# Patient Record
Sex: FEMALE | Race: WHITE | NOT HISPANIC OR LATINO | Employment: OTHER | URBAN - METROPOLITAN AREA
[De-identification: names, ages, dates, MRNs, and addresses within clinical notes are randomized per-mention and may not be internally consistent; named-entity substitution may affect disease eponyms.]

---

## 2017-10-12 ENCOUNTER — CONVERSION ENCOUNTER (OUTPATIENT)
Dept: RADIOLOGY | Facility: IMAGING CENTER | Age: 42
End: 2017-10-12

## 2017-10-27 RX ORDER — ACETAMINOPHEN,DIPHENHYDRAMINE HCL 500; 25 MG/1; MG/1
1 TABLET, FILM COATED ORAL
COMMUNITY
End: 2017-11-03 | Stop reason: HOSPADM

## 2017-10-27 RX ORDER — ACETAMINOPHEN 500 MG
500 TABLET ORAL EVERY 6 HOURS PRN
COMMUNITY
End: 2017-11-03 | Stop reason: HOSPADM

## 2017-10-27 RX ORDER — ALBUTEROL SULFATE 90 UG/1
2 AEROSOL, METERED RESPIRATORY (INHALATION) EVERY 4 HOURS PRN
COMMUNITY
End: 2018-08-06 | Stop reason: SDUPTHER

## 2017-10-27 RX ORDER — ZOLPIDEM TARTRATE 10 MG/1
10 TABLET ORAL
COMMUNITY
End: 2019-01-22 | Stop reason: SDUPTHER

## 2017-10-27 RX ORDER — NAPROXEN 500 MG/1
500 TABLET ORAL 2 TIMES DAILY WITH MEALS
COMMUNITY
End: 2018-07-05 | Stop reason: HOSPADM

## 2017-10-27 RX ORDER — MONTELUKAST SODIUM 10 MG/1
10 TABLET ORAL EVERY EVENING
COMMUNITY
End: 2018-07-17 | Stop reason: SDUPTHER

## 2017-10-27 RX ORDER — SUMATRIPTAN 50 MG/1
100 TABLET, FILM COATED ORAL ONCE AS NEEDED
COMMUNITY
End: 2019-08-08 | Stop reason: SDUPTHER

## 2017-10-27 NOTE — PRE-PROCEDURE INSTRUCTIONS
Pre-Surgery Instructions:   Medication Instructions    acetaminophen (TYLENOL) 500 mg tablet Instructed patient per Anesthesia Guidelines   albuterol (PROVENTIL HFA,VENTOLIN HFA) 90 mcg/act inhaler Instructed patient per Anesthesia Guidelines   conjugated estrogens (PREMARIN) 0 625 mg tablet Instructed patient per Anesthesia Guidelines   diphenhydrAMINE-acetaminophen (TYLENOL PM)  MG TABS Instructed patient per Anesthesia Guidelines   fluticasone-salmeterol (ADVAIR) 500-50 mcg/dose Instructed patient per Anesthesia Guidelines   HYDROcodone-acetaminophen (VICODIN) 7 5-500 MG per tablet Instructed patient per Anesthesia Guidelines   montelukast (SINGULAIR) 10 mg tablet Instructed patient per Anesthesia Guidelines   naproxen (NAPROSYN) 500 mg tablet Instructed patient per Anesthesia Guidelines   SUMAtriptan (IMITREX) 50 mg tablet Instructed patient per Anesthesia Guidelines   zolpidem (AMBIEN) 10 mg tablet Instructed patient per Anesthesia Guidelines  Pt instructed to take advair in am of surgery and to bring her albuterol inhaler   Instructed to stop NSAIDS and all supplements 7 days prior to surgery

## 2017-10-31 ENCOUNTER — ANESTHESIA EVENT (OUTPATIENT)
Dept: PERIOP | Facility: HOSPITAL | Age: 42
End: 2017-10-31
Payer: COMMERCIAL

## 2017-11-01 ENCOUNTER — TRANSCRIBE ORDERS (OUTPATIENT)
Dept: ADMINISTRATIVE | Facility: HOSPITAL | Age: 42
End: 2017-11-01

## 2017-11-01 ENCOUNTER — HOSPITAL ENCOUNTER (OUTPATIENT)
Dept: CT IMAGING | Facility: HOSPITAL | Age: 42
Discharge: HOME | End: 2017-11-01
Payer: COMMERCIAL

## 2017-11-01 DIAGNOSIS — Z01.818 PRE-OP EVALUATION: ICD-10-CM

## 2017-11-01 DIAGNOSIS — M79.673 PAIN OF FOOT, UNSPECIFIED LATERALITY: ICD-10-CM

## 2017-11-01 DIAGNOSIS — Z01.818 PRE-OP EVALUATION: Primary | ICD-10-CM

## 2017-11-01 PROCEDURE — 73700 CT LOWER EXTREMITY W/O DYE: CPT

## 2017-11-03 ENCOUNTER — HOSPITAL ENCOUNTER (OUTPATIENT)
Facility: HOSPITAL | Age: 42
Setting detail: OUTPATIENT SURGERY
Discharge: HOME | End: 2017-11-03
Attending: PODIATRIST | Admitting: PODIATRIST
Payer: COMMERCIAL

## 2017-11-03 ENCOUNTER — APPOINTMENT (OUTPATIENT)
Dept: RADIOLOGY | Facility: HOSPITAL | Age: 42
End: 2017-11-03
Payer: COMMERCIAL

## 2017-11-03 ENCOUNTER — ANESTHESIA (OUTPATIENT)
Dept: PERIOP | Facility: HOSPITAL | Age: 42
End: 2017-11-03
Payer: COMMERCIAL

## 2017-11-03 VITALS
SYSTOLIC BLOOD PRESSURE: 105 MMHG | DIASTOLIC BLOOD PRESSURE: 73 MMHG | BODY MASS INDEX: 28.7 KG/M2 | HEART RATE: 86 BPM | TEMPERATURE: 98.5 F | OXYGEN SATURATION: 97 % | HEIGHT: 61 IN | RESPIRATION RATE: 16 BRPM | WEIGHT: 152 LBS

## 2017-11-03 DIAGNOSIS — M20.12 ACQUIRED HALLUX VALGUS OF LEFT FOOT: ICD-10-CM

## 2017-11-03 DIAGNOSIS — G89.18 POST-OP PAIN: Primary | ICD-10-CM

## 2017-11-03 PROCEDURE — C1769 GUIDE WIRE: HCPCS | Performed by: PODIATRIST

## 2017-11-03 PROCEDURE — 73630 X-RAY EXAM OF FOOT: CPT

## 2017-11-03 PROCEDURE — C1713 ANCHOR/SCREW BN/BN,TIS/BN: HCPCS | Performed by: PODIATRIST

## 2017-11-03 DEVICE — IMPLANTABLE DEVICE
Type: IMPLANTABLE DEVICE | Site: FOOT | Status: NON-FUNCTIONAL
Removed: 2018-07-05

## 2017-11-03 DEVICE — KIT REPAIR LIGAMENT AUG INTERNALBRACE: Type: IMPLANTABLE DEVICE | Site: FOOT | Status: FUNCTIONAL

## 2017-11-03 DEVICE — ANCHOR SUT MINI 2.4 X 8.5MM DISP: Type: IMPLANTABLE DEVICE | Site: FOOT | Status: FUNCTIONAL

## 2017-11-03 RX ORDER — HYDROCODONE BITARTRATE AND ACETAMINOPHEN 5; 325 MG/1; MG/1
1-2 TABLET ORAL EVERY 6 HOURS PRN
Status: DISCONTINUED | OUTPATIENT
Start: 2017-11-03 | End: 2017-11-03 | Stop reason: HOSPADM

## 2017-11-03 RX ORDER — FENTANYL CITRATE 50 UG/ML
INJECTION, SOLUTION INTRAMUSCULAR; INTRAVENOUS AS NEEDED
Status: DISCONTINUED | OUTPATIENT
Start: 2017-11-03 | End: 2017-11-03 | Stop reason: SURG

## 2017-11-03 RX ORDER — CLINDAMYCIN PHOSPHATE 600 MG/50ML
600 INJECTION INTRAVENOUS ONCE
Status: COMPLETED | OUTPATIENT
Start: 2017-11-03 | End: 2017-11-03

## 2017-11-03 RX ORDER — ROPIVACAINE HYDROCHLORIDE 5 MG/ML
INJECTION, SOLUTION EPIDURAL; INFILTRATION; PERINEURAL AS NEEDED
Status: DISCONTINUED | OUTPATIENT
Start: 2017-11-03 | End: 2017-11-03 | Stop reason: SURG

## 2017-11-03 RX ORDER — DEXAMETHASONE SODIUM PHOSPHATE 4 MG/ML
INJECTION, SOLUTION INTRA-ARTICULAR; INTRALESIONAL; INTRAMUSCULAR; INTRAVENOUS; SOFT TISSUE AS NEEDED
Status: DISCONTINUED | OUTPATIENT
Start: 2017-11-03 | End: 2017-11-03 | Stop reason: SURG

## 2017-11-03 RX ORDER — SODIUM CHLORIDE 9 MG/ML
125 INJECTION, SOLUTION INTRAVENOUS CONTINUOUS
Status: DISCONTINUED | OUTPATIENT
Start: 2017-11-03 | End: 2017-11-03 | Stop reason: HOSPADM

## 2017-11-03 RX ORDER — MIDAZOLAM HYDROCHLORIDE 1 MG/ML
INJECTION INTRAMUSCULAR; INTRAVENOUS AS NEEDED
Status: DISCONTINUED | OUTPATIENT
Start: 2017-11-03 | End: 2017-11-03 | Stop reason: SURG

## 2017-11-03 RX ORDER — ONDANSETRON 2 MG/ML
INJECTION INTRAMUSCULAR; INTRAVENOUS AS NEEDED
Status: DISCONTINUED | OUTPATIENT
Start: 2017-11-03 | End: 2017-11-03 | Stop reason: SURG

## 2017-11-03 RX ORDER — ONDANSETRON 2 MG/ML
4 INJECTION INTRAMUSCULAR; INTRAVENOUS ONCE AS NEEDED
Status: DISCONTINUED | OUTPATIENT
Start: 2017-11-03 | End: 2017-11-03 | Stop reason: HOSPADM

## 2017-11-03 RX ORDER — HYDROCODONE BITARTRATE AND ACETAMINOPHEN 7.5; 325 MG/1; MG/1
1 TABLET ORAL EVERY 6 HOURS PRN
Qty: 30 TABLET | Refills: 0 | Status: SHIPPED | OUTPATIENT
Start: 2017-11-03 | End: 2017-11-13

## 2017-11-03 RX ORDER — ASPIRIN 81 MG/1
81 TABLET ORAL 2 TIMES DAILY
Qty: 30 TABLET | Refills: 0 | Status: SHIPPED | OUTPATIENT
Start: 2017-11-03 | End: 2018-06-29

## 2017-11-03 RX ORDER — FENTANYL CITRATE/PF 50 MCG/ML
25 SYRINGE (ML) INJECTION
Status: COMPLETED | OUTPATIENT
Start: 2017-11-03 | End: 2017-11-03

## 2017-11-03 RX ORDER — MAGNESIUM HYDROXIDE 1200 MG/15ML
LIQUID ORAL AS NEEDED
Status: DISCONTINUED | OUTPATIENT
Start: 2017-11-03 | End: 2017-11-03 | Stop reason: HOSPADM

## 2017-11-03 RX ORDER — GLYCOPYRROLATE 0.2 MG/ML
INJECTION INTRAMUSCULAR; INTRAVENOUS AS NEEDED
Status: DISCONTINUED | OUTPATIENT
Start: 2017-11-03 | End: 2017-11-03 | Stop reason: SURG

## 2017-11-03 RX ORDER — PROPOFOL 10 MG/ML
INJECTION, EMULSION INTRAVENOUS AS NEEDED
Status: DISCONTINUED | OUTPATIENT
Start: 2017-11-03 | End: 2017-11-03 | Stop reason: SURG

## 2017-11-03 RX ADMIN — ONDANSETRON HYDROCHLORIDE 4 MG: 2 INJECTION, SOLUTION INTRAVENOUS at 10:41

## 2017-11-03 RX ADMIN — FENTANYL CITRATE 50 MCG: 50 INJECTION, SOLUTION INTRAMUSCULAR; INTRAVENOUS at 08:10

## 2017-11-03 RX ADMIN — MIDAZOLAM HYDROCHLORIDE 4 MG: 1 INJECTION, SOLUTION INTRAMUSCULAR; INTRAVENOUS at 07:24

## 2017-11-03 RX ADMIN — PROPOFOL 200 MG: 10 INJECTION, EMULSION INTRAVENOUS at 07:40

## 2017-11-03 RX ADMIN — FENTANYL CITRATE 25 MCG: 50 INJECTION, SOLUTION INTRAMUSCULAR; INTRAVENOUS at 11:34

## 2017-11-03 RX ADMIN — SODIUM CHLORIDE: 0.9 INJECTION, SOLUTION INTRAVENOUS at 09:48

## 2017-11-03 RX ADMIN — CLINDAMYCIN PHOSPHATE 900 MG: 12 INJECTION, SOLUTION INTRAMUSCULAR; INTRAVENOUS at 07:37

## 2017-11-03 RX ADMIN — FENTANYL CITRATE 100 MCG: 50 INJECTION, SOLUTION INTRAMUSCULAR; INTRAVENOUS at 07:24

## 2017-11-03 RX ADMIN — SODIUM CHLORIDE 125 ML/HR: 0.9 INJECTION, SOLUTION INTRAVENOUS at 06:54

## 2017-11-03 RX ADMIN — DEXAMETHASONE SODIUM PHOSPHATE 4 MG: 4 INJECTION, SOLUTION INTRAMUSCULAR; INTRAVENOUS at 10:41

## 2017-11-03 RX ADMIN — GLYCOPYRROLATE 0.2 MG: 0.2 INJECTION, SOLUTION INTRAMUSCULAR; INTRAVENOUS at 08:29

## 2017-11-03 RX ADMIN — HYDROCODONE BITARTRATE AND ACETAMINOPHEN 2 TABLET: 5; 325 TABLET ORAL at 13:33

## 2017-11-03 RX ADMIN — MIDAZOLAM HYDROCHLORIDE 2 MG: 1 INJECTION, SOLUTION INTRAMUSCULAR; INTRAVENOUS at 07:35

## 2017-11-03 RX ADMIN — FENTANYL CITRATE 25 MCG: 50 INJECTION, SOLUTION INTRAMUSCULAR; INTRAVENOUS at 12:04

## 2017-11-03 RX ADMIN — ROPIVACAINE HYDROCHLORIDE 30 ML: 5 INJECTION, SOLUTION EPIDURAL; INFILTRATION; PERINEURAL at 07:29

## 2017-11-03 RX ADMIN — LIDOCAINE HYDROCHLORIDE 60 MG: 20 INJECTION, SOLUTION INTRAVENOUS at 07:40

## 2017-11-03 RX ADMIN — FENTANYL CITRATE 25 MCG: 50 INJECTION, SOLUTION INTRAMUSCULAR; INTRAVENOUS at 11:42

## 2017-11-03 RX ADMIN — FENTANYL CITRATE 50 MCG: 50 INJECTION, SOLUTION INTRAMUSCULAR; INTRAVENOUS at 08:21

## 2017-11-03 RX ADMIN — FENTANYL CITRATE 25 MCG: 50 INJECTION, SOLUTION INTRAMUSCULAR; INTRAVENOUS at 11:52

## 2017-11-03 NOTE — OP NOTE
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]     * Congenital pes cavus [Q66 7]    Procedure(s) (LRB):  OSTEOTOMY CALCANEUS (Left)  FUSION FIRST MTPJ: BROSTRUM; LATERAL ANKLE STABILIZATION (Left)    Specimen(s):  * No specimens in log *    Materials: Arthrex 6 7 PT screw  Arthrex 4 5 partially threated screw  Arthrex 3 0 screws (x5)  Arthrex MTP plate short    Estimated Blood Loss:   20 mL    Drains: none       Anesthesia Type:   General w/ Popliteal Block    Operative Indications:  Acquired hallux valgus of left foot [M20 12]  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, 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]  Congenital pes cavus [Q66 7]    Operative Findings:  C/w diagnosis  Bone of adequate quality  Complications:   None    Procedure and Technique:  Under mild sedation, the patient was brought into the operating room and placed on the operating room table in the supine position  A pneumatic thigh tourniquet was then placed around the patient's left thigh with ample webril padding  A time out was performed to confirm the correct patient, procedure and site with all parties in agreement  Preoperatively, patient received a popliteal block the left lower extremity  After general anesthesia, The foot and lower leg were then scrubbed, prepped and draped in the usual aseptic manner   An esmarch bandage was utilized to exsangunate the patients foot and the pneumatic ankle tourniquet was then inflated  The esmarch bandage was removed and the foot was placed on the operating room table  Attention was then directed to the left heel where upon the lateral wall of the calcaneus, an incision in an oblique manner was effected that was full-thickness through the skin  Blunt to sharp dissection was performed to the level of the periosteum of the lateral wall of the calcaneus  Bleeders were cauterized as necessary and a neurovascular and tendinous structures were retracted as necessary  The site of the osteotomy was confirmed via c-arm  Utilizing a sagittal saw, a wedge cut was made until the medial cortex was reached  At this time, the wedge of bone was removed and the osteotomy was closed down in order to swing the calcaneus out of varus deformity  Positioning was confirmed via c-arm  Fixation was placed in the form of one 6 7 screw and one 4 5 Partially threaded screws from the posterior inferior calcaneus through the osteotomy  Adequate compression was noted clinically and radiographically  The incisions were irrigated with normal sterile saline and closed in a layered fashion with 3-0 Vicryl and 3-0 nylon      Attention was directed to the left lateral ankle where a curvilinear skin incision was made along the ATFL and midline of the fibula  Any exposed bleeding vessels were cauterized  Retraction was used to protect vital structures  Blunt dissection was continued down through the subcutaneous layers using a hemostat and scissors  Dissection was continued until the retinaculum was identified      An ankle arthrotomy was effected laterally and the capsule and periosteum was sharply reflected exposing the distal fibula and lateral talus  The inferior and anterior portions of the fibula were roughened using a rongeur and rasp in order to provide better attachment for the Brostrom    The ATFL ligament was observed and noted to be attenuated      The decision to augment the instability was made via Arthrex internal brace  Per  protocol the internal brace was anchored to the non-articular aspect of the lateral talus at the ATFL insertion  Two suture tacks were placed into the distal fibula, all were challenged and noted stable  The brostrom was performed and then the fibertape from the lateral talus was then passed from below the capsule outward and with proper tensioning secured via biotenodesis screw to distal fibula   This was then challenged and noted to be stable  The site was then he irrigated with normal sterile saline  The subcutaneous tissues were reapproximated using 3-0 Vicryl  The skin margins were reapproximated using 3-0 nylon in a horizontal mattress fashion  Attention was then directed to dorsomedial aspect of the Left foot where an approximately 6cm dorsolinear incision was made  This incision was deepened to subcutaneous tissue with a sterile 15 blade  All vital neurovascular structures were identified and then retracted, all bleeders were identified and cauterized  The EHL was then identified and retracted laterally  A capsular incision was then made and all capsular and periosteal structures were reflected off of the 1st metatarsal head and the base of the proximal phalanx, all soft tissue attachments were reflected off the 1st metatarsal head and base of the proximal phalanx  Upon visualization of the MTPJ, it was noted that there was prominent osteophyte formation circumferentially around the joint  A cup and cone reamer was then utilized to remove all cartilaginous surfaces from the head of the first metatarsal and the base of the proximal phalanx down to bleeding subchondral bone in the standard fashion  Adequate reduction and position of the joint was visualized and assured using C-arm   Using standard AO technique a lag screw was placed from distal medial aspect on the proximal phalanx to proximal lateral on the first metatarsal head to achieve compression  The length was confirmed on C-arm  A short MTP plate by arthrex was placed on the dorsal aspect of the 1st MTPJ  This was secured utilized 5 screws in standard AO technique  Adequate reduction of the joint was confirmed radiologically and clinically  The wound was cleansed with copious amounts of sterile saline  The EHL tendon was noted to be partially torn at this time and this was reapproximated using 3-0 Ethibond  Capsular closure was then obtained utilizing 4-0 Vicryl  Subcutaneous tissues were then reapproximated utilizing 4-0 Vicryl  Skin closure was then obtained utilizing 4-0 nylon placed in a horizontal mattress type of fashion  The foot was then cleansed and dried  All incisions were then dressed with Xeroform and dry sterile dressing  The thigh tourniquet was then deflated and a prompt hyperemic response was noted to all digits of the left foot  A posterior splint was then reapplied with an adequate Guzman compression dressing underlying    The patient tolerated the procedure and anesthesia well was then transferred to PACU vital signs stable        Patient Disposition:  PACU     SIGNATURE: Fabian Macias  DATE: November 3, 2017  TIME: 11:35 AM

## 2017-11-03 NOTE — ANESTHESIA POSTPROCEDURE EVALUATION
Post-Op Assessment Note      CV Status:  Stable    Mental Status:  Alert and awake    Hydration Status:  Euvolemic    PONV Controlled:  Controlled    Airway Patency:  Patent    Post Op Vitals Reviewed: Yes          Staff: Anesthesiologist           /73 (11/03/17 1200)    Temp 98 5 °F (36 9 °C) (11/03/17 1200)    Pulse 76 (11/03/17 1200)   Resp 20 (11/03/17 1200)    SpO2 95 % (11/03/17 1200)

## 2017-11-03 NOTE — ANESTHESIA PROCEDURE NOTES
Peripheral Block    Patient location during procedure: holding area  Start time: 11/3/2017 7:24 AM  End time: 11/3/2017 7:29 AM  Reason for block: at surgeon's request and post-op pain management  Staffing  Anesthesiologist: China John  Performed: anesthesiologist   Preanesthetic Checklist  Completed: patient identified, site marked, surgical consent, pre-op evaluation, timeout performed, IV checked, risks and benefits discussed and monitors and equipment checked  Peripheral Block  Patient position: prone  Prep: ChloraPrep  Patient monitoring: heart rate, continuous pulse ox and frequent blood pressure checks  Block type: popliteal  Laterality: left  Injection technique: single-shot  Procedures: ultrasound guided and nerve stimulator  ultrasound permanent image saved    Local infiltration: ropivacaine  Infiltration strength: 0 5 %  Dose: 30 mL  Needle  Needle type: short-bevel   Needle gauge: 22 G  Needle length: 10 cm  Needle localization: nerve stimulator and ultrasound guidance  Assessment  Injection assessment: incremental injection, local visualized surrounding nerve on ultrasound, negative aspiration for heme and no paresthesia on injection  patient tolerated the procedure well with no immediate complications

## 2017-11-03 NOTE — PROGRESS NOTES
Pt returned from xray crying c/o xray technician grabbing and raising her leg in the air which was really painful for her    Tavia Wahl Rn clinical coordinator made aware and informed Malia Roque

## 2017-11-03 NOTE — DISCHARGE INSTRUCTIONS
Post-op surgery Instructions    Pain / Swelling   There is expected to be some discomfort, swelling and bruising of the foot  You might see some blood on the bandage  This is not a cause for alarm  However, if there is active or persistent bleeding (blood running out of the bandage while at rest) - call the office at once (or) go to a [facility] and ask them to page the podiatry residents   Apply an ice bag to the top of your ankle for 30 minutes for each waking hour, for the first 72 hours  This should be discontinued when sleeping  This will also work through your cast if you have one  Ice must not leak and wet the dressings  Also, using the ice inappropriately can cause permanent nerve damage   Your foot should be elevated as much as possible for the first 72 hours  The foot should be above heart level  If your foot is below heart level, throbbing and pain will increase  When sleeping, elevation can be accomplished by putting a small hard suitcase between the box spring and mattress at the foot of the bed  Walking and standing will increase pain, throbbing and bleeding   Persistent pain despite elevation and your pain meds can many times be relieved by removing the tight brown compression layer (called the ACE wrap) that is over the white gauze dressing  If you are elevating and taking your pain meds and pain is still severe, remove this brown stretchy layer but leave the gauze intact  Wait 30 minutes  If the pain subsides, reapply the ACE so its not so tight  If pain doesnt get better, call your doctor  Dressings / Casts   Do not remove your surgical dressings - they will be changed at your doctor appointment  Do not allow surgical dressings to get wet  Sponge baths should be used until the sutures are removed  Do not try to keep the foot dry using a garbage bag and tape - this rarely works  If you get your dressings or cast wet - call your doctor immediately     If your cast or dressings feel tight - elevate your foot for 30 minutes  If this doesnt help and you feel tingling or see toe discoloration - call your doctor or go to a [facility] ER and ask them to page the podiatry residents   Do not put things in your cast such as powder, coat hangers to scratch, etc  This can cause skin damage and infections  Infection   If you have a fever at or above 100 degrees, chills, sweats, or see red streaks rising above the dressing or smell odor / see pus (creamy white drainage), call your doctor immediately or go to a [facility] ER and ask them to page the podiatry residents  Constipation   If you have severe constipation after surgery, this can be due to the pain medication  Notify your doctor and special medication will be prescribed to deal with this  Blood Clots   If you had surgery and are in a cast, have an external fixation device, or are non-weightbearing using crutches, a knee scooter, a wheelchair, a walker, or an iWalk device - you need to be on a blood thinner  Your doctor will prescribe one of the regimens below  If you run out of the blood thinner checked off below before you are walking normally on your foot and out of your cast - notify your doctor immediately so you can get a refill  Not doing so can lead to blood clots and serious complications including death     Aspirin 81 mg two times daily  Please call doctor if fall while taking blood thinner    Numbness   It is normal for your foot to be numb until about dinner time  If youve had a popliteal block procedure, you might be numb until the following day  When you start to feel pins and needles in the foot - this means the block is wearing off  That is the appropriate time to take your pain medication  Pain Medication   Do not supplement your pain medication with over the counter drugs, old leftover pain medications, or extra Tylenol   You must discuss any additional medications with your doctor prior to taking them for pain  Driving   No driving is allowed without discussion with the doctor  Ambulation   Nonweightbearing to surgical foot   If given a flat, stiff shoe / darco wedge shoe, Do not walk at all without it   Use a device (cane, walker, crutches) to take some weight off of the foot when walking   If instructed not to put weight on the surgical foot, use the following:  o Crutches     o Putting weight on the foot will lead to complications

## 2017-11-03 NOTE — ANESTHESIA PREPROCEDURE EVALUATION
Review of Systems/Medical History  Patient summary reviewed        Cardiovascular   Pulmonary  Smoker , Asthma: , ,        GI/Hepatic    GERD well controlled,        Single kidney,        Endo/Other  Arthritis     GYN       Hematology   Musculoskeletal  Back pain ,   Comment: Hx THR      Neurology      Comment: Glaucoma   Psychology   Anxiety, Depression ,            Physical Exam    Airway    Mallampati score: III  TM Distance: >3 FB  Neck ROM: full     Dental   Comment: Bonded right upper front tooth,     Cardiovascular  Rhythm: regular, Rate: normal, Cardiovascular exam normal    Pulmonary  Pulmonary exam normal Breath sounds clear to auscultation,     Other Findings        Anesthesia Plan  ASA Score- 2       Anesthesia Type- general and regional with ASA Monitors  Additional Monitors:   Airway Plan:           Induction- intravenous  Informed Consent- Anesthetic plan and risks discussed with patient

## 2017-11-11 ENCOUNTER — HOSPITAL ENCOUNTER (EMERGENCY)
Facility: HOSPITAL | Age: 42
Discharge: HOME | End: 2017-11-11
Attending: EMERGENCY MEDICINE
Payer: COMMERCIAL

## 2017-11-11 ENCOUNTER — APPOINTMENT (EMERGENCY)
Dept: RADIOLOGY | Facility: HOSPITAL | Age: 42
End: 2017-11-11
Payer: COMMERCIAL

## 2017-11-11 VITALS
TEMPERATURE: 98.1 F | RESPIRATION RATE: 21 BRPM | OXYGEN SATURATION: 98 % | DIASTOLIC BLOOD PRESSURE: 58 MMHG | SYSTOLIC BLOOD PRESSURE: 115 MMHG | HEART RATE: 73 BPM | WEIGHT: 150 LBS

## 2017-11-11 DIAGNOSIS — G89.18 ACUTE POST-OPERATIVE PAIN: ICD-10-CM

## 2017-11-11 DIAGNOSIS — M79.672 FOOT PAIN, LEFT: Primary | ICD-10-CM

## 2017-11-11 PROCEDURE — 73630 X-RAY EXAM OF FOOT: CPT

## 2017-11-11 PROCEDURE — 99283 EMERGENCY DEPT VISIT LOW MDM: CPT

## 2017-11-11 RX ORDER — OXYCODONE HYDROCHLORIDE 5 MG/1
5-15 TABLET ORAL EVERY 6 HOURS PRN
Qty: 30 TABLET | Refills: 0 | Status: SHIPPED | OUTPATIENT
Start: 2017-11-11 | End: 2018-07-05 | Stop reason: HOSPADM

## 2017-11-11 RX ORDER — OXYCODONE HYDROCHLORIDE 5 MG/1
5 TABLET ORAL ONCE
Status: COMPLETED | OUTPATIENT
Start: 2017-11-11 | End: 2017-11-11

## 2017-11-11 RX ORDER — ACETAMINOPHEN 325 MG/1
325 TABLET ORAL ONCE
Status: COMPLETED | OUTPATIENT
Start: 2017-11-11 | End: 2017-11-11

## 2017-11-11 RX ADMIN — ACETAMINOPHEN 325 MG: 325 TABLET ORAL at 23:11

## 2017-11-11 RX ADMIN — OXYCODONE HYDROCHLORIDE 5 MG: 5 TABLET ORAL at 23:11

## 2017-11-12 NOTE — ED ATTENDING ATTESTATION
Rocky Zuniga DO, saw and evaluated the patient  I have discussed the patient with the resident/non-physician practitioner and agree with the resident's/non-physician practitioner's findings, Plan of Care, and MDM as documented in the resident's/non-physician practitioner's note, except where noted  All available labs and Radiology studies were reviewed  At this point I agree with the current assessment done in the Emergency Department  I have conducted an independent evaluation of this patient a history and physical is as follows:  48-year-old female presents for evaluation of severe left foot pain that occurred several hours prior to arrival   Patient states that she got her foot caught on a piece of furniture and believes that she strain part of her foot in her postoperative cast   The patient recently had podiatric surgery and contacted Dr Muriel Zafar advise the patient come to the emergency department for further evaluation  The patient took 2 Norco tablets without relief prior to arrival    The patient was neurovascularly intact on my examination  I discussed the case with the podiatry resident who saw and evaluated the patient in the emergency department  Reviewed the x-ray together and did not note any new fracture  The foot was redressed by Podiatry and the pain management protocol was changed consultation with Dr Muriel Zafar to begin the patient on oxycodone and Tylenol  The patient was improved prior to discharge from the emergency department  The patient will follow up with Podiatry as an outpatient        Critical Care Time  CritCare Time

## 2017-11-12 NOTE — DISCHARGE INSTRUCTIONS
Take oxycodone 5mg 1-3 tablets every 4-6 hours and tylenol extra strength 1 tablet (500mg) every 4-6 hours as needed for severe pain

## 2017-11-12 NOTE — ED PROVIDER NOTES
History  Chief Complaint   Patient presents with    Leg Pain     Patient presents complaining of left sided leg pain, reports "it feels like my leg is in a vice "  Presents in cast, reports had procedure 1 week prior to fuse great toe, reconstruct "heel bone", and repair ligaments  Patient of "Dr Bhavani Chambers", reports "he's calling the resident"  Sensation intact in toes, able to move toes very minimally  Pink skin  25-year-old female presents to the ER with left foot pain for 1 day  Patient is 1 week post reconstructive surgery of left heel and reports that foot slipped in cast last night placing foot in a flexed position primarily on 5th toe for six hours until cast was rewrapped by   Since incident patient has been in a severe pain uncontrolled by current pain regiment prescribed post surgery  Denies shortness of breath, chest pain, claudication, decrease sensation or tingling in foot  History provided by:  Patient   used: No    Leg Pain   Location:  Foot  Time since incident:  1 day  Foot location:  L foot  Pain details:     Quality:  Sharp and aching    Radiates to:  Does not radiate    Severity:  Severe    Onset quality:  Sudden    Duration:  1 day    Timing:  Constant  Chronicity:  New (Pain in addition to generalized discomfort from heel reconstruction )  Relieved by:  Nothing  Worsened by: Activity  Ineffective treatments: Pain Norco    Associated symptoms: no back pain, no fever, no swelling and no tingling    Risk factors: concern for non-accidental trauma        None       Past Medical History:   Diagnosis Date    Asthma     Glaucoma     Renal disorder     Reports single kidney       Past Surgical History:   Procedure Laterality Date    LEG SURGERY         History reviewed  No pertinent family history  I have reviewed and agree with the history as documented      Social History   Substance Use Topics    Smoking status: Current Every Day Smoker tablet 5 mg (5 mg Oral Given 11/11/17 2311)   acetaminophen (TYLENOL) tablet 325 mg (325 mg Oral Given 11/11/17 2311)       Diagnostic Studies  Results Reviewed     None                 XR foot 3+ views LEFT   ED Interpretation by Morgan Sanford (11/11 2311)   No fracture or osteomyelitis to 5th toe or metatarsal                   Procedures  Procedures       Phone Contacts  ED Phone Contact    ED Course  ED Course as of Nov 11 2357   Sat Nov 11, 2017   0458 Plan of care discussed with podiatry  Goal to achieve pain control with oxycodone and Tylenol prior to discharge and continue pain regimen outpatient  MDM  Number of Diagnoses or Management Options  Acute post-operative pain: new and requires workup  Foot pain, left: new and requires workup  Diagnosis management comments: Pain reassessment of left prior to discharge 5/10  The patient (and any family present) verbalized understanding of the discharge instructions and warnings that would necessitate return to the Emergency Department  Gave verbal in addition to written discharge instructions  Specifically highlighted areas of special concern regarding the written and verbal discharge instructions and return precautions  All questions were answered prior to discharge  Amount and/or Complexity of Data Reviewed  Tests in the radiology section of CPT®: ordered and reviewed  Discuss the patient with other providers: yes    Risk of Complications, Morbidity, and/or Mortality  Presenting problems: high  Diagnostic procedures: high  Management options: moderate  General comments: Acute post operative pain likely due to accidental misposition of foot in posterior splint for 6 hours    Foot pain, left- 5th toe and metatarsal   - left foot x-ray: no fractures or osteomyelitis noted to Left 5th toe and metatarsal   - pain management provided  - consulted podiatry and assessed in ED  f/u scheduled       Dr Lee Ann Castaneda agrees with treatment plan and medications prescribed for patient  Patient Progress  Patient progress: stable    CritCare Time    Disposition  Final diagnoses: Foot pain, left   Acute post-operative pain     Time reflects when diagnosis was documented in both MDM as applicable and the Disposition within this note     Time User Action Codes Description Comment    11/11/2017  9:42 PM Carlo Escobar Add [V44 715] Foot pain, left     11/11/2017 11:41 PM Ranjit Wellso Add [G89 18] Acute post-operative pain       ED Disposition     ED Disposition Condition Comment    Discharge  Miguel Garner discharge to home/self care  Condition at discharge: Stable        Follow-up Information     Follow up With Specialties Details Why Contact Info    Monster Arenas DPM Podiatry Go to For further evaluation, As needed [address]  [facility] [place] [address] [address] [facility]  [phone]          Discharge Medication List as of 11/11/2017 11:43 PM      START taking these medications    Details   oxyCODONE (ROXICODONE) 5 mg immediate release tablet Take 1-3 tablets by mouth every 6 (six) hours as needed for moderate pain Max Daily Amount: 60 mg, Starting Sat 11/11/2017, Print           No discharge procedures on file      ED Provider  Electronically Signed by           Elon Merlin, [address]  11/11/17 5990

## 2017-11-12 NOTE — CONSULTS
Consult - Podiatry   Vikavarun Garner 36 y o  female MRN: [id number]  Unit/Bed#: ED 30 Encounter: [id number]    Assessment/Plan     Assessment:  1  Left foot pain secondary to putting pressure for prolonged period of time   2  S/p Left foot calcaneal osteotomy, lateral ankle stabilization and 1st MTPJ fusion on 11/4/17 by Dr Inez Cisneros:  - patient is afebrile with VSS, appears to be non toxic  - pain on palpation of the left 5th toe and 5th metatarsal however no bruising noted to the skin and no abnormality noted on the xray  Pain is likely soft tissue origin from having pressure to the area for prolonged period of time   - Xray images reviewed and no abnormalities were noted by me; official reading pending   - surgical site assessed which appears to be stable without any acute signs of infection   - dressings re-applied with xeroform, gauze, webril, posterior splint and ACE wrap   - will switch pain regimen to oxycodone 5mg tablet every 4-6 hours and tylenol extra strength 1 tablet (500mg) every 4-6 hours as needed for severe pain (off note, patient has taken percocet in the past with minimal reaction of hives however denies any respiratory or BP problems with it)  - discussed this plan with Dr Vinicio Nichole and Dr Myriam Zafar     History of Present Illness     HPI:  Ned Arechiga is a 36 y o  female who presents with left foot pain since 2AM last night secondary to having her left foot caught up in her bed metal part  She states her foot was stuck in varus position with pressure on the lateral aspect of the left foot for approximately 6 hours  She states there was no one home to help her to get the foot out of that position  She complains of 14/10 pain to the left lateral foot and 5th toe   Also, she showed me bruising on her right thigh which she states happened secondary to X-ray technician trying to get images of her foot post operatively by manipulating her foot as she was not able to move her leg secondary to popliteal block  However she does states that she tends to easily bruise  Denies any nausea, vomiting, fever, chills, shortness of breath or chest pain  She states she took 1 norco pill at 5pm then another one at 6pm which did not help her with pain  Her  states he unwrapped the top layers of the dressing with no improvement in pain  Inpatient consult to Podiatry  Consult performed by: Lara Dai ordered by: Lisa Toussaint        Review of Systems   Constitutional: Negative  HENT: Negative  Eyes: Negative  Respiratory: Negative  Cardiovascular: LLE swelling   Gastrointestinal: Negative  Musculoskeletal: Left foot pain   Skin:surgical closure   Neurological: Numbness and burning from time to time   Psych: negative  Historical Information   Past Medical History:   Diagnosis Date    Asthma     Glaucoma     Renal disorder     Reports single kidney     Past Surgical History:   Procedure Laterality Date    LEG SURGERY       Social History   History   Alcohol Use No     History   Drug Use No     History   Smoking Status    Current Every Day Smoker    Packs/day: 0 25   Smokeless Tobacco    Never Used     Family History: History reviewed  No pertinent family history      Meds/Allergies     (Not in a hospital admission)  Allergies   Allergen Reactions    Morphine And Related     Penicillins        Objective   First Vitals:   Blood Pressure: 119/56 (11/11/17 2058)  Pulse: 77 (11/11/17 2058)  Temperature: 98 1 °F (36 7 °C) (11/11/17 2058)  Temp Source: Oral (11/11/17 6234)  Respirations: 20 (11/11/17 0446)  Weight - Scale: 68 kg (150 lb) (11/11/17 2058)  SpO2: 98 % (11/11/17 2058)    Current Vitals:   Blood Pressure: 119/56 (11/11/17 2058)  Pulse: 77 (11/11/17 2058)  Temperature: 98 1 °F (36 7 °C) (11/11/17 2058)  Temp Source: Oral (11/11/17 5413)  Respirations: 20 (11/11/17 8773)  Weight - Scale: 68 kg (150 lb) (11/11/17 2058)  SpO2: 98 % (11/11/17 2058)        /56 Pulse 77   Temp 98 1 °F (36 7 °C) (Oral)   Resp 20   Wt 68 kg (150 lb)   SpO2 98%      General Appearance:    Alert, cooperative, no distress   Head:    Normocephalic, without obvious abnormality, atraumatic   Eyes:    PERRL, conjunctiva/corneas clear, EOM's intact        Nose:   Moist mucous membranes   Neck:   Supple, symmetrical, trachea midline   Back:     Symmetric   Lungs:     Respirations unlabored   Heart:    Regular rate and rhythm, S1 and S2 normal, no murmur, rub   or gallop   Abdomen:     Soft, non-tender   Extremities:   Motor function diminished to the digits on the left foot likely secondary to edema  Pain noted on palpation of the left 5th toe and along 5th metatarsal  No pain on squeeze of the calf  Pulses:   DP/PT 2/4 b/l  Cap refill WNL  Moderate edema to the LLE secondary to surgery  Skin:   Surgical sites are well coapted with sutures intact  No signs of necrosis or dehiscence or infection  Neurologic:   Gross sensation is intact  Left lateral heel                                                  Left foot and leg             Lab Results:   No visits with results within 1 Day(s) from this visit  Latest known visit with results is:   No results found for any previous visit  Invalid input(s): LABAEARO            Imaging: I have personally reviewed pertinent films in PACS  EKG, Pathology, and Other Studies: I have personally reviewed pertinent reports        Code Status: No Order  Advance Directive and Living Will:      Power of :    POLST:

## 2018-01-03 ENCOUNTER — APPOINTMENT (OUTPATIENT)
Dept: PHYSICAL THERAPY | Facility: REHABILITATION | Age: 43
End: 2018-01-03
Payer: COMMERCIAL

## 2018-01-03 PROCEDURE — G8979 MOBILITY GOAL STATUS: HCPCS

## 2018-01-03 PROCEDURE — 97162 PT EVAL MOD COMPLEX 30 MIN: CPT

## 2018-01-03 PROCEDURE — G8978 MOBILITY CURRENT STATUS: HCPCS

## 2018-01-05 ENCOUNTER — APPOINTMENT (OUTPATIENT)
Dept: PHYSICAL THERAPY | Facility: REHABILITATION | Age: 43
End: 2018-01-05
Payer: COMMERCIAL

## 2018-01-08 ENCOUNTER — APPOINTMENT (OUTPATIENT)
Dept: PHYSICAL THERAPY | Facility: REHABILITATION | Age: 43
End: 2018-01-08
Payer: COMMERCIAL

## 2018-01-10 ENCOUNTER — APPOINTMENT (OUTPATIENT)
Dept: PHYSICAL THERAPY | Facility: REHABILITATION | Age: 43
End: 2018-01-10
Payer: COMMERCIAL

## 2018-04-05 ENCOUNTER — TRANSCRIBE ORDERS (OUTPATIENT)
Dept: ADMINISTRATIVE | Facility: HOSPITAL | Age: 43
End: 2018-04-05

## 2018-04-05 DIAGNOSIS — M79.672 LEFT FOOT PAIN: Primary | ICD-10-CM

## 2018-05-10 ENCOUNTER — TRANSCRIBE ORDERS (OUTPATIENT)
Dept: RADIOLOGY | Facility: HOSPITAL | Age: 43
End: 2018-05-10

## 2018-05-10 ENCOUNTER — HOSPITAL ENCOUNTER (OUTPATIENT)
Dept: RADIOLOGY | Facility: HOSPITAL | Age: 43
Discharge: HOME | End: 2018-05-10
Attending: PODIATRIST

## 2018-05-10 DIAGNOSIS — M79.672 LEFT FOOT PAIN: ICD-10-CM

## 2018-05-11 ENCOUNTER — HOSPITAL ENCOUNTER (OUTPATIENT)
Dept: RADIOLOGY | Facility: HOSPITAL | Age: 43
Discharge: HOME | End: 2018-05-11
Attending: PODIATRIST
Payer: COMMERCIAL

## 2018-05-11 DIAGNOSIS — M79.672 LEFT FOOT PAIN: ICD-10-CM

## 2018-05-11 PROCEDURE — 20605 DRAIN/INJ JOINT/BURSA W/O US: CPT

## 2018-05-11 PROCEDURE — 77012 CT SCAN FOR NEEDLE BIOPSY: CPT

## 2018-05-11 RX ORDER — BUPIVACAINE HYDROCHLORIDE 2.5 MG/ML
2 INJECTION, SOLUTION EPIDURAL; INFILTRATION; INTRACAUDAL ONCE
Status: COMPLETED | OUTPATIENT
Start: 2018-05-11 | End: 2018-05-11

## 2018-05-11 RX ORDER — LIDOCAINE HYDROCHLORIDE 10 MG/ML
10 INJECTION, SOLUTION INFILTRATION; PERINEURAL ONCE
Status: COMPLETED | OUTPATIENT
Start: 2018-05-11 | End: 2018-05-11

## 2018-05-11 RX ORDER — METHYLPREDNISOLONE ACETATE 80 MG/ML
80 INJECTION, SUSPENSION INTRA-ARTICULAR; INTRALESIONAL; INTRAMUSCULAR; SOFT TISSUE ONCE
Status: COMPLETED | OUTPATIENT
Start: 2018-05-11 | End: 2018-05-11

## 2018-05-11 RX ADMIN — LIDOCAINE HYDROCHLORIDE 10 ML: 10 INJECTION, SOLUTION INFILTRATION; PERINEURAL at 13:48

## 2018-05-11 RX ADMIN — METHYLPREDNISOLONE ACETATE 80 MG: 80 INJECTION, SUSPENSION INTRA-ARTICULAR; INTRALESIONAL; INTRAMUSCULAR; SOFT TISSUE at 13:48

## 2018-05-11 RX ADMIN — BUPIVACAINE HYDROCHLORIDE 2 ML: 2.5 INJECTION, SOLUTION EPIDURAL; INFILTRATION; INTRACAUDAL at 13:47

## 2018-06-21 LAB
ANION GAP SERPL CALCULATED.3IONS-SCNC: 6 MMOL/L (ref 5–14)
BUN SERPL-MCNC: 17 MG/DL (ref 5–25)
CALCIUM SERPL-MCNC: 10.2 MG/DL (ref 8.4–10.2)
CHLORIDE SERPL-SCNC: 105 MEQ/L (ref 97–108)
CO2 SERPL-SCNC: 30 MMOL/L (ref 22–30)
CREATINE, SERUM (HISTORICAL): 0.85 MG/DL (ref 0.6–1.2)
DEPRECATED RDW RBC AUTO: 13.6 %
EGFR (HISTORICAL): >60 ML/MIN/1.73 M2
GLUCOSE SERPL-MCNC: 93 MG/DL (ref 70–99)
HCT VFR BLD AUTO: 40.7 % (ref 36–46)
HGB BLD-MCNC: 13.8 G/DL (ref 12–16)
MCH RBC QN AUTO: 31.3 PG (ref 26–34)
MCHC RBC AUTO-ENTMCNC: 33.8 % (ref 31–36)
MCV RBC AUTO: 93 FL (ref 80–100)
PLATELET # BLD AUTO: 219 K/MCL (ref 150–450)
POTASSIUM SERPL-SCNC: 5.7 MEQ/L (ref 3.6–5)
PT - I.N. RATIO (HISTORICAL): 0.9 RATIO (INR) (ref 0.89–1.1)
PT, PATIENT (HISTORICAL): 9.6 SEC (ref 9.5–11.6)
RBC # BLD AUTO: 4.39 M/MCL (ref 4–5.2)
SODIUM SERPL-SCNC: 141 MEQ/L (ref 137–147)
WBC # BLD AUTO: 6.6 K/MCL (ref 4.5–11)

## 2018-06-26 ENCOUNTER — TELEPHONE (OUTPATIENT)
Dept: FAMILY MEDICINE CLINIC | Facility: CLINIC | Age: 43
End: 2018-06-26

## 2018-06-26 DIAGNOSIS — R22.42 LUMP OF SKIN OF LEFT LOWER EXTREMITY: Primary | ICD-10-CM

## 2018-06-26 RX ORDER — BUDESONIDE AND FORMOTEROL FUMARATE DIHYDRATE 160; 4.5 UG/1; UG/1
2 AEROSOL RESPIRATORY (INHALATION) 2 TIMES DAILY
COMMUNITY
End: 2018-08-27

## 2018-06-26 RX ORDER — DIAZEPAM 5 MG/1
5 TABLET ORAL DAILY
COMMUNITY
End: 2018-06-29

## 2018-06-26 RX ORDER — SERTRALINE HYDROCHLORIDE 25 MG/1
25 TABLET, FILM COATED ORAL DAILY
COMMUNITY
End: 2018-08-22 | Stop reason: SDUPTHER

## 2018-06-26 RX ORDER — OXYCODONE HYDROCHLORIDE AND ACETAMINOPHEN 5; 325 MG/1; MG/1
1 TABLET ORAL EVERY 6 HOURS PRN
COMMUNITY
End: 2018-07-05 | Stop reason: HOSPADM

## 2018-06-26 RX ORDER — IBUPROFEN 600 MG/1
TABLET ORAL 3 TIMES DAILY
Status: ON HOLD | COMMUNITY
End: 2018-07-02 | Stop reason: SDUPTHER

## 2018-06-26 RX ORDER — ALBUTEROL SULFATE 2.5 MG/3ML
2.5 SOLUTION RESPIRATORY (INHALATION) EVERY 6 HOURS PRN
COMMUNITY
End: 2019-01-22 | Stop reason: SDUPTHER

## 2018-06-26 NOTE — TELEPHONE ENCOUNTER
U/S was put in and pt is aware Mri was denied and to get the U/S done  Pt understands and will go either Thursday or Friday   Her Surgery for her foot is Monday

## 2018-06-29 ENCOUNTER — ANESTHESIA EVENT (OUTPATIENT)
Dept: PERIOP | Facility: HOSPITAL | Age: 43
End: 2018-06-29
Payer: COMMERCIAL

## 2018-06-29 RX ORDER — GABAPENTIN 100 MG/1
300 CAPSULE ORAL 3 TIMES DAILY
COMMUNITY
End: 2018-11-22 | Stop reason: SDUPTHER

## 2018-06-29 NOTE — PRE-PROCEDURE INSTRUCTIONS
Pre-op Showering Instructions for Surgery Patients    Before your operation, you play an important role in decreasing your risk for infection by washing with special antiseptic soap  This is an effective way to reduce bacteria on the skin which may help to prevent infections at the surgical site  Please read the following directions in advance  1  In the week before your operation, purchase a 4 ounce bottle of antiseptic soap containing chlorhexidine gluconate (CHG)  4%  Some brand names include: Aplicare®, Endure, and Hibiclens®  The cost is usually less than $5 00   For your convenience, the [employer] carries the soap   It may also be available at your doctors office or pre-admission testing center, and at most retail pharmacies   If you are allergic or sensitive to soaps containing CHG, please let your doctor know so another antiseptic can be suggested   CHG antiseptic soap is for external use only  2  The day before your operation, follow these instructions carefully to get ready   Please clean linens (sheets) on your bed; you should sleep on clean sheets after your evening shower   Get clean towels and washcloth ready - you need enough for 2 showers   Set aside clean underwear, pajamas, and clothing to wear after the showers     Reminders:   DO NOT use any other soap or body rinse on your skin during or after the antiseptic showers   DO NOT use lotion, powder, deodorant, or perfume/aftershave of any kind on your skin after your antiseptic shower   DO NOT shave any body parts in the 24 hours/day before your operation   DO NOT get the antiseptic soap in your eyes, ears, nose, mouth, or vaginal area    3  You will need to shower the night before AND the morning of your surgery  Shower 1:   The first evening before the operation, take the first shower   First, shampoo your hair with regular shampoo and rinse it completely before you use the antiseptic soap   After washing and rinsing your hair, rinse your body   Next, use a clean washcloth to apply the antiseptic soap and wash your body from the neck down to your toes using ½ bottle of the antiseptic soap  You will use the other ½ bottle for the second shower   Clean the area where your incision will be; lather this area well for about 2 minutes   If you are having head or neck surgery, wash areas with the antiseptic soap   Rinse yourself completely with running water   Use a clean towel to dry off   Wear clean underwear and clothing/pajamas  Shower 2   The morning of your operation, take the second shower following the same steps as Shower 1 using the second ½ of the bottle of antiseptic soap   Use clean cloths and towels to wash and dry yourself   Wear clean underwear and clothing    Pre-Surgery Instructions:   Medication Instructions    albuterol (2 5 mg/3 mL) 0 083 % nebulizer solution Instructed patient per Anesthesia Guidelines   budesonide-formoterol (SYMBICORT) 160-4 5 mcg/act inhaler Instructed patient per Anesthesia Guidelines   Ergocalciferol (VITAMIN D2 PO) Instructed patient per Anesthesia Guidelines   gabapentin (NEURONTIN) 100 mg capsule Instructed patient per Anesthesia Guidelines   ibuprofen (MOTRIN) 600 mg tablet Instructed patient per Anesthesia Guidelines   Ipratropium-Albuterol (COMBIVENT IN) Instructed patient per Anesthesia Guidelines   Ipratropium-Albuterol (COMBIVENT RESPIMAT IN) Instructed patient per Anesthesia Guidelines   oxyCODONE-acetaminophen (PERCOCET) 5-325 mg per tablet Instructed patient per Anesthesia Guidelines   sertraline (ZOLOFT) 25 mg tablet Instructed patient per Anesthesia Guidelines   [DISCONTINUED] diazepam (VALIUM) 5 mg tablet Instructed patient per Anesthesia Guidelines

## 2018-06-29 NOTE — ANESTHESIA PREPROCEDURE EVALUATION
Review of Systems/Medical History  Patient summary reviewed  Chart reviewed      Cardiovascular  Exercise tolerance (METS): >4,     Pulmonary  Smoker cigarette smoker  , Tobacco cessation counseling given Cumulative Pack Years: 21, Pneumonia: 0 , Asthma , well controlled/ stable Last rescue: today Asthma type of rescue: daily inhaler, No shortness of breath, No sleep apnea ,        GI/Hepatic  Negative GI/hepatic ROS          Negative  ROS        Endo/Other    Obesity    GYN  , Prior pregnancy/OB history ,   Hysterectomy,        Hematology  Negative hematology ROS      Musculoskeletal  Negative musculoskeletal ROS Back pain (no acute changes) , lumbar pain,   Arthritis     Neurology    Headaches,    Psychology   Anxiety, Depression , being treated for depression,   Chronic pain,            Physical Exam    Airway    Mallampati score: II  TM Distance: >3 FB  Neck ROM: full     Dental   implants,     Cardiovascular  Rhythm: regular, Rate: normal,     Pulmonary  Pulmonary exam normal     Other Findings        Anesthesia Plan  ASA Score- 2     Anesthesia Type- general with ASA Monitors  Additional Monitors:   Airway Plan: LMA  Plan Factors- Patient instructed to abstain from smoking on day of procedure  Patient smoked on day of surgery  Induction- intravenous  Postoperative Plan- Plan for postoperative opioid use  Informed Consent- Anesthetic plan and risks discussed with patient and spouse  I personally reviewed this patient with the CRNA  Discussed and agreed on the Anesthesia Plan with the CRNA  Bernard Ruffin

## 2018-07-02 ENCOUNTER — HOSPITAL ENCOUNTER (OUTPATIENT)
Facility: HOSPITAL | Age: 43
Setting detail: OUTPATIENT SURGERY
Discharge: HOME | End: 2018-07-02
Attending: PODIATRIST | Admitting: PODIATRIST
Payer: COMMERCIAL

## 2018-07-02 ENCOUNTER — ANESTHESIA (OUTPATIENT)
Dept: PERIOP | Facility: HOSPITAL | Age: 43
End: 2018-07-02
Payer: COMMERCIAL

## 2018-07-02 ENCOUNTER — HOSPITAL ENCOUNTER (OUTPATIENT)
Dept: RADIOLOGY | Facility: HOSPITAL | Age: 43
Setting detail: OUTPATIENT SURGERY
Discharge: HOME | End: 2018-07-02
Payer: COMMERCIAL

## 2018-07-02 VITALS
OXYGEN SATURATION: 98 % | HEART RATE: 53 BPM | RESPIRATION RATE: 18 BRPM | WEIGHT: 154 LBS | SYSTOLIC BLOOD PRESSURE: 115 MMHG | DIASTOLIC BLOOD PRESSURE: 59 MMHG | TEMPERATURE: 97.7 F | HEIGHT: 61 IN | BODY MASS INDEX: 29.07 KG/M2

## 2018-07-02 DIAGNOSIS — R52 PAIN: Primary | ICD-10-CM

## 2018-07-02 DIAGNOSIS — Z98.890 STATUS POST HARDWARE REMOVAL: ICD-10-CM

## 2018-07-02 RX ORDER — ONDANSETRON 2 MG/ML
4 INJECTION INTRAMUSCULAR; INTRAVENOUS ONCE AS NEEDED
Status: DISCONTINUED | OUTPATIENT
Start: 2018-07-02 | End: 2018-07-02 | Stop reason: HOSPADM

## 2018-07-02 RX ORDER — DIPHENHYDRAMINE HYDROCHLORIDE 50 MG/ML
12.5 INJECTION INTRAMUSCULAR; INTRAVENOUS ONCE AS NEEDED
Status: DISCONTINUED | OUTPATIENT
Start: 2018-07-02 | End: 2018-07-02 | Stop reason: HOSPADM

## 2018-07-02 RX ORDER — IBUPROFEN 600 MG/1
600 TABLET ORAL 3 TIMES DAILY
Qty: 45 TABLET | Refills: 0 | Status: SHIPPED | OUTPATIENT
Start: 2018-07-02 | End: 2018-08-08 | Stop reason: SDUPTHER

## 2018-07-02 RX ORDER — MIDAZOLAM HYDROCHLORIDE 1 MG/ML
INJECTION INTRAMUSCULAR; INTRAVENOUS AS NEEDED
Status: DISCONTINUED | OUTPATIENT
Start: 2018-07-02 | End: 2018-07-02 | Stop reason: SURG

## 2018-07-02 RX ORDER — FENTANYL CITRATE/PF 50 MCG/ML
25 SYRINGE (ML) INJECTION
Status: DISCONTINUED | OUTPATIENT
Start: 2018-07-02 | End: 2018-07-02 | Stop reason: HOSPADM

## 2018-07-02 RX ORDER — SODIUM CHLORIDE, SODIUM GLUCONATE, SODIUM ACETATE, POTASSIUM CHLORIDE, MAGNESIUM CHLORIDE, SODIUM PHOSPHATE, DIBASIC, AND POTASSIUM PHOSPHATE .53; .5; .37; .037; .03; .012; .00082 G/100ML; G/100ML; G/100ML; G/100ML; G/100ML; G/100ML; G/100ML
INJECTION, SOLUTION INTRAVENOUS CONTINUOUS PRN
Status: DISCONTINUED | OUTPATIENT
Start: 2018-07-02 | End: 2018-07-02 | Stop reason: SURG

## 2018-07-02 RX ORDER — CLINDAMYCIN PHOSPHATE 600 MG/50ML
600 INJECTION INTRAVENOUS ONCE
Status: COMPLETED | OUTPATIENT
Start: 2018-07-02 | End: 2018-07-02

## 2018-07-02 RX ORDER — FENTANYL CITRATE 50 UG/ML
INJECTION, SOLUTION INTRAMUSCULAR; INTRAVENOUS AS NEEDED
Status: DISCONTINUED | OUTPATIENT
Start: 2018-07-02 | End: 2018-07-02 | Stop reason: SURG

## 2018-07-02 RX ORDER — PROPOFOL 10 MG/ML
INJECTION, EMULSION INTRAVENOUS AS NEEDED
Status: DISCONTINUED | OUTPATIENT
Start: 2018-07-02 | End: 2018-07-02 | Stop reason: SURG

## 2018-07-02 RX ORDER — SODIUM CHLORIDE, SODIUM GLUCONATE, SODIUM ACETATE, POTASSIUM CHLORIDE AND MAGNESIUM CHLORIDE 526; 502; 368; 37; 30 MG/100ML; MG/100ML; MG/100ML; MG/100ML; MG/100ML
75 INJECTION, SOLUTION INTRAVENOUS CONTINUOUS
Status: DISCONTINUED | OUTPATIENT
Start: 2018-07-02 | End: 2018-07-02 | Stop reason: HOSPADM

## 2018-07-02 RX ORDER — DEXAMETHASONE SODIUM PHOSPHATE 4 MG/ML
4 INJECTION, SOLUTION INTRA-ARTICULAR; INTRALESIONAL; INTRAMUSCULAR; INTRAVENOUS; SOFT TISSUE ONCE
Status: DISCONTINUED | OUTPATIENT
Start: 2018-07-02 | End: 2018-07-02 | Stop reason: HOSPADM

## 2018-07-02 RX ORDER — LIDOCAINE HYDROCHLORIDE 10 MG/ML
INJECTION, SOLUTION INFILTRATION; PERINEURAL AS NEEDED
Status: DISCONTINUED | OUTPATIENT
Start: 2018-07-02 | End: 2018-07-02 | Stop reason: SURG

## 2018-07-02 RX ADMIN — LIDOCAINE HYDROCHLORIDE 50 MG: 10 INJECTION, SOLUTION INFILTRATION; PERINEURAL at 15:42

## 2018-07-02 RX ADMIN — SODIUM CHLORIDE, SODIUM GLUCONATE, SODIUM ACETATE, POTASSIUM CHLORIDE, MAGNESIUM CHLORIDE, SODIUM PHOSPHATE, DIBASIC, AND POTASSIUM PHOSPHATE: .53; .5; .37; .037; .03; .012; .00082 INJECTION, SOLUTION INTRAVENOUS at 15:20

## 2018-07-02 RX ADMIN — PROPOFOL 200 MG: 10 INJECTION, EMULSION INTRAVENOUS at 15:42

## 2018-07-02 RX ADMIN — MIDAZOLAM HYDROCHLORIDE 2 MG: 1 INJECTION, SOLUTION INTRAMUSCULAR; INTRAVENOUS at 15:35

## 2018-07-02 RX ADMIN — CLINDAMYCIN IN 5 PERCENT DEXTROSE 600 MG: 12 INJECTION, SOLUTION INTRAVENOUS at 15:30

## 2018-07-02 RX ADMIN — SODIUM CHLORIDE, SODIUM GLUCONATE, SODIUM ACETATE, POTASSIUM CHLORIDE AND MAGNESIUM CHLORIDE 75 ML/HR: 526; 502; 368; 37; 30 INJECTION, SOLUTION INTRAVENOUS at 14:56

## 2018-07-02 RX ADMIN — FENTANYL CITRATE 100 MCG: 50 INJECTION INTRAMUSCULAR; INTRAVENOUS at 15:35

## 2018-07-02 RX ADMIN — DEXAMETHASONE SODIUM PHOSPHATE 4 MG: 10 INJECTION INTRAMUSCULAR; INTRAVENOUS at 15:45

## 2018-07-02 NOTE — ANESTHESIA PROCEDURE NOTES
Peripheral Block    Patient location during procedure: holding area  Start time: 7/2/2018 3:19 PM  Removal time: 7/2/2018 3:26 PM  Reason for block: at surgeon's request and post-op pain management  Staffing  Anesthesiologist: Della Jordan  Resident/CRNA: Edu Kong  Performed: anesthesiologist and resident/CRNA   Preanesthetic Checklist  Completed: patient identified, site marked, surgical consent, pre-op evaluation, timeout performed, IV checked, risks and benefits discussed and monitors and equipment checked  Peripheral Block  Patient position: prone  Prep: ChloraPrep  Patient monitoring: heart rate, cardiac monitor, continuous pulse ox and frequent blood pressure checks  Block type: popliteal  Laterality: left  Injection technique: single-shot  Procedures: nerve stimulator  Local infiltration: ropivacaine  Infiltration strength: 0 5 %  Dose: 30 mL  Needle  Needle type: Stimuplex   Needle gauge: 22 G  Needle length: 10 cm  Needle localization: anatomical landmarks and nerve stimulator  Needle insertion depth: 3 cm  Assessment  Injection assessment: incremental injection, negative aspiration for heme and no paresthesia on injection  Paresthesia pain: none  Heart rate change: no  Slow fractionated injection: yes  Post-procedure:  sterile dressing applied  patient tolerated the procedure well with no immediate complications

## 2018-07-02 NOTE — ANESTHESIA POSTPROCEDURE EVALUATION
Post-Op Assessment Note      CV Status:  Stable    Mental Status:  Alert    Hydration Status:  Stable    PONV Controlled:  Controlled    Airway Patency:  Patent    Post Op Vitals Reviewed: No          Staff: AnesthesiologistCHERELLE           /66 (07/02/18 1612)    Temp (!) 97 4 °F (36 3 °C) (07/02/18 1612)    Pulse 60 (07/02/18 1612)   Resp 20 (07/02/18 1612)    SpO2 100 % (07/02/18 1612)

## 2018-07-02 NOTE — ANESTHESIA POSTPROCEDURE EVALUATION
Post-Op Assessment Note      CV Status:  Stable    Mental Status:  Alert and awake    Hydration Status:  Euvolemic    PONV Controlled:  Controlled    Airway Patency:  Patent    Post Op Vitals Reviewed: Yes          Staff: CRNA           BP (P) 110/66 (07/02/18 1612)    Temp (!) (P) 97 4 °F (36 3 °C) (07/02/18 1612)    Pulse (P) 60 (07/02/18 1612)   Resp (P) 20 (07/02/18 1612)    SpO2 (P) 100 % (07/02/18 1612)

## 2018-07-02 NOTE — ANESTHESIA POSTPROCEDURE EVALUATION
Post-Op Assessment Note      CV Status:  Stable    Mental Status:  Alert    Hydration Status:  Stable    PONV Controlled:  Controlled    Airway Patency:  Patent    Post Op Vitals Reviewed: No          Staff: Anesthesiologist, CRNA           BP      Temp     Pulse     Resp      SpO2

## 2018-07-05 ENCOUNTER — ANESTHESIA EVENT (OUTPATIENT)
Dept: PERIOP | Facility: HOSPITAL | Age: 43
End: 2018-07-05
Payer: COMMERCIAL

## 2018-07-05 ENCOUNTER — APPOINTMENT (OUTPATIENT)
Dept: RADIOLOGY | Facility: HOSPITAL | Age: 43
End: 2018-07-05
Payer: COMMERCIAL

## 2018-07-05 ENCOUNTER — HOSPITAL ENCOUNTER (OUTPATIENT)
Facility: HOSPITAL | Age: 43
Setting detail: OUTPATIENT SURGERY
Discharge: HOME | End: 2018-07-05
Attending: PODIATRIST | Admitting: PODIATRIST
Payer: COMMERCIAL

## 2018-07-05 ENCOUNTER — HOSPITAL ENCOUNTER (OUTPATIENT)
Dept: RADIOLOGY | Facility: HOSPITAL | Age: 43
Setting detail: OUTPATIENT SURGERY
Discharge: HOME | End: 2018-07-05
Payer: COMMERCIAL

## 2018-07-05 ENCOUNTER — ANESTHESIA (OUTPATIENT)
Dept: PERIOP | Facility: HOSPITAL | Age: 43
End: 2018-07-05
Payer: COMMERCIAL

## 2018-07-05 VITALS
RESPIRATION RATE: 18 BRPM | HEART RATE: 61 BPM | OXYGEN SATURATION: 98 % | TEMPERATURE: 98.6 F | DIASTOLIC BLOOD PRESSURE: 73 MMHG | SYSTOLIC BLOOD PRESSURE: 135 MMHG

## 2018-07-05 DIAGNOSIS — Z98.890 S/P FOOT SURGERY, LEFT: Primary | ICD-10-CM

## 2018-07-05 DIAGNOSIS — Z98.890 STATUS POST HARDWARE REMOVAL: ICD-10-CM

## 2018-07-05 DIAGNOSIS — Z98.890 S/P HARDWARE REMOVAL: ICD-10-CM

## 2018-07-05 PROCEDURE — 73620 X-RAY EXAM OF FOOT: CPT

## 2018-07-05 PROCEDURE — 73630 X-RAY EXAM OF FOOT: CPT

## 2018-07-05 RX ORDER — FENTANYL CITRATE/PF 50 MCG/ML
25 SYRINGE (ML) INJECTION
Status: DISCONTINUED | OUTPATIENT
Start: 2018-07-05 | End: 2018-07-05 | Stop reason: HOSPADM

## 2018-07-05 RX ORDER — ROCURONIUM BROMIDE 10 MG/ML
INJECTION, SOLUTION INTRAVENOUS AS NEEDED
Status: DISCONTINUED | OUTPATIENT
Start: 2018-07-05 | End: 2018-07-05 | Stop reason: SURG

## 2018-07-05 RX ORDER — OXYCODONE HYDROCHLORIDE AND ACETAMINOPHEN 5; 325 MG/1; MG/1
TABLET ORAL
Status: DISPENSED
Start: 2018-07-05 | End: 2018-07-06

## 2018-07-05 RX ORDER — ROPIVACAINE HYDROCHLORIDE 2 MG/ML
INJECTION, SOLUTION EPIDURAL; INFILTRATION; PERINEURAL AS NEEDED
Status: DISCONTINUED | OUTPATIENT
Start: 2018-07-05 | End: 2018-07-05 | Stop reason: SURG

## 2018-07-05 RX ORDER — DEXAMETHASONE SODIUM PHOSPHATE 4 MG/ML
INJECTION, SOLUTION INTRA-ARTICULAR; INTRALESIONAL; INTRAMUSCULAR; INTRAVENOUS; SOFT TISSUE AS NEEDED
Status: DISCONTINUED | OUTPATIENT
Start: 2018-07-05 | End: 2018-07-05 | Stop reason: HOSPADM

## 2018-07-05 RX ORDER — BUPIVACAINE HYDROCHLORIDE 2.5 MG/ML
INJECTION, SOLUTION INFILTRATION; PERINEURAL AS NEEDED
Status: DISCONTINUED | OUTPATIENT
Start: 2018-07-05 | End: 2018-07-05 | Stop reason: HOSPADM

## 2018-07-05 RX ORDER — PROMETHAZINE HYDROCHLORIDE 25 MG/ML
12.5 INJECTION, SOLUTION INTRAMUSCULAR; INTRAVENOUS ONCE AS NEEDED
Status: DISCONTINUED | OUTPATIENT
Start: 2018-07-05 | End: 2018-07-05 | Stop reason: HOSPADM

## 2018-07-05 RX ORDER — EPHEDRINE SULFATE 50 MG/ML
INJECTION, SOLUTION INTRAVENOUS AS NEEDED
Status: DISCONTINUED | OUTPATIENT
Start: 2018-07-05 | End: 2018-07-05 | Stop reason: SURG

## 2018-07-05 RX ORDER — FENTANYL CITRATE 50 UG/ML
INJECTION, SOLUTION INTRAMUSCULAR; INTRAVENOUS AS NEEDED
Status: DISCONTINUED | OUTPATIENT
Start: 2018-07-05 | End: 2018-07-05 | Stop reason: SURG

## 2018-07-05 RX ORDER — OXYCODONE HYDROCHLORIDE AND ACETAMINOPHEN 5; 325 MG/1; MG/1
1 TABLET ORAL EVERY 4 HOURS PRN
Qty: 35 TABLET | Refills: 0 | Status: SHIPPED | OUTPATIENT
Start: 2018-07-05 | End: 2018-07-15

## 2018-07-05 RX ORDER — MEPERIDINE HYDROCHLORIDE 25 MG/ML
12.5 INJECTION INTRAMUSCULAR; INTRAVENOUS; SUBCUTANEOUS
Status: DISCONTINUED | OUTPATIENT
Start: 2018-07-05 | End: 2018-07-05 | Stop reason: HOSPADM

## 2018-07-05 RX ORDER — SODIUM CHLORIDE, SODIUM GLUCONATE, SODIUM ACETATE, POTASSIUM CHLORIDE, MAGNESIUM CHLORIDE, SODIUM PHOSPHATE, DIBASIC, AND POTASSIUM PHOSPHATE .53; .5; .37; .037; .03; .012; .00082 G/100ML; G/100ML; G/100ML; G/100ML; G/100ML; G/100ML; G/100ML
50 INJECTION, SOLUTION INTRAVENOUS CONTINUOUS
Status: DISCONTINUED | OUTPATIENT
Start: 2018-07-05 | End: 2018-07-06 | Stop reason: HOSPADM

## 2018-07-05 RX ORDER — MIDAZOLAM HYDROCHLORIDE 1 MG/ML
INJECTION INTRAMUSCULAR; INTRAVENOUS AS NEEDED
Status: DISCONTINUED | OUTPATIENT
Start: 2018-07-05 | End: 2018-07-05 | Stop reason: SURG

## 2018-07-05 RX ORDER — ONDANSETRON 2 MG/ML
4 INJECTION INTRAMUSCULAR; INTRAVENOUS ONCE AS NEEDED
Status: DISCONTINUED | OUTPATIENT
Start: 2018-07-05 | End: 2018-07-05 | Stop reason: HOSPADM

## 2018-07-05 RX ORDER — MAGNESIUM HYDROXIDE 1200 MG/15ML
LIQUID ORAL AS NEEDED
Status: DISCONTINUED | OUTPATIENT
Start: 2018-07-05 | End: 2018-07-05 | Stop reason: HOSPADM

## 2018-07-05 RX ORDER — PROPOFOL 10 MG/ML
INJECTION, EMULSION INTRAVENOUS AS NEEDED
Status: DISCONTINUED | OUTPATIENT
Start: 2018-07-05 | End: 2018-07-05 | Stop reason: SURG

## 2018-07-05 RX ORDER — CLINDAMYCIN PHOSPHATE 600 MG/50ML
600 INJECTION INTRAVENOUS ONCE
Status: COMPLETED | OUTPATIENT
Start: 2018-07-05 | End: 2018-07-05

## 2018-07-05 RX ORDER — OXYCODONE HYDROCHLORIDE AND ACETAMINOPHEN 5; 325 MG/1; MG/1
1 TABLET ORAL EVERY 4 HOURS PRN
Status: DISCONTINUED | OUTPATIENT
Start: 2018-07-05 | End: 2018-07-06 | Stop reason: HOSPADM

## 2018-07-05 RX ADMIN — PROPOFOL 50 MG: 10 INJECTION, EMULSION INTRAVENOUS at 19:50

## 2018-07-05 RX ADMIN — EPHEDRINE SULFATE 10 MG: 50 INJECTION INTRAMUSCULAR; INTRAVENOUS; SUBCUTANEOUS at 18:25

## 2018-07-05 RX ADMIN — HYDROMORPHONE HYDROCHLORIDE 0.5 MG: 1 INJECTION, SOLUTION INTRAMUSCULAR; INTRAVENOUS; SUBCUTANEOUS at 20:43

## 2018-07-05 RX ADMIN — SODIUM CHLORIDE, SODIUM GLUCONATE, SODIUM ACETATE, POTASSIUM CHLORIDE, MAGNESIUM CHLORIDE, SODIUM PHOSPHATE, DIBASIC, AND POTASSIUM PHOSPHATE: .53; .5; .37; .037; .03; .012; .00082 INJECTION, SOLUTION INTRAVENOUS at 17:29

## 2018-07-05 RX ADMIN — MIDAZOLAM HYDROCHLORIDE 2 MG: 1 INJECTION, SOLUTION INTRAMUSCULAR; INTRAVENOUS at 18:10

## 2018-07-05 RX ADMIN — PROPOFOL 50 MG: 10 INJECTION, EMULSION INTRAVENOUS at 19:35

## 2018-07-05 RX ADMIN — MIDAZOLAM HYDROCHLORIDE 2 MG: 1 INJECTION, SOLUTION INTRAMUSCULAR; INTRAVENOUS at 17:25

## 2018-07-05 RX ADMIN — HYDROMORPHONE HYDROCHLORIDE 0.5 MG: 1 INJECTION, SOLUTION INTRAMUSCULAR; INTRAVENOUS; SUBCUTANEOUS at 20:54

## 2018-07-05 RX ADMIN — ROPIVACAINE HYDROCHLORIDE 40 ML: 2 INJECTION, SOLUTION EPIDURAL; INFILTRATION at 17:34

## 2018-07-05 RX ADMIN — ROCURONIUM BROMIDE 40 MG: 10 INJECTION, SOLUTION INTRAVENOUS at 18:10

## 2018-07-05 RX ADMIN — FENTANYL CITRATE 50 MCG: 50 INJECTION INTRAMUSCULAR; INTRAVENOUS at 17:34

## 2018-07-05 RX ADMIN — FENTANYL CITRATE 50 MCG: 50 INJECTION INTRAMUSCULAR; INTRAVENOUS at 17:32

## 2018-07-05 RX ADMIN — CLINDAMYCIN IN 5 PERCENT DEXTROSE 600 MG: 12 INJECTION, SOLUTION INTRAVENOUS at 18:20

## 2018-07-05 RX ADMIN — PROPOFOL 150 MG: 10 INJECTION, EMULSION INTRAVENOUS at 18:10

## 2018-07-05 RX ADMIN — OXYCODONE HYDROCHLORIDE AND ACETAMINOPHEN 1 TABLET: 5; 325 TABLET ORAL at 21:13

## 2018-07-05 NOTE — ANESTHESIA PROCEDURE NOTES
Peripheral Block    Patient location during procedure: pre-op  Start time: 7/5/2018 5:30 PM  Reason for block: at surgeon's request and post-op pain management  Staffing  Anesthesiologist: Jesu Ray  Preanesthetic Checklist  Completed: patient identified, site marked, surgical consent, pre-op evaluation, timeout performed, IV checked, risks and benefits discussed and monitors and equipment checked  Peripheral Block  Patient position: prone  Prep: ChloraPrep  Patient monitoring: heart rate, continuous pulse ox and frequent blood pressure checks  Block type: popliteal  Laterality: left  Injection technique: single-shot  Procedures: nerve stimulator  Local infiltration: ropivacaine  Infiltration strength: 0 2 %  Needle  Needle type: Stimuplex   Needle gauge: 21 G  Needle length: 10 cm  Needle localization: anatomical landmarks and nerve stimulator  Assessment  Injection assessment: incremental injection  Heart rate change: no  Post-procedure:  site cleaned  patient tolerated the procedure well with no immediate complications

## 2018-07-06 NOTE — PROGRESS NOTES
Phone call to Dr Kedar Curran, clarified discharge prescription instructions, asa 81 mg to be taken two times daily   Pt and spouse informed of same

## 2018-07-06 NOTE — DISCHARGE INSTRUCTIONS
Dr Corinne Urena Instructions    1  Take your prescribed medication as directed such as aspirin 81mg twice a day for prevention of blood clots  2  Upon arrival at home, lie down and elevate your surgical foot on 2 pillows  3  Remain quiet, off your feet as much as possible, for the first 24-48 hours  This is when your feet first swell and may become painful  After 48 hours you may begin limited walking following these restrictions:   Nonweightbearinbg to surgical foot with crutches  4  Drink large quantities of water and citrus fruit juice  Consume no alcohol  Continue a well-balanced diet  5  Report any unusual discomfort or fever to this office  6  A limited amount of discomfort and swelling is to be expected  In some cases the skin may take on a bruised appearance  The surgical solution that was applied to your foot prior to the operation is dark in color and the operation site may appear to be oozing when it actually is not  7  A slight amount of blood is to be expected, and is no cause for alarm  Do not remove the dressings  If there is active bleeding and if the bleeding persists, add additional gauze to the bandage, apply direct pressure, elevate your feet and call this office  8  Do not get the dressings wet  As regular bathing may be inconvenient, sponge baths are recommended  9  When anesthesia wears off and if any discomfort should be present, apply an ice pack directly over the operated area for 15 minute intervals for several hours or until the pain leaves  (USE IN EXCESS OF 15 MINUTES COULD CAUSE FROSTBITE)  Do not use hot water bags or electric pads  A convenient icepack can be made by placing ice cubes in a plastic bag and covering this with a towel  10  If necessary, take a mild laxative before retiring  11  Non weight bearing to your left foot at all times with use of crutches   12  Having performed the operation, we are interested in a prompt recovery   Please cooperate by following the above instructions  13  Please call to confirm your post-op appointment or call with any other questions

## 2018-07-06 NOTE — ADDENDUM NOTE
Addendum  created 07/06/18 0064 by Richard Molina MD    Anesthesia Event edited, Anesthesia Intra Flowsheets edited

## 2018-07-06 NOTE — OP NOTE
OPERATIVE REPORT  PATIENT NAME: Fide Garner    :  1975  MRN: [id number]  Pt Location: * No operating room entered *    SURGERY DATE: 2018    Surgeon(s) and Role:     * Finesse Read DPM - Primary     * Sandra Sal DPM - Assisting    Preop Diagnosis:  1  Painful retained hardware to the left foot   2  Lateral ankle pain     Post op Diagnosis:   Same       Procedure(s) (LRB):  LEFT PERONEAL TENDON DEBRIDMENT  (Left)  LEFT CALCANEAL REMOVAL OF HARDWARE (Left)  LEFT BIG TOE REMOVAL OF HARDWARE (Left)    Specimen(s):  * No specimens in log *    Estimated Blood Loss:   Minimal    Drains: none    Anesthesia Type:   General with popliteal block     Operative Indications:  1  Painful retained hardware to the left foot   2  Lateral ankle pain     Operative Findings:  Extensive synovitis to the peroneal tendons  Well fused 1st metatarsal phalangeal joint with no signs of non union  Complications:   None    Procedure and Technique:  Patient received a popliteal block to the left lower extremity in preoperative area by anesthesia department  Under mild sedation, the patient was brought into the operating room and placed on the operating room table in the lateral decubitous position  After intubation, a pneumatic thigh tourniquet was then placed around the patient's left thigh with ample webril padding  A time out was performed to confirm the correct patient, procedure and site with all parties in agreement  The foot was then scrubbed, prepped and draped in the usual aseptic manner  An esmarch bandage was utilized to exsangunate the patients left lower extremity and the pneumatic thigh tourniquet was then inflated  The esmarch bandage was removed and the lower extremity was placed on the operating room table  Attention was then directed to the posterior calcaneus/heel  C-arm was used to identify the 2 screws, then k-wire was placed through the two cannulated screws   Subsequently these screws were removed with the correct screw   Then surgical site was irrigated and closed with 2 0 nylon in horizontal mattress and simple interrupted technique  Next attention was then directed to the left foot 1st MTPJ  Plate and screws were visualized under the C-arm  Then incision was made along the scarring of the previously healed incision site with 15 blade  This incision was deepened via sharp and blunt dissection to the capsule level  Then incision was made through the capsule with care taken as to retract the tendon laterally  Subsequently 4 screws and a plate was removed  Underneath the plate it was noted that 1st metatarsophalangeal joint is well healed without any signs of nonunion  Subsequently a lag screw was removed via stab incision to the medial hallux  All incision sites were irrigated with copious amounts of sterile saline  Then deep closure was obtained with 4-0 Vicryl  Skin was closed with 4 0 nylon in a running horizontal technique  Final x-ray of the left foot was taken, no further hardware was remaining to the left foot  Attention was then directed to the lateral aspect the left ankle  Incision was made along the course of peroneal tendons of approximately 6 cm long  This incision was deepened via blunt and sharp dissection  Bleeders were cauterized as needed  Next peroneal retinaculum was transected and tagged with 2 0 Vicryl  The 2 peroneal tendons were identified  No tear was present along the peroneal tendons  However there was extensive synovitis present  All the synovitis along the tendon course was removed  The most distal aspect of the muscle belly was removed from the tendon and cauterized as needed  Surgical site was irrigated with sterile saline  Peroneal retinaculum was repaired with 2 0 Vicryl  Then the fascia was closed with 3 0 Monocryl in a running technique  Subcutaneous closure was obtained with 4 0 Monocryl    Skin was closed with 4 0 nylon in horizontal mattress suture technique  Tourniquet deflated at this time  Normal hyperemic response noted to all the digits  Surgical sites were dressed with Xeroform dry sterile dressing  Then left lower extremity was well padded with Webril, Ace wrap then posterior splint was applied in 90° and secured with an Ace wrap  Patient tolerated procedure and anesthesia well and was transported to the PACU with vital signs stable      Patient Disposition:  PACU  and hemodynamically stable    SIGNATURE: Kiki Esquivel DPM  DATE: July 5, 2018  TIME: 8:29 PM

## 2018-07-06 NOTE — ANESTHESIA POSTPROCEDURE EVALUATION
Post-Op Assessment Note      CV Status:  Stable    Mental Status:  Alert    Hydration Status:  Stable    PONV Controlled:  Controlled    Airway Patency:  Patent    Post Op Vitals Reviewed: Yes          Staff: Anesthesiologist           /71 (07/05/18 2020)    Temp (!) 97 4 °F (36 3 °C) (07/05/18 2020)    Pulse 60 (07/05/18 2020)   Resp 13 (07/05/18 2020)    SpO2 100 % (07/05/18 2020)

## 2018-07-06 NOTE — DISCHARGE SUMMARY
Discharge Summary Outpatient Procedure Podiatry -   Maria T Juno Garner 39 y o  female MRN: [id number]  Unit/Bed#: OR POOL Encounter: [id number]    Admission Date: 7/5/2018     Admitting Diagnosis: Pain in left foot [M79 672]    Discharge Diagnosis: same    Procedures Performed: LEFT PERONEAL TENDON DEBRIDMENT : 20100 (CPT®)  LEFT CALCANEAL REMOVAL OF HARDWARE:   LEFT BIG TOE REMOVAL OF HARDWARE:     Complications: none    Condition at Discharge: stable    Discharge instructions/Information to patient and family:   See after visit summary for information provided to patient and family  Provisions for Follow-Up Care/Important appointments:  See after visit summary for information related to follow-up care and any pertinent home health orders  Discharge Medications:  See after visit summary for reconciled discharge medications provided to patient and family

## 2018-07-09 DIAGNOSIS — G47.00 INSOMNIA, UNSPECIFIED TYPE: Primary | ICD-10-CM

## 2018-07-09 RX ORDER — ZOLPIDEM TARTRATE 10 MG/1
TABLET ORAL
COMMUNITY
Start: 2015-11-02 | End: 2018-07-09 | Stop reason: SDUPTHER

## 2018-07-09 RX ORDER — ZOLPIDEM TARTRATE 10 MG/1
10 TABLET ORAL
Qty: 30 TABLET | Refills: 0 | Status: SHIPPED | OUTPATIENT
Start: 2018-07-09 | End: 2018-08-07 | Stop reason: SDUPTHER

## 2018-07-17 DIAGNOSIS — Z88.9 MULTIPLE ALLERGIES: Primary | ICD-10-CM

## 2018-07-17 RX ORDER — MONTELUKAST SODIUM 10 MG/1
10 TABLET ORAL EVERY EVENING
Qty: 30 TABLET | Refills: 3 | Status: SHIPPED | OUTPATIENT
Start: 2018-07-17 | End: 2018-11-22 | Stop reason: SDUPTHER

## 2018-08-06 DIAGNOSIS — J45.909 UNCOMPLICATED ASTHMA, UNSPECIFIED ASTHMA SEVERITY, UNSPECIFIED WHETHER PERSISTENT: Primary | ICD-10-CM

## 2018-08-06 RX ORDER — ALBUTEROL SULFATE 90 UG/1
2 AEROSOL, METERED RESPIRATORY (INHALATION) EVERY 4 HOURS PRN
Qty: 1 INHALER | Refills: 1 | Status: SHIPPED | OUTPATIENT
Start: 2018-08-06 | End: 2018-09-17 | Stop reason: SDUPTHER

## 2018-08-07 DIAGNOSIS — G47.00 INSOMNIA, UNSPECIFIED TYPE: ICD-10-CM

## 2018-08-07 NOTE — TELEPHONE ENCOUNTER
PT WOULD LIKE REFILLS FOR HER (AMBIEN 10MG) SHE CAN'T WAIT UNTIL HER APPT  ON 08/23/18  [facility]

## 2018-08-08 DIAGNOSIS — R52 PAIN: ICD-10-CM

## 2018-08-08 RX ORDER — IBUPROFEN 600 MG/1
600 TABLET ORAL EVERY 8 HOURS PRN
Qty: 45 TABLET | Refills: 0 | Status: SHIPPED | OUTPATIENT
Start: 2018-08-08 | End: 2018-08-27 | Stop reason: SDUPTHER

## 2018-08-10 ENCOUNTER — TELEPHONE (OUTPATIENT)
Dept: FAMILY MEDICINE CLINIC | Facility: CLINIC | Age: 43
End: 2018-08-10

## 2018-08-10 NOTE — TELEPHONE ENCOUNTER
Pt called stating that she called a couple days ago for her Sally Esters to be sent in to [facility] and it was not there  I called the pharmacy and called her ambien in  She has appt 8/22/18 but she is going on vacation

## 2018-08-22 DIAGNOSIS — F33.0 MILD EPISODE OF RECURRENT MAJOR DEPRESSIVE DISORDER (HCC): Primary | ICD-10-CM

## 2018-08-22 RX ORDER — SERTRALINE HYDROCHLORIDE 25 MG/1
25 TABLET, FILM COATED ORAL DAILY
Qty: 90 TABLET | Refills: 0 | Status: SHIPPED | OUTPATIENT
Start: 2018-08-22 | End: 2018-11-19 | Stop reason: SDUPTHER

## 2018-08-22 RX ORDER — ZOLPIDEM TARTRATE 10 MG/1
10 TABLET ORAL
Qty: 30 TABLET | Refills: 0 | Status: SHIPPED | OUTPATIENT
Start: 2018-08-22 | End: 2018-09-18

## 2018-08-27 ENCOUNTER — OFFICE VISIT (OUTPATIENT)
Dept: FAMILY MEDICINE CLINIC | Facility: CLINIC | Age: 43
End: 2018-08-27
Payer: COMMERCIAL

## 2018-08-27 VITALS
TEMPERATURE: 97.2 F | WEIGHT: 162 LBS | HEIGHT: 61 IN | HEART RATE: 76 BPM | DIASTOLIC BLOOD PRESSURE: 80 MMHG | SYSTOLIC BLOOD PRESSURE: 128 MMHG | BODY MASS INDEX: 30.58 KG/M2 | RESPIRATION RATE: 16 BRPM

## 2018-08-27 DIAGNOSIS — F51.01 PRIMARY INSOMNIA: ICD-10-CM

## 2018-08-27 DIAGNOSIS — R21 RASH: Primary | ICD-10-CM

## 2018-08-27 DIAGNOSIS — R22.42 LUMP OF SKIN OF LEFT LOWER EXTREMITY: ICD-10-CM

## 2018-08-27 DIAGNOSIS — R52 PAIN: ICD-10-CM

## 2018-08-27 PROCEDURE — 3008F BODY MASS INDEX DOCD: CPT | Performed by: FAMILY MEDICINE

## 2018-08-27 PROCEDURE — 99214 OFFICE O/P EST MOD 30 MIN: CPT | Performed by: FAMILY MEDICINE

## 2018-08-27 RX ORDER — TRIAMCINOLONE ACETONIDE 5 MG/G
CREAM TOPICAL 2 TIMES DAILY
Qty: 30 G | Refills: 3 | Status: SHIPPED | OUTPATIENT
Start: 2018-08-27 | End: 2019-08-08

## 2018-08-27 NOTE — PROGRESS NOTES
Assessment/Plan:    No problem-specific Assessment & Plan notes found for this encounter  Diagnoses and all orders for this visit:    Rash  Comments:  seems secondary to the cast, but now getting better  Orders:  -     triamcinolone (KENALOG) 0 5 % cream; Apply topically 2 (two) times a day    Primary insomnia  Comments:  Renee Luna is not working but cannot give her any thing else as she is allergic to benzo    Lump of skin of left lower extremity  Comments:  MRI is denied , so will do the U/s   Orders:  -     US extremity soft tissue; Future          Subjective:      Patient ID: Socorro Cruz is a 39 y o  female  HPI    The following portions of the patient's history were reviewed and updated as appropriate:   She  has a past medical history of Anxiety; Arthritis; Asthma; Bleb; Chronic kidney disease; Chronic pain; Chronic pain disorder; Depression; Endometriosis; Glaucoma; Glaucoma; H/O carpal tunnel syndrome; Headache; Insomnia; Migraines; Osteoarthritis; Polypoid cystitis; Renal disorder; and Right knee injury  She   Patient Active Problem List    Diagnosis Date Noted    Primary insomnia 08/27/2018    Rash 08/27/2018     She  reports that she has been smoking Cigarettes  She has a 30 00 pack-year smoking history  She has never used smokeless tobacco  She reports that she does not drink alcohol or use drugs    Current Outpatient Prescriptions   Medication Sig Dispense Refill    albuterol (2 5 mg/3 mL) 0 083 % nebulizer solution Take 2 5 mg by nebulization every 6 (six) hours as needed for wheezing or shortness of breath      albuterol (PROVENTIL HFA,VENTOLIN HFA) 90 mcg/act inhaler Inhale 2 puffs every 4 (four) hours as needed for shortness of breath 1 Inhaler 1    ALPRAZolam (XANAX) 2 MG tablet Take by mouth      aspirin 81 MG tablet Take 1 tablet (81 mg total) by mouth daily 30 tablet 0    conjugated estrogens (PREMARIN) 0 625 mg tablet Take 0 625 mg by mouth every evening Take daily for 21 days then do not take for 7 days   cyclobenzaprine (FLEXERIL) 10 mg tablet cyclobenzaprine 10 mg tablet      diazepam (VALIUM) 5 mg tablet every 24 hours      diclofenac sodium (VOLTAREN) 1 % diclofenac 1 % topical gel      ergocalciferol (VITAMIN D2) 50,000 units Take 50,000 Units by mouth once a week  5    fluticasone-salmeterol (ADVAIR) 500-50 mcg/dose Inhale 1 puff every morning      gabapentin (NEURONTIN) 100 mg capsule Take 300 mg by mouth 3 (three) times a day      ibuprofen (MOTRIN) 600 mg tablet Take 1 tablet (600 mg total) by mouth every 8 (eight) hours as needed for mild pain 45 tablet 0    Ipratropium-Albuterol (COMBIVENT IN) Inhale daily      Ipratropium-Albuterol (COMBIVENT RESPIMAT IN) Inhale every 6 (six) hours as needed      methocarbamol (ROBAXIN) 750 mg tablet methocarbamol 750 mg tablet      montelukast (SINGULAIR) 10 mg tablet Take 1 tablet (10 mg total) by mouth every evening 30 tablet 3    naproxen (EC NAPROSYN) 500 MG EC tablet one with food twice a day for pain      Omeprazole 20 MG TBEC omeprazole 20 mg capsule,delayed release      oxyCODONE-acetaminophen (PERCOCET) 5-325 mg per tablet every 6 (six) hours      sertraline (ZOLOFT) 25 mg tablet Take 1 tablet (25 mg total) by mouth daily 90 tablet 0    SUMAtriptan (IMITREX) 50 mg tablet Take 100 mg by mouth once as needed for migraine      traMADol (ULTRAM) 50 mg tablet tramadol 50 mg tablet      triamcinolone (KENALOG) 0 5 % cream Apply topically 2 (two) times a day 30 g 3    zolpidem (AMBIEN) 10 mg tablet Take 10 mg by mouth daily at bedtime as needed for sleep      zolpidem (AMBIEN) 10 mg tablet Take 1 tablet (10 mg total) by mouth daily at bedtime as needed for sleep 30 tablet 0     No current facility-administered medications for this visit        Current Outpatient Prescriptions on File Prior to Visit   Medication Sig    albuterol (2 5 mg/3 mL) 0 083 % nebulizer solution Take 2 5 mg by nebulization every 6 (six) hours as needed for wheezing or shortness of breath    albuterol (PROVENTIL HFA,VENTOLIN HFA) 90 mcg/act inhaler Inhale 2 puffs every 4 (four) hours as needed for shortness of breath    ALPRAZolam (XANAX) 2 MG tablet Take by mouth    aspirin 81 MG tablet Take 1 tablet (81 mg total) by mouth daily    conjugated estrogens (PREMARIN) 0 625 mg tablet Take 0 625 mg by mouth every evening Take daily for 21 days then do not take for 7 days      cyclobenzaprine (FLEXERIL) 10 mg tablet cyclobenzaprine 10 mg tablet    diazepam (VALIUM) 5 mg tablet every 24 hours    diclofenac sodium (VOLTAREN) 1 % diclofenac 1 % topical gel    ergocalciferol (VITAMIN D2) 50,000 units Take 50,000 Units by mouth once a week    fluticasone-salmeterol (ADVAIR) 500-50 mcg/dose Inhale 1 puff every morning    gabapentin (NEURONTIN) 100 mg capsule Take 300 mg by mouth 3 (three) times a day    ibuprofen (MOTRIN) 600 mg tablet Take 1 tablet (600 mg total) by mouth every 8 (eight) hours as needed for mild pain    Ipratropium-Albuterol (COMBIVENT IN) Inhale daily    Ipratropium-Albuterol (COMBIVENT RESPIMAT IN) Inhale every 6 (six) hours as needed    methocarbamol (ROBAXIN) 750 mg tablet methocarbamol 750 mg tablet    montelukast (SINGULAIR) 10 mg tablet Take 1 tablet (10 mg total) by mouth every evening    naproxen (EC NAPROSYN) 500 MG EC tablet one with food twice a day for pain    Omeprazole 20 MG TBEC omeprazole 20 mg capsule,delayed release    oxyCODONE-acetaminophen (PERCOCET) 5-325 mg per tablet every 6 (six) hours    sertraline (ZOLOFT) 25 mg tablet Take 1 tablet (25 mg total) by mouth daily    SUMAtriptan (IMITREX) 50 mg tablet Take 100 mg by mouth once as needed for migraine    traMADol (ULTRAM) 50 mg tablet tramadol 50 mg tablet    zolpidem (AMBIEN) 10 mg tablet Take 10 mg by mouth daily at bedtime as needed for sleep    zolpidem (AMBIEN) 10 mg tablet Take 1 tablet (10 mg total) by mouth daily at bedtime as needed for sleep    [DISCONTINUED] budesonide-formoterol (SYMBICORT) 160-4 5 mcg/act inhaler Inhale 2 puffs 2 (two) times a day Rinse mouth after use   [DISCONTINUED] DULoxetine (CYMBALTA) 30 mg delayed release capsule duloxetine 30 mg capsule,delayed release    [DISCONTINUED] Ergocalciferol (VITAMIN D2 PO) Take by mouth once a week    [DISCONTINUED] HYDROcodone-acetaminophen (NORCO) 7 5-325 mg per tablet hydrocodone 7 5 mg-acetaminophen 325 mg tablet    [DISCONTINUED] naproxen (NAPROSYN) 500 mg tablet naproxen 500 mg tablet     No current facility-administered medications on file prior to visit  She is allergic to morphine; morphine and related; penicillins; penicillins; and quazepam       Review of Systems   Constitutional: Negative  HENT: Negative  Eyes: Negative  Respiratory: Negative  Cardiovascular: Negative  Gastrointestinal: Negative  Genitourinary: Negative  Skin: Positive for rash  Psychiatric/Behavioral: Negative  Objective:      /80 (BP Location: Left arm, Patient Position: Sitting, Cuff Size: Standard)   Pulse 76   Temp (!) 97 2 °F (36 2 °C) (Tympanic)   Resp 16   Ht 5' 1" (1 549 m)   Wt 73 5 kg (162 lb)   BMI 30 61 kg/m²          Physical Exam   Constitutional: She is oriented to person, place, and time  She appears well-developed  HENT:   Head: Normocephalic  Mouth/Throat: Oropharynx is clear and moist    Neck: Normal range of motion  Cardiovascular: Normal rate and regular rhythm  Pulmonary/Chest: Effort normal and breath sounds normal    Abdominal: Soft  Bowel sounds are normal    Musculoskeletal:   Use left walking boot  Mass on the upper  Medial side of the left leg  Neurological: She is alert and oriented to person, place, and time  Skin: Skin is warm  Rash noted  Psychiatric: She has a normal mood and affect

## 2018-08-31 ENCOUNTER — HOSPITAL ENCOUNTER (OUTPATIENT)
Dept: RADIOLOGY | Facility: IMAGING CENTER | Age: 43
Discharge: HOME | End: 2018-08-31
Payer: COMMERCIAL

## 2018-08-31 DIAGNOSIS — R22.42 LUMP OF SKIN OF LEFT LOWER EXTREMITY: ICD-10-CM

## 2018-08-31 PROCEDURE — 76882 US LMTD JT/FCL EVL NVASC XTR: CPT

## 2018-09-04 ENCOUNTER — TELEPHONE (OUTPATIENT)
Dept: FAMILY MEDICINE CLINIC | Facility: CLINIC | Age: 43
End: 2018-09-04

## 2018-09-04 RX ORDER — IBUPROFEN 600 MG/1
600 TABLET ORAL EVERY 8 HOURS PRN
Qty: 45 TABLET | Refills: 0 | Status: SHIPPED | OUTPATIENT
Start: 2018-09-04 | End: 2020-04-23 | Stop reason: SDUPTHER

## 2018-09-04 NOTE — TELEPHONE ENCOUNTER
----- Message from Erika Barth MD sent at 9/4/2018 11:00 AM EDT -----  U/s does not show any thing, so probably we can sent her to Dr Toño Lyn or any surgeon and go from there

## 2018-09-04 NOTE — TELEPHONE ENCOUNTER
Called pt and gave test results  Pt agrees to go see Dr Roxie King  I faxed everything over to them and to have them call the pt

## 2018-09-10 DIAGNOSIS — E28.39 ESTROGEN DEFICIENCY: Primary | ICD-10-CM

## 2018-09-17 DIAGNOSIS — J45.909 UNCOMPLICATED ASTHMA, UNSPECIFIED ASTHMA SEVERITY, UNSPECIFIED WHETHER PERSISTENT: ICD-10-CM

## 2018-09-17 RX ORDER — ALBUTEROL SULFATE 90 UG/1
2 AEROSOL, METERED RESPIRATORY (INHALATION) EVERY 4 HOURS PRN
Qty: 1 INHALER | Refills: 0 | Status: SHIPPED | OUTPATIENT
Start: 2018-09-17 | End: 2018-10-15 | Stop reason: SDUPTHER

## 2018-10-03 ENCOUNTER — HOSPITAL ENCOUNTER (OUTPATIENT)
Dept: RADIOLOGY | Facility: IMAGING CENTER | Age: 43
Discharge: HOME | End: 2018-10-03
Payer: COMMERCIAL

## 2018-10-03 ENCOUNTER — TRANSCRIBE ORDERS (OUTPATIENT)
Dept: ADMINISTRATIVE | Facility: HOSPITAL | Age: 43
End: 2018-10-03

## 2018-10-03 ENCOUNTER — OFFICE VISIT (OUTPATIENT)
Dept: FAMILY MEDICINE CLINIC | Facility: CLINIC | Age: 43
End: 2018-10-03
Payer: COMMERCIAL

## 2018-10-03 VITALS
TEMPERATURE: 98.1 F | WEIGHT: 162 LBS | DIASTOLIC BLOOD PRESSURE: 78 MMHG | HEIGHT: 61 IN | HEART RATE: 84 BPM | RESPIRATION RATE: 16 BRPM | OXYGEN SATURATION: 97 % | BODY MASS INDEX: 30.58 KG/M2 | SYSTOLIC BLOOD PRESSURE: 120 MMHG

## 2018-10-03 DIAGNOSIS — G47.00 INSOMNIA, UNSPECIFIED TYPE: ICD-10-CM

## 2018-10-03 DIAGNOSIS — M25.572 LEFT ANKLE PAIN, UNSPECIFIED CHRONICITY: ICD-10-CM

## 2018-10-03 DIAGNOSIS — M25.572 LEFT ANKLE PAIN, UNSPECIFIED CHRONICITY: Primary | ICD-10-CM

## 2018-10-03 DIAGNOSIS — J45.21 INTERMITTENT ASTHMA WITH ACUTE EXACERBATION, UNSPECIFIED ASTHMA SEVERITY: ICD-10-CM

## 2018-10-03 DIAGNOSIS — Z12.31 SCREENING MAMMOGRAM, ENCOUNTER FOR: ICD-10-CM

## 2018-10-03 DIAGNOSIS — J45.21 INTERMITTENT ASTHMA WITH ACUTE EXACERBATION, UNSPECIFIED ASTHMA SEVERITY: Primary | ICD-10-CM

## 2018-10-03 DIAGNOSIS — R05.9 COUGH: ICD-10-CM

## 2018-10-03 PROCEDURE — 71046 X-RAY EXAM CHEST 2 VIEWS: CPT

## 2018-10-03 PROCEDURE — 99214 OFFICE O/P EST MOD 30 MIN: CPT | Performed by: FAMILY MEDICINE

## 2018-10-03 PROCEDURE — 73610 X-RAY EXAM OF ANKLE: CPT

## 2018-10-03 RX ORDER — PREDNISONE 10 MG/1
TABLET ORAL
Qty: 32 TABLET | Refills: 0 | Status: SHIPPED | OUTPATIENT
Start: 2018-10-03 | End: 2018-10-15

## 2018-10-03 RX ORDER — METHYLPREDNISOLONE ACETATE 80 MG/ML
80 INJECTION, SUSPENSION INTRA-ARTICULAR; INTRALESIONAL; INTRAMUSCULAR; SOFT TISSUE ONCE
Status: COMPLETED | OUTPATIENT
Start: 2018-10-03 | End: 2018-10-03

## 2018-10-03 RX ORDER — ZOLPIDEM TARTRATE 10 MG/1
10 TABLET ORAL
Qty: 30 TABLET | Refills: 0 | Status: SHIPPED | OUTPATIENT
Start: 2018-10-03 | End: 2018-12-11 | Stop reason: SDUPTHER

## 2018-10-03 RX ORDER — BENZONATATE 100 MG/1
100 CAPSULE ORAL 3 TIMES DAILY PRN
Qty: 15 CAPSULE | Refills: 0 | Status: CANCELLED | OUTPATIENT
Start: 2018-10-03

## 2018-10-03 RX ADMIN — METHYLPREDNISOLONE ACETATE 80 MG: 80 INJECTION, SUSPENSION INTRA-ARTICULAR; INTRALESIONAL; INTRAMUSCULAR; SOFT TISSUE at 13:43

## 2018-10-03 NOTE — PROGRESS NOTES
Assessment/Plan:    No problem-specific Assessment & Plan notes found for this encounter  Diagnoses and all orders for this visit:    Intermittent asthma with acute exacerbation, unspecified asthma severity  Comments:  Wheezing heard on exam  She was given nebulizer treatment, and DEPO-MEDROL injection in office  Refilled her Advair, and Combivent  Ordered prednisone for 10d  Orders:  -     fluticasone-salmeterol (ADVAIR) 500-50 mcg/dose inhaler; Inhale 1 puff every morning  -     ipratropium-albuterol (COMBIVENT RESPIMAT) inhaler; Inhale 1 puff 4 (four) times a day  -     predniSONE 10 mg tablet; 4 tab po x 4 days, 3 tab po x 3 days, 2 tab po x 2 days and 1 tab po x 1 day after breakfast  -     methylPREDNISolone acetate (DEPO-MEDROL) injection 80 mg; Inject 1 mL (80 mg total) into a muscle once   -     XR chest pa & lateral; Future    Screening mammogram, encounter for  -     Mammo screening bilateral w cad; Future    Cough  Comments:  Severe coughing, Ordered Tessalon Perles for the cough  Ordered chest xray  Other orders  -     Cancel: benzonatate (TESSALON PERLES) 100 mg capsule; Take 1 capsule (100 mg total) by mouth 3 (three) times a day as needed for cough          Subjective:      Patient ID: Filomena Smyth is a 39 y o  female  HPI  Anthony Spaulding is a 40 yo female, with PMH significant for intermittent asthma, coming in with shortness of breath, cough, congestion, and chest tightness x 7 days  She reports that the cough has been so bad that she sometimes feels lightheaded from not getting enough air in  She has been using her nebulizer 4x a day, as well as her Flonase and Mucinex  She also has a headache, and postnasal drip  She is allergic to PCN and morphine  She has run out of the albuterol inhaler, Advair inhaler, and Combivent  The following portions of the patient's history were reviewed and updated as appropriate:   She  has a past medical history of Anxiety; Arthritis;  Asthma; Bleb; Chronic kidney disease; Chronic pain; Chronic pain disorder; Depression; Endometriosis; Glaucoma; Glaucoma; H/O carpal tunnel syndrome; Headache; Insomnia; Migraines; Osteoarthritis; Polypoid cystitis; Renal disorder; and Right knee injury  She  reports that she has been smoking Cigarettes  She has a 30 00 pack-year smoking history  She has never used smokeless tobacco  She reports that she does not drink alcohol or use drugs  Current Outpatient Prescriptions   Medication Sig Dispense Refill    albuterol (2 5 mg/3 mL) 0 083 % nebulizer solution Take 2 5 mg by nebulization every 6 (six) hours as needed for wheezing or shortness of breath      albuterol (PROVENTIL HFA,VENTOLIN HFA) 90 mcg/act inhaler Inhale 2 puffs every 4 (four) hours as needed for shortness of breath 1 Inhaler 0    ALPRAZolam (XANAX) 2 MG tablet Take by mouth      aspirin 81 MG tablet Take 1 tablet (81 mg total) by mouth daily 30 tablet 0    conjugated estrogens (PREMARIN) 0 625 mg tablet Take 1 tablet (0 625 mg total) by mouth every evening Take daily for 21 days then do not take for 7 days   30 tablet 1    cyclobenzaprine (FLEXERIL) 10 mg tablet cyclobenzaprine 10 mg tablet      diazepam (VALIUM) 5 mg tablet every 24 hours      diclofenac sodium (VOLTAREN) 1 % diclofenac 1 % topical gel      ergocalciferol (VITAMIN D2) 50,000 units Take 50,000 Units by mouth once a week  5    fluticasone-salmeterol (ADVAIR) 500-50 mcg/dose inhaler Inhale 1 puff every morning 1 Inhaler 3    gabapentin (NEURONTIN) 100 mg capsule Take 300 mg by mouth 3 (three) times a day      ibuprofen (MOTRIN) 600 mg tablet TAKE 1 TABLET (600 MG TOTAL) BY MOUTH EVERY 8 (EIGHT) HOURS AS NEEDED FOR MILD PAIN 45 tablet 0    Ipratropium-Albuterol (COMBIVENT IN) Inhale daily      Ipratropium-Albuterol (COMBIVENT RESPIMAT IN) Inhale every 6 (six) hours as needed      montelukast (SINGULAIR) 10 mg tablet Take 1 tablet (10 mg total) by mouth every evening 30 tablet 3    naproxen (EC NAPROSYN) 500 MG EC tablet one with food twice a day for pain      Omeprazole 20 MG TBEC omeprazole 20 mg capsule,delayed release      oxyCODONE-acetaminophen (PERCOCET) 5-325 mg per tablet every 6 (six) hours      sertraline (ZOLOFT) 25 mg tablet Take 1 tablet (25 mg total) by mouth daily 90 tablet 0    SUMAtriptan (IMITREX) 50 mg tablet Take 100 mg by mouth once as needed for migraine      traMADol (ULTRAM) 50 mg tablet tramadol 50 mg tablet      triamcinolone (KENALOG) 0 5 % cream Apply topically 2 (two) times a day 30 g 3    zolpidem (AMBIEN) 10 mg tablet Take 10 mg by mouth daily at bedtime as needed for sleep      ipratropium-albuterol (COMBIVENT RESPIMAT) inhaler Inhale 1 puff 4 (four) times a day 4 g 0    predniSONE 10 mg tablet 4 tab po x 4 days, 3 tab po x 3 days, 2 tab po x 2 days and 1 tab po x 1 day after breakfast 32 tablet 0     No current facility-administered medications for this visit  Current Outpatient Prescriptions on File Prior to Visit   Medication Sig    albuterol (2 5 mg/3 mL) 0 083 % nebulizer solution Take 2 5 mg by nebulization every 6 (six) hours as needed for wheezing or shortness of breath    albuterol (PROVENTIL HFA,VENTOLIN HFA) 90 mcg/act inhaler Inhale 2 puffs every 4 (four) hours as needed for shortness of breath    ALPRAZolam (XANAX) 2 MG tablet Take by mouth    aspirin 81 MG tablet Take 1 tablet (81 mg total) by mouth daily    conjugated estrogens (PREMARIN) 0 625 mg tablet Take 1 tablet (0 625 mg total) by mouth every evening Take daily for 21 days then do not take for 7 days      cyclobenzaprine (FLEXERIL) 10 mg tablet cyclobenzaprine 10 mg tablet    diazepam (VALIUM) 5 mg tablet every 24 hours    diclofenac sodium (VOLTAREN) 1 % diclofenac 1 % topical gel    ergocalciferol (VITAMIN D2) 50,000 units Take 50,000 Units by mouth once a week    gabapentin (NEURONTIN) 100 mg capsule Take 300 mg by mouth 3 (three) times a day    ibuprofen (MOTRIN) 600 mg tablet TAKE 1 TABLET (600 MG TOTAL) BY MOUTH EVERY 8 (EIGHT) HOURS AS NEEDED FOR MILD PAIN    Ipratropium-Albuterol (COMBIVENT IN) Inhale daily    Ipratropium-Albuterol (COMBIVENT RESPIMAT IN) Inhale every 6 (six) hours as needed    montelukast (SINGULAIR) 10 mg tablet Take 1 tablet (10 mg total) by mouth every evening    naproxen (EC NAPROSYN) 500 MG EC tablet one with food twice a day for pain    Omeprazole 20 MG TBEC omeprazole 20 mg capsule,delayed release    oxyCODONE-acetaminophen (PERCOCET) 5-325 mg per tablet every 6 (six) hours    sertraline (ZOLOFT) 25 mg tablet Take 1 tablet (25 mg total) by mouth daily    SUMAtriptan (IMITREX) 50 mg tablet Take 100 mg by mouth once as needed for migraine    traMADol (ULTRAM) 50 mg tablet tramadol 50 mg tablet    triamcinolone (KENALOG) 0 5 % cream Apply topically 2 (two) times a day    zolpidem (AMBIEN) 10 mg tablet Take 10 mg by mouth daily at bedtime as needed for sleep    [DISCONTINUED] fluticasone-salmeterol (ADVAIR) 500-50 mcg/dose Inhale 1 puff every morning     No current facility-administered medications on file prior to visit  She is allergic to morphine; morphine and related; penicillins; penicillins; and quazepam       Review of Systems   Constitutional: Negative  HENT: Positive for congestion and postnasal drip  Eyes: Negative  Respiratory: Positive for cough, chest tightness and wheezing  Cardiovascular: Negative  Gastrointestinal: Negative  Genitourinary: Negative  Musculoskeletal: Negative  Skin: Negative  Neurological: Negative  Psychiatric/Behavioral: Negative  Objective:      /78 (BP Location: Left arm, Patient Position: Sitting, Cuff Size: Large)   Pulse 84   Temp 98 1 °F (36 7 °C) (Tympanic)   Resp 16   Ht 5' 1" (1 549 m)   Wt 73 5 kg (162 lb)   SpO2 97%   BMI 30 61 kg/m²          Physical Exam   HENT:   Head: Atraumatic  Mouth/Throat: Uvula is midline   Posterior oropharyngeal erythema present  No oropharyngeal exudate  Cardiovascular: Normal rate, regular rhythm and normal heart sounds  Pulmonary/Chest: She is in respiratory distress  She has wheezes  She has rhonchi  Neurological: She is alert  Skin: Skin is warm and dry

## 2018-10-15 ENCOUNTER — OFFICE VISIT (OUTPATIENT)
Dept: FAMILY MEDICINE CLINIC | Facility: CLINIC | Age: 43
End: 2018-10-15
Payer: COMMERCIAL

## 2018-10-15 VITALS
HEART RATE: 73 BPM | OXYGEN SATURATION: 98 % | SYSTOLIC BLOOD PRESSURE: 110 MMHG | TEMPERATURE: 97.6 F | RESPIRATION RATE: 16 BRPM | WEIGHT: 162 LBS | HEIGHT: 61 IN | BODY MASS INDEX: 30.58 KG/M2 | DIASTOLIC BLOOD PRESSURE: 60 MMHG

## 2018-10-15 DIAGNOSIS — J45.909 UNCOMPLICATED ASTHMA, UNSPECIFIED ASTHMA SEVERITY, UNSPECIFIED WHETHER PERSISTENT: ICD-10-CM

## 2018-10-15 DIAGNOSIS — J44.9 CHRONIC OBSTRUCTIVE PULMONARY DISEASE, UNSPECIFIED COPD TYPE (HCC): Primary | ICD-10-CM

## 2018-10-15 PROCEDURE — 99213 OFFICE O/P EST LOW 20 MIN: CPT | Performed by: FAMILY MEDICINE

## 2018-10-15 RX ORDER — ALBUTEROL SULFATE 90 UG/1
2 AEROSOL, METERED RESPIRATORY (INHALATION) EVERY 4 HOURS PRN
Qty: 1 INHALER | Refills: 0 | Status: SHIPPED | OUTPATIENT
Start: 2018-10-15 | End: 2018-10-26 | Stop reason: SDUPTHER

## 2018-10-15 RX ORDER — PREDNISONE 10 MG/1
TABLET ORAL
Qty: 32 TABLET | Refills: 0 | Status: SHIPPED | OUTPATIENT
Start: 2018-10-15 | End: 2019-01-22 | Stop reason: ALTCHOICE

## 2018-10-15 RX ORDER — AZITHROMYCIN 250 MG/1
TABLET, FILM COATED ORAL
Qty: 6 TABLET | Refills: 0 | Status: SHIPPED | OUTPATIENT
Start: 2018-10-15 | End: 2018-10-19

## 2018-10-15 NOTE — PROGRESS NOTES
Assessment/Plan:    No problem-specific Assessment & Plan notes found for this encounter  Diagnoses and all orders for this visit:    Chronic obstructive pulmonary disease, unspecified [facility]  Comments:  chest xray from 10/3 was clear   start prednisone and azithromycin, continue with Mucinex   make appointment with pulmonology  Orders:  -     predniSONE 10 mg tablet; 4 tab po x 4 days, 3 tab po x 3 days, 2 tab po x 2 days and 1 tab po x 1 day after breakfast  -     azithromycin (ZITHROMAX) 250 mg tablet; Take 2 tablets today then 1 tablet daily x 4 days  -     Ambulatory referral to Pulmonology; Future          Subjective:      Patient ID: Stormy Matthews is a 39 y o  female  HPI     DAISY HALL is a 36yo F who presents to the office for follow up for cough and asthma exacerbation  On 10/3 patient was started on prednisone and benzonatate  Patient states that she is still having postnasal drip and cough  Her cough is not productive, but she states it feels like she has stuff stuck in her chest  She is using her nebulizer 5x/day, along with her inhalers and Mucinex  She states that she feels a little better than her last visit, but still feels bad  She states it is very difficult to sleep because her coughing is worse when she lays down  The following portions of the patient's history were reviewed and updated as appropriate:   She  has a past medical history of Anxiety; Arthritis; Asthma; Bleb; Chronic kidney disease; Chronic pain; Chronic pain disorder; Depression; Endometriosis; Glaucoma; Glaucoma; H/O carpal tunnel syndrome; Headache; Insomnia; Migraines; Osteoarthritis; Polypoid cystitis; Renal disorder; and Right knee injury    She   Patient Active Problem List    Diagnosis Date Noted    [facility] 10/15/2018    Primary insomnia 08/27/2018    Rash 08/27/2018     Current Outpatient Prescriptions   Medication Sig Dispense Refill    albuterol (2 5 mg/3 mL) 0 083 % nebulizer solution Take 2 5 mg by nebulization every 6 (six) hours as needed for wheezing or shortness of breath      albuterol (PROVENTIL HFA,VENTOLIN HFA) 90 mcg/act inhaler Inhale 2 puffs every 4 (four) hours as needed for shortness of breath 1 Inhaler 0    ALPRAZolam (XANAX) 2 MG tablet Take by mouth      aspirin 81 MG tablet Take 1 tablet (81 mg total) by mouth daily 30 tablet 0    conjugated estrogens (PREMARIN) 0 625 mg tablet Take 1 tablet (0 625 mg total) by mouth every evening Take daily for 21 days then do not take for 7 days   30 tablet 1    cyclobenzaprine (FLEXERIL) 10 mg tablet cyclobenzaprine 10 mg tablet      diazepam (VALIUM) 5 mg tablet every 24 hours      diclofenac sodium (VOLTAREN) 1 % diclofenac 1 % topical gel      ergocalciferol (VITAMIN D2) 50,000 units Take 50,000 Units by mouth once a week  5    fluticasone-salmeterol (ADVAIR) 500-50 mcg/dose inhaler Inhale 1 puff every morning 1 Inhaler 3    gabapentin (NEURONTIN) 100 mg capsule Take 300 mg by mouth 3 (three) times a day      ibuprofen (MOTRIN) 600 mg tablet TAKE 1 TABLET (600 MG TOTAL) BY MOUTH EVERY 8 (EIGHT) HOURS AS NEEDED FOR MILD PAIN 45 tablet 0    Ipratropium-Albuterol (COMBIVENT IN) Inhale daily      Ipratropium-Albuterol (COMBIVENT RESPIMAT IN) Inhale every 6 (six) hours as needed      ipratropium-albuterol (COMBIVENT RESPIMAT) inhaler Inhale 1 puff 4 (four) times a day 4 g 0    montelukast (SINGULAIR) 10 mg tablet Take 1 tablet (10 mg total) by mouth every evening 30 tablet 3    naproxen (EC NAPROSYN) 500 MG EC tablet one with food twice a day for pain      Omeprazole 20 MG TBEC omeprazole 20 mg capsule,delayed release      oxyCODONE-acetaminophen (PERCOCET) 5-325 mg per tablet every 6 (six) hours      sertraline (ZOLOFT) 25 mg tablet Take 1 tablet (25 mg total) by mouth daily 90 tablet 0    SUMAtriptan (IMITREX) 50 mg tablet Take 100 mg by mouth once as needed for migraine      traMADol (ULTRAM) 50 mg tablet tramadol 50 mg tablet      triamcinolone (KENALOG) 0 5 % cream Apply topically 2 (two) times a day 30 g 3    zolpidem (AMBIEN) 10 mg tablet Take 10 mg by mouth daily at bedtime as needed for sleep      zolpidem (AMBIEN) 10 mg tablet TAKE 1 TABLET (10 MG TOTAL) BY MOUTH DAILY AT BEDTIME AS NEEDED FOR SLEEP 30 tablet 0    azithromycin (ZITHROMAX) 250 mg tablet Take 2 tablets today then 1 tablet daily x 4 days 6 tablet 0    predniSONE 10 mg tablet 4 tab po x 4 days, 3 tab po x 3 days, 2 tab po x 2 days and 1 tab po x 1 day after breakfast 32 tablet 0     No current facility-administered medications for this visit  She is allergic to morphine; morphine and related; penicillins; penicillins; and quazepam     Review of Systems   Constitutional: Negative  HENT: Positive for postnasal drip  Eyes: Negative  Respiratory: Positive for cough and shortness of breath  Cardiovascular: Negative  Gastrointestinal: Negative  Genitourinary: Negative  Neurological: Positive for headaches  Negative for dizziness  Psychiatric/Behavioral: Negative  Objective:      /60 (BP Location: Left arm, Patient Position: Sitting, Cuff Size: Large)   Pulse 73   Temp 97 6 °F (36 4 °C) (Tympanic)   Resp 16   Ht 5' 1" (1 549 m)   Wt 73 5 kg (162 lb)   SpO2 98%   BMI 30 61 kg/m²          Physical Exam   Constitutional: She is oriented to person, place, and time  She appears well-developed  HENT:   Head: Normocephalic  Mouth/Throat: Oropharynx is clear and moist    Neck: Normal range of motion  Cardiovascular: Normal rate and regular rhythm  Pulmonary/Chest: Effort normal  She has wheezes  She has rhonchi  Abdominal: Soft  Bowel sounds are normal    Neurological: She is alert and oriented to person, place, and time  Skin: Skin is warm  Psychiatric: She has a normal mood and affect

## 2018-10-26 ENCOUNTER — TELEPHONE (OUTPATIENT)
Dept: FAMILY MEDICINE CLINIC | Facility: CLINIC | Age: 43
End: 2018-10-26

## 2018-10-26 DIAGNOSIS — J45.21 INTERMITTENT ASTHMA WITH ACUTE EXACERBATION, UNSPECIFIED ASTHMA SEVERITY: ICD-10-CM

## 2018-10-26 DIAGNOSIS — J45.909 UNCOMPLICATED ASTHMA, UNSPECIFIED ASTHMA SEVERITY, UNSPECIFIED WHETHER PERSISTENT: ICD-10-CM

## 2018-10-26 RX ORDER — ALBUTEROL SULFATE 90 UG/1
2 AEROSOL, METERED RESPIRATORY (INHALATION) EVERY 4 HOURS PRN
Qty: 1 INHALER | Refills: 0 | Status: SHIPPED | OUTPATIENT
Start: 2018-10-26 | End: 2018-10-31

## 2018-10-31 ENCOUNTER — OFFICE VISIT (OUTPATIENT)
Dept: FAMILY MEDICINE CLINIC | Facility: CLINIC | Age: 43
End: 2018-10-31
Payer: COMMERCIAL

## 2018-10-31 ENCOUNTER — TRANSCRIBE ORDERS (OUTPATIENT)
Dept: ADMINISTRATIVE | Facility: HOSPITAL | Age: 43
End: 2018-10-31

## 2018-10-31 VITALS
DIASTOLIC BLOOD PRESSURE: 68 MMHG | SYSTOLIC BLOOD PRESSURE: 116 MMHG | BODY MASS INDEX: 30.58 KG/M2 | HEART RATE: 80 BPM | RESPIRATION RATE: 16 BRPM | TEMPERATURE: 96.9 F | WEIGHT: 162 LBS | HEIGHT: 61 IN

## 2018-10-31 DIAGNOSIS — M79.672 LEFT FOOT PAIN: Primary | ICD-10-CM

## 2018-10-31 DIAGNOSIS — J44.9 CHRONIC OBSTRUCTIVE PULMONARY DISEASE, UNSPECIFIED COPD TYPE (HCC): Primary | ICD-10-CM

## 2018-10-31 PROCEDURE — 4004F PT TOBACCO SCREEN RCVD TLK: CPT | Performed by: FAMILY MEDICINE

## 2018-10-31 PROCEDURE — 3008F BODY MASS INDEX DOCD: CPT | Performed by: FAMILY MEDICINE

## 2018-10-31 PROCEDURE — 99214 OFFICE O/P EST MOD 30 MIN: CPT | Performed by: FAMILY MEDICINE

## 2018-10-31 RX ORDER — ALBUTEROL SULFATE 90 UG/1
2 AEROSOL, METERED RESPIRATORY (INHALATION) EVERY 6 HOURS PRN
Qty: 1 INHALER | Refills: 3 | Status: SHIPPED | OUTPATIENT
Start: 2018-10-31 | End: 2019-08-08 | Stop reason: SDUPTHER

## 2018-10-31 NOTE — PROGRESS NOTES
Assessment/Plan:    No problem-specific Assessment & Plan notes found for this encounter  Diagnoses and all orders for this visit:    Chronic obstructive pulmonary disease, unspecified [facility] type ([facility]  Comments:  start B12 and B complex vitamins x2 weeks   switch ipratropium-albuterol inhaler to tiotropium-olodaterol inhaler   albuterol rescue inhaler as needed for SOB   Orders:  -     tiotropium-olodaterol (STIOLTO RESPIMAT) 2 5-2 5 MCG/ACT inhaler; Inhale 2 puffs daily  -     albuterol (VENTOLIN HFA) 90 mcg/act inhaler; Inhale 2 puffs every 6 (six) hours as needed for wheezing          Subjective:      Patient ID: Gerald Cruz is a 39 y o  female  BRIE HALL  Is a 38 yo F with PMH COPD who presents to the office with complaint of a "bad taste" in her mouth x6 days  Patient was recently seen on 10/15 for worsening COPD symptoms, at that time she was using her inhalers more than usual  Her shortness of breath and cough has since resolved, but she is now complaining of a bad taste in her mouth  Patient complains that she can't drink her coffee or taste her food  She says the taste in her mouth is "coppery"  She has stopped using her inhalers since the bad taste started, but she has continued to use Mucinex  She has also tried gargling with salt water and brushing her tongue with baking soda, with no relief  She admits that sometimes her mouth is dry as well  She denies nausea, vomiting, diarrhea, chest pain, shortness of breath  The following portions of the patient's history were reviewed and updated as appropriate:   She  has a past medical history of Anxiety; Arthritis; Asthma; Bleb; Chronic kidney disease; Chronic pain; Chronic pain disorder; Depression; Endometriosis; Glaucoma; Glaucoma; H/O carpal tunnel syndrome; Headache; Insomnia; Migraines; Osteoarthritis; Polypoid cystitis; Renal disorder; and Right knee injury    She   Patient Active Problem List    Diagnosis Date Noted    Chronic obstructive pulmonary disease ([facility] ) 10/15/2018    Primary insomnia 08/27/2018    Rash 08/27/2018     Current Outpatient Prescriptions   Medication Sig Dispense Refill    albuterol (2 5 mg/3 mL) 0 083 % nebulizer solution Take 2 5 mg by nebulization every 6 (six) hours as needed for wheezing or shortness of breath      albuterol (VENTOLIN HFA) 90 mcg/act inhaler Inhale 2 puffs every 6 (six) hours as needed for wheezing 1 Inhaler 3    ALPRAZolam (XANAX) 2 MG tablet Take by mouth      aspirin 81 MG tablet Take 1 tablet (81 mg total) by mouth daily 30 tablet 0    conjugated estrogens (PREMARIN) 0 625 mg tablet Take 1 tablet (0 625 mg total) by mouth every evening Take daily for 21 days then do not take for 7 days   30 tablet 1    cyclobenzaprine (FLEXERIL) 10 mg tablet cyclobenzaprine 10 mg tablet      diazepam (VALIUM) 5 mg tablet every 24 hours      diclofenac sodium (VOLTAREN) 1 % diclofenac 1 % topical gel      ergocalciferol (VITAMIN D2) 50,000 units Take 50,000 Units by mouth once a week  5    fluticasone-salmeterol (ADVAIR) 500-50 mcg/dose inhaler Inhale 1 puff every morning 1 Inhaler 3    gabapentin (NEURONTIN) 100 mg capsule Take 300 mg by mouth 3 (three) times a day      ibuprofen (MOTRIN) 600 mg tablet TAKE 1 TABLET (600 MG TOTAL) BY MOUTH EVERY 8 (EIGHT) HOURS AS NEEDED FOR MILD PAIN 45 tablet 0    montelukast (SINGULAIR) 10 mg tablet Take 1 tablet (10 mg total) by mouth every evening 30 tablet 3    naproxen (EC NAPROSYN) 500 MG EC tablet one with food twice a day for pain      Omeprazole 20 MG TBEC omeprazole 20 mg capsule,delayed release      oxyCODONE-acetaminophen (PERCOCET) 5-325 mg per tablet every 6 (six) hours      predniSONE 10 mg tablet 4 tab po x 4 days, 3 tab po x 3 days, 2 tab po x 2 days and 1 tab po x 1 day after breakfast 32 tablet 0    sertraline (ZOLOFT) 25 mg tablet Take 1 tablet (25 mg total) by mouth daily 90 tablet 0    SUMAtriptan (IMITREX) 50 mg tablet Take 100 mg by mouth once as needed for migraine      tiotropium-olodaterol (STIOLTO RESPIMAT) 2 5-2 5 MCG/ACT inhaler Inhale 2 puffs daily 1 Inhaler 3    traMADol (ULTRAM) 50 mg tablet tramadol 50 mg tablet      triamcinolone (KENALOG) 0 5 % cream Apply topically 2 (two) times a day 30 g 3    zolpidem (AMBIEN) 10 mg tablet Take 10 mg by mouth daily at bedtime as needed for sleep      zolpidem (AMBIEN) 10 mg tablet TAKE 1 TABLET (10 MG TOTAL) BY MOUTH DAILY AT BEDTIME AS NEEDED FOR SLEEP 30 tablet 0     No current facility-administered medications for this visit  She is allergic to morphine; morphine and related; penicillins; penicillins; and quazepam     Review of Systems   Constitutional: Negative  HENT: Negative  Eyes: Negative  Respiratory: Negative  Cardiovascular: Negative  Gastrointestinal: Negative  Genitourinary: Negative  Psychiatric/Behavioral: Negative  Objective:      /68 (BP Location: Left arm, Patient Position: Sitting, Cuff Size: Standard)   Pulse 80   Temp (!) 96 9 °F (36 1 °C) (Tympanic)   Resp 16   Ht 5' 1" (1 549 m)   Wt 73 5 kg (162 lb)   BMI 30 61 kg/m²          Physical Exam   Constitutional: She is oriented to person, place, and time  She appears well-developed  HENT:   Head: Normocephalic  Mouth/Throat: Oropharynx is clear and moist    Neck: Normal range of motion  Cardiovascular: Normal rate and regular rhythm  Pulmonary/Chest: Effort normal and breath sounds normal    Abdominal: Soft  Bowel sounds are normal    Neurological: She is alert and oriented to person, place, and time  Skin: Skin is warm  Psychiatric: She has a normal mood and affect

## 2018-11-10 DIAGNOSIS — E28.39 ESTROGEN DEFICIENCY: ICD-10-CM

## 2018-11-10 RX ORDER — CONJUGATED ESTROGENS 0.62 MG/1
TABLET, FILM COATED ORAL
Qty: 30 TABLET | Refills: 1 | Status: SHIPPED | OUTPATIENT
Start: 2018-11-10 | End: 2018-12-24 | Stop reason: SDUPTHER

## 2018-11-19 DIAGNOSIS — F33.0 MILD EPISODE OF RECURRENT MAJOR DEPRESSIVE DISORDER (HCC): ICD-10-CM

## 2018-11-19 RX ORDER — SERTRALINE HYDROCHLORIDE 25 MG/1
TABLET, FILM COATED ORAL
Qty: 90 TABLET | Refills: 0 | Status: SHIPPED | OUTPATIENT
Start: 2018-11-19 | End: 2018-12-20 | Stop reason: SDUPTHER

## 2018-11-21 DIAGNOSIS — J45.21 INTERMITTENT ASTHMA WITH ACUTE EXACERBATION, UNSPECIFIED ASTHMA SEVERITY: ICD-10-CM

## 2018-11-21 RX ORDER — IPRATROPIUM/ALBUTEROL SULFATE 20-100 MCG
1 MIST INHALER (GRAM) INHALATION 4 TIMES DAILY
Qty: 1 INHALER | Refills: 1 | Status: SHIPPED | OUTPATIENT
Start: 2018-11-21 | End: 2018-12-20 | Stop reason: SDUPTHER

## 2018-11-22 DIAGNOSIS — Z88.9 MULTIPLE ALLERGIES: ICD-10-CM

## 2018-11-22 DIAGNOSIS — R52 PAIN AGGRAVATED BY WALKING: Primary | ICD-10-CM

## 2018-11-26 RX ORDER — MONTELUKAST SODIUM 10 MG/1
TABLET ORAL
Qty: 30 TABLET | Refills: 3 | Status: SHIPPED | OUTPATIENT
Start: 2018-11-26 | End: 2019-03-25 | Stop reason: SDUPTHER

## 2018-11-26 RX ORDER — GABAPENTIN 100 MG/1
CAPSULE ORAL
Qty: 180 CAPSULE | Refills: 5 | Status: SHIPPED | OUTPATIENT
Start: 2018-11-26 | End: 2019-12-13 | Stop reason: SDUPTHER

## 2018-11-30 DIAGNOSIS — J45.21 INTERMITTENT ASTHMA WITH ACUTE EXACERBATION, UNSPECIFIED ASTHMA SEVERITY: ICD-10-CM

## 2018-12-10 DIAGNOSIS — E28.39 ESTROGEN DEFICIENCY: ICD-10-CM

## 2018-12-10 DIAGNOSIS — G47.00 INSOMNIA, UNSPECIFIED TYPE: ICD-10-CM

## 2018-12-11 ENCOUNTER — TELEPHONE (OUTPATIENT)
Dept: FAMILY MEDICINE CLINIC | Facility: CLINIC | Age: 43
End: 2018-12-11

## 2018-12-11 RX ORDER — ZOLPIDEM TARTRATE 10 MG/1
10 TABLET ORAL
Qty: 30 TABLET | Refills: 0 | Status: SHIPPED | OUTPATIENT
Start: 2018-12-11 | End: 2019-01-17 | Stop reason: SDUPTHER

## 2018-12-11 NOTE — TELEPHONE ENCOUNTER
I want to know does she has the uterus and ovaries or had the hysterectomy done, and how long she is on it as if its more than 1 year than will have to decrease the dose

## 2018-12-11 NOTE — TELEPHONE ENCOUNTER
Pt did have a hysterectomy and been on premarin for 1 1/2 years   She is aware that the dose will be increased

## 2018-12-20 DIAGNOSIS — F33.0 MILD EPISODE OF RECURRENT MAJOR DEPRESSIVE DISORDER (HCC): ICD-10-CM

## 2018-12-20 DIAGNOSIS — J45.21 INTERMITTENT ASTHMA WITH ACUTE EXACERBATION, UNSPECIFIED ASTHMA SEVERITY: ICD-10-CM

## 2018-12-20 RX ORDER — SERTRALINE HYDROCHLORIDE 25 MG/1
25 TABLET, FILM COATED ORAL DAILY
Qty: 90 TABLET | Refills: 0 | Status: SHIPPED | OUTPATIENT
Start: 2018-12-20 | End: 2019-11-21

## 2018-12-21 DIAGNOSIS — J45.21 INTERMITTENT ASTHMA WITH ACUTE EXACERBATION, UNSPECIFIED ASTHMA SEVERITY: ICD-10-CM

## 2018-12-24 ENCOUNTER — TELEPHONE (OUTPATIENT)
Dept: FAMILY MEDICINE CLINIC | Facility: CLINIC | Age: 43
End: 2018-12-24

## 2018-12-24 DIAGNOSIS — E28.39 ESTROGEN DEFICIENCY: ICD-10-CM

## 2019-01-03 DIAGNOSIS — E28.39 ESTROGEN DEFICIENCY: ICD-10-CM

## 2019-01-03 NOTE — TELEPHONE ENCOUNTER
Fax from [facility] for refill of premarin 0 45mg tab, one daily for 21 days then do not take for 7 days  #[phone]

## 2019-01-14 DIAGNOSIS — E28.39 ESTROGEN DEFICIENCY: ICD-10-CM

## 2019-01-14 NOTE — TELEPHONE ENCOUNTER
Received another fax for refill of premarin  They apparently did not receive refill on 1/3/19 for the same   Can you resend

## 2019-01-17 DIAGNOSIS — G47.00 INSOMNIA, UNSPECIFIED TYPE: ICD-10-CM

## 2019-01-17 RX ORDER — ZOLPIDEM TARTRATE 10 MG/1
10 TABLET ORAL
Qty: 30 TABLET | Refills: 0 | Status: SHIPPED | OUTPATIENT
Start: 2019-01-17 | End: 2019-02-26 | Stop reason: SDUPTHER

## 2019-01-21 ENCOUNTER — TELEPHONE (OUTPATIENT)
Dept: FAMILY MEDICINE CLINIC | Facility: CLINIC | Age: 44
End: 2019-01-21

## 2019-01-21 DIAGNOSIS — J45.21 INTERMITTENT ASTHMA WITH ACUTE EXACERBATION, UNSPECIFIED ASTHMA SEVERITY: ICD-10-CM

## 2019-01-22 ENCOUNTER — OFFICE VISIT (OUTPATIENT)
Dept: FAMILY MEDICINE CLINIC | Facility: CLINIC | Age: 44
End: 2019-01-22
Payer: COMMERCIAL

## 2019-01-22 VITALS
HEIGHT: 61 IN | DIASTOLIC BLOOD PRESSURE: 82 MMHG | HEART RATE: 76 BPM | SYSTOLIC BLOOD PRESSURE: 134 MMHG | TEMPERATURE: 97.9 F | RESPIRATION RATE: 16 BRPM | BODY MASS INDEX: 30.61 KG/M2 | OXYGEN SATURATION: 96 %

## 2019-01-22 DIAGNOSIS — R05.9 COUGH: ICD-10-CM

## 2019-01-22 DIAGNOSIS — J45.909 ASTHMA, UNSPECIFIED ASTHMA SEVERITY, UNSPECIFIED WHETHER COMPLICATED, UNSPECIFIED WHETHER PERSISTENT: ICD-10-CM

## 2019-01-22 DIAGNOSIS — Z23 ENCOUNTER FOR IMMUNIZATION: ICD-10-CM

## 2019-01-22 DIAGNOSIS — J01.00 ACUTE NON-RECURRENT MAXILLARY SINUSITIS: Primary | ICD-10-CM

## 2019-01-22 PROCEDURE — 99214 OFFICE O/P EST MOD 30 MIN: CPT | Performed by: FAMILY MEDICINE

## 2019-01-22 RX ORDER — AZITHROMYCIN 250 MG/1
TABLET, FILM COATED ORAL
Qty: 6 TABLET | Refills: 0 | Status: SHIPPED | OUTPATIENT
Start: 2019-01-22 | End: 2019-01-26

## 2019-01-22 RX ORDER — FLUTICASONE PROPIONATE 50 MCG
2 SPRAY, SUSPENSION (ML) NASAL DAILY
Qty: 16 G | Refills: 0 | Status: SHIPPED | OUTPATIENT
Start: 2019-01-22 | End: 2019-02-11 | Stop reason: SDUPTHER

## 2019-01-22 RX ORDER — ALBUTEROL SULFATE 2.5 MG/3ML
2.5 SOLUTION RESPIRATORY (INHALATION) EVERY 6 HOURS PRN
Qty: 50 VIAL | Refills: 0 | Status: SHIPPED | OUTPATIENT
Start: 2019-01-22 | End: 2020-01-24

## 2019-01-22 RX ORDER — GUAIFENESIN AND CODEINE PHOSPHATE 100; 10 MG/5ML; MG/5ML
5 SOLUTION ORAL 3 TIMES DAILY PRN
Qty: 120 ML | Refills: 0 | Status: SHIPPED | OUTPATIENT
Start: 2019-01-22 | End: 2019-08-08

## 2019-01-22 NOTE — PROGRESS NOTES
Assessment/Plan:     Diagnoses and all orders for this visit:    Encounter for immunization  -     TDAP VACCINE GREATER THAN OR EQUAL TO 6YO IM    Asthma, unspecified asthma severity, unspecified whether complicated, unspecified whether persistent  -     albuterol (2 5 mg/3 mL) 0 083 % nebulizer solution; Take 1 vial (2 5 mg total) by nebulization every 6 (six) hours as needed for wheezing or shortness of breath          There are no Patient Instructions on file for this visit  No Follow-up on file  Subjective:      Patient ID: Any Barney is a 39 y o  female  Chief Complaint   Patient presents with    Cold Like Symptoms     cough, nasal congestion, bilateral ear pressure       HPI    Patient with a PMH of asthma presents with a CC of cough, congestion, and ear pressure for about a month  She states that she has been taking tylenol and using her inhalers with minimal relief of symptoms  She also admits to sinus pressure/headaches, fatigue, and postnasal drip  Her symptoms are worse at night  She had an asthma exacerbation in October 2018 and was prescribed multiple rounds of steroids and had a chest x ray which was normal  Since then her asthma has been stable and well controlled on her current inhaler regimen  She has smoked cigarettes for many years, but not for the last two days  At this time she denies any difficulty breathing, SOB, or wheezing  She states that "this doesn't feel like my asthma flare ups"  Shedenies fever/chills, chest pain, and GI symptoms  The following portions of the patient's history were reviewed and updated as appropriate: allergies, current medications, past family history, past medical history, past social history, past surgical history and problem list     Review of Systems   Constitutional: Positive for fatigue  Negative for chills and fever  HENT: Positive for congestion, ear pain, postnasal drip, sinus pain and sinus pressure  Negative for trouble swallowing  Eyes: Negative for visual disturbance  Respiratory: Negative for shortness of breath and wheezing  Cardiovascular: Negative for chest pain, palpitations and leg swelling  Gastrointestinal: Negative for abdominal pain, constipation and diarrhea  Endocrine: Negative for cold intolerance and heat intolerance  Genitourinary: Negative for difficulty urinating and dysuria  Musculoskeletal: Negative for gait problem  Skin: Negative for rash  Neurological: Negative for dizziness, tremors and seizures  Hematological: Negative for adenopathy  Psychiatric/Behavioral: Negative for behavioral problems  Current Outpatient Prescriptions   Medication Sig Dispense Refill    albuterol (2 5 mg/3 mL) 0 083 % nebulizer solution Take 2 5 mg by nebulization every 6 (six) hours as needed for wheezing or shortness of breath      albuterol (VENTOLIN HFA) 90 mcg/act inhaler Inhale 2 puffs every 6 (six) hours as needed for wheezing 1 Inhaler 3    conjugated estrogens (PREMARIN) 0 45 mg tablet Take 1 tablet (0 45 mg total) by mouth daily Take daily for 21 days then do not take for 7 days   60 tablet 0    cyclobenzaprine (FLEXERIL) 10 mg tablet cyclobenzaprine 10 mg tablet      diazepam (VALIUM) 5 mg tablet every 24 hours      diclofenac sodium (VOLTAREN) 1 % diclofenac 1 % topical gel      ergocalciferol (VITAMIN D2) 50,000 units Take 50,000 Units by mouth once a week  5    fluticasone-salmeterol (ADVAIR) 500-50 mcg/dose inhaler Inhale 1 puff every morning 1 Inhaler 3    gabapentin (NEURONTIN) 100 mg capsule TAKE 3 CAPSULES 3 TIMES A  capsule 5    ibuprofen (MOTRIN) 600 mg tablet TAKE 1 TABLET (600 MG TOTAL) BY MOUTH EVERY 8 (EIGHT) HOURS AS NEEDED FOR MILD PAIN 45 tablet 0    ipratropium-albuterol (COMBIVENT RESPIMAT) inhaler Inhale 1 puff 4 (four) times a day 1 Inhaler 3    montelukast (SINGULAIR) 10 mg tablet TAKE 1 TABLET BY MOUTH EVERY DAY IN THE EVENING 30 tablet 3    oxyCODONE-acetaminophen (PERCOCET) 5-325 mg per tablet every 6 (six) hours      SUMAtriptan (IMITREX) 50 mg tablet Take 100 mg by mouth once as needed for migraine      tiotropium-olodaterol (STIOLTO RESPIMAT) 2 5-2 5 MCG/ACT inhaler Inhale 2 puffs daily 1 Inhaler 3    traMADol (ULTRAM) 50 mg tablet tramadol 50 mg tablet      zolpidem (AMBIEN) 10 mg tablet Take 1 tablet (10 mg total) by mouth daily at bedtime as needed for sleep 30 tablet 0    ALPRAZolam (XANAX) 2 MG tablet Take by mouth      aspirin 81 MG tablet Take 1 tablet (81 mg total) by mouth daily (Patient not taking: Reported on 1/22/2019 ) 30 tablet 0    naproxen (EC NAPROSYN) 500 MG EC tablet one with food twice a day for pain      Omeprazole 20 MG TBEC omeprazole 20 mg capsule,delayed release      sertraline (ZOLOFT) 25 mg tablet Take 1 tablet (25 mg total) by mouth daily (Patient not taking: Reported on 1/22/2019 ) 90 tablet 0    triamcinolone (KENALOG) 0 5 % cream Apply topically 2 (two) times a day (Patient not taking: Reported on 1/22/2019 ) 30 g 3     No current facility-administered medications for this visit  Objective:    /82 (BP Location: Left arm, Patient Position: Sitting, Cuff Size: Adult)   Pulse 76   Temp 97 9 °F (36 6 °C) (Tympanic)   Resp 16   Ht 5' 1" (1 549 m)   SpO2 96%   BMI 30 61 kg/m²        Physical Exam   Constitutional: She is oriented to person, place, and time  She appears well-developed and well-nourished  HENT:   Head: Normocephalic and atraumatic  Right Ear: A middle ear effusion is present  Left Ear: A middle ear effusion is present  Mouth/Throat: Posterior oropharyngeal erythema present  Eyes: Pupils are equal, round, and reactive to light  EOM are normal    Neck: Normal range of motion  Neck supple  Cardiovascular: Normal rate, regular rhythm and normal heart sounds  Pulmonary/Chest: Effort normal and breath sounds normal  No respiratory distress  She has no wheezes   She has no rhonchi  She has no rales  Abdominal: Soft  Bowel sounds are normal    Musculoskeletal: Normal range of motion  She exhibits no edema  Lymphadenopathy:     She has no cervical adenopathy  Neurological: She is alert and oriented to person, place, and time  No cranial nerve deficit  Skin: Skin is warm  Psychiatric: She has a normal mood and affect  Nursing note and vitals reviewed               Bipin Virk MD

## 2019-01-24 ENCOUNTER — TRANSCRIBE ORDERS (OUTPATIENT)
Dept: ADMINISTRATIVE | Facility: HOSPITAL | Age: 44
End: 2019-01-24

## 2019-01-24 DIAGNOSIS — M79.672 LEFT FOOT PAIN: Primary | ICD-10-CM

## 2019-02-08 ENCOUNTER — HOSPITAL ENCOUNTER (OUTPATIENT)
Dept: RADIOLOGY | Facility: HOSPITAL | Age: 44
Discharge: HOME | End: 2019-02-08
Attending: PODIATRIST

## 2019-02-11 DIAGNOSIS — J01.00 ACUTE NON-RECURRENT MAXILLARY SINUSITIS: ICD-10-CM

## 2019-02-12 RX ORDER — FLUTICASONE PROPIONATE 50 MCG
2 SPRAY, SUSPENSION (ML) NASAL DAILY
Qty: 16 G | Refills: 0 | Status: SHIPPED | OUTPATIENT
Start: 2019-02-12 | End: 2019-08-08

## 2019-02-15 DIAGNOSIS — J01.00 ACUTE NON-RECURRENT MAXILLARY SINUSITIS: ICD-10-CM

## 2019-02-16 RX ORDER — FLUTICASONE PROPIONATE 50 MCG
SPRAY, SUSPENSION (ML) NASAL
Qty: 1 BOTTLE | Refills: 2 | Status: SHIPPED | OUTPATIENT
Start: 2019-02-16 | End: 2019-08-08

## 2019-02-20 ENCOUNTER — TELEPHONE (OUTPATIENT)
Dept: FAMILY MEDICINE CLINIC | Facility: CLINIC | Age: 44
End: 2019-02-20

## 2019-02-22 DIAGNOSIS — G47.00 INSOMNIA, UNSPECIFIED TYPE: ICD-10-CM

## 2019-02-23 RX ORDER — ZOLPIDEM TARTRATE 10 MG/1
10 TABLET ORAL
Qty: 30 TABLET | Refills: 0 | Status: SHIPPED | OUTPATIENT
Start: 2019-02-23 | End: 2019-02-27 | Stop reason: SDUPTHER

## 2019-02-26 DIAGNOSIS — G47.00 INSOMNIA, UNSPECIFIED TYPE: ICD-10-CM

## 2019-02-26 RX ORDER — ZOLPIDEM TARTRATE 10 MG/1
10 TABLET ORAL
Qty: 30 TABLET | Refills: 0 | Status: SHIPPED | OUTPATIENT
Start: 2019-02-26 | End: 2019-08-08 | Stop reason: SDUPTHER

## 2019-02-26 NOTE — TELEPHONE ENCOUNTER
Pt states [facility] did not receive her [employer]   Informed pt I will request provider resent rx to [facility]  (previous script printed)

## 2019-02-27 DIAGNOSIS — G47.00 INSOMNIA, UNSPECIFIED TYPE: ICD-10-CM

## 2019-02-28 ENCOUNTER — TELEPHONE (OUTPATIENT)
Dept: FAMILY MEDICINE CLINIC | Facility: CLINIC | Age: 44
End: 2019-02-28

## 2019-02-28 RX ORDER — ZOLPIDEM TARTRATE 10 MG/1
10 TABLET ORAL
Qty: 30 TABLET | Refills: 0 | Status: SHIPPED | OUTPATIENT
Start: 2019-02-28 | End: 2020-09-18 | Stop reason: SDUPTHER

## 2019-03-01 RX ORDER — ZOLPIDEM TARTRATE 10 MG/1
10 TABLET ORAL
Qty: 30 TABLET | Refills: 0 | Status: SHIPPED | OUTPATIENT
Start: 2019-03-01 | End: 2019-04-17 | Stop reason: SDUPTHER

## 2019-03-08 ENCOUNTER — HOSPITAL ENCOUNTER (OUTPATIENT)
Dept: RADIOLOGY | Facility: HOSPITAL | Age: 44
Discharge: HOME | End: 2019-03-08
Attending: PODIATRIST | Admitting: RADIOLOGY
Payer: COMMERCIAL

## 2019-03-08 ENCOUNTER — TRANSCRIBE ORDERS (OUTPATIENT)
Dept: RADIOLOGY | Facility: HOSPITAL | Age: 44
End: 2019-03-08

## 2019-03-08 DIAGNOSIS — M79.672 LEFT FOOT PAIN: ICD-10-CM

## 2019-03-08 PROCEDURE — 20605 DRAIN/INJ JOINT/BURSA W/O US: CPT

## 2019-03-08 PROCEDURE — 77012 CT SCAN FOR NEEDLE BIOPSY: CPT

## 2019-03-08 RX ORDER — METHYLPREDNISOLONE ACETATE 80 MG/ML
80 INJECTION, SUSPENSION INTRA-ARTICULAR; INTRALESIONAL; INTRAMUSCULAR; SOFT TISSUE ONCE
Status: CANCELLED | OUTPATIENT
Start: 2019-03-08 | End: 2019-03-08

## 2019-03-08 RX ORDER — LIDOCAINE HYDROCHLORIDE 10 MG/ML
5 INJECTION, SOLUTION EPIDURAL; INFILTRATION; INTRACAUDAL; PERINEURAL ONCE
Status: CANCELLED | OUTPATIENT
Start: 2019-03-08 | End: 2019-03-08

## 2019-03-08 RX ORDER — BUPIVACAINE HYDROCHLORIDE 2.5 MG/ML
20 INJECTION, SOLUTION EPIDURAL; INFILTRATION; INTRACAUDAL ONCE
Status: CANCELLED | OUTPATIENT
Start: 2019-03-08 | End: 2019-03-08

## 2019-03-08 NOTE — ADDENDUM NOTE
Addendum  created 07/02/18 1644 by Man Greenwood, DO    Order list changed, Order sets accessed, Sign clinical note
[id number] Detailed

## 2019-03-25 DIAGNOSIS — Z88.9 MULTIPLE ALLERGIES: ICD-10-CM

## 2019-03-26 RX ORDER — MONTELUKAST SODIUM 10 MG/1
10 TABLET ORAL EVERY EVENING
Qty: 30 TABLET | Refills: 3 | Status: SHIPPED | OUTPATIENT
Start: 2019-03-26 | End: 2019-06-19 | Stop reason: SDUPTHER

## 2019-04-08 ENCOUNTER — TELEPHONE (OUTPATIENT)
Dept: FAMILY MEDICINE CLINIC | Facility: CLINIC | Age: 44
End: 2019-04-08

## 2019-04-10 DIAGNOSIS — J45.21 INTERMITTENT ASTHMA WITH ACUTE EXACERBATION, UNSPECIFIED ASTHMA SEVERITY: ICD-10-CM

## 2019-04-17 DIAGNOSIS — G47.00 INSOMNIA, UNSPECIFIED TYPE: ICD-10-CM

## 2019-04-17 RX ORDER — ZOLPIDEM TARTRATE 10 MG/1
10 TABLET ORAL
Qty: 30 TABLET | Refills: 0 | Status: SHIPPED | OUTPATIENT
Start: 2019-04-17 | End: 2019-05-20 | Stop reason: SDUPTHER

## 2019-05-11 DIAGNOSIS — J45.21 INTERMITTENT ASTHMA WITH ACUTE EXACERBATION, UNSPECIFIED ASTHMA SEVERITY: ICD-10-CM

## 2019-05-15 ENCOUNTER — TELEPHONE (OUTPATIENT)
Dept: FAMILY MEDICINE CLINIC | Facility: CLINIC | Age: 44
End: 2019-05-15

## 2019-05-15 DIAGNOSIS — J45.21 INTERMITTENT ASTHMA WITH ACUTE EXACERBATION, UNSPECIFIED ASTHMA SEVERITY: ICD-10-CM

## 2019-05-20 DIAGNOSIS — G47.00 INSOMNIA, UNSPECIFIED TYPE: ICD-10-CM

## 2019-05-20 RX ORDER — IPRATROPIUM/ALBUTEROL SULFATE 20-100 MCG
MIST INHALER (GRAM) INHALATION
Qty: 1 INHALER | Refills: 3 | Status: SHIPPED | OUTPATIENT
Start: 2019-05-20 | End: 2019-08-08 | Stop reason: SDUPTHER

## 2019-05-20 RX ORDER — ZOLPIDEM TARTRATE 10 MG/1
10 TABLET ORAL
Qty: 30 TABLET | Refills: 0 | Status: SHIPPED | OUTPATIENT
Start: 2019-05-20 | End: 2019-06-25 | Stop reason: SDUPTHER

## 2019-06-08 DIAGNOSIS — E28.39 ESTROGEN DEFICIENCY: ICD-10-CM

## 2019-06-08 RX ORDER — ESTROGENS, CONJUGATED 0.45 MG/1
TABLET, FILM COATED ORAL
Qty: 60 TABLET | Refills: 0 | Status: SHIPPED | OUTPATIENT
Start: 2019-06-08 | End: 2019-06-28 | Stop reason: SDUPTHER

## 2019-06-19 DIAGNOSIS — Z88.9 MULTIPLE ALLERGIES: ICD-10-CM

## 2019-06-19 RX ORDER — MONTELUKAST SODIUM 10 MG/1
TABLET ORAL
Qty: 90 TABLET | Refills: 1 | Status: SHIPPED | OUTPATIENT
Start: 2019-06-19 | End: 2019-12-26 | Stop reason: SDUPTHER

## 2019-06-25 DIAGNOSIS — G47.00 INSOMNIA, UNSPECIFIED TYPE: ICD-10-CM

## 2019-06-25 RX ORDER — ZOLPIDEM TARTRATE 10 MG/1
TABLET ORAL
Qty: 30 TABLET | Refills: 0 | Status: SHIPPED | OUTPATIENT
Start: 2019-06-25 | End: 2019-08-08 | Stop reason: SDUPTHER

## 2019-06-28 DIAGNOSIS — E28.39 ESTROGEN DEFICIENCY: ICD-10-CM

## 2019-06-28 RX ORDER — ESTROGENS, CONJUGATED 0.45 MG/1
TABLET, FILM COATED ORAL
Qty: 60 TABLET | Refills: 0 | Status: SHIPPED | OUTPATIENT
Start: 2019-06-28 | End: 2019-08-08

## 2019-08-05 DIAGNOSIS — G47.00 INSOMNIA, UNSPECIFIED TYPE: ICD-10-CM

## 2019-08-05 RX ORDER — ZOLPIDEM TARTRATE 10 MG/1
TABLET ORAL
Qty: 30 TABLET | Refills: 0 | Status: SHIPPED | OUTPATIENT
Start: 2019-08-05 | End: 2019-08-08 | Stop reason: SDUPTHER

## 2019-08-08 ENCOUNTER — OFFICE VISIT (OUTPATIENT)
Dept: FAMILY MEDICINE CLINIC | Facility: CLINIC | Age: 44
End: 2019-08-08

## 2019-08-08 VITALS
HEART RATE: 78 BPM | HEIGHT: 61 IN | SYSTOLIC BLOOD PRESSURE: 120 MMHG | DIASTOLIC BLOOD PRESSURE: 80 MMHG | OXYGEN SATURATION: 97 % | BODY MASS INDEX: 32.73 KG/M2 | RESPIRATION RATE: 16 BRPM | WEIGHT: 173.38 LBS | TEMPERATURE: 98.1 F

## 2019-08-08 DIAGNOSIS — R51.9 NONINTRACTABLE HEADACHE, UNSPECIFIED CHRONICITY PATTERN, UNSPECIFIED HEADACHE TYPE: ICD-10-CM

## 2019-08-08 DIAGNOSIS — R52 PAIN AGGRAVATED BY WALKING: ICD-10-CM

## 2019-08-08 DIAGNOSIS — J45.21 INTERMITTENT ASTHMA WITH ACUTE EXACERBATION, UNSPECIFIED ASTHMA SEVERITY: ICD-10-CM

## 2019-08-08 DIAGNOSIS — E28.39 ESTROGEN DEFICIENCY: ICD-10-CM

## 2019-08-08 DIAGNOSIS — J44.9 CHRONIC OBSTRUCTIVE PULMONARY DISEASE, UNSPECIFIED COPD TYPE (HCC): ICD-10-CM

## 2019-08-08 DIAGNOSIS — Z12.31 ENCOUNTER FOR SCREENING MAMMOGRAM FOR BREAST CANCER: ICD-10-CM

## 2019-08-08 DIAGNOSIS — R52 PAIN: Primary | ICD-10-CM

## 2019-08-08 DIAGNOSIS — K21.9 GASTROESOPHAGEAL REFLUX DISEASE WITHOUT ESOPHAGITIS: ICD-10-CM

## 2019-08-08 RX ORDER — NICOTINE POLACRILEX 4 MG/1
GUM, CHEWING ORAL
Qty: 30 TABLET | Refills: 2 | Status: SHIPPED | OUTPATIENT
Start: 2019-08-08 | End: 2019-12-08 | Stop reason: SDUPTHER

## 2019-08-08 RX ORDER — LIDOCAINE 50 MG/G
PATCH TOPICAL
COMMUNITY
End: 2020-04-23 | Stop reason: ALTCHOICE

## 2019-08-08 RX ORDER — METHOCARBAMOL 750 MG/1
TABLET, FILM COATED ORAL
COMMUNITY
Start: 2017-12-07 | End: 2019-08-08

## 2019-08-08 RX ORDER — TRAMADOL HYDROCHLORIDE 50 MG/1
50 TABLET ORAL DAILY PRN
Qty: 30 TABLET | Refills: 0 | Status: SHIPPED | OUTPATIENT
Start: 2019-08-08 | End: 2019-11-21

## 2019-08-08 RX ORDER — ALBUTEROL SULFATE 90 UG/1
2 AEROSOL, METERED RESPIRATORY (INHALATION) EVERY 6 HOURS PRN
Qty: 1 INHALER | Refills: 3 | Status: SHIPPED | OUTPATIENT
Start: 2019-08-08 | End: 2019-12-11 | Stop reason: SDUPTHER

## 2019-08-08 RX ORDER — SUMATRIPTAN 50 MG/1
100 TABLET, FILM COATED ORAL ONCE AS NEEDED
Qty: 9 TABLET | Refills: 0 | Status: SHIPPED | OUTPATIENT
Start: 2019-08-08 | End: 2020-09-18 | Stop reason: SDUPTHER

## 2019-08-08 RX ORDER — CYCLOBENZAPRINE HCL 10 MG
TABLET ORAL
Qty: 30 TABLET | Refills: 3 | Status: SHIPPED | OUTPATIENT
Start: 2019-08-08 | End: 2020-01-08

## 2019-08-11 DIAGNOSIS — J45.21 INTERMITTENT ASTHMA WITH ACUTE EXACERBATION, UNSPECIFIED ASTHMA SEVERITY: ICD-10-CM

## 2019-09-10 DIAGNOSIS — G47.00 INSOMNIA, UNSPECIFIED TYPE: ICD-10-CM

## 2019-09-13 RX ORDER — ZOLPIDEM TARTRATE 10 MG/1
TABLET ORAL
Qty: 30 TABLET | Refills: 0 | Status: SHIPPED | OUTPATIENT
Start: 2019-09-13 | End: 2019-11-21

## 2019-11-14 DIAGNOSIS — E28.39 ESTROGEN DEFICIENCY: ICD-10-CM

## 2019-11-14 RX ORDER — ESTROGENS, CONJUGATED 0.45 MG/1
TABLET, FILM COATED ORAL
Qty: 21 TABLET | Refills: 2 | OUTPATIENT
Start: 2019-11-14

## 2019-11-18 DIAGNOSIS — J01.00 ACUTE NON-RECURRENT MAXILLARY SINUSITIS: ICD-10-CM

## 2019-11-18 RX ORDER — FLUTICASONE PROPIONATE 50 MCG
SPRAY, SUSPENSION (ML) NASAL
Qty: 16 ML | Refills: 2 | OUTPATIENT
Start: 2019-11-18

## 2019-11-21 ENCOUNTER — OFFICE VISIT (OUTPATIENT)
Dept: FAMILY MEDICINE CLINIC | Facility: CLINIC | Age: 44
End: 2019-11-21
Payer: COMMERCIAL

## 2019-11-21 ENCOUNTER — HOSPITAL ENCOUNTER (OUTPATIENT)
Dept: RADIOLOGY | Facility: IMAGING CENTER | Age: 44
Discharge: HOME | End: 2019-11-21
Payer: COMMERCIAL

## 2019-11-21 ENCOUNTER — TRANSCRIBE ORDERS (OUTPATIENT)
Dept: ADMINISTRATIVE | Facility: HOSPITAL | Age: 44
End: 2019-11-21

## 2019-11-21 VITALS
SYSTOLIC BLOOD PRESSURE: 118 MMHG | WEIGHT: 173.2 LBS | TEMPERATURE: 97.9 F | DIASTOLIC BLOOD PRESSURE: 82 MMHG | HEART RATE: 69 BPM | BODY MASS INDEX: 32.7 KG/M2 | HEIGHT: 61 IN | OXYGEN SATURATION: 96 % | RESPIRATION RATE: 16 BRPM

## 2019-11-21 DIAGNOSIS — G89.29 CHRONIC LEFT SHOULDER PAIN: ICD-10-CM

## 2019-11-21 DIAGNOSIS — M25.512 CHRONIC LEFT SHOULDER PAIN: ICD-10-CM

## 2019-11-21 DIAGNOSIS — M54.50 CHRONIC LOW BACK PAIN WITHOUT SCIATICA, UNSPECIFIED BACK PAIN LATERALITY: ICD-10-CM

## 2019-11-21 DIAGNOSIS — G89.29 CHRONIC LOW BACK PAIN WITHOUT SCIATICA, UNSPECIFIED BACK PAIN LATERALITY: ICD-10-CM

## 2019-11-21 DIAGNOSIS — D50.8 OTHER IRON DEFICIENCY ANEMIA: ICD-10-CM

## 2019-11-21 DIAGNOSIS — R53.82 CHRONIC FATIGUE: ICD-10-CM

## 2019-11-21 DIAGNOSIS — Z00.00 HEALTHCARE MAINTENANCE: Primary | ICD-10-CM

## 2019-11-21 DIAGNOSIS — F51.01 PRIMARY INSOMNIA: ICD-10-CM

## 2019-11-21 PROBLEM — D64.9 ABSOLUTE ANEMIA: Status: ACTIVE | Noted: 2019-11-21

## 2019-11-21 PROCEDURE — 73030 X-RAY EXAM OF SHOULDER: CPT

## 2019-11-21 PROCEDURE — 99396 PREV VISIT EST AGE 40-64: CPT | Performed by: FAMILY MEDICINE

## 2019-11-21 RX ORDER — TRAZODONE HYDROCHLORIDE 50 MG/1
50 TABLET ORAL
Qty: 30 TABLET | Refills: 2 | Status: SHIPPED | OUTPATIENT
Start: 2019-11-21 | End: 2020-02-23

## 2019-11-21 NOTE — ASSESSMENT & PLAN NOTE
She has history of iron-deficiency anemia but she does not take iron  I am going to check her CBC and iron panel

## 2019-11-21 NOTE — PROGRESS NOTES
Assessment/Plan:  Healthcare maintenance    It was discussed about immunizations, diet, exercise and safety measures  Chronic left shoulder pain    I am going to check x-ray of her left shoulder  I am going to send her referral to see orthopedic  Chronic low back pain without sciatica    Referral to see pain management  Primary insomnia    She was given prescription for trazodone 50 mg 1 tablet 2 hours before bedtime  It was discussed about possible  Side effects of the medication  Absolute anemia    She has history of iron-deficiency anemia but she does not take iron  I am going to check her CBC and iron panel  Diagnoses and all orders for this visit:    Healthcare maintenance  -     Lipid Panel with Direct LDL reflex; Future    Other iron deficiency anemia  -     CBC and differential; Future  -     Iron, TIBC and Ferritin Panel; Future    Chronic low back pain without sciatica, unspecified back pain laterality  -     Ambulatory referral to Pain Management; Future    Chronic fatigue  -     Comprehensive metabolic panel; Future  -     TSH, 3rd generation with Free T4 reflex; Future    Primary insomnia  -     traZODone (DESYREL) 50 mg tablet; Take 1 tablet (50 mg total) by mouth daily at bedtime    BMI 32 0-32 9,adult    Chronic left shoulder pain  -     Ambulatory referral to Orthopedic Surgery; Future  -     XR shoulder 2+ vw left; Future          Patient Instructions     Obesity   AMBULATORY CARE:   Obesity  is when your body mass index (BMI) is greater than 30  Your healthcare provider will use your height and weight to measure your BMI  The risks of obesity include  many health problems, such as injuries or physical disability   You may need tests to check for the following:  · Diabetes     · High blood pressure or high cholesterol     · Heart disease     · Gallbladder or liver disease     · Cancer of the colon, breast, prostate, liver, or kidney     · Sleep apnea     · Arthritis or gout  Seek care immediately if:   · You have a severe headache, confusion, or difficulty speaking  · You have weakness on one side of your body  · You have chest pain, sweating, or shortness of breath  Contact your healthcare provider if:   · You have symptoms of gallbladder or liver disease, such as pain in your upper abdomen  · You have knee or hip pain and discomfort while walking  · You have symptoms of diabetes, such as intense hunger and thirst, and frequent urination  · You have symptoms of sleep apnea, such as snoring or daytime sleepiness  · You have questions or concerns about your condition or care  Treatment for obesity  focuses on helping you lose weight to improve your health  Even a small decrease in BMI can reduce the risk for many health problems  Your healthcare provider will help you set a weight-loss goal   · Lifestyle changes  are the first step in treating obesity  These include making healthy food choices and getting regular physical activity  Your healthcare provider may suggest a weight-loss program that involves coaching, education, and therapy  · Medicine  may help you lose weight when it is used with a healthy diet and physical activity  · Surgery  can help you lose weight if you are very obese and have other health problems  There are several types of weight-loss surgery  Ask your healthcare provider for more information  Be successful losing weight:   · Set small, realistic goals  An example of a small goal is to walk for 20 minutes 5 days a week  Anther goal is to lose 5% of your body weight  · Tell friends, family members, and coworkers about your goals  and ask for their support  Ask a friend to lose weight with you, or join a weight-loss support group  · Identify foods or triggers that may cause you to overeat , and find ways to avoid them  Remove tempting high-calorie foods from your home and workplace   Place a bowl of fresh fruit on your kitchen counter  If stress causes you to eat, then find other ways to cope with stress  · Keep a diary to track what you eat and drink  Also write down how many minutes of physical activity you do each day  Weigh yourself once a week and record it in your diary  Eating changes: You will need to eat 500 to 1,000 fewer calories each day than you currently eat to lose 1 to 2 pounds a week  The following changes will help you cut calories:  · Eat smaller portions  Use small plates, no larger than 9 inches in diameter  Fill your plate half full of fruits and vegetables  Measure your food using measuring cups until you know what a serving size looks like  · Eat 3 meals and 1 or 2 snacks each day  Plan your meals in advance  Shelbi Pettit and eat at home most of the time  Eat slowly  · Eat fruits and vegetables at every meal   They are low in calories and high in fiber, which makes you feel full  Do not add butter, margarine, or cream sauce to vegetables  Use herbs to season steamed vegetables  · Eat less fat and fewer fried foods  Eat more baked or grilled chicken and fish  These protein sources are lower in calories and fat than red meat  Limit fast food  Dress your salads with olive oil and vinegar instead of bottled dressing  · Limit the amount of sugar you eat  Do not drink sugary beverages  Limit alcohol  Activity changes:  Physical activity is good for your body in many ways  It helps you burn calories and build strong muscles  It decreases stress and depression, and improves your mood  It can also help you sleep better  Talk to your healthcare provider before you begin an exercise program   · Exercise for at least 30 minutes 5 days a week  Start slowly  Set aside time each day for physical activity that you enjoy and that is convenient for you  It is best to do both weight training and an activity that increases your heart rate, such as walking, bicycling, or swimming       · Find ways to be more active  Do yard work and housecleaning  Walk up the stairs instead of using elevators  Spend your leisure time going to events that require walking, such as outdoor festivals or fairs  This extra physical activity can help you lose weight and keep it off  Follow up with your healthcare provider as directed: You may need to meet with a dietitian  Write down your questions so you remember to ask them during your visits  © 2017 [employer]  Information is for End User's use only and may not be sold, redistributed or otherwise used for commercial purposes  All illustrations and images included in CareNotes® are the copyrighted property of [employer]  or [employer]  The above information is an  only  It is not intended as medical advice for individual conditions or treatments  Talk to your doctor, nurse or pharmacist before following any medical regimen to see if it is safe and effective for you  Return in about 6 months (around 5/21/2020)  Subjective:      Patient ID: Emilio Soriano is a 38 y o  female  Chief Complaint   Patient presents with    Pain    Insomnia       She is here today for wellness exam   She has multiple medical problems including chronic back and shoulder pain  She also has problem with her foot status post reconstructive surgery  She has problem with insomnia and she has been taking Ambien but when she ran out she start taking some over-the-counter medications Tylenol p m  with only minimum  benefit  The following portions of the patient's history were reviewed and updated as appropriate: allergies, current medications, past family history, past medical history, past social history, past surgical history and problem list     Review of Systems   Constitutional: Negative for chills and fever  HENT: Negative for trouble swallowing  Eyes: Negative for visual disturbance     Respiratory: Negative for cough and shortness of breath  Cardiovascular: Negative for chest pain, palpitations and leg swelling  Gastrointestinal: Negative for abdominal pain, constipation and diarrhea  Endocrine: Negative for cold intolerance and heat intolerance  Genitourinary: Negative for difficulty urinating and dysuria  Musculoskeletal: Positive for arthralgias and back pain  Negative for gait problem  Skin: Negative for rash  Neurological: Negative for dizziness, tremors, seizures and headaches  Hematological: Negative for adenopathy  Psychiatric/Behavioral: Positive for sleep disturbance  Negative for behavioral problems  Current Outpatient Medications   Medication Sig Dispense Refill    albuterol (2 5 mg/3 mL) 0 083 % nebulizer solution Take 1 vial (2 5 mg total) by nebulization every 6 (six) hours as needed for wheezing or shortness of breath 50 vial 0    albuterol (VENTOLIN HFA) 90 mcg/act inhaler Inhale 2 puffs every 6 (six) hours as needed for wheezing 1 Inhaler 3    conjugated estrogens (PREMARIN) 0 45 mg tablet Take 1 tablet (0 45 mg total) by mouth daily Take daily for 21 days then do not take for 7 days   60 tablet 0    cyclobenzaprine (FLEXERIL) 10 mg tablet Take one tablet daily prn 30 tablet 3    diclofenac sodium (VOLTAREN) 1 % diclofenac 1 % topical gel      gabapentin (NEURONTIN) 100 mg capsule TAKE 3 CAPSULES 3 TIMES A  capsule 5    ibuprofen (MOTRIN) 600 mg tablet TAKE 1 TABLET (600 MG TOTAL) BY MOUTH EVERY 8 (EIGHT) HOURS AS NEEDED FOR MILD PAIN 45 tablet 0    ipratropium-albuterol (COMBIVENT RESPIMAT) inhaler Inhale 1 puff 4 (four) times a day 1 Inhaler 3    montelukast (SINGULAIR) 10 mg tablet TAKE 1 TABLET BY MOUTH EVERY DAY IN THE EVENING 90 tablet 1    naproxen (EC NAPROSYN) 500 MG EC tablet one with food twice a day for pain      Omeprazole 20 MG TBEC Take one tablet daily 30 tablet 2    SUMAtriptan (IMITREX) 50 mg tablet Take 2 tablets (100 mg total) by mouth once as needed for migraine 9 tablet 0    WIXELA INHUB 500-50 MCG/DOSE inhaler INHALE 1 PUFF EVERY MORNING 60 each 3    zolpidem (AMBIEN) 10 mg tablet TAKE 1 TABLET (10 MG TOTAL) BY MOUTH DAILY AT BEDTIME AS NEEDED FOR SLEEP 30 tablet 0    ALPRAZolam (XANAX) 2 MG tablet Take by mouth      ergocalciferol (VITAMIN D2) 50,000 units Take 50,000 Units by mouth once a week  5    lidocaine (LIDODERM) 5 % lidocaine 5 % topical patch      tiotropium-olodaterol (STIOLTO RESPIMAT) 2 5-2 5 MCG/ACT inhaler Stiolto Respimat 2 5 mcg-2 5 mcg/actuation solution for inhalation      traZODone (DESYREL) 50 mg tablet Take 1 tablet (50 mg total) by mouth daily at bedtime 30 tablet 2     No current facility-administered medications for this visit  Objective:    /82 (BP Location: Left arm, Patient Position: Sitting, Cuff Size: Standard)   Pulse 69   Temp 97 9 °F (36 6 °C)   Resp 16   Ht 5' 1" (1 549 m)   Wt 78 6 kg (173 lb 3 2 oz)   SpO2 96%   BMI 32 73 kg/m²        Physical Exam   Constitutional: She is oriented to person, place, and time  She appears well-developed and well-nourished  HENT:   Head: Normocephalic and atraumatic  Eyes: Pupils are equal, round, and reactive to light  EOM are normal    Neck: Normal range of motion  Neck supple  Cardiovascular: Normal rate, regular rhythm and normal heart sounds  Pulmonary/Chest: Effort normal and breath sounds normal    Abdominal: Soft  Bowel sounds are normal    Musculoskeletal: Normal range of motion  She exhibits no edema  Lymphadenopathy:     She has no cervical adenopathy  Neurological: She is alert and oriented to person, place, and time  No cranial nerve deficit  Skin: Skin is warm  Psychiatric: She has a normal mood and affect  Nursing note and vitals reviewed  Yeison Wood MD BMI Counseling: Body mass index is 32 73 kg/m²   The BMI is above normal  Nutrition recommendations include reducing portion sizes, decreasing overall calorie intake and 3-5 servings of fruits/vegetables daily  Exercise recommendations include moderate aerobic physical activity for 150 minutes/week

## 2019-11-21 NOTE — ASSESSMENT & PLAN NOTE
She was given prescription for trazodone 50 mg 1 tablet 2 hours before bedtime  It was discussed about possible  Side effects of the medication

## 2019-12-08 DIAGNOSIS — K21.9 GASTROESOPHAGEAL REFLUX DISEASE WITHOUT ESOPHAGITIS: ICD-10-CM

## 2019-12-08 RX ORDER — OMEPRAZOLE 20 MG/1
CAPSULE, DELAYED RELEASE ORAL
Qty: 30 CAPSULE | Refills: 2 | Status: SHIPPED | OUTPATIENT
Start: 2019-12-08 | End: 2021-11-08 | Stop reason: HOSPADM

## 2019-12-11 DIAGNOSIS — J44.9 CHRONIC OBSTRUCTIVE PULMONARY DISEASE, UNSPECIFIED COPD TYPE (HCC): ICD-10-CM

## 2019-12-11 RX ORDER — ALBUTEROL SULFATE 90 UG/1
AEROSOL, METERED RESPIRATORY (INHALATION)
Qty: 18 INHALER | Refills: 3 | Status: SHIPPED | OUTPATIENT
Start: 2019-12-11 | End: 2020-03-26 | Stop reason: SDUPTHER

## 2019-12-12 ENCOUNTER — CONSULT (OUTPATIENT)
Dept: OBGYN CLINIC | Facility: OTHER | Age: 44
End: 2019-12-12
Payer: COMMERCIAL

## 2019-12-12 VITALS
HEIGHT: 61 IN | BODY MASS INDEX: 32.85 KG/M2 | HEART RATE: 64 BPM | WEIGHT: 174 LBS | SYSTOLIC BLOOD PRESSURE: 151 MMHG | DIASTOLIC BLOOD PRESSURE: 84 MMHG

## 2019-12-12 DIAGNOSIS — M75.02 ADHESIVE CAPSULITIS OF LEFT SHOULDER: Primary | ICD-10-CM

## 2019-12-12 DIAGNOSIS — M75.32 CALCIFIC TENDINITIS OF LEFT SHOULDER: ICD-10-CM

## 2019-12-12 DIAGNOSIS — E28.39 ESTROGEN DEFICIENCY: ICD-10-CM

## 2019-12-12 PROCEDURE — 20610 DRAIN/INJ JOINT/BURSA W/O US: CPT | Performed by: ORTHOPAEDIC SURGERY

## 2019-12-12 PROCEDURE — 99243 OFF/OP CNSLTJ NEW/EST LOW 30: CPT | Performed by: ORTHOPAEDIC SURGERY

## 2019-12-12 RX ORDER — BETAMETHASONE SODIUM PHOSPHATE AND BETAMETHASONE ACETATE 3; 3 MG/ML; MG/ML
6 INJECTION, SUSPENSION INTRA-ARTICULAR; INTRALESIONAL; INTRAMUSCULAR; SOFT TISSUE
Status: COMPLETED | OUTPATIENT
Start: 2019-12-12 | End: 2019-12-12

## 2019-12-12 RX ORDER — ESTROGENS, CONJUGATED 0.45 MG/1
TABLET, FILM COATED ORAL
Qty: 21 TABLET | Refills: 2 | Status: SHIPPED | OUTPATIENT
Start: 2019-12-12 | End: 2020-02-18

## 2019-12-12 RX ORDER — BUPIVACAINE HYDROCHLORIDE 2.5 MG/ML
2 INJECTION, SOLUTION INFILTRATION; PERINEURAL
Status: COMPLETED | OUTPATIENT
Start: 2019-12-12 | End: 2019-12-12

## 2019-12-12 RX ADMIN — BETAMETHASONE SODIUM PHOSPHATE AND BETAMETHASONE ACETATE 6 MG: 3; 3 INJECTION, SUSPENSION INTRA-ARTICULAR; INTRALESIONAL; INTRAMUSCULAR; SOFT TISSUE at 10:57

## 2019-12-12 RX ADMIN — BUPIVACAINE HYDROCHLORIDE 2 ML: 2.5 INJECTION, SOLUTION INFILTRATION; PERINEURAL at 10:57

## 2019-12-12 NOTE — PROGRESS NOTES
Assessment  Diagnoses and all orders for this visit:    Adhesive capsulitis of left shoulder      Calcific tendinitis of left shoulder          Discussion and Plan:    The patient has an examination consistent with adhesive capsulitis likely secondary to calcific tendinitis of the left shoulder  Patient was provided with a cortico-steroid injection into her left glenohumeral joint today  Patient was given post injection protocol  Patient will also begin formal outpatient therapy for range of motion and stretching exercises for her left shoulder  We will re-evaluate her in 3 months  Subjective:   Patient ID: Bandar Greenwood is a 38 y o  female      HPI  Patient is a 38year old female who is RHD who presents with left shoulder pain that has been complaining of left shoulder pain for the past 5 months  Patient describes difficulties reaching overhead and getting changed  Patient does complain of numbness and tingling to her left upper extremity however she does have a history of spinal stenosis  Patient has been treating her left shoulder was 6 weeks of physical therapy and Biofreeze without relief  The following portions of the patient's history were reviewed and updated as appropriate: allergies, current medications, past family history, past medical history, past social history, past surgical history and problem list     Review of Systems   Constitutional: Negative  HENT: Negative  Eyes: Negative  Respiratory: Negative  Cardiovascular: Negative  Gastrointestinal: Negative  Endocrine: Negative  Genitourinary: Negative  Musculoskeletal:        As noted in HPI   Skin: Negative  Allergic/Immunologic: Negative  Neurological: Negative  Hematological: Negative  Psychiatric/Behavioral: Negative          Objective:  /84   Pulse 64   Ht 5' 1" (1 549 m)   Wt 78 9 kg (174 lb)   BMI 32 88 kg/m²       Ortho Exam   Left Shoulder Exam  Alignment / Posture:  Normal shoulder posture  Inspection:  No swelling  Palpation:  No tenderness  ROM:  Shoulder FE limitied due to pain 90   Shoulder ER limited due to pain 20  Shoulder IR sacrum  Strength:  5/5 supraspinatus, infraspinatus, and subscapularis  Stability:  No objective shoulder instability  Tests: (-) Bear hug  Neurovascular:  Sensation intact in Ax/R/M/U nerve distributions  2+ radial pulse  Physical Exam   Constitutional: She is oriented to person, place, and time  She appears well-developed and well-nourished  Eyes: Pupils are equal, round, and reactive to light  Cardiovascular: Normal rate  Pulmonary/Chest: Effort normal and breath sounds normal    Neurological: She is alert and oriented to person, place, and time  Skin: Skin is warm  Capillary refill takes less than 2 seconds  Psychiatric: She has a normal mood and affect  Her behavior is normal    Vitals reviewed  I have personally reviewed pertinent films in PACS and my interpretation is as follows  Xray of left shoulder on 12/12/19 demonstrates calcific tendinitis without evidence of fractures or dislocations      Large joint arthrocentesis: L glenohumeral  Date/Time: 12/12/2019 10:57 AM  Consent given by: patient  Site marked: site marked  Supporting Documentation  Indications: pain   Procedure Details  Location: shoulder - L glenohumeral  Medications administered: 2 mL bupivacaine 0 25 %; 6 mg betamethasone acetate-betamethasone sodium phosphate 6 (3-3) mg/mL    Patient tolerance: patient tolerated the procedure well with no immediate complications  Dressing:  Sterile dressing applied          Scribe Attestation    I,:   Netta Michel am acting as a scribe while in the presence of the attending physician :        I,:   Carolyn David MD personally performed the services described in this documentation    as scribed in my presence :

## 2019-12-13 ENCOUNTER — CONSULT (OUTPATIENT)
Dept: PAIN MEDICINE | Facility: CLINIC | Age: 44
End: 2019-12-13
Payer: COMMERCIAL

## 2019-12-13 ENCOUNTER — APPOINTMENT (OUTPATIENT)
Dept: LAB | Facility: IMAGING CENTER | Age: 44
End: 2019-12-13
Payer: COMMERCIAL

## 2019-12-13 VITALS
SYSTOLIC BLOOD PRESSURE: 127 MMHG | BODY MASS INDEX: 32.28 KG/M2 | WEIGHT: 171 LBS | DIASTOLIC BLOOD PRESSURE: 83 MMHG | TEMPERATURE: 98.4 F | HEIGHT: 61 IN | HEART RATE: 80 BPM

## 2019-12-13 DIAGNOSIS — M54.2 NECK PAIN: ICD-10-CM

## 2019-12-13 DIAGNOSIS — R52 PAIN AGGRAVATED BY WALKING: ICD-10-CM

## 2019-12-13 DIAGNOSIS — M54.40 LOW BACK PAIN WITH SCIATICA, SCIATICA LATERALITY UNSPECIFIED, UNSPECIFIED BACK PAIN LATERALITY, UNSPECIFIED CHRONICITY: ICD-10-CM

## 2019-12-13 DIAGNOSIS — M50.30 DDD (DEGENERATIVE DISC DISEASE), CERVICAL: ICD-10-CM

## 2019-12-13 DIAGNOSIS — R53.82 CHRONIC FATIGUE: ICD-10-CM

## 2019-12-13 DIAGNOSIS — M51.36 DDD (DEGENERATIVE DISC DISEASE), LUMBAR: ICD-10-CM

## 2019-12-13 DIAGNOSIS — Z00.00 HEALTHCARE MAINTENANCE: ICD-10-CM

## 2019-12-13 DIAGNOSIS — D50.8 OTHER IRON DEFICIENCY ANEMIA: ICD-10-CM

## 2019-12-13 DIAGNOSIS — M54.16 LUMBAR RADICULOPATHY: Primary | ICD-10-CM

## 2019-12-13 DIAGNOSIS — M54.12 CERVICAL RADICULOPATHY: ICD-10-CM

## 2019-12-13 PROBLEM — M51.369 DDD (DEGENERATIVE DISC DISEASE), LUMBAR: Status: ACTIVE | Noted: 2019-12-13

## 2019-12-13 LAB
ALBUMIN SERPL BCP-MCNC: 4.1 G/DL (ref 3.5–5)
ALP SERPL-CCNC: 90 U/L (ref 46–116)
ALT SERPL W P-5'-P-CCNC: 20 U/L (ref 12–78)
ANION GAP SERPL CALCULATED.3IONS-SCNC: 4 MMOL/L (ref 4–13)
AST SERPL W P-5'-P-CCNC: 8 U/L (ref 5–45)
BASOPHILS # BLD AUTO: 0.01 THOUSANDS/ΜL (ref 0–0.1)
BASOPHILS NFR BLD AUTO: 0 % (ref 0–1)
BILIRUB SERPL-MCNC: 0.22 MG/DL (ref 0.2–1)
BUN SERPL-MCNC: 17 MG/DL (ref 5–25)
CALCIUM ALBUM COR SERPL-MCNC: 10.4 MG/DL (ref 8.3–10.1)
CALCIUM SERPL-MCNC: 10.5 MG/DL (ref 8.3–10.1)
CHLORIDE SERPL-SCNC: 107 MMOL/L (ref 100–108)
CHOLEST SERPL-MCNC: 292 MG/DL (ref 50–200)
CO2 SERPL-SCNC: 27 MMOL/L (ref 21–32)
CREAT SERPL-MCNC: 0.97 MG/DL (ref 0.6–1.3)
EOSINOPHIL # BLD AUTO: 0.02 THOUSAND/ΜL (ref 0–0.61)
EOSINOPHIL NFR BLD AUTO: 0 % (ref 0–6)
ERYTHROCYTE [DISTWIDTH] IN BLOOD BY AUTOMATED COUNT: 13 % (ref 11.6–15.1)
FERRITIN SERPL-MCNC: 65 NG/ML (ref 8–388)
GFR SERPL CREATININE-BSD FRML MDRD: 71 ML/MIN/1.73SQ M
GLUCOSE P FAST SERPL-MCNC: 108 MG/DL (ref 65–99)
HCT VFR BLD AUTO: 42.4 % (ref 34.8–46.1)
HDLC SERPL-MCNC: 73 MG/DL
HGB BLD-MCNC: 13.7 G/DL (ref 11.5–15.4)
IMM GRANULOCYTES # BLD AUTO: 0.03 THOUSAND/UL (ref 0–0.2)
IMM GRANULOCYTES NFR BLD AUTO: 0 % (ref 0–2)
IRON SATN MFR SERPL: 20 %
IRON SERPL-MCNC: 60 UG/DL (ref 50–170)
LDLC SERPL CALC-MCNC: 199 MG/DL (ref 0–100)
LYMPHOCYTES # BLD AUTO: 1.74 THOUSANDS/ΜL (ref 0.6–4.47)
LYMPHOCYTES NFR BLD AUTO: 17 % (ref 14–44)
MCH RBC QN AUTO: 30.5 PG (ref 26.8–34.3)
MCHC RBC AUTO-ENTMCNC: 32.3 G/DL (ref 31.4–37.4)
MCV RBC AUTO: 94 FL (ref 82–98)
MONOCYTES # BLD AUTO: 0.63 THOUSAND/ΜL (ref 0.17–1.22)
MONOCYTES NFR BLD AUTO: 6 % (ref 4–12)
NEUTROPHILS # BLD AUTO: 8.11 THOUSANDS/ΜL (ref 1.85–7.62)
NEUTS SEG NFR BLD AUTO: 77 % (ref 43–75)
NRBC BLD AUTO-RTO: 0 /100 WBCS
PLATELET # BLD AUTO: 263 THOUSANDS/UL (ref 149–390)
PMV BLD AUTO: 11.2 FL (ref 8.9–12.7)
POTASSIUM SERPL-SCNC: 4.7 MMOL/L (ref 3.5–5.3)
PROT SERPL-MCNC: 8 G/DL (ref 6.4–8.2)
RBC # BLD AUTO: 4.49 MILLION/UL (ref 3.81–5.12)
SODIUM SERPL-SCNC: 138 MMOL/L (ref 136–145)
TIBC SERPL-MCNC: 303 UG/DL (ref 250–450)
TRIGL SERPL-MCNC: 98 MG/DL
TSH SERPL DL<=0.05 MIU/L-ACNC: 0.83 UIU/ML (ref 0.36–3.74)
WBC # BLD AUTO: 10.54 THOUSAND/UL (ref 4.31–10.16)

## 2019-12-13 PROCEDURE — 82728 ASSAY OF FERRITIN: CPT

## 2019-12-13 PROCEDURE — 83540 ASSAY OF IRON: CPT

## 2019-12-13 PROCEDURE — 99244 OFF/OP CNSLTJ NEW/EST MOD 40: CPT | Performed by: ANESTHESIOLOGY

## 2019-12-13 PROCEDURE — 36415 COLL VENOUS BLD VENIPUNCTURE: CPT

## 2019-12-13 PROCEDURE — 84443 ASSAY THYROID STIM HORMONE: CPT

## 2019-12-13 PROCEDURE — 85025 COMPLETE CBC W/AUTO DIFF WBC: CPT

## 2019-12-13 PROCEDURE — 83550 IRON BINDING TEST: CPT

## 2019-12-13 PROCEDURE — 80061 LIPID PANEL: CPT

## 2019-12-13 PROCEDURE — 80053 COMPREHEN METABOLIC PANEL: CPT

## 2019-12-13 RX ORDER — GABAPENTIN 300 MG/1
300 CAPSULE ORAL 3 TIMES DAILY
Qty: 90 CAPSULE | Refills: 1 | Status: SHIPPED | OUTPATIENT
Start: 2019-12-13 | End: 2021-11-08 | Stop reason: ALTCHOICE

## 2019-12-13 NOTE — PROGRESS NOTES
Assessment  1  Lumbar radiculopathy    2  Low back pain with sciatica, sciatica laterality unspecified, unspecified back pain laterality, unspecified chronicity    3  Pain aggravated by walking    4  Cervical radiculopathy    5  Neck pain    6  DDD (degenerative disc disease), cervical    7  DDD (degenerative disc disease), lumbar        Plan  69-year-old female referred by Dr Opal Jimenez, with a history of carpal tunnel syndrome, presenting for initial consultation regarding a long-standing history of neck pain with radiculopathy in bilateral upper extremities moreso on the left than the right and lumbosacral back pain with radiculopathy in the L5-S1 distribution of the left lower extremity  The patient's low back and leg symptoms are her major complaint today  MRI of the cervical spine reveals degenerative disc disease and spondylosis with disc bulges from C4-5 C6-7 most pronounced at C6-7 causing some mild central and foraminal stenosis predominantly at C6-7  The patient does have a CT of the lumbar spine revealing degenerative disc disease and spondylosis with a disc bulge at L5-S1  She is currently taking gabapentin 300 mg t i d  With minimal to mild relief  She is unable to take NSAIDs secondary to only having 1 kidney  She does take Tylenol p r n  With minimal relief  She has done physical therapy in the past, however none recently for her neck or low back symptoms  The patient's neck pain seems to be multifactorial including myofascial and facet mediated components  The patient's upper extremity symptoms seem to be multifactorial as well including radicular components as well as components of carpal tunnel syndrome  The patient's low back pain seems to be multifactorial as well including myofascial and facet mediated components    No evidence of sacroiliitis on physical exam   The radicular symptoms in her left lower extremity may be coming from disc bulge at L5-S1       1  Will schedule the patient for left L5 and S1 TFESI to reduce the inflammatory component of her pain  2  I will prescribe physical therapy as I feel she would benefit from [facility] based exercises for her cervical and lumbar complaints  3  The patient will continue with gabapentin 300 mg t i d  For neuropathic complaints  4  We will avoid NSAIDs secondary to only 1 kidney  5  Patient may benefit from cervical epidural steroid injections in the future  6  Patient may benefit from bilateral SI joint injections in the future  7  I will follow up the patient in 2 months      Complete risks and benefits including bleeding, infection, tissue reaction, nerve injury and allergic reaction were discussed  The approach was demonstrated using models and literature was provided  Verbal and written consent was obtained  My impressions and treatment recommendations were discussed in detail with the patient who verbalized understanding and had no further questions  Discharge instructions were provided  I personally saw and examined the patient and I agree with the above discussed plan of care  No orders of the defined types were placed in this encounter  No orders of the defined types were placed in this encounter  History of Present Illness    Dennis Pepe is a 38 y o  female referred by Dr Teresa Guerra, with a history of carpal tunnel syndrome, presenting for initial consultation regarding a long-standing history of neck pain with radiation into bilateral upper extremities moreso on the left than the right with associated numbness and paresthesias in the hands  She denies any upper extremity weakness, bladder or bowel incontinence , or balance issues  She also complains of lumbosacral back pain with radiation into the poster lateral aspect of the left lower extremity  She denies any right lower extremity symptoms or saddle anesthesia  The patient's low back and leg symptoms are her major complaint today    Of note, she does have some chronic left ankle pain secondary to multiple surgeries  This is a minor complaint for her today  MRI of the cervical spine reveals degenerative disc disease and spondylosis with disc bulges from C4-5 C6-7 most pronounced at C6-7 causing some mild central and foraminal stenosis predominantly at C6-7  The patient does have a CT of the lumbar spine revealing degenerative disc disease and spondylosis with a disc bulge at L5-S1  She is currently taking gabapentin 300 mg t i d  With minimal to mild relief  She is unable to take NSAIDs secondary to only having 1 kidney  She does take Tylenol p r n  With minimal relief  She has done physical therapy in the past, however none recently for her neck or low back symptoms  The patient rates her pain an 8/10 on the pain is nearly constant  The pain does not follow any particular pattern throughout the day  The pain is described as shooting, numbness, sharp, pins and needles, dull, and aching  The pain is increased with standing, bending, walking, and exercise  She does find some relief with relaxation  I have personally reviewed and/or updated the patient's past medical history, past surgical history, family history, social history, current medications, allergies, and vital signs today  Other than as stated above, the patient denies any interval changes in medications, medical condition, mental condition, symptoms, or allergies since the last office visit  Review of Systems   Constitutional: Positive for unexpected weight change  Negative for fever  HENT: Negative for trouble swallowing  Eyes: Positive for visual disturbance  Respiratory: Negative for shortness of breath and wheezing  Cardiovascular: Negative for chest pain and palpitations  Gastrointestinal: Negative for constipation, diarrhea, nausea and vomiting  Endocrine: Negative for cold intolerance, heat intolerance and polydipsia     Genitourinary: Negative for difficulty urinating and frequency  Musculoskeletal: Positive for joint swelling and myalgias  Negative for arthralgias and gait problem  Skin: Negative for rash  Neurological: Positive for headaches  Negative for dizziness, seizures, syncope and weakness  Hematological: Does not bruise/bleed easily  Psychiatric/Behavioral: Negative for dysphoric mood  All other systems reviewed and are negative  Patient Active Problem List   Diagnosis    Primary insomnia    Rash    Chronic obstructive pulmonary disease (HCC)    Cough    Acute non-recurrent maxillary sinusitis    Healthcare maintenance    Absolute anemia    Chronic low back pain without sciatica    Chronic fatigue    BMI 32 0-32 9,adult    Chronic left shoulder pain       Past Medical History:   Diagnosis Date    Anxiety     h/o 2 panic attacks     Arthritis     l hip congenital dyspasia    Asthma     good control    Bleb     R eye    Chronic kidney disease     one kidney left side    Chronic pain     back- DJD in 2 upper 2 lower, buldging disc c-7    Chronic pain disorder     lower back and hips    Depression     Endometriosis     Glaucoma     b/l    Bleb in R eye    Glaucoma     H/O carpal tunnel syndrome     L    Headache     migraines    Insomnia     Migraines     Osteoarthritis     cervical spondylarthritis    Polypoid cystitis     POLYP CYSTS ON BUTTOCK AREA    Renal disorder     Reports single kidney    Right knee injury     SCRAPED GROWTH PLATE RIGHT KNEE       Past Surgical History:   Procedure Laterality Date    BLEPHAROPLASTY Right     BLEB RIGHT EYE    BUNIONECTOMY Bilateral     2 on R, 3 on left    CARPAL TUNNEL RELEASE Left     CT NEEDLE BX ASPIRATION INJECTION LOCALIZATION  3/8/2019    EYE SURGERY Right     bleb implant    EYE SURGERY Bilateral     lazer- glaucoma    FOOT HARDWARE REMOVAL Left 7/2/2018    Procedure: CALCANEAL REMOVAL OF HARDWARE;  Surgeon: Kimmy Dumas DPM;  Location: [facility] MAIN OR;  Service: Podiatry    FOOT HARDWARE REMOVAL Left 7/5/2018    Procedure: LEFT CALCANEAL REMOVAL OF HARDWARE;  Surgeon: Heath Rousseau DPM;  Location: [address] [place] [address] MAIN OR;  Service: Podiatry    FOOT SURGERY Right     BONE REMOVED BOTTOM OF RIGHT FOOT    HAND SURGERY Right     ring finger tendon severed    HARDWARE REMOVAL Left 7/5/2018    Procedure: LEFT BIG TOE REMOVAL OF HARDWARE;  Surgeon: Heath Rousseau DPM;  Location: [address] [place] [address] MAIN OR;  Service: Podiatry    HIP SURGERY Left     reconstruction as child    HYSTERECTOMY      total abdominal    HYSTERECTOMY      TOTAL    JOINT REPLACEMENT      LTHR and reconstruction    KNEE ARTHROSCOPY      R knee    LEG SURGERY      LISFRANC ARTHRODESIS Left 11/3/2017    Procedure: FUSION FIRST MTPJ: Floreen Bowels; LATERAL ANKLE STABILIZATION;  Surgeon: Heath Rousseau DPM;  Location: [facility] Main [facility];  Service: Podiatry    PILONIDAL CYST EXCISION      midline    AZ DECOMPRESS LEG,ANT/LAT & POST Left 7/5/2018    Procedure: LEFT PERONEAL TENDON DEBRIDMENT ;  Surgeon: Heath Rousseau DPM;  Location: [address] [place] [address] MAIN OR;  Service: Podiatry    AZ OSTEOTOMY HEEL BONE Left 11/3/2017    Procedure: OSTEOTOMY CALCANEUS;  Surgeon: Heath Rousseau DPM;  Location: [facility];  Service: Podiatry    AZ REPAIR/GRAFT FLEX FOOT TENDON Left 7/2/2018    Procedure: PERONEAL TENDON EXPLORATION;  Surgeon: Heath Rousseau DPM;  Location: [address] [place] [address] MAIN OR;  Service: Podiatry    REFRACTIVE SURGERY Bilateral     LASER SURGERY BOTH EYES    TOTAL HIP ARTHROPLASTY  2005    WRIST SURGERY Left        Family History   Problem Relation Age of Onset    Cancer Mother     Hypertension Mother     Bone cancer Mother 40        COD    Breast cancer Mother     Hypertension Father     Heart disease Father     Liver cancer Paternal Grandmother     Hypertension Paternal Grandfather     Stroke Paternal Grandfather        Social History     Occupational History    Not on file   Tobacco Use    Smoking status: Current Every Day Smoker     Packs/day: 0 50     Years: 30 00 Pack years: 15 00     Types: Cigarettes    Smokeless tobacco: Never Used   Substance and Sexual Activity    Alcohol use: No    Drug use: No    Sexual activity: Yes       Current Outpatient Medications on File Prior to Visit   Medication Sig    albuterol (2 5 mg/3 mL) 0 083 % nebulizer solution Take 1 vial (2 5 mg total) by nebulization every 6 (six) hours as needed for wheezing or shortness of breath    albuterol (PROVENTIL HFA,VENTOLIN HFA) 90 mcg/act inhaler TAKE 2 PUFFS BY MOUTH EVERY 6 HOURS AS NEEDED FOR WHEEZE    ALPRAZolam (XANAX) 2 MG tablet Take by mouth    conjugated estrogens (PREMARIN) 0 45 mg tablet Take 1 tablet (0 45 mg total) by mouth daily Take daily for 21 days then do not take for 7 days   cyclobenzaprine (FLEXERIL) 10 mg tablet Take one tablet daily prn    diclofenac sodium (VOLTAREN) 1 % diclofenac 1 % topical gel    ergocalciferol (VITAMIN D2) 50,000 units Take 50,000 Units by mouth once a week    gabapentin (NEURONTIN) 100 mg capsule TAKE 3 CAPSULES 3 TIMES A DAY    ibuprofen (MOTRIN) 600 mg tablet TAKE 1 TABLET (600 MG TOTAL) BY MOUTH EVERY 8 (EIGHT) HOURS AS NEEDED FOR MILD PAIN    ipratropium-albuterol (COMBIVENT RESPIMAT) inhaler Inhale 1 puff 4 (four) times a day    lidocaine (LIDODERM) 5 % lidocaine 5 % topical patch    montelukast (SINGULAIR) 10 mg tablet TAKE 1 TABLET BY MOUTH EVERY DAY IN THE EVENING    naproxen (EC NAPROSYN) 500 MG EC tablet one with food twice a day for pain    omeprazole (PriLOSEC) 20 mg delayed release capsule TAKE 1 CAPSULE BY MOUTH EVERY DAY    PREMARIN 0 45 MG tablet TAKE 1 TABLET DAILY FOR 21 DAYS, THEN OFF FOR 7 DAYS      SUMAtriptan (IMITREX) 50 mg tablet Take 2 tablets (100 mg total) by mouth once as needed for migraine    tiotropium-olodaterol (STIOLTO RESPIMAT) 2 5-2 5 MCG/ACT inhaler Stiolto Respimat 2 5 mcg-2 5 mcg/actuation solution for inhalation    traZODone (DESYREL) 50 mg tablet Take 1 tablet (50 mg total) by mouth daily at bedtime    WIXELA INHUB 500-50 MCG/DOSE inhaler INHALE 1 PUFF EVERY MORNING    zolpidem (AMBIEN) 10 mg tablet TAKE 1 TABLET (10 MG TOTAL) BY MOUTH DAILY AT BEDTIME AS NEEDED FOR SLEEP     Current Facility-Administered Medications on File Prior to Visit   Medication    [COMPLETED] betamethasone acetate-betamethasone sodium phosphate (CELESTONE) injection 6 mg    [COMPLETED] bupivacaine (MARCAINE) 0 25 % injection 2 mL       Allergies   Allergen Reactions    Morphine Other (See Comments)     Blood pressure drops    Morphine And Related     Penicillins Itching    Penicillins     Quazepam Rash       Physical Exam     5' 1" (1 549 m)   BMI 32 88 kg/m²     Constitutional: normal, well developed, well nourished, alert, in no distress and non-toxic and no overt pain behavior  Eyes: anicteric  HEENT: grossly intact  Neck: supple, symmetric, trachea midline and no masses   Pulmonary:even and unlabored  Cardiovascular:No edema or pitting edema present  Skin:Normal without rashes or lesions and well hydrated  Psychiatric:Mood and affect appropriate  Neurologic:Cranial Nerves II-XII grossly intact  Musculoskeletal:antalgic gait  Bilateral cervical paraspinals and trapezii tender to palpation and ropy in texture  Full range of motion of cervical spine in all planes  Bilateral biceps, triceps, brachioradialis, patellar, and Achilles reflexes were 2/4 and symmetrical   No clonus was noted bilaterally  Negative Mosqueda's reflex bilaterally  Bilateral upper extremity strength 5/5 in all muscle groups  Sensation intact to light touch in C5 through T1 dermatomes bilaterally  Positive Tinel's over bilateral wrists, but negative over bilateral cubital tunnels  Negative Spurling's bilaterally  Bilateral lumbar paraspinals tender to palpation from L2-L5  Bilateral SI joints minimally tender to palpation    Bilateral lower extremity strength 5/5 in all muscle groups with the exception of left dorsiflexion, inversion, and eversion which was 3/5  Sensation intact to light touch in L3 thru S1 dermatomes bilaterally  Positive straight leg raise on the left and negative on the right  Negative Jr's test bilaterally      Imaging  Imaging reviewed

## 2019-12-16 ENCOUNTER — TELEPHONE (OUTPATIENT)
Dept: PAIN MEDICINE | Facility: MEDICAL CENTER | Age: 44
End: 2019-12-16

## 2019-12-16 NOTE — TELEPHONE ENCOUNTER
Spoke to pt, scheduled Lt L5 & S1 TFESI on Tues 01/07/20 arr at 10:45  Went over pre procedure instructions, no vaccinations 2 weeks prior/post proc, NPO 1 hr prior, if sick or on abx needs to call to rs, wear loose, comf clothing- no buttons/zippers, needs   Pt verbalized understanding

## 2019-12-19 ENCOUNTER — HOSPITAL ENCOUNTER (OUTPATIENT)
Dept: RADIOLOGY | Facility: IMAGING CENTER | Age: 44
Discharge: HOME | End: 2019-12-19
Payer: COMMERCIAL

## 2019-12-19 VITALS — BODY MASS INDEX: 33.23 KG/M2 | HEIGHT: 61 IN | WEIGHT: 176 LBS

## 2019-12-19 DIAGNOSIS — Z12.31 ENCOUNTER FOR SCREENING MAMMOGRAM FOR BREAST CANCER: ICD-10-CM

## 2019-12-19 PROCEDURE — 77067 SCR MAMMO BI INCL CAD: CPT

## 2019-12-24 ENCOUNTER — TELEPHONE (OUTPATIENT)
Dept: FAMILY MEDICINE CLINIC | Facility: CLINIC | Age: 44
End: 2019-12-24

## 2019-12-24 DIAGNOSIS — E78.5 HYPERLIPIDEMIA, UNSPECIFIED HYPERLIPIDEMIA TYPE: Primary | ICD-10-CM

## 2019-12-24 RX ORDER — ATORVASTATIN CALCIUM 20 MG/1
20 TABLET, FILM COATED ORAL DAILY
Qty: 30 TABLET | Refills: 2 | Status: SHIPPED | OUTPATIENT
Start: 2019-12-24 | End: 2020-03-30

## 2019-12-24 NOTE — TELEPHONE ENCOUNTER
Patient returned call  I relayed message  She is agreeable to start Lipitor 20mg  Told her it probably won't be called in until 12/26    She was ok with this

## 2019-12-24 NOTE — TELEPHONE ENCOUNTER
Checked hippa so I lm with results and to repeat screening in 1 year   She is to call for lab results

## 2019-12-24 NOTE — TELEPHONE ENCOUNTER
----- Message from Roger Grimm MD sent at 12/23/2019  7:48 AM EST -----  Normal mammogram   Continue routine screening with mammogram in 1 year

## 2019-12-24 NOTE — TELEPHONE ENCOUNTER
----- Message from Scarlett Campuzano MD sent at 12/23/2019  7:47 AM EST -----  Her blood work came back showing high cholesterol  I recommend Lipitor 20 mg 1 tablet daily  All the rest of the blood work came back stable

## 2019-12-26 DIAGNOSIS — Z88.9 MULTIPLE ALLERGIES: ICD-10-CM

## 2019-12-27 RX ORDER — MONTELUKAST SODIUM 10 MG/1
TABLET ORAL
Qty: 90 TABLET | Refills: 1 | Status: SHIPPED | OUTPATIENT
Start: 2019-12-27 | End: 2020-06-19

## 2020-01-05 DIAGNOSIS — R52 PAIN AGGRAVATED BY WALKING: ICD-10-CM

## 2020-01-07 RX ORDER — GABAPENTIN 300 MG/1
CAPSULE ORAL
Qty: 90 CAPSULE | Refills: 0 | OUTPATIENT
Start: 2020-01-07

## 2020-01-08 DIAGNOSIS — R52 PAIN AGGRAVATED BY WALKING: ICD-10-CM

## 2020-01-08 RX ORDER — CYCLOBENZAPRINE HCL 10 MG
TABLET ORAL
Qty: 30 TABLET | Refills: 3 | Status: SHIPPED | OUTPATIENT
Start: 2020-01-08 | End: 2021-12-10 | Stop reason: SDUPTHER

## 2020-01-09 ENCOUNTER — APPOINTMENT (OUTPATIENT)
Dept: PHYSICAL THERAPY | Facility: REHABILITATION | Age: 45
End: 2020-01-09
Payer: COMMERCIAL

## 2020-01-14 ENCOUNTER — TELEPHONE (OUTPATIENT)
Dept: PAIN MEDICINE | Facility: CLINIC | Age: 45
End: 2020-01-14

## 2020-01-14 ENCOUNTER — EVALUATION (OUTPATIENT)
Dept: PHYSICAL THERAPY | Facility: REHABILITATION | Age: 45
End: 2020-01-14
Payer: COMMERCIAL

## 2020-01-14 DIAGNOSIS — M50.30 DDD (DEGENERATIVE DISC DISEASE), CERVICAL: ICD-10-CM

## 2020-01-14 DIAGNOSIS — M54.12 CERVICAL RADICULOPATHY: Primary | ICD-10-CM

## 2020-01-14 DIAGNOSIS — M51.36 DDD (DEGENERATIVE DISC DISEASE), LUMBAR: ICD-10-CM

## 2020-01-14 DIAGNOSIS — M54.16 LUMBAR RADICULOPATHY: ICD-10-CM

## 2020-01-14 PROCEDURE — 97163 PT EVAL HIGH COMPLEX 45 MIN: CPT | Performed by: PHYSICAL THERAPIST

## 2020-01-14 NOTE — PROGRESS NOTES
PT Evaluation     Today's date: 2020  Patient name: Patricia Tristan  : 1975  MRN: [id number]  Referring provider: Alfredo Chowdhury DO  Dx:   Encounter Diagnosis     ICD-10-CM    1  Cervical radiculopathy M54 12    2  Lumbar radiculopathy M54 16    3  DDD (degenerative disc disease), lumbar M51 36    4  DDD (degenerative disc disease), cervical M50 30        Start Time: 1400  Stop Time: 0266  Total time in clinic (min): 45 minutes    Assessment  Assessment details: Pt is a 38year old right-handed female presenting to PT with multiple diagnoses including cervical and lumbar radiculopathy and DDD  Pt presents with deficits in posture, cervical range of motion, right shoulder and periscapular strength  Pt also has deficits in lumbar range of motion, bilateral lower extremity flexibility, bilateral hip and left knee strength  Due to these deficits, Liban Manuel is limited in her ability to perform the following functional activities: lying in any position other than prone, sitting > 30 minutes, driving, looking up, turning her head to the right, looking down, forward bending, twisting, standing > 30 minutes, walking > 30 minutes, lifting and carrying  Pt reports sleep disturbance secondary to pain waking 2-3  Times/night  Pt is a good candidate for skilled PT intervention and will benefit from treatment in order to address her limitations and to restore maximal function  Impairments: abnormal coordination, abnormal or restricted ROM, activity intolerance, impaired physical strength and pain with function  Understanding of Dx/Px/POC: good   Prognosis: good    Goals  Short Term Goals: To be achieved by 4 weeks    1) Patient to be independent with basic HEP  2) Decrease pain to 6/10 at worst   3) Increase ROM by 5 degrees or greater in all deficient planes  4) Increase strength by 1/2 MMT grade or greater in all deficient planes  5) Patient to report decreased sleep interruption secondary to pain        Long Term Goals: To be achieved by discharge    1) FOTO equal to or greater than   2) Patient to be independent with comprehensive HEP  3) Decrease pain to 3/10 at worst  for improved quality of life  4) Increase strength to 5/5 MMT grade in all deficient planes to improve a/iadls  5) Increase ROM to within normal limits  6) Patient to report no sleep interruption secondary to pain  Plan  Patient would benefit from: PT eval and skilled PT  Planned modality interventions: cryotherapy and thermotherapy: hydrocollator packs  Planned therapy interventions: IADL retraining, body mechanics training, flexibility, functional ROM exercises, home exercise program, neuromuscular re-education, manual therapy, postural training, strengthening, stretching, therapeutic activities, therapeutic exercise and joint mobilization  Frequency: 2x week  Duration in visits: 8  Duration in weeks: 4  Treatment plan discussed with: patient        Subjective Evaluation    History of Present Illness  Mechanism of injury: Cervical Spine Evaluation: Pt is a 38year old right-handed female presenting to PT with multiple diagnoses including cervical and lumbar radiculopathy and DDD  Pt reports history of falls, has had 4  falls within the past year  Pt reports right cervical pain radiating into right arm and hand  Pt reports she was seen by Dr Lynne Hyde in 2018 and MRI was performed indicating C4-C7 disc narrowing  Pt reports she never received any PT or injections for her cervical spine, however, she reports she went to chiropractor for her neck for approximately 6 months  Pt reports she would only have short-term pain relief so she stopped going  Pt recently went to Dr Jeanne Goetz for evaluation, and was referred to OPPT      Pain Location: right cervical spine, radiating into right arm and hand    Pain Type: sharp, aching, pins    Pain Intensity:  Current: 4  Best: 4  Worst: 9      Pt reports increased pain and/or difficulty with: lying in any position other than prone, sitting > 30 minutes, driving, looking up, turning her head to the right, and looking down  Pt reports sleep disturbance secondary to pain waking 2-3  times/night  Pt reports decreased pain with: biofreeze, heating pad      Lumbar Spine Initial Evaluation: Pt reports chronic history of low back pain beginning in her early twenties  Pt reports she has had had various types of treatments including chiropractic care, physical therapy and medication  Pt reports she also saw Dr Michelle Neri for her lumbar spine and she had CT imaging performed  No results available in [facility]  Pain Location: central lumbar spine radiating into left gluteal and lateral hip region  Pain Type: pins, sharp, dull    Pain Intensity:  Current:  5  Best: 3  Worst: 9    Pt reports increased pain and/or difficulty with: forward bending, twisting, standing > 30 minutes, walking > 30 minutes, lifting and carrying; Pt reports sleep disturbance secondary to pain waking 2-3  times/night  Pt reports decreased pain with: sitting slightly leaned forward, heating pad, Biofreeze, pain patch, medication  Recurrent probem    Quality of life: good      Diagnostic Tests    FCE comments: No imaging available in Epic  Treatments  Previous treatment: chiropractic, medication and physical therapy  Patient Goals  Patient goals for therapy: decreased pain, increased strength, independence with ADLs/IADLs and increased motion  Patient goal: "Go through the motions to get the MRI approved so I can take the next step with the doctor "         Objective     Concurrent Complaints  Positive for night pain and disturbed sleep   Negative for bladder dysfunction, bowel dysfunction and saddle (S4) numbness    Postural Observations  Seated posture: fair  Standing posture: fair    Additional Postural Observation Details  Moderate forward head, bilateral rounded shoulders, increased lumbar lordosis    Neurological Testing Sensation     Lumbar   Left   Intact: light touch    Right   Intact: light touch    Reflexes   Left   Patellar (L4): normal (2+)  Achilles (S1): normal (2+)    Right   Patellar (L4): normal (2+)  Achilles (S1): normal (2+)    Active Range of Motion   Cervical/Thoracic Spine       Cervical    Flexion: 45 degrees  with pain Restriction level: minimal  Extension: 15 degrees     with pain Restriction level: maximal  Left lateral flexion: 35 degrees     with pain Restriction level: minimal  Right lateral flexion: 25 degrees     with pain Restriction level minimal  Left rotation: 52 degrees with pain Restriction level: minimal  Right rotation: 46 degrees    with pain Restriction level: moderate    Lumbar   Flexion:  with pain Restriction level: moderate  Extension:  with pain Restriction level: maximal  Left lateral flexion:  with pain Restriction level: moderate  Right lateral flexion:  with pain Restriction level: moderate  Left rotation:  with pain Restriction level: moderate  Right rotation:  with pain Restriction level: moderate    Additional Active Range of Motion Details  Pt reports increased central and left lumbar pain     Joint Play   Joints within functional limits: L1, L2, L3 and S1     Hypermobile: L4 and L5     Pain: L4 and L5     Strength/Myotome Testing     Left Shoulder     Planes of Motion   Flexion: 5   Abduction: 5   External rotation at 0°: 5   Internal rotation at 0°: 5     Isolated Muscles   Lower trapezius: 3+   Middle trapezius: 3+     Right Shoulder     Planes of Motion   Flexion: 4+   Abduction: 4+   External rotation at 0°: 4+   Internal rotation at 0°: 5     Isolated Muscles   Lower trapezius: 3+   Middle trapezius: 3+     Left Elbow   Flexion: 5  Extension: 5    Right Elbow   Flexion: 5  Extension: 5    Left Hip   Planes of Motion   Flexion: 4-  Extension: 3-  Abduction: 3-  Adduction: 4  External rotation: 3-  Internal rotation: 3-    Right Hip   Planes of Motion   Flexion: 4+  Extension: 3+  Abduction: 3  Adduction: 4+  External rotation: 4-  Internal rotation: 4-    Left Knee   Flexion: 4  Extension: 4+    Right Knee   Flexion: 5  Extension: 5    Left Ankle/Foot   Dorsiflexion: 5  Plantar flexion: 5  Inversion: 5  Eversion: 5    Right Ankle/Foot   Dorsiflexion: 5  Plantar flexion: 5  Inversion: 5  Eversion: 5    Muscle Activation   Patient able to activate left transverse abdominals, left multifidus, right transverse abdominals and right multifidus  Tests   Cervical   Positive neck flexor muscle endurance test   Negative vertical compression and lumbar distraction  Left   Negative Spurling's Test A  Right   Negative Spurling's Test A  Left Shoulder   Negative ULTT1 and ULTT3  Right Shoulder   Negative ULTT1 and ULTT3  Lumbar   Negative vertical compression  Left   Positive passive SLR and slump test      Right   Positive slump test    Negative passive SLR  Left Hip   Positive EMEKA  Modified Adrián: Positive  90/90 SLR: Positive  Right Hip   Negative EMEKA  Modified Adrián: Positive  90/90 SLR: Positive       Additional Tests Details  SIJ Cluster: negative             Precautions:  Anxiety, asthma, CKD, chronic pain disorder, depression, glaucoma, migraines, L THR, multiple left foot surgeries       Daily Treatment Diary      Assessment  1/14                     Kimberly/Reval  MD                     FOTO  50/52       **         **   POC Signed                       HEP Issued                        Manuals  1/14                                             Exercise Diary   1/14                     LUMBAR:             SKTC - seated with stool                       Piriformis Stretch - seated                        PPT                        PPT + iso hip add                        PPT + iso hip flex                        PPT + low bridge                        Clamshells                        Reverse Clamshells CERVICAL:                        Supine Chin Tucks                        Seated UT & LS Stretches                        Corner Pec Stretch                        Scap Retractions                                                                       Modalities  1/14                      MHP C-spine & L-spine  Seated  Pre-Tx                          * = HEP

## 2020-01-16 ENCOUNTER — OFFICE VISIT (OUTPATIENT)
Dept: PHYSICAL THERAPY | Facility: REHABILITATION | Age: 45
End: 2020-01-16
Payer: COMMERCIAL

## 2020-01-16 DIAGNOSIS — M51.36 DDD (DEGENERATIVE DISC DISEASE), LUMBAR: ICD-10-CM

## 2020-01-16 DIAGNOSIS — M50.30 DDD (DEGENERATIVE DISC DISEASE), CERVICAL: ICD-10-CM

## 2020-01-16 DIAGNOSIS — M54.16 LUMBAR RADICULOPATHY: ICD-10-CM

## 2020-01-16 DIAGNOSIS — M54.12 CERVICAL RADICULOPATHY: Primary | ICD-10-CM

## 2020-01-16 PROCEDURE — 97112 NEUROMUSCULAR REEDUCATION: CPT

## 2020-01-16 PROCEDURE — 97110 THERAPEUTIC EXERCISES: CPT

## 2020-01-16 NOTE — PROGRESS NOTES
Daily Note     Today's date: 2020  Patient name: Nikki Mayo  : 1975  MRN: [id number]  Referring provider: Soledad Joyce DO  Dx:   Encounter Diagnosis     ICD-10-CM    1  Cervical radiculopathy M54 12    2  Lumbar radiculopathy M54 16    3  DDD (degenerative disc disease), lumbar M51 36    4  DDD (degenerative disc disease), cervical M50 30        Start Time: 0005  Stop Time: 1215  Total time in clinic (min): 40 minutes    Subjective: Pt reports she continues to have neck and back pain, states there has been no relief for over a year  Objective: See treatment diary below      Assessment: Tolerated treatment fair  Patient demonstrated fatigue post treatment and would benefit from continued PT  Pt performed exercises as noted with continued reports of back and neck pain, and consistent, non changing symptoms  Next session work towards performing exercises included in program, not performed today  Pt will benefit from further skilled PT to increase strength, flexibility and function  Continue to progress as able  Plan: Continue per plan of care        Precautions:  Anxiety, asthma, CKD, chronic pain disorder, depression, glaucoma, migraines         Daily Treatment Diary      Assessment                      Kimberly/Reval   MD                     FOTO   50/52       **         **   POC Signed                       HEP Issued                        Manuals                                              Exercise Diary                       LUMBAR:             SKTC - seated with stool   10x10"                   Piriformis Stretch - seated   5x20"                     PPT    nv                    PPT + iso hip add    nv                    PPT + iso hip flex    nv                    PPT + low bridge    nv                    Clamshells    nv                    Reverse Clamshells    nv                                            CERVICAL:                        Supine Chin Tucks   10x3"                    Seated UT & LS Stretches   5x20"                    Corner Pec Stretch   5x20"                    Scap Retractions   10x5"                                                                   Modalities   1/14 1/16                     MHP C-spine & L-spine  Seated  Pre-Tx    10'                      * = HEP

## 2020-01-21 ENCOUNTER — APPOINTMENT (OUTPATIENT)
Dept: PHYSICAL THERAPY | Facility: REHABILITATION | Age: 45
End: 2020-01-21
Payer: COMMERCIAL

## 2020-01-23 ENCOUNTER — APPOINTMENT (OUTPATIENT)
Dept: PHYSICAL THERAPY | Facility: REHABILITATION | Age: 45
End: 2020-01-23
Payer: COMMERCIAL

## 2020-01-24 DIAGNOSIS — J45.909 ASTHMA, UNSPECIFIED ASTHMA SEVERITY, UNSPECIFIED WHETHER COMPLICATED, UNSPECIFIED WHETHER PERSISTENT: ICD-10-CM

## 2020-01-24 RX ORDER — ALBUTEROL SULFATE 2.5 MG/3ML
SOLUTION RESPIRATORY (INHALATION)
Qty: 150 ML | Refills: 0 | Status: SHIPPED | OUTPATIENT
Start: 2020-01-24 | End: 2020-10-27 | Stop reason: SDUPTHER

## 2020-01-28 ENCOUNTER — OFFICE VISIT (OUTPATIENT)
Dept: PHYSICAL THERAPY | Facility: REHABILITATION | Age: 45
End: 2020-01-28
Payer: COMMERCIAL

## 2020-01-28 DIAGNOSIS — M51.36 DDD (DEGENERATIVE DISC DISEASE), LUMBAR: ICD-10-CM

## 2020-01-28 DIAGNOSIS — M54.12 CERVICAL RADICULOPATHY: Primary | ICD-10-CM

## 2020-01-28 DIAGNOSIS — M50.30 DDD (DEGENERATIVE DISC DISEASE), CERVICAL: ICD-10-CM

## 2020-01-28 DIAGNOSIS — M54.16 LUMBAR RADICULOPATHY: ICD-10-CM

## 2020-01-28 PROCEDURE — 97112 NEUROMUSCULAR REEDUCATION: CPT | Performed by: PHYSICAL THERAPIST

## 2020-01-28 PROCEDURE — 97110 THERAPEUTIC EXERCISES: CPT | Performed by: PHYSICAL THERAPIST

## 2020-01-28 NOTE — PROGRESS NOTES
Daily Note     Today's date: 2020  Patient name: Lori Christina  : 1975  MRN: [id number]  Referring provider: Teresa Cosme DO  Dx:   Encounter Diagnosis     ICD-10-CM    1  Cervical radiculopathy M54 12    2  Lumbar radiculopathy M54 16    3  DDD (degenerative disc disease), lumbar M51 36    4  DDD (degenerative disc disease), cervical M50 30                   Subjective: Pt reports she went to her foot doctor yesterday and got an Rx for PT  She reports she wasn't able to attend PT last week because she was really sick, and is still not feeling 100%  Objective: See treatment diary      Precautions:  Anxiety, asthma, CKD, chronic pain disorder, depression, glaucoma, migraines  Daily Treatment Diary   Assessment                    Kimberly/Reval   MD                     FOTO   50/52       **         **   POC Signed                       HEP Issued                        Manuals                                            Exercise Diary                     LUMBAR:             SKTC - seated with stool   10x10"  30"x3                 Piriformis Stretch - seated   5x20"   30"x3                  PPT    nv  5"x10                  PPT + iso hip add    nv  5"x10                  PPT + iso hip flex    nv                    PPT + glute set    nv  5"x10                  Clamshells    nv                    Reverse Clamshells    nv                                            CERVICAL:                        Supine Chin Tucks   10x3"  5"x10                  Seated UT & LS Stretches   5x20"  30"x3 ea                  Corner Pec Stretch   5x20"  30"x3                  Scap Retractions   10x5"  5"x15                                                                 Modalities                      MHP C-spine & L-spine  Seated  Pre-Tx    10'  10'                  * = HEP     Assessment: Pt with good tolerance to progression of program noting mild fatigue without increase in cervical or lumbar pain  Pt required moderate verbal and manual cues to ensure proper position and performance with all exercises today  Pt issued updated home program to which she demonstrated and verbalized understanding  Pt will benefit from continued skilled PT intervention in order to address her remaining limitations and to restore maximal function  Plan: Continue per plan of care

## 2020-01-30 ENCOUNTER — EVALUATION (OUTPATIENT)
Dept: PHYSICAL THERAPY | Facility: REHABILITATION | Age: 45
End: 2020-01-30
Payer: COMMERCIAL

## 2020-01-30 DIAGNOSIS — M51.36 DDD (DEGENERATIVE DISC DISEASE), LUMBAR: ICD-10-CM

## 2020-01-30 DIAGNOSIS — M54.12 CERVICAL RADICULOPATHY: Primary | ICD-10-CM

## 2020-01-30 DIAGNOSIS — M54.16 LUMBAR RADICULOPATHY: ICD-10-CM

## 2020-01-30 DIAGNOSIS — M76.72 PERONEAL TENDINITIS OF LEFT LOWER EXTREMITY: ICD-10-CM

## 2020-01-30 DIAGNOSIS — M50.30 DDD (DEGENERATIVE DISC DISEASE), CERVICAL: ICD-10-CM

## 2020-01-30 PROCEDURE — 97112 NEUROMUSCULAR REEDUCATION: CPT | Performed by: PHYSICAL THERAPIST

## 2020-01-30 PROCEDURE — 97140 MANUAL THERAPY 1/> REGIONS: CPT | Performed by: PHYSICAL THERAPIST

## 2020-01-30 PROCEDURE — 97110 THERAPEUTIC EXERCISES: CPT | Performed by: PHYSICAL THERAPIST

## 2020-01-30 PROCEDURE — 97164 PT RE-EVAL EST PLAN CARE: CPT | Performed by: PHYSICAL THERAPIST

## 2020-01-30 NOTE — PROGRESS NOTES
PT Evaluation     Today's date: 2020  Patient name: Duglas Brown  : 1975  MRN: [id number]  Referring provider: Elisabet De Anda DPM  Dx:   Encounter Diagnosis     ICD-10-CM    1  Cervical radiculopathy M54 12    2  Lumbar radiculopathy M54 16    3  DDD (degenerative disc disease), lumbar M51 36    4  DDD (degenerative disc disease), cervical M50 30                   Assessment  Assessment details: Pt is a 38year old right-handed female presenting to PT with new prescription for evaluation of left ankle with diagnosis of peroneal tendonitis  Pt presents with deficits with left ankle range of motion, joint mobility, flexibility and strength  Pt also with impaired balance and gait symmetry  Pt reports sleep disturbance secondary to pain waking 2-3 times/night  Pt is a good candidate for skilled PT intervention and will benefit from treatment in order to address her limitations and to restore maximal function  Impairments: abnormal coordination, abnormal gait, abnormal or restricted ROM, activity intolerance, impaired balance, impaired physical strength, lacks appropriate home exercise program, pain with function and weight-bearing intolerance  Understanding of Dx/Px/POC: good   Prognosis: good    Goals  Short Term Goals: To be achieved by 4 weeks    1) Patient to be independent with basic HEP  2) Decrease pain to 6/10 at worst   3) Increase ROM by 5 degrees or greater in all deficient planes  4) Increase strength by 1/2 MMT grade or greater in all deficient planes  5) Patient to report decreased sleep interruption secondary to pain  Long Term Goals: To be achieved by discharge    1) FOTO equal to or greater than   2) Patient to be independent with comprehensive HEP  3) Decrease pain to 3/10 at worst  for improved quality of life  4) Increase strength to 5/5 MMT grade in all deficient planes to improve a/iadls  5) Increase ROM to within normal limits    6) Patient to report no sleep interruption secondary to pain  Plan  Patient would benefit from: PT eval and skilled PT  Planned modality interventions: cryotherapy and thermotherapy: hydrocollator packs  Planned therapy interventions: IADL retraining, body mechanics training, flexibility, functional ROM exercises, home exercise program, neuromuscular re-education, manual therapy, postural training, strengthening, stretching, therapeutic activities, therapeutic exercise and joint mobilization  Frequency: 2x week  Duration in visits: 8  Duration in weeks: 4  Treatment plan discussed with: patient        Subjective Evaluation    History of Present Illness  Mechanism of injury: 1/30/20 Initial Evaluation for L Foot: Pt reports complicated history of 4 left ankle surgeries and 3 foot surgeries  Pt reports she is unsure of the exact names of all the procedures, however, recalls that her "heel bone was cut and repositioned"  She reports she had issues with the hardware, this was then removed  She also had a localized infection post-operatively, and needed another procedure for this  She also had 2 bunionectomies and hallux fusion  Pt reports she has had pain since her last procedure, more noticeably within the last year  She went back to see Dr Hannah Núñez for further evaluation  X-ray performed and negative per patient  Pt received 2 CSI injections into her ankle, which provided only short-term relief  Pain Location: left lateral malleolus and ankle    Pain Type: sharp and shooting    Pain Intensity:  Current: 0  Best:0  Worst: 10    Pt reports increased pain and/or difficulty with: extended walking, extended standing, rolling up onto toes when pushing off, stairs, pressure on the outside of her ankle  Pt reports sleep disturbance secondary to ankle/foot pain waking several times/night  Pt reports decreased pain with: changing position, modified sleeping position, Naproxen, soaking foot in Epsom salts                Recurrent probem    Quality of life: good      Diagnostic Tests    FCE comments: No imaging available in Epic  Treatments  Previous treatment: chiropractic, medication and physical therapy  Patient Goals  Patient goals for therapy: decreased pain, increased strength, independence with ADLs/IADLs and increased motion  Patient goal: "Go through the motions to get the MRI approved so I can take the next step with the doctor "         Objective     Concurrent Complaints  Positive for night pain and disturbed sleep  Negative for bladder dysfunction, bowel dysfunction and saddle (S4) numbness    Postural Observations  Seated posture: fair  Standing posture: fair    Additional Postural Observation Details  Moderate forward head, bilateral rounded shoulders, increased lumbar lordosis    Observations   Left Ankle/Foot   Positive for adhesive scar and incision  Additional Observation Details  Left lateral ankle incisions, closed and healed with moderate adhesions       Neurological Testing     Sensation     Lumbar   Left   Intact: light touch    Right   Intact: light touch    Ankle/Foot   Left Ankle/Foot   Intact: light touch    Right Ankle/Foot   Intact: light touch     Reflexes   Left   Patellar (L4): normal (2+)  Achilles (S1): normal (2+)    Right   Patellar (L4): normal (2+)  Achilles (S1): normal (2+)    Active Range of Motion   Cervical/Thoracic Spine       Cervical    Flexion: 45 degrees  with pain Restriction level: minimal  Extension: 15 degrees     with pain Restriction level: maximal  Left lateral flexion: 35 degrees     with pain Restriction level: minimal  Right lateral flexion: 25 degrees     with pain Restriction level minimal  Left rotation: 52 degrees with pain Restriction level: minimal  Right rotation: 46 degrees    with pain Restriction level: moderate    Lumbar   Flexion:  with pain Restriction level: moderate  Extension:  with pain Restriction level: maximal  Left lateral flexion:  with pain Restriction level: moderate  Right lateral flexion:  with pain Restriction level: moderate  Left rotation:  with pain Restriction level: moderate  Right rotation:  with pain Restriction level: moderate  Left Ankle/Foot   Dorsiflexion (ke): 4 degrees   Plantar flexion: 25 degrees   Inversion: 16 degrees   Eversion: 6 degrees     Right Ankle/Foot   Dorsiflexion (ke): WFL  Plantar flexion: WFL  Inversion: WFL  Eversion: WFL    Additional Active Range of Motion Details  Pt reports increased central and left lumbar pain     Joint Play   Left Ankle/Foot  Joints within functional limits are the fibular head, proximal tibiofibular joint and distal tibiofibular joint  Hypomobile in the talocrural joint, subtalar joint, midfoot and forefoot  Right Ankle/Foot  Joints within functional limits are the fibular head, proximal tibiofibular joint, distal tibiofibular joint, talocrural joint, subtalar joint, midfoot and forefoot     Joints within functional limits: L1, L2, L3 and S1     Hypermobile: L4 and L5     Pain: L4 and L5     Strength/Myotome Testing     Left Shoulder     Planes of Motion   Flexion: 5   Abduction: 5   External rotation at 0°: 5   Internal rotation at 0°: 5     Isolated Muscles   Lower trapezius: 3+   Middle trapezius: 3+     Right Shoulder     Planes of Motion   Flexion: 4+   Abduction: 4+   External rotation at 0°: 4+   Internal rotation at 0°: 5     Isolated Muscles   Lower trapezius: 3+   Middle trapezius: 3+     Left Elbow   Flexion: 5  Extension: 5    Right Elbow   Flexion: 5  Extension: 5    Left Hip   Planes of Motion   Flexion: 4-  Extension: 3-  Abduction: 3-  Adduction: 4  External rotation: 3-  Internal rotation: 3-    Right Hip   Planes of Motion   Flexion: 4+  Extension: 3+  Abduction: 3  Adduction: 4+  External rotation: 4-  Internal rotation: 4-    Left Knee   Flexion: 4  Extension: 4+    Right Knee   Flexion: 5  Extension: 5    Left Ankle/Foot   Dorsiflexion: 4-  Plantar flexion: 4-  Inversion: 4-  Eversion: 3+    Right Ankle/Foot   Dorsiflexion: 5  Plantar flexion: 5  Inversion: 5  Eversion: 5    Muscle Activation   Patient able to activate left transverse abdominals, left multifidus, right transverse abdominals and right multifidus  Tests   Cervical   Positive neck flexor muscle endurance test   Negative vertical compression and lumbar distraction  Left   Negative Spurling's Test A  Right   Negative Spurling's Test A  Left Shoulder   Negative ULTT1 and ULTT3  Right Shoulder   Negative ULTT1 and ULTT3  Lumbar   Negative vertical compression  Left   Positive passive SLR and slump test      Right   Positive slump test    Negative passive SLR  Left Hip   Positive EMEKA  Modified Adrián: Positive  90/90 SLR: Positive  Right Hip   Negative EMEKA  Modified Adrián: Positive  90/90 SLR: Positive  Left Ankle/Foot   Negative for anterior drawer, Homans' sign and inversion talar tilt       Additional Tests Details  SIJ Cluster: negative    Swelling   Left Ankle/Foot   Figure 8: 50 cm  Malleoli: 23 cm    Right Ankle/Foot   Figure 8: 48 cm  Malleoli: 24 cm             Precautions:  Anxiety, asthma, CKD, chronic pain disorder, depression, glaucoma, migraines, L THR, multiple left foot surgeries     Daily Treatment Diary   Assessment   1/14 1/16 1/28 1/30               Kimberly/Vj CARDOSO  L Foot  New RX               FOTO   50/52       **         **   POC Signed                       HEP Issued                        Manuals   1/14 1/16 1/30                                       Exercise Diary    1/14 1/16 1/28 1/30               LUMBAR:             SKTC - seated with stool   10x10"  30"x3                 Piriformis Stretch - seated   5x20"   30"x3                  PPT    nv  5"x10  5"x15                PPT + iso hip add    nv  5"x10 5"x15                PPT + iso hip flex    nv                    PPT + glute set    nv  5"x10  5"x15                Clamshells    nv Reverse Lazaro    nv                                           CERVICAL:                         Supine Chin Tucks   10x3"  5"x10  5"x15                Seated UT & LS Stretches   5x20"  30"x3 ea  30"x3 ea                Corner Pec Stretch   5x20"  30"x3  30"x3                Scap Retractions   10x5"  5"x15  5"x15                                       FOOT:              Gastroc Stretch - Strap    30"x3         Gastroc Stretch - wall    30"x3         Soleus/plantar fascia stretch - toes propped on wall        30"x3               Modalities   1/14 1/16 1/28 1/30                 MHP C-spine & L-spine  Seated  Pre-Tx    10'  10'  NP

## 2020-02-04 ENCOUNTER — APPOINTMENT (OUTPATIENT)
Dept: PHYSICAL THERAPY | Facility: REHABILITATION | Age: 45
End: 2020-02-04
Payer: COMMERCIAL

## 2020-02-06 ENCOUNTER — OFFICE VISIT (OUTPATIENT)
Dept: PHYSICAL THERAPY | Facility: REHABILITATION | Age: 45
End: 2020-02-06
Payer: COMMERCIAL

## 2020-02-06 DIAGNOSIS — M50.30 DDD (DEGENERATIVE DISC DISEASE), CERVICAL: ICD-10-CM

## 2020-02-06 DIAGNOSIS — M51.36 DDD (DEGENERATIVE DISC DISEASE), LUMBAR: ICD-10-CM

## 2020-02-06 DIAGNOSIS — M54.16 LUMBAR RADICULOPATHY: ICD-10-CM

## 2020-02-06 DIAGNOSIS — M76.72 PERONEAL TENDINITIS OF LEFT LOWER EXTREMITY: Primary | ICD-10-CM

## 2020-02-06 DIAGNOSIS — M54.12 CERVICAL RADICULOPATHY: ICD-10-CM

## 2020-02-06 PROCEDURE — 97140 MANUAL THERAPY 1/> REGIONS: CPT

## 2020-02-06 PROCEDURE — 97112 NEUROMUSCULAR REEDUCATION: CPT

## 2020-02-06 PROCEDURE — 97110 THERAPEUTIC EXERCISES: CPT

## 2020-02-06 NOTE — PROGRESS NOTES
Daily Note     Today's date: 2020  Patient name: Vale Thornton  : 1975  MRN: [id number]  Referring provider: Radha Waters DO  Dx:   Encounter Diagnosis     ICD-10-CM    1  Peroneal tendinitis of left lower extremity M76 72    2  Cervical radiculopathy M54 12    3  Lumbar radiculopathy M54 16    4  DDD (degenerative disc disease), lumbar M51 36    5  DDD (degenerative disc disease), cervical M50 30        Start Time: 1405  Stop Time: 1505  Total time in clinic (min): 60 minutes    Subjective: Pt reports she is having some increased pain in the ankle today, states she had the injection on Tuesday  Pt reports that caused a lot of pain in the ankle  Objective: See treatment diary below      Assessment: Tolerated treatment well  Patient demonstrated fatigue post treatment and would benefit from continued PT  Pt shows increased symptoms with chin tucks, attempted cervical extension with reduction of symptoms  Attempted manual cervical traction with decreased overall symptoms  Pt will benefit from further skilled PT to increase strength, flexibility and function  Continue to progress as able  Plan: Continue per plan of care        Precautions:  Anxiety, asthma, CKD, chronic pain disorder, depression, glaucoma, migraines, L THR, multiple left foot surgeries      Daily Treatment Diary   Assessment     2/             Kimberly/Vj CARDOSO  L Foot  New RX               FOTO   50/52       49/56         **   POC Signed                       HEP Issued                        Manuals     2/6              Manual cervical traction          HY             Exercise Diary      2/6             LUMBAR:             SKTC - seated with stool   10x10"  30"x3   3x30"             Piriformis Stretch - seated   5x20"   30"x3    3x30"              PPT    nv  5"x10   5"x15  15x5"              PPT + iso hip add    nv  5"x10 5"x15                PPT + iso hip flex    nv                    PPT + glute set    nv  5"x10   5"x15                Clamshells    nv                    Reverse Clamshells    nv                                           CERVICAL:                         Supine Chin Tucks   10x3"  5"x10   5"x15  15x5"              Seated UT & LS Stretches   5x20"  30"x3 ea   30"x3 ea 3x30"              Corner Pec Stretch   5x20"  30"x3   30"x3  3x30"              Scap Retractions   10x5"  5"x15   5"x15  15x5"                                     FOOT:              Gastroc Stretch - Strap    30"x3 3x30"        Gastroc Stretch - wall    30"x3 3x30"        Soleus/plantar fascia stretch - toes propped on wall        30"x3 3x30"             Modalities   1/14 1/16 1/28 1/30  2/6               MHP C-spine & L-spine  Seated  Pre-Tx    10'  10'   NP  10'

## 2020-02-11 ENCOUNTER — APPOINTMENT (OUTPATIENT)
Dept: PHYSICAL THERAPY | Facility: REHABILITATION | Age: 45
End: 2020-02-11
Payer: COMMERCIAL

## 2020-02-18 ENCOUNTER — APPOINTMENT (OUTPATIENT)
Dept: PHYSICAL THERAPY | Facility: REHABILITATION | Age: 45
End: 2020-02-18
Payer: COMMERCIAL

## 2020-02-18 DIAGNOSIS — E28.39 ESTROGEN DEFICIENCY: ICD-10-CM

## 2020-02-18 RX ORDER — ESTROGENS, CONJUGATED 0.45 MG/1
TABLET, FILM COATED ORAL
Qty: 21 TABLET | Refills: 2 | Status: SHIPPED | OUTPATIENT
Start: 2020-02-18 | End: 2020-04-17

## 2020-02-20 ENCOUNTER — APPOINTMENT (OUTPATIENT)
Dept: PHYSICAL THERAPY | Facility: REHABILITATION | Age: 45
End: 2020-02-20
Payer: COMMERCIAL

## 2020-02-22 DIAGNOSIS — F51.01 PRIMARY INSOMNIA: ICD-10-CM

## 2020-02-23 RX ORDER — TRAZODONE HYDROCHLORIDE 50 MG/1
TABLET ORAL
Qty: 30 TABLET | Refills: 2 | Status: SHIPPED | OUTPATIENT
Start: 2020-02-23 | End: 2020-07-01

## 2020-03-25 ENCOUNTER — TELEPHONE (OUTPATIENT)
Dept: FAMILY MEDICINE CLINIC | Facility: CLINIC | Age: 45
End: 2020-03-25

## 2020-03-25 NOTE — TELEPHONE ENCOUNTER
Patient called and said she is having GI symptoms, bad cough but no fever  She does have asthma    She is not sure what to do and is hesitant to come to office

## 2020-03-26 ENCOUNTER — TELEMEDICINE (OUTPATIENT)
Dept: FAMILY MEDICINE CLINIC | Facility: CLINIC | Age: 45
End: 2020-03-26
Payer: COMMERCIAL

## 2020-03-26 VITALS
DIASTOLIC BLOOD PRESSURE: 82 MMHG | SYSTOLIC BLOOD PRESSURE: 128 MMHG | HEIGHT: 61 IN | BODY MASS INDEX: 32.85 KG/M2 | WEIGHT: 174 LBS | TEMPERATURE: 98.7 F

## 2020-03-26 DIAGNOSIS — J44.1 CHRONIC OBSTRUCTIVE PULMONARY DISEASE WITH ACUTE EXACERBATION (HCC): Primary | ICD-10-CM

## 2020-03-26 DIAGNOSIS — R19.7 DIARRHEA, UNSPECIFIED TYPE: ICD-10-CM

## 2020-03-26 PROCEDURE — 3008F BODY MASS INDEX DOCD: CPT | Performed by: FAMILY MEDICINE

## 2020-03-26 PROCEDURE — G2012 BRIEF CHECK IN BY MD/QHP: HCPCS | Performed by: FAMILY MEDICINE

## 2020-03-26 RX ORDER — AZITHROMYCIN 250 MG/1
TABLET, FILM COATED ORAL
Qty: 6 TABLET | Refills: 0 | Status: SHIPPED | OUTPATIENT
Start: 2020-03-26 | End: 2020-03-30

## 2020-03-26 RX ORDER — PREDNISONE 50 MG/1
50 TABLET ORAL DAILY
Qty: 5 TABLET | Refills: 0 | Status: SHIPPED | OUTPATIENT
Start: 2020-03-26 | End: 2020-03-31

## 2020-03-26 RX ORDER — BENZONATATE 200 MG/1
200 CAPSULE ORAL 3 TIMES DAILY PRN
Qty: 20 CAPSULE | Refills: 0 | Status: SHIPPED | OUTPATIENT
Start: 2020-03-26 | End: 2021-09-10 | Stop reason: ALTCHOICE

## 2020-03-26 RX ORDER — ALBUTEROL SULFATE 90 UG/1
2 AEROSOL, METERED RESPIRATORY (INHALATION) EVERY 4 HOURS PRN
Qty: 18 INHALER | Refills: 3 | Status: SHIPPED | OUTPATIENT
Start: 2020-03-26 | End: 2020-09-18 | Stop reason: SDUPTHER

## 2020-03-26 NOTE — ASSESSMENT & PLAN NOTE
Was given prescriptions for prednisone, Z-Jarad and Tessalon Perles  She was also given refills for her albuterol inhaler  Encourage oral hydration use humidifier  If symptoms worse or any fever call back

## 2020-03-26 NOTE — PROGRESS NOTES
Virtual Regular Visit    Problem List Items Addressed This Visit        Respiratory    Chronic obstructive pulmonary disease ([address] ) - Primary       Was given prescriptions for prednisone, Z-Jarad and Tessalon Perles  She was also given refills for her albuterol inhaler  Encourage oral hydration use humidifier  If symptoms worse or any fever call back  Relevant Medications    albuterol (PROVENTIL HFA,VENTOLIN HFA) 90 mcg/act inhaler    azithromycin (ZITHROMAX) 250 mg tablet    predniSONE 50 mg tablet    benzonatate (TESSALON) 200 MG capsule       Other    Diarrhea     Was discussed about brat diet  Encourage oral hydration and avoid dairy products  If symptoms not improving the next few days call back  BMI Counseling: Body mass index is 32 88 kg/m²  The BMI is above normal  Nutrition recommendations include decreasing portion sizes, encouraging healthy choices of fruits and vegetables and decreasing fast food intake  Reason for visit is cough and wheezing with diarrhea  Encounter provider Guru Bridges MD    Provider located at [address] [place] [id number]      Recent Visits  Date Type Provider Dept   03/25/20 Telephone Leela Monsivais [facility]   Showing recent visits within past 7 days and meeting all other requirements     Today's Visits  Date Type Provider Dept   03/26/20 Frazier Galeazzi, MD [facility]   Showing today's visits and meeting all other requirements     Future Appointments  No visits were found meeting these conditions  Showing future appointments within next 150 days and meeting all other requirements        After connecting through [employer], the patient was identified by name and date of birth   Kim Garner was informed that this is a telemedicine visit and that the visit is being conducted through telephone which may not be secure and therefore, might not be HIPAA-compliant  My office door was closed  No one else was in the room  She acknowledged consent and understanding of privacy and security of the video platform  The patient has agreed to participate and understands they can discontinue the visit at any time  Subjective  Kathya Royal is a 54 y o  female with complaint of cough for 2 weeks now with wheezing  She has history of asthma and COPD  She has been using her inhalers  She denies any fever or chills  She denies any contact with people with risk of exposure to corona virus  She also complained of diarrhea for the last few days  She denies any nausea or vomiting  She complained of lower abdominal cramps but results when she moved her bowel  No blood in stool  Past Medical History:   Diagnosis Date    Anxiety     h/o 2 panic attacks     Arthritis     l hip congenital dyspasia    Asthma     good control    Bleb     R eye    Chronic kidney disease     one kidney left side    Chronic pain     back- DJD in 2 upper 2 lower, buldging disc c-7    Chronic pain disorder     lower back and hips    Depression     Endometriosis     Glaucoma     b/l    Bleb in R eye    Glaucoma     H/O carpal tunnel syndrome     L    Headache     migraines    Insomnia     Migraines     Osteoarthritis     cervical spondylarthritis    Polypoid cystitis     POLYP CYSTS ON BUTTOCK AREA    Renal disorder     Reports single kidney    Right knee injury     SCRAPED GROWTH PLATE RIGHT KNEE       Past Surgical History:   Procedure Laterality Date    BLEPHAROPLASTY Right     BLEB RIGHT EYE    BUNIONECTOMY Bilateral     2 on R, 3 on left    CARPAL TUNNEL RELEASE Left     CT NEEDLE BX ASPIRATION INJECTION LOCALIZATION  3/8/2019    EYE SURGERY Right     bleb implant    EYE SURGERY Bilateral     lazer- glaucoma    FOOT HARDWARE REMOVAL Left 7/2/2018    Procedure: CALCANEAL REMOVAL OF HARDWARE; Surgeon: Evie Medeiros DPM;  Location: [address] [place] [address] MAIN OR;  Service: Podiatry    FOOT HARDWARE REMOVAL Left 7/5/2018    Procedure: LEFT CALCANEAL REMOVAL OF HARDWARE;  Surgeon: Evie Medeiros DPM;  Location: [address] [place] [address] OR;  Service: Podiatry    FOOT SURGERY Right     BONE REMOVED BOTTOM OF RIGHT FOOT    HAND SURGERY Right     ring finger tendon severed    HARDWARE REMOVAL Left 7/5/2018    Procedure: LEFT BIG TOE REMOVAL OF HARDWARE;  Surgeon: Evie Medeiros DPM;  Location: [address] [place] [address] MAIN OR;  Service: Podiatry    HIP SURGERY Left     reconstruction as child    HYSTERECTOMY      total abdominal    HYSTERECTOMY      TOTAL    JOINT REPLACEMENT      LTHR and reconstruction    KNEE ARTHROSCOPY      R knee    LEG SURGERY      LISFRANC ARTHRODESIS Left 11/3/2017    Procedure: FUSION FIRST MTPJ: Cruz Cueva; LATERAL ANKLE STABILIZATION;  Surgeon: Evie Medeiros DPM;  [facility];  Service: Podiatry    OOPHORECTOMY      age 35   Makenzie Choudhary PILONIDAL CYST EXCISION      midline    LA DECOMPRESS LEG,ANT/LAT & POST Left 7/5/2018    Procedure: LEFT PERONEAL TENDON DEBRIDMENT ;  Surgeon: Evie Medeiros DPM;  Location: [address] [place] [address] MAIN OR;  Service: Podiatry    LA OSTEOTOMY HEEL BONE Left 11/3/2017    Procedure: OSTEOTOMY CALCANEUS;  Surgeon: Evie Medeiros DPM;  [facility];  Service: Podiatry    LA REPAIR/GRAFT FLEX FOOT TENDON Left 7/2/2018    Procedure: PERONEAL TENDON EXPLORATION;  Surgeon: Evie Medeiros DPM;  Location: [address] [place] [address] OR;  Service: Podiatry    REFRACTIVE SURGERY Bilateral     LASER SURGERY BOTH EYES    TOTAL HIP ARTHROPLASTY  2005    WRIST SURGERY Left        Current Outpatient Medications   Medication Sig Dispense Refill    albuterol (2 5 mg/3 mL) 0 083 % nebulizer solution USE 1 VIAL EVERY 6 (SIX) HOURS AS NEEDED FOR WHEEZING OR SHORTNESS OF BREATH 150 mL 0    albuterol (PROVENTIL HFA,VENTOLIN HFA) 90 mcg/act inhaler Inhale 2 puffs every 4 (four) hours as needed for wheezing 18 Inhaler 3    ALPRAZolam (XANAX) 2 MG tablet Take by mouth      atorvastatin (LIPITOR) 20 mg tablet Take 1 tablet (20 mg total) by mouth daily 30 tablet 2    azithromycin (ZITHROMAX) 250 mg tablet Take 2 tablets today then 1 tablet daily x 4 days 6 tablet 0    benzonatate (TESSALON) 200 MG capsule Take 1 capsule (200 mg total) by mouth 3 (three) times a day as needed for cough 20 capsule 0    conjugated estrogens (PREMARIN) 0 45 mg tablet Take 1 tablet (0 45 mg total) by mouth daily Take daily for 21 days then do not take for 7 days  60 tablet 0    cyclobenzaprine (FLEXERIL) 10 mg tablet TAKE ONE TABLET DAILY AS NEEDED 30 tablet 3    diclofenac sodium (VOLTAREN) 1 % diclofenac 1 % topical gel      ergocalciferol (VITAMIN D2) 50,000 units Take 50,000 Units by mouth once a week  5    gabapentin (NEURONTIN) 300 mg capsule Take 1 capsule (300 mg total) by mouth 3 (three) times a day 90 capsule 1    ibuprofen (MOTRIN) 600 mg tablet TAKE 1 TABLET (600 MG TOTAL) BY MOUTH EVERY 8 (EIGHT) HOURS AS NEEDED FOR MILD PAIN 45 tablet 0    ipratropium-albuterol (COMBIVENT RESPIMAT) inhaler Inhale 1 puff 4 (four) times a day 1 Inhaler 3    lidocaine (LIDODERM) 5 % lidocaine 5 % topical patch      montelukast (SINGULAIR) 10 mg tablet TAKE 1 TABLET BY MOUTH EVERY DAY IN THE EVENING 90 tablet 1    naproxen (EC NAPROSYN) 500 MG EC tablet one with food twice a day for pain      omeprazole (PriLOSEC) 20 mg delayed release capsule TAKE 1 CAPSULE BY MOUTH EVERY DAY 30 capsule 2    predniSONE 50 mg tablet Take 1 tablet (50 mg total) by mouth daily for 5 days 5 tablet 0    PREMARIN 0 45 MG tablet TAKE 1 TABLET DAILY FOR 21 DAYS, THEN OFF FOR 7 DAYS   21 tablet 2    SUMAtriptan (IMITREX) 50 mg tablet Take 2 tablets (100 mg total) by mouth once as needed for migraine 9 tablet 0    tiotropium-olodaterol (STIOLTO RESPIMAT) 2 5-2 5 MCG/ACT inhaler Stiolto Respimat 2 5 mcg-2 5 mcg/actuation solution for inhalation      traZODone (DESYREL) 50 mg tablet TAKE 1 TABLET BY MOUTH DAILY AT BEDTIME 30 tablet 2    WIXELA INHUB 500-50 MCG/DOSE inhaler INHALE 1 PUFF EVERY MORNING 60 each 3    zolpidem (AMBIEN) 10 mg tablet TAKE 1 TABLET (10 MG TOTAL) BY MOUTH DAILY AT BEDTIME AS NEEDED FOR SLEEP 30 tablet 0     No current facility-administered medications for this visit  Allergies   Allergen Reactions    Morphine Other (See Comments)     Blood pressure drops    Morphine And Related     Penicillins Itching    Penicillins     Quazepam Rash       Review of Systems   Constitutional: Negative for chills and fever  HENT: Negative for trouble swallowing  Eyes: Negative for visual disturbance  Respiratory: Positive for cough and wheezing  Negative for shortness of breath  Cardiovascular: Negative for chest pain, palpitations and leg swelling  Gastrointestinal: Positive for diarrhea  Negative for abdominal pain and constipation  Endocrine: Negative for cold intolerance and heat intolerance  Genitourinary: Negative for difficulty urinating and dysuria  Musculoskeletal: Negative for gait problem  Skin: Negative for rash  Neurological: Negative for dizziness, tremors, seizures and headaches  Hematological: Negative for adenopathy  Psychiatric/Behavioral: Negative for behavioral problems  I spent 16 minutes with the patient during this visit

## 2020-03-26 NOTE — ASSESSMENT & PLAN NOTE
Was discussed about brat diet  Encourage oral hydration and avoid dairy products  If symptoms not improving the next few days call back

## 2020-03-30 DIAGNOSIS — E78.5 HYPERLIPIDEMIA, UNSPECIFIED HYPERLIPIDEMIA TYPE: ICD-10-CM

## 2020-03-30 RX ORDER — ATORVASTATIN CALCIUM 20 MG/1
TABLET, FILM COATED ORAL
Qty: 30 TABLET | Refills: 2 | Status: SHIPPED | OUTPATIENT
Start: 2020-03-30 | End: 2021-09-10 | Stop reason: HOSPADM

## 2020-04-17 DIAGNOSIS — E28.39 ESTROGEN DEFICIENCY: ICD-10-CM

## 2020-04-17 RX ORDER — ESTROGENS, CONJUGATED 0.45 MG/1
TABLET, FILM COATED ORAL
Qty: 21 TABLET | Refills: 2 | Status: SHIPPED | OUTPATIENT
Start: 2020-04-17 | End: 2020-06-13

## 2020-04-23 ENCOUNTER — TELEMEDICINE (OUTPATIENT)
Dept: FAMILY MEDICINE CLINIC | Facility: CLINIC | Age: 45
End: 2020-04-23
Payer: COMMERCIAL

## 2020-04-23 VITALS — WEIGHT: 174 LBS | BODY MASS INDEX: 32.88 KG/M2

## 2020-04-23 DIAGNOSIS — R52 PAIN: ICD-10-CM

## 2020-04-23 DIAGNOSIS — J01.00 ACUTE NON-RECURRENT MAXILLARY SINUSITIS: Primary | ICD-10-CM

## 2020-04-23 PROCEDURE — 99213 OFFICE O/P EST LOW 20 MIN: CPT | Performed by: FAMILY MEDICINE

## 2020-04-23 RX ORDER — FLUTICASONE PROPIONATE 50 MCG
2 SPRAY, SUSPENSION (ML) NASAL DAILY
Qty: 16 G | Refills: 0 | Status: SHIPPED | OUTPATIENT
Start: 2020-04-23 | End: 2020-05-30

## 2020-04-23 RX ORDER — IBUPROFEN 600 MG/1
600 TABLET ORAL EVERY 8 HOURS PRN
Qty: 45 TABLET | Refills: 0 | Status: SHIPPED | OUTPATIENT
Start: 2020-04-23 | End: 2020-05-30

## 2020-04-23 RX ORDER — AZITHROMYCIN 250 MG/1
TABLET, FILM COATED ORAL
Qty: 6 TABLET | Refills: 0 | Status: SHIPPED | OUTPATIENT
Start: 2020-04-23 | End: 2020-04-27

## 2020-05-29 DIAGNOSIS — R52 PAIN: ICD-10-CM

## 2020-05-29 DIAGNOSIS — J01.00 ACUTE NON-RECURRENT MAXILLARY SINUSITIS: ICD-10-CM

## 2020-05-30 RX ORDER — FLUTICASONE PROPIONATE 50 MCG
SPRAY, SUSPENSION (ML) NASAL
Qty: 16 ML | Refills: 0 | Status: SHIPPED | OUTPATIENT
Start: 2020-05-30 | End: 2020-07-01

## 2020-05-30 RX ORDER — IBUPROFEN 600 MG/1
TABLET ORAL
Qty: 45 TABLET | Refills: 0 | Status: SHIPPED | OUTPATIENT
Start: 2020-05-30 | End: 2020-07-01

## 2020-06-13 DIAGNOSIS — E28.39 ESTROGEN DEFICIENCY: ICD-10-CM

## 2020-06-13 RX ORDER — ESTROGENS, CONJUGATED 0.45 MG/1
TABLET, FILM COATED ORAL
Qty: 21 TABLET | Refills: 2 | Status: SHIPPED | OUTPATIENT
Start: 2020-06-13 | End: 2020-09-17

## 2020-06-19 DIAGNOSIS — Z88.9 MULTIPLE ALLERGIES: ICD-10-CM

## 2020-06-19 RX ORDER — MONTELUKAST SODIUM 10 MG/1
TABLET ORAL
Qty: 90 TABLET | Refills: 1 | Status: SHIPPED | OUTPATIENT
Start: 2020-06-19 | End: 2020-09-18 | Stop reason: SDUPTHER

## 2020-06-29 DIAGNOSIS — J01.00 ACUTE NON-RECURRENT MAXILLARY SINUSITIS: ICD-10-CM

## 2020-06-30 DIAGNOSIS — F51.01 PRIMARY INSOMNIA: ICD-10-CM

## 2020-06-30 DIAGNOSIS — R52 PAIN: ICD-10-CM

## 2020-07-01 RX ORDER — TRAZODONE HYDROCHLORIDE 50 MG/1
TABLET ORAL
Qty: 30 TABLET | Refills: 2 | Status: SHIPPED | OUTPATIENT
Start: 2020-07-01 | End: 2021-02-28

## 2020-07-01 RX ORDER — FLUTICASONE PROPIONATE 50 MCG
SPRAY, SUSPENSION (ML) NASAL
Qty: 16 ML | Refills: 0 | Status: SHIPPED | OUTPATIENT
Start: 2020-07-01 | End: 2020-11-30

## 2020-07-01 RX ORDER — IBUPROFEN 600 MG/1
TABLET ORAL
Qty: 45 TABLET | Refills: 0 | Status: SHIPPED | OUTPATIENT
Start: 2020-07-01 | End: 2020-07-21

## 2020-07-20 DIAGNOSIS — R52 PAIN: ICD-10-CM

## 2020-07-21 RX ORDER — IBUPROFEN 600 MG/1
TABLET ORAL
Qty: 45 TABLET | Refills: 0 | Status: SHIPPED | OUTPATIENT
Start: 2020-07-21 | End: 2020-09-18 | Stop reason: SDUPTHER

## 2020-07-24 DIAGNOSIS — J45.21 INTERMITTENT ASTHMA WITH ACUTE EXACERBATION, UNSPECIFIED ASTHMA SEVERITY: ICD-10-CM

## 2020-07-24 NOTE — TELEPHONE ENCOUNTER
Pt requesting refill of Combivent inhaler, asking it to be done ASAP    Not sure if this is on her med list

## 2020-09-14 ENCOUNTER — OFFICE VISIT (OUTPATIENT)
Dept: FAMILY MEDICINE CLINIC | Facility: CLINIC | Age: 45
End: 2020-09-14
Payer: COMMERCIAL

## 2020-09-14 VITALS
BODY MASS INDEX: 31.91 KG/M2 | SYSTOLIC BLOOD PRESSURE: 110 MMHG | RESPIRATION RATE: 16 BRPM | HEART RATE: 74 BPM | OXYGEN SATURATION: 97 % | TEMPERATURE: 98.5 F | DIASTOLIC BLOOD PRESSURE: 70 MMHG | WEIGHT: 169 LBS | HEIGHT: 61 IN

## 2020-09-14 DIAGNOSIS — S39.012A LUMBAR STRAIN, INITIAL ENCOUNTER: Primary | ICD-10-CM

## 2020-09-14 PROCEDURE — 3725F SCREEN DEPRESSION PERFORMED: CPT | Performed by: PHYSICIAN ASSISTANT

## 2020-09-14 PROCEDURE — 4004F PT TOBACCO SCREEN RCVD TLK: CPT | Performed by: PHYSICIAN ASSISTANT

## 2020-09-14 PROCEDURE — 99213 OFFICE O/P EST LOW 20 MIN: CPT | Performed by: PHYSICIAN ASSISTANT

## 2020-09-14 NOTE — PROGRESS NOTES
Assessment/Plan:    Since the patient does have a history of back problems she is aware of a home exercise program   -I did advise her to do the home exercises at least twice a day  -apply heat 3 times daily for 10 minutes as needed  -continue over-the-counter extra-strength Tylenol as needed as package directs  -I did return her to work for full duties  I did advise her to follow-up if any symptoms increase    M*Modal software was used to dictate this note  It may contain errors with dictating incorrect words/spelling  Please contact provider directly for any questions  Diagnoses and all orders for this visit:    Lumbar strain, initial encounter    Other orders  -     Discontinue: fluticasone-salmeterol (Advair Diskus) 500-50 mcg/dose inhaler; Advair Diskus 500 mcg-50 mcg/dose powder for inhalation          Subjective:      Patient ID: Rosario Nieves is a 54 y o  female  Patient presents today for acute visit because 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  She states 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  She states that there was a new volunteer who was working with the [employer]  Patient states that the latter started to fall so she grabbed a hold of the ladder and twisted and noticed immediate left-sided low back pain  She does notice some mild pain into the left buttock  She states overall since yesterday she has noticed improvement  She denies any numbness or tingling or radiation of pain down her lower extremities  She denies any loss of bladder or bowel control  She did apply icy Hot and took some Tylenol which did help  She states that she would like to return to full duties because if she does not she is not able to finish the class over the next several weeks  She does have classes 3 times a week        The following portions of the patient's history were reviewed and updated as appropriate:   She   Patient Active Problem List    Diagnosis Date Noted    Lumbar strain, initial encounter 09/14/2020    Diarrhea 03/26/2020    Pain 12/13/2019    Lumbar radiculopathy 12/13/2019    DDD (degenerative disc disease), lumbar 12/13/2019    DDD (degenerative disc disease), cervical 12/13/2019    Neck pain 12/13/2019    Cervical radiculopathy 12/13/2019    Healthcare maintenance 11/21/2019    Absolute anemia 11/21/2019    Chronic low back pain without sciatica 11/21/2019    Chronic fatigue 11/21/2019    BMI 32 0-32 9,adult 11/21/2019    Chronic left shoulder pain 11/21/2019    Cough 01/22/2019    Acute non-recurrent maxillary sinusitis 01/22/2019    Chronic obstructive pulmonary disease (HCC) 10/15/2018    Primary insomnia 08/27/2018    Rash 08/27/2018     Current Outpatient Medications   Medication Sig Dispense Refill    albuterol (2 5 mg/3 mL) 0 083 % nebulizer solution USE 1 VIAL EVERY 6 (SIX) HOURS AS NEEDED FOR WHEEZING OR SHORTNESS OF BREATH 150 mL 0    albuterol (PROVENTIL HFA,VENTOLIN HFA) 90 mcg/act inhaler Inhale 2 puffs every 4 (four) hours as needed for wheezing 18 Inhaler 3    conjugated estrogens (PREMARIN) 0 45 mg tablet Take 1 tablet (0 45 mg total) by mouth daily Take daily for 21 days then do not take for 7 days   60 tablet 0    cyclobenzaprine (FLEXERIL) 10 mg tablet TAKE ONE TABLET DAILY AS NEEDED 30 tablet 3    ergocalciferol (VITAMIN D2) 50,000 units Take 50,000 Units by mouth once a week  5    fluticasone (FLONASE) 50 mcg/act nasal spray SPRAY 2 SPRAYS INTO EACH NOSTRIL EVERY DAY 16 mL 0    ibuprofen (MOTRIN) 600 mg tablet TAKE 1 TABLET BY MOUTH EVERY 8 HOURS AS NEEDED FOR MILD PAIN 45 tablet 0    ipratropium-albuterol (Combivent Respimat) inhaler Inhale 1 puff 4 (four) times a day 1 Inhaler 3    montelukast (SINGULAIR) 10 mg tablet TAKE 1 TABLET BY MOUTH EVERY DAY IN THE EVENING 90 tablet 1    naproxen (EC NAPROSYN) 500 MG EC tablet one with food twice a day for pain      PREMARIN 0 45 MG tablet TAKE 1 TABLET DAILY FOR 21 DAYS, THEN OFF FOR 7 DAYS  21 tablet 2    SUMAtriptan (IMITREX) 50 mg tablet Take 2 tablets (100 mg total) by mouth once as needed for migraine 9 tablet 0    ALPRAZolam (XANAX) 2 MG tablet Take by mouth      atorvastatin (LIPITOR) 20 mg tablet TAKE 1 TABLET BY MOUTH EVERY DAY (Patient not taking: Reported on 9/14/2020) 30 tablet 2    benzonatate (TESSALON) 200 MG capsule Take 1 capsule (200 mg total) by mouth 3 (three) times a day as needed for cough (Patient not taking: Reported on 4/23/2020) 20 capsule 0    diclofenac sodium (VOLTAREN) 1 % diclofenac 1 % topical gel      gabapentin (NEURONTIN) 300 mg capsule Take 1 capsule (300 mg total) by mouth 3 (three) times a day (Patient not taking: Reported on 9/14/2020) 90 capsule 1    omeprazole (PriLOSEC) 20 mg delayed release capsule TAKE 1 CAPSULE BY MOUTH EVERY DAY (Patient not taking: Reported on 9/14/2020) 30 capsule 2    tiotropium-olodaterol (STIOLTO RESPIMAT) 2 5-2 5 MCG/ACT inhaler Stiolto Respimat 2 5 mcg-2 5 mcg/actuation solution for inhalation      traZODone (DESYREL) 50 mg tablet TAKE 1 TABLET BY MOUTH EVERYDAY AT BEDTIME (Patient not taking: Reported on 9/14/2020) 30 tablet 2    WIXELA INHUB 500-50 MCG/DOSE inhaler INHALE 1 PUFF EVERY MORNING (Patient not taking: Reported on 9/14/2020) 60 each 3    zolpidem (AMBIEN) 10 mg tablet TAKE 1 TABLET (10 MG TOTAL) BY MOUTH DAILY AT BEDTIME AS NEEDED FOR SLEEP (Patient not taking: Reported on 4/23/2020) 30 tablet 0     No current facility-administered medications for this visit        She is allergic to morphine; morphine and related; penicillins; penicillins; and quazepam     Review of Systems   Musculoskeletal:        As stated in HPI   Neurological:        As stated in HPI         Objective:      /70 (BP Location: Left arm, Patient Position: Sitting, Cuff Size: Large)   Pulse 74   Temp 98 5 °F (36 9 °C) (Tympanic)   Resp 16   Ht 5' 1" (1 549 m)   Wt 76 7 kg (169 lb)   SpO2 97%   BMI 31 93 kg/m²          Physical Exam  Constitutional:       General: She is not in acute distress  Appearance: Normal appearance  She is not ill-appearing, toxic-appearing or diaphoretic  Musculoskeletal:      Comments: Lumbar spine:  She does have mild tenderness over the left SI joint  She does have good forward flexion  Negative straight leg raise bilaterally  Skin:     General: Skin is warm  Neurological:      General: No focal deficit present  Mental Status: She is alert     Psychiatric:         Mood and Affect: Mood normal

## 2020-09-14 NOTE — LETTER
September 14, 2020     Patient: Dante Garner   YOB: 1975   Date of Visit: 9/14/2020       To Whom it May Concern:    oHma Seals is under my professional care  She was seen in my office on 9/14/2020  She is able to return to full unrestricted duties on 09/14/2020  If you have any questions or concerns, please don't hesitate to call           Sincerely,          Fabiola Vargas PA-C        CC: No Recipients

## 2020-09-17 DIAGNOSIS — E28.39 ESTROGEN DEFICIENCY: ICD-10-CM

## 2020-09-17 RX ORDER — ESTROGENS, CONJUGATED 0.45 MG/1
TABLET, FILM COATED ORAL
Qty: 21 TABLET | Refills: 2 | Status: SHIPPED | OUTPATIENT
Start: 2020-09-17 | End: 2020-12-18

## 2020-09-18 DIAGNOSIS — J44.1 CHRONIC OBSTRUCTIVE PULMONARY DISEASE WITH ACUTE EXACERBATION (HCC): ICD-10-CM

## 2020-09-18 DIAGNOSIS — J45.21 INTERMITTENT ASTHMA WITH ACUTE EXACERBATION, UNSPECIFIED ASTHMA SEVERITY: ICD-10-CM

## 2020-09-18 DIAGNOSIS — G47.00 INSOMNIA, UNSPECIFIED TYPE: ICD-10-CM

## 2020-09-18 DIAGNOSIS — R52 PAIN: ICD-10-CM

## 2020-09-18 DIAGNOSIS — R51.9 NONINTRACTABLE HEADACHE, UNSPECIFIED CHRONICITY PATTERN, UNSPECIFIED HEADACHE TYPE: ICD-10-CM

## 2020-09-18 DIAGNOSIS — Z88.9 MULTIPLE ALLERGIES: ICD-10-CM

## 2020-09-18 NOTE — TELEPHONE ENCOUNTER
Pt last seen 20  I checked [facility] web site and she has not had zolpidem filled since 19  I did copy past refill into chart   Sent to provider for review and approval        Report Prepared: 2020   Date Range: 2018 - 2020       Download PDF      Download CSV    jack doan     Linked Records  Name  ID Gender Address   [facility] 1975 [facility] [id number]   Report Criteria  First Namejack  Last Namesheats  DOB1975  Summary      Summary  Total Prescriptions   11   Total Private Pay   0   Total Prescribers   3   Total Pharmacies   1    Opioids* (excluding buprenorphine)  Current Qty   0 0   Current MME/day   0 0   30 Day Avg MME/day   0 0    Buprenorphine*  Current Qty   0 0   Current mg/day   0 0   30 Day Avg mg/day   0 0    Prescriptions    Filled  ID  Written  Drug  QTY  Days  Prescriber  Rx #  Pharmacy *  Refills  Daily Dose  Pymt Type      2019  1  2019  ZOLPIDEM TARTRATE 10 MG TABLET  30 0  30  [facility]  [id number]  [place] ([id number][place]    2019  1  2019  ZOLPIDEM TARTRATE 10 MG TABLET  30 0  30  [facility]  [id number]  [place] ([id number])  [place]    2019  1  2019  ZOLPIDEM TARTRATE 10 MG TABLET  30 0  30  [facility]  [id number]  [place] ([id number])  [place]    2019  1  2019  ZOLPIDEM TARTRATE 10 MG TABLET  30 0  30  [facility]  [id number]  [facility] ([id number])  [place]    2019  1  2019  ZOLPIDEM TARTRATE 10 MG TABLET  30 0  30  [facility]  [id number]  [facility] ([id number])  [place]    2019  1  2019  ZOLPIDEM TARTRATE 10 MG TABLET  30 0  30  [facility]  [id number]  [facility] ([id number])  0   [place]    2019  1  2019  ZOLPIDEM TARTRATE 10 MG TABLET  30 0  30  [facility]  [id number]  [facility] ([id number])  0   [place]    2019  1  2019  CODEINE-GUAIFEN  MG/5 ML  120 0  6  [facility]  [id number]  [facility]NS ([id number])  0  6 0 MME  [place]    2019  1  2019  ZOLPIDEM TARTRATE 10 MG TABLET  30 0  30  [facility]  [id number]  [facility]NS ([id number])  0   [place]    12/11/2018  1  12/11/2018  ZOLPIDEM TARTRATE 10 MG TABLET  30 0  30  [facility]  [id number]  [place]NS ([id number])  0   [place]    10/05/2018  1  10/03/2018  ZOLPIDEM TARTRATE 10 MG TABLET  30 0  30  [facility]  [id number]  [facility]NS ([id number])  0   [place]    Filled  ID  Written  Drug  QTY  Days  Prescriber  Rx #  Pharmacy *  Refills  Daily Dose  Pymt Type     *Pharmacy is created using a combination of pharmacy name and the last four digits of the pharmacy license number  *Per CDC guidance, the MME conversion factors prescribed or provided as part of medication-assisted treatment for opioid use disorder should not be used to benchmark against dosage thresholds meant for opioids prescribed for pain  Buprenorphine products have no agreed upon morphine equivalency, and as partial opioid agonists, are not expected to be associated with overdose risk in the same dose-dependent manner as doses for full agonist opioids  MME = morphine milligram equivalents  mg = dose in milligrams  Prescribers    Name  [address]  Zip  Phone    Marsha Butterfield MD  [address] [place] [address]  [place] [address]  [id number] [phone]    [address] [place] [address]  Zip  Phone    [facility] CandiceELICEO  ([id number])  [address]  [phone]    ×   Contested Record Flag    Prescriber Delegate - Unlicensed Disclaimer:  Information from the [address] is protected health information and any information accessed must be treated as confidential  Any person who unintentionally or intentionally makes an unauthorized disclosure of information from the [address] will be subject to civil and criminal penalties as set forth in the ABC-[employer] Act 2014-191, Act of Oct  27, 2014, P L  2911   The information in the [facility] may contain errors resulting from the reporting of information received   Additional independent verification of patient profile information with pharmacies and prescribers may sometimes be prudent or

## 2020-09-20 RX ORDER — ALBUTEROL SULFATE 90 UG/1
2 AEROSOL, METERED RESPIRATORY (INHALATION) EVERY 4 HOURS PRN
Qty: 18 INHALER | Refills: 0 | Status: SHIPPED | OUTPATIENT
Start: 2020-09-20 | End: 2020-11-06

## 2020-09-20 RX ORDER — ZOLPIDEM TARTRATE 10 MG/1
10 TABLET ORAL
Qty: 30 TABLET | Refills: 0 | Status: SHIPPED | OUTPATIENT
Start: 2020-09-20 | End: 2020-11-30

## 2020-09-20 RX ORDER — IPRATROPIUM/ALBUTEROL SULFATE 20-100 MCG
MIST INHALER (GRAM) INHALATION
Qty: 4 G | Refills: 3 | Status: SHIPPED | OUTPATIENT
Start: 2020-09-20 | End: 2021-03-07

## 2020-09-20 RX ORDER — SUMATRIPTAN 50 MG/1
100 TABLET, FILM COATED ORAL ONCE AS NEEDED
Qty: 9 TABLET | Refills: 0 | Status: SHIPPED | OUTPATIENT
Start: 2020-09-20 | End: 2020-10-14

## 2020-09-20 RX ORDER — MONTELUKAST SODIUM 10 MG/1
10 TABLET ORAL EVERY EVENING
Qty: 90 TABLET | Refills: 0 | Status: SHIPPED | OUTPATIENT
Start: 2020-09-20 | End: 2021-02-28

## 2020-09-20 RX ORDER — IBUPROFEN 600 MG/1
600 TABLET ORAL EVERY 8 HOURS PRN
Qty: 45 TABLET | Refills: 0 | Status: SHIPPED | OUTPATIENT
Start: 2020-09-20 | End: 2020-12-09

## 2020-09-20 RX ORDER — IPRATROPIUM/ALBUTEROL SULFATE 20-100 MCG
1 MIST INHALER (GRAM) INHALATION 4 TIMES DAILY
Qty: 1 INHALER | Refills: 0 | Status: SHIPPED | OUTPATIENT
Start: 2020-09-20 | End: 2020-11-09

## 2020-10-14 DIAGNOSIS — R51.9 NONINTRACTABLE HEADACHE, UNSPECIFIED CHRONICITY PATTERN, UNSPECIFIED HEADACHE TYPE: ICD-10-CM

## 2020-10-14 RX ORDER — SUMATRIPTAN 50 MG/1
100 TABLET, FILM COATED ORAL ONCE AS NEEDED
Qty: 9 TABLET | Refills: 0 | Status: SHIPPED | OUTPATIENT
Start: 2020-10-14 | End: 2020-11-10

## 2020-10-27 DIAGNOSIS — J45.909 ASTHMA, UNSPECIFIED ASTHMA SEVERITY, UNSPECIFIED WHETHER COMPLICATED, UNSPECIFIED WHETHER PERSISTENT: ICD-10-CM

## 2020-10-29 RX ORDER — ALBUTEROL SULFATE 2.5 MG/3ML
2.5 SOLUTION RESPIRATORY (INHALATION) EVERY 6 HOURS PRN
Qty: 150 ML | Refills: 0 | Status: SHIPPED | OUTPATIENT
Start: 2020-10-29 | End: 2021-07-02

## 2020-11-03 ENCOUNTER — OFFICE VISIT (OUTPATIENT)
Dept: FAMILY MEDICINE CLINIC | Facility: CLINIC | Age: 45
End: 2020-11-03
Payer: COMMERCIAL

## 2020-11-03 VITALS
TEMPERATURE: 97.6 F | HEART RATE: 85 BPM | OXYGEN SATURATION: 96 % | DIASTOLIC BLOOD PRESSURE: 80 MMHG | SYSTOLIC BLOOD PRESSURE: 124 MMHG | RESPIRATION RATE: 16 BRPM | HEIGHT: 61 IN | WEIGHT: 168.38 LBS | BODY MASS INDEX: 31.79 KG/M2

## 2020-11-03 DIAGNOSIS — J44.1 COPD EXACERBATION (HCC): Primary | ICD-10-CM

## 2020-11-03 PROCEDURE — 99214 OFFICE O/P EST MOD 30 MIN: CPT | Performed by: PHYSICIAN ASSISTANT

## 2020-11-03 PROCEDURE — 4004F PT TOBACCO SCREEN RCVD TLK: CPT | Performed by: PHYSICIAN ASSISTANT

## 2020-11-03 PROCEDURE — 3008F BODY MASS INDEX DOCD: CPT | Performed by: PHYSICIAN ASSISTANT

## 2020-11-03 RX ORDER — PROMETHAZINE HYDROCHLORIDE AND CODEINE PHOSPHATE 6.25; 1 MG/5ML; MG/5ML
5 SYRUP ORAL EVERY 4 HOURS PRN
Qty: 120 ML | Refills: 0 | Status: SHIPPED | OUTPATIENT
Start: 2020-11-03 | End: 2021-12-10 | Stop reason: SDUPTHER

## 2020-11-03 RX ORDER — PREDNISONE 10 MG/1
TABLET ORAL
Qty: 30 TABLET | Refills: 0 | Status: SHIPPED | OUTPATIENT
Start: 2020-11-03 | End: 2021-12-10 | Stop reason: SDUPTHER

## 2020-11-05 DIAGNOSIS — J44.1 CHRONIC OBSTRUCTIVE PULMONARY DISEASE WITH ACUTE EXACERBATION (HCC): ICD-10-CM

## 2020-11-06 RX ORDER — ALBUTEROL SULFATE 90 UG/1
AEROSOL, METERED RESPIRATORY (INHALATION)
Qty: 18 G | Refills: 3 | Status: SHIPPED | OUTPATIENT
Start: 2020-11-06 | End: 2020-11-30

## 2020-11-09 DIAGNOSIS — J45.21 INTERMITTENT ASTHMA WITH ACUTE EXACERBATION, UNSPECIFIED ASTHMA SEVERITY: ICD-10-CM

## 2020-11-09 RX ORDER — IPRATROPIUM/ALBUTEROL SULFATE 20-100 MCG
MIST INHALER (GRAM) INHALATION
Qty: 4 G | Refills: 2 | Status: SHIPPED | OUTPATIENT
Start: 2020-11-09 | End: 2021-01-07

## 2020-11-10 DIAGNOSIS — R51.9 NONINTRACTABLE HEADACHE, UNSPECIFIED CHRONICITY PATTERN, UNSPECIFIED HEADACHE TYPE: ICD-10-CM

## 2020-11-10 RX ORDER — SUMATRIPTAN 50 MG/1
100 TABLET, FILM COATED ORAL ONCE AS NEEDED
Qty: 9 TABLET | Refills: 0 | Status: SHIPPED | OUTPATIENT
Start: 2020-11-10 | End: 2020-12-18

## 2020-11-27 DIAGNOSIS — J44.1 CHRONIC OBSTRUCTIVE PULMONARY DISEASE WITH ACUTE EXACERBATION (HCC): ICD-10-CM

## 2020-11-27 DIAGNOSIS — J01.00 ACUTE NON-RECURRENT MAXILLARY SINUSITIS: ICD-10-CM

## 2020-11-27 DIAGNOSIS — G47.00 INSOMNIA, UNSPECIFIED TYPE: ICD-10-CM

## 2020-11-30 RX ORDER — ZOLPIDEM TARTRATE 10 MG/1
TABLET ORAL
Qty: 30 TABLET | Refills: 0 | Status: SHIPPED | OUTPATIENT
Start: 2020-11-30 | End: 2021-01-10

## 2020-11-30 RX ORDER — ALBUTEROL SULFATE 90 UG/1
AEROSOL, METERED RESPIRATORY (INHALATION)
Qty: 18 INHALER | Refills: 3 | Status: SHIPPED | OUTPATIENT
Start: 2020-11-30 | End: 2021-02-28

## 2020-11-30 RX ORDER — FLUTICASONE PROPIONATE 50 MCG
SPRAY, SUSPENSION (ML) NASAL
Qty: 16 ML | Refills: 0 | Status: SHIPPED | OUTPATIENT
Start: 2020-11-30 | End: 2020-12-23

## 2020-12-08 DIAGNOSIS — R52 PAIN: ICD-10-CM

## 2020-12-09 RX ORDER — IBUPROFEN 600 MG/1
600 TABLET ORAL EVERY 8 HOURS PRN
Qty: 45 TABLET | Refills: 0 | Status: SHIPPED | OUTPATIENT
Start: 2020-12-09 | End: 2021-01-11

## 2020-12-18 DIAGNOSIS — E28.39 ESTROGEN DEFICIENCY: ICD-10-CM

## 2020-12-18 DIAGNOSIS — R51.9 NONINTRACTABLE HEADACHE, UNSPECIFIED CHRONICITY PATTERN, UNSPECIFIED HEADACHE TYPE: ICD-10-CM

## 2020-12-18 RX ORDER — ESTROGENS, CONJUGATED 0.45 MG/1
TABLET, FILM COATED ORAL
Qty: 21 TABLET | Refills: 2 | Status: SHIPPED | OUTPATIENT
Start: 2020-12-18 | End: 2021-02-14

## 2020-12-18 RX ORDER — SUMATRIPTAN 50 MG/1
100 TABLET, FILM COATED ORAL ONCE AS NEEDED
Qty: 9 TABLET | Refills: 0 | Status: SHIPPED | OUTPATIENT
Start: 2020-12-18 | End: 2021-01-10

## 2020-12-23 DIAGNOSIS — J01.00 ACUTE NON-RECURRENT MAXILLARY SINUSITIS: ICD-10-CM

## 2020-12-23 RX ORDER — FLUTICASONE PROPIONATE 50 MCG
SPRAY, SUSPENSION (ML) NASAL
Qty: 16 ML | Refills: 0 | Status: SHIPPED | OUTPATIENT
Start: 2020-12-23 | End: 2021-01-20

## 2021-01-07 ENCOUNTER — TELEMEDICINE (OUTPATIENT)
Dept: FAMILY MEDICINE CLINIC | Facility: CLINIC | Age: 46
End: 2021-01-07
Payer: COMMERCIAL

## 2021-01-07 VITALS — BODY MASS INDEX: 31.72 KG/M2 | HEIGHT: 61 IN | WEIGHT: 168 LBS | TEMPERATURE: 98.3 F

## 2021-01-07 DIAGNOSIS — H92.01 RIGHT EAR PAIN: Primary | ICD-10-CM

## 2021-01-07 DIAGNOSIS — G47.00 INSOMNIA, UNSPECIFIED TYPE: ICD-10-CM

## 2021-01-07 DIAGNOSIS — J06.9 ACUTE UPPER RESPIRATORY INFECTION: ICD-10-CM

## 2021-01-07 PROCEDURE — 99213 OFFICE O/P EST LOW 20 MIN: CPT | Performed by: PHYSICIAN ASSISTANT

## 2021-01-07 PROCEDURE — 99051 MED SERV EVE/WKEND/HOLIDAY: CPT | Performed by: PHYSICIAN ASSISTANT

## 2021-01-07 PROCEDURE — 3008F BODY MASS INDEX DOCD: CPT | Performed by: PHYSICIAN ASSISTANT

## 2021-01-07 PROCEDURE — 4004F PT TOBACCO SCREEN RCVD TLK: CPT | Performed by: PHYSICIAN ASSISTANT

## 2021-01-07 RX ORDER — AZITHROMYCIN 250 MG/1
TABLET, FILM COATED ORAL
Qty: 6 TABLET | Refills: 0 | Status: SHIPPED | OUTPATIENT
Start: 2021-01-07 | End: 2021-01-12

## 2021-01-07 NOTE — PROGRESS NOTES
Virtual Regular Visit      Assessment/Plan:    Problem List Items Addressed This Visit        Respiratory    Acute upper respiratory infection    Relevant Medications    azithromycin (ZITHROMAX) 250 mg tablet       Other    Right ear pain - Primary    Relevant Medications    azithromycin (ZITHROMAX) 250 mg tablet         -Z-Jarad as directed  - over-the-counter generic Sudafed as needed as package directs  - Tylenol as needed   -increase clear liquids  - advised to call if there is no improvement with treatment or if any symptoms increase    M*Modal software was used to dictate this note  It may contain errors with dictating incorrect words/spelling  Please contact provider directly for any questions  Reason for visit is   Chief Complaint   Patient presents with    Earache    Headache    Sinus Problem     congestion    Virtual Regular Visit        Encounter provider Rajinder Jeffers PA-C    Provider located at [address] [place] [phone]      Recent Visits  No visits were found meeting these conditions  Showing recent visits within past 7 days and meeting all other requirements     Today's Visits  Date Type Provider Dept   01/07/21 Telemedicine Fabiola Vargas PA-C [facility]   Showing today's visits and meeting all other requirements     Future Appointments  No visits were found meeting these conditions  Showing future appointments within next 150 days and meeting all other requirements        The patient was identified by name and date of birth  Kim Garner was informed that this is a telemedicine visit and that the visit is being conducted through [employer] and patient was informed that this is not a secure, HIPAA-compliant platform  She agrees to proceed     My office door was closed  No one else was in the room    She acknowledged consent and understanding of privacy and security of the video platform  The patient has agreed to participate and understands they can discontinue the visit at any time  Patient is aware this is a billable service  Subjective  Josafat Cortes is a 54 y o  female  Virtual visit for right ear pain and upper respiratory symptoms   Patient presents today for acute virtual visit for upper respiratory symptoms she has had off and on for 1/2 months  She states she started with increasing right ear pain which is been more persistent over the past 2 days  She denies any fever, chills, discharge from her ear  She does have a mild cough at bedtime but she relates this to a postnasal drip  She denies nausea, vomiting, diarrhea, loss of sense of smell or taste  She states that she has not been exposed anybody with COVID symptoms  She states no one else is ill in her house  Past Medical History:   Diagnosis Date    Anxiety     h/o 2 panic attacks     Arthritis     l hip congenital dyspasia    Asthma     good control    Bleb     R eye    Chronic kidney disease     one kidney left side    Chronic pain     back- DJD in 2 upper 2 lower, buldging disc c-7    Chronic pain disorder     lower back and hips    Depression     Endometriosis     Glaucoma     b/l    Bleb in R eye    Glaucoma     H/O carpal tunnel syndrome     L    Headache     migraines    Insomnia     Migraines     Osteoarthritis     cervical spondylarthritis    Polypoid cystitis     POLYP CYSTS ON BUTTOCK AREA    Renal disorder     Reports single kidney    Right knee injury     SCRAPED GROWTH PLATE RIGHT KNEE       Past Surgical History:   Procedure Laterality Date    BLEPHAROPLASTY Right     BLEB RIGHT EYE    BUNIONECTOMY Bilateral     2 on R, 3 on left    CARPAL TUNNEL RELEASE Left     CT NEEDLE BX ASPIRATION INJECTION LOCALIZATION  3/8/2019    EYE SURGERY Right     bleb implant    EYE SURGERY Bilateral     lazer- glaucoma    FOOT HARDWARE REMOVAL Left 7/2/2018    Procedure: CALCANEAL REMOVAL OF HARDWARE;  Surgeon: Janee Peña DPM;  Location: [address] [place] [address] MAIN OR;  Service: Podiatry    FOOT HARDWARE REMOVAL Left 7/5/2018    Procedure: LEFT CALCANEAL REMOVAL OF HARDWARE;  Surgeon: Janee Peña DPM;  Location: [address] [place] [address] MAIN OR;  Service: Podiatry    FOOT SURGERY Right     BONE REMOVED BOTTOM OF RIGHT FOOT    HAND SURGERY Right     ring finger tendon severed    HARDWARE REMOVAL Left 7/5/2018    Procedure: LEFT BIG TOE REMOVAL OF HARDWARE;  Surgeon: Janee Peña DPM;  Location: [address] [place] [address] MAIN OR;  Service: Podiatry    HIP SURGERY Left     reconstruction as child    HYSTERECTOMY      total abdominal    HYSTERECTOMY      TOTAL    JOINT REPLACEMENT      LTHR and reconstruction    KNEE ARTHROSCOPY      R knee    LEG SURGERY      LISFRANC ARTHRODESIS Left 11/3/2017    Procedure: FUSION FIRST MTPJ: Vikram Ta; LATERAL ANKLE STABILIZATION;  Surgeon: Janee Peña DPM;  [facility];  Service: Podiatry    OOPHORECTOMY      age 35   Solange Sang PILONIDAL CYST EXCISION      midline    WI DECOMPRESS LEG,ANT/LAT & POST Left 7/5/2018    Procedure: LEFT PERONEAL TENDON DEBRIDMENT ;  Surgeon: Janee Peña DPM;  Location: [address] [place] [address] MAIN OR;  Service: Podiatry    WI OSTEOTOMY HEEL BONE Left 11/3/2017    Procedure: OSTEOTOMY CALCANEUS;  Surgeon: Janee Peña DPM;  [facility];  Service: Podiatry    WI REPAIR/GRAFT FLEX FOOT TENDON Left 7/2/2018    Procedure: PERONEAL TENDON EXPLORATION;  Surgeon: Janee Peña DPM;  Location: [address] [place] [address] MAIN OR;  Service: Podiatry    REFRACTIVE SURGERY Bilateral     LASER SURGERY BOTH EYES    TOTAL HIP ARTHROPLASTY  2005    WRIST SURGERY Left        Current Outpatient Medications   Medication Sig Dispense Refill    albuterol (2 5 mg/3 mL) 0 083 % nebulizer solution Take 1 vial (2 5 mg total) by nebulization every 6 (six) hours as needed for wheezing or shortness of breath 150 mL 0    albuterol (PROVENTIL HFA,VENTOLIN HFA) 90 mcg/act inhaler TAKE 2 PUFFS BY MOUTH EVERY 4 HOURS AS NEEDED FOR WHEEZE 18 Inhaler 3    Combivent Respimat inhaler INHALE 1 PUFF 4 TIMES A DAY 4 g 3    conjugated estrogens (PREMARIN) 0 45 mg tablet Take 1 tablet (0 45 mg total) by mouth daily Take daily for 21 days then do not take for 7 days  60 tablet 0    cyclobenzaprine (FLEXERIL) 10 mg tablet TAKE ONE TABLET DAILY AS NEEDED 30 tablet 3    diclofenac sodium (VOLTAREN) 1 % diclofenac 1 % topical gel      ergocalciferol (VITAMIN D2) 50,000 units Take 50,000 Units by mouth once a week  5    fluticasone (FLONASE) 50 mcg/act nasal spray SPRAY 2 SPRAYS INTO EACH NOSTRIL EVERY DAY 16 mL 0    ibuprofen (MOTRIN) 600 mg tablet TAKE 1 TABLET (600 MG TOTAL) BY MOUTH EVERY 8 (EIGHT) HOURS AS NEEDED FOR MILD PAIN 45 tablet 0    montelukast (SINGULAIR) 10 mg tablet Take 1 tablet (10 mg total) by mouth every evening 90 tablet 0    naproxen (EC NAPROSYN) 500 MG EC tablet one with food twice a day for pain      Premarin 0 45 MG tablet TAKE 1 TABLET DAILY FOR 21 DAYS, THEN OFF FOR 7 DAYS   21 tablet 2    SUMAtriptan (IMITREX) 50 mg tablet TAKE 2 TABLETS (100 MG TOTAL) BY MOUTH ONCE AS NEEDED FOR MIGRAINE 9 tablet 0    zolpidem (AMBIEN) 10 mg tablet TAKE 1 TABLET BY MOUTH DAILY AT BEDTIME AS NEEDED FOR SLEEP 30 tablet 0    ALPRAZolam (XANAX) 2 MG tablet Take by mouth      atorvastatin (LIPITOR) 20 mg tablet TAKE 1 TABLET BY MOUTH EVERY DAY (Patient not taking: Reported on 9/14/2020) 30 tablet 2    azithromycin (ZITHROMAX) 250 mg tablet TAKE 2 TABLETS ON DAY 1 THEN 1 TABLETS DAY 2-5 6 tablet 0    benzonatate (TESSALON) 200 MG capsule Take 1 capsule (200 mg total) by mouth 3 (three) times a day as needed for cough (Patient not taking: Reported on 4/23/2020) 20 capsule 0    gabapentin (NEURONTIN) 300 mg capsule Take 1 capsule (300 mg total) by mouth 3 (three) times a day (Patient not taking: Reported on 9/14/2020) 90 capsule 1    omeprazole (PriLOSEC) 20 mg delayed release capsule TAKE 1 CAPSULE BY MOUTH EVERY DAY (Patient not taking: Reported on 9/14/2020) 30 capsule 2    predniSONE 10 mg tablet Take 5 tablets for 2 days then decrease by 1 tablet every 2 days until you complete the course with 1 tablet for 2 days (Patient not taking: Reported on 1/7/2021) 30 tablet 0    promethazine-codeine (PHENERGAN WITH CODEINE) 6 25-10 mg/5 mL syrup Take 5 mL by mouth every 4 (four) hours as needed for cough (Patient not taking: Reported on 1/7/2021) 120 mL 0    tiotropium-olodaterol (STIOLTO RESPIMAT) 2 5-2 5 MCG/ACT inhaler Stiolto Respimat 2 5 mcg-2 5 mcg/actuation solution for inhalation      traZODone (DESYREL) 50 mg tablet TAKE 1 TABLET BY MOUTH EVERYDAY AT BEDTIME (Patient not taking: Reported on 9/14/2020) 30 tablet 2    WIXELA INHUB 500-50 MCG/DOSE inhaler INHALE 1 PUFF EVERY MORNING (Patient not taking: Reported on 9/14/2020) 60 each 3     No current facility-administered medications for this visit  Allergies   Allergen Reactions    Morphine Other (See Comments)     Blood pressure drops    Morphine And Related     Penicillins Itching    Penicillins     Quazepam Rash       Review of Systems   Constitutional: Negative for chills and fever  HENT: Positive for congestion, ear pain and rhinorrhea  Negative for ear discharge  Respiratory: Positive for cough  Negative for shortness of breath  Gastrointestinal: Negative for diarrhea, nausea and vomiting  Video Exam    Vitals:    01/07/21 1458   Temp: 98 3 °F (36 8 °C)   TempSrc: Tympanic   Weight: 76 2 kg (168 lb)   Height: 5' 1" (1 549 m)       Physical Exam  Constitutional:       General: She is not in acute distress  Appearance: Normal appearance  She is not ill-appearing, toxic-appearing or diaphoretic  HENT:      Head: Normocephalic and atraumatic  Neck:      Musculoskeletal: Neck supple     Pulmonary:      Effort: Pulmonary effort is normal  No respiratory distress (  Patient was walking around during the visit in her house without coughing or appearing short of breath)  Neurological:      General: No focal deficit present  Mental Status: She is alert  Psychiatric:         Mood and Affect: Mood normal           I spent 12 minutes directly with the patient during this visit      VIRTUAL VISIT DISCLAIMER    Lori Christina acknowledges that she has consented to an online visit or consultation  She understands that the online visit is based solely on information provided by her, and that, in the absence of a face-to-face physical evaluation by the physician, the diagnosis she receives is both limited and provisional in terms of accuracy and completeness  This is not intended to replace a full medical face-to-face evaluation by the physician  Keke Garner understands and accepts these terms

## 2021-01-10 DIAGNOSIS — R51.9 NONINTRACTABLE HEADACHE, UNSPECIFIED CHRONICITY PATTERN, UNSPECIFIED HEADACHE TYPE: ICD-10-CM

## 2021-01-10 RX ORDER — ZOLPIDEM TARTRATE 10 MG/1
TABLET ORAL
Qty: 30 TABLET | Refills: 0 | Status: SHIPPED | OUTPATIENT
Start: 2021-01-10 | End: 2021-02-28

## 2021-01-10 RX ORDER — SUMATRIPTAN 50 MG/1
100 TABLET, FILM COATED ORAL ONCE AS NEEDED
Qty: 9 TABLET | Refills: 0 | Status: SHIPPED | OUTPATIENT
Start: 2021-01-10 | End: 2021-02-28

## 2021-01-10 NOTE — TELEPHONE ENCOUNTER
JACK CUBA   Age: 54  Data as of: 01/10/2021    Demographics    Linked Records  Search Criteria            Summary    Summary  Total Prescriptions:    12    Total Prescribers:    3    Total Pharmacies:    2    Narcotics* (excluding Buprenorphine)  Current Qty:   0   Current MME/day:   0 00   30 Day Avg MME/day:   0 00   Buprenorphine*  Current Qty:   0   Current mg/day:   0 00   30 Day Avg mg/day:   0 00   Prescriptions    *Highlighted drugs could not be included in score calculations   Prescriptions  Total Prescriptions: 12    Total Private Pay: 0    Fill Date ID   Written Drug Qty Days Prescriber Rx # Pharmacy Refill   Daily Dose* Pymt Type      11/30/2020  1   11/30/2020  Zolpidem Tartrate 10 MG Tablet  30 00  30 Wa Abo   [id number]   Pen (6968)   0   [place]   11/03/2020  1   11/03/2020  Promethazine-Codeine Syrup  120 00  30 Ta Fel   [id number]   Kp (9929)   0  1 20 MME  [place]   09/20/2020  2   09/20/2020  Zolpidem Tartrate 10 MG Tablet  30 00  30 Wa Abo   [id number]   Pen (3387)   0   [place]   09/13/2019  1   09/13/2019  Zolpidem Tartrate 10 MG Tablet  30 00  30 Lo Sny   [id number]   Pen (0293)   0   [place]   08/05/2019  1   08/05/2019  Zolpidem Tartrate 10 MG Tablet  30 00  30 Lo Sny   [id number]   Pen (9898)   0   [place]   06/25/2019  1   06/25/2019  Zolpidem Tartrate 10 MG Tablet  30 00  30 Lo Sny   [id number]   Pen (3387)   0   [place]   05/26/2019  1   05/20/2019  Zolpidem Tartrate 10 MG Tablet  30 00  30 Lo Sny   [id number]   Pen (3387)   0   [place]   04/25/2019  1   04/17/2019  Zolpidem Tartrate 10 MG Tablet  30 00  30 Lo Sny   [id number]   Pen (3387)   0   [place]   03/27/2019  1   03/01/2019  Zolpidem Tartrate 10 MG Tablet  30 00  30 Lo Sny   [id number]   Pen (3387)   0   [place]   02/28/2019  1   02/28/2019  Zolpidem Tartrate 10 MG Tablet  30 00  30 Wa Abo   [id number]   Pen (7524)   0   [place]   01/22/2019  1   01/22/2019  Codeine-Guaifen  Mg/5 Ml  120 00  6 Wa Abo   [id number]   Pen (1917)   0  6 00 MME  [place]   01/17/2019  1   01/17/2019  Zolpidem Tartrate 10 MG Tablet  30 00  30 Wa Abo   [id number]   Pen (9677)   0   [place]   *Per CDC guidance, the MME conversion factors prescribed or provided as part of the medication-assisted treatment for opioid use disorder should not be used to benchmark against dosage thresholds meant for opioids prescribed for pain  Buprenorphine products have no agreed upon morphine equivalency, and as partial opioid agonists, are not expected to be associated with overdose risk in the same dose-dependent manner as doses for full agonist opioids  MME = morphine milligram equivalents  LME = Lorazepam milligram equivalents  MG = dose in milligrams     Providers  Total Providers: 3   Name [place] Phone   Fabiola Bryan Pa-C [address] [facility]  [address]   [facility] [id number] [place] [address], [address]  Total Pharmacies: 2   Name [employer] [facility] Phone   7 [facility] [phone] [facility] [phone] [phone]

## 2021-01-11 ENCOUNTER — IMMUNIZATIONS (OUTPATIENT)
Dept: FAMILY MEDICINE CLINIC | Facility: HOSPITAL | Age: 46
End: 2021-01-11

## 2021-01-11 DIAGNOSIS — R52 PAIN: ICD-10-CM

## 2021-01-11 DIAGNOSIS — Z23 ENCOUNTER FOR IMMUNIZATION: ICD-10-CM

## 2021-01-11 PROCEDURE — 0011A SARS-COV-2 / COVID-19 MRNA VACCINE (MODERNA) 100 MCG: CPT

## 2021-01-11 PROCEDURE — 91301 SARS-COV-2 / COVID-19 MRNA VACCINE (MODERNA) 100 MCG: CPT

## 2021-01-11 RX ORDER — IBUPROFEN 600 MG/1
600 TABLET ORAL EVERY 8 HOURS PRN
Qty: 45 TABLET | Refills: 0 | Status: SHIPPED | OUTPATIENT
Start: 2021-01-11 | End: 2021-02-01

## 2021-01-20 DIAGNOSIS — J01.00 ACUTE NON-RECURRENT MAXILLARY SINUSITIS: ICD-10-CM

## 2021-01-20 RX ORDER — FLUTICASONE PROPIONATE 50 MCG
SPRAY, SUSPENSION (ML) NASAL
Qty: 16 ML | Refills: 0 | Status: SHIPPED | OUTPATIENT
Start: 2021-01-20 | End: 2021-02-19

## 2021-02-01 DIAGNOSIS — R52 PAIN: ICD-10-CM

## 2021-02-01 RX ORDER — IBUPROFEN 600 MG/1
600 TABLET ORAL EVERY 8 HOURS PRN
Qty: 45 TABLET | Refills: 0 | Status: SHIPPED | OUTPATIENT
Start: 2021-02-01 | End: 2021-04-30

## 2021-02-10 ENCOUNTER — IMMUNIZATIONS (OUTPATIENT)
Dept: FAMILY MEDICINE CLINIC | Facility: HOSPITAL | Age: 46
End: 2021-02-10

## 2021-02-10 DIAGNOSIS — Z23 ENCOUNTER FOR IMMUNIZATION: Primary | ICD-10-CM

## 2021-02-10 PROCEDURE — 0012A SARS-COV-2 / COVID-19 MRNA VACCINE (MODERNA) 100 MCG: CPT

## 2021-02-10 PROCEDURE — 91301 SARS-COV-2 / COVID-19 MRNA VACCINE (MODERNA) 100 MCG: CPT

## 2021-02-14 DIAGNOSIS — E28.39 ESTROGEN DEFICIENCY: ICD-10-CM

## 2021-02-14 RX ORDER — ESTROGENS, CONJUGATED 0.45 MG/1
TABLET, FILM COATED ORAL
Qty: 21 TABLET | Refills: 2 | Status: SHIPPED | OUTPATIENT
Start: 2021-02-14 | End: 2021-05-17

## 2021-02-19 DIAGNOSIS — J01.00 ACUTE NON-RECURRENT MAXILLARY SINUSITIS: ICD-10-CM

## 2021-02-19 RX ORDER — FLUTICASONE PROPIONATE 50 MCG
SPRAY, SUSPENSION (ML) NASAL
Qty: 16 ML | Refills: 0 | Status: SHIPPED | OUTPATIENT
Start: 2021-02-19 | End: 2021-04-29

## 2021-02-27 DIAGNOSIS — Z88.9 MULTIPLE ALLERGIES: ICD-10-CM

## 2021-02-27 DIAGNOSIS — J44.1 CHRONIC OBSTRUCTIVE PULMONARY DISEASE WITH ACUTE EXACERBATION (HCC): ICD-10-CM

## 2021-02-27 DIAGNOSIS — G47.00 INSOMNIA, UNSPECIFIED TYPE: ICD-10-CM

## 2021-02-27 DIAGNOSIS — F51.01 PRIMARY INSOMNIA: ICD-10-CM

## 2021-02-27 DIAGNOSIS — R51.9 NONINTRACTABLE HEADACHE, UNSPECIFIED CHRONICITY PATTERN, UNSPECIFIED HEADACHE TYPE: ICD-10-CM

## 2021-02-28 RX ORDER — TRAZODONE HYDROCHLORIDE 50 MG/1
TABLET ORAL
Qty: 30 TABLET | Refills: 2 | Status: SHIPPED | OUTPATIENT
Start: 2021-02-28 | End: 2021-05-26

## 2021-02-28 RX ORDER — MONTELUKAST SODIUM 10 MG/1
TABLET ORAL
Qty: 90 TABLET | Refills: 0 | Status: SHIPPED | OUTPATIENT
Start: 2021-02-28 | End: 2021-05-28

## 2021-02-28 RX ORDER — ALBUTEROL SULFATE 90 UG/1
AEROSOL, METERED RESPIRATORY (INHALATION)
Qty: 18 INHALER | Refills: 3 | Status: SHIPPED | OUTPATIENT
Start: 2021-02-28 | End: 2021-05-20

## 2021-02-28 RX ORDER — ZOLPIDEM TARTRATE 10 MG/1
TABLET ORAL
Qty: 30 TABLET | Refills: 0 | Status: SHIPPED | OUTPATIENT
Start: 2021-02-28 | End: 2021-04-30

## 2021-02-28 RX ORDER — SUMATRIPTAN 50 MG/1
100 TABLET, FILM COATED ORAL ONCE AS NEEDED
Qty: 9 TABLET | Refills: 0 | Status: SHIPPED | OUTPATIENT
Start: 2021-02-28 | End: 2021-04-30

## 2021-02-28 NOTE — TELEPHONE ENCOUNTER
JACK CUBA   Age: 54  Data as of: 02/28/2021    Demographics    Linked Records  Search Criteria            Summary    Summary  Total Prescriptions:    11    Total Prescribers:    3    Total Pharmacies:    2    Narcotics* (excluding Buprenorphine)  Current Qty:   0   Current MME/day:   0 00   30 Day Avg MME/day:   0 00   Buprenorphine*  Current Qty:   0   Current mg/day:   0 00   30 Day Avg mg/day:   0 00   Prescriptions    *Highlighted drugs could not be included in score calculations   Prescriptions  Total Prescriptions: 11    Total Private Pay: 0    Fill Date ID   Written Drug Qty Days Prescriber Rx # Pharmacy Refill   Daily Dose* Pymt Type      01/10/2021  1   01/10/2021  Zolpidem Tartrate 10 MG Tablet  30 00  30 Wa Abo   [id number]   Pen (1664)   0   [place]   11/30/2020  1   11/30/2020  Zolpidem Tartrate 10 MG Tablet  30 00  30 Wa Abo   [id number]   Pen (7246)   0   [place]   11/03/2020  1   11/03/2020  Promethazine-Codeine Syrup  120 00  30 Ta Fel   [id number]   Kp (9929)   0  1 20 MME  [place]   09/20/2020  2   09/20/2020  Zolpidem Tartrate 10 MG Tablet  30 00  30 Wa Abo   [id number]   Pen (3387)   0   [place]   09/13/2019  1   09/13/2019  Zolpidem Tartrate 10 MG Tablet  30 00  30 Lo Sny   [id number]   Pen (7331)   0   [place]   08/05/2019  1   08/05/2019  Zolpidem Tartrate 10 MG Tablet  30 00  30 Lo Sny   [id number]   Pen (2775)   0   [place]   06/25/2019  1   06/25/2019  Zolpidem Tartrate 10 MG Tablet  30 00  30 Lo Sny   [id number]   Pen (3387)   0   [place]   05/26/2019  1   05/20/2019  Zolpidem Tartrate 10 MG Tablet  30 00  30 Lo Sny   [id number]   Pen (3387)   0   [place]   04/25/2019  1   04/17/2019  Zolpidem Tartrate 10 MG Tablet  30 00  30 Lo Sny   [id number]   Pen (3387)   0   [place]   03/27/2019  1   03/01/2019  Zolpidem Tartrate 10 MG Tablet  30 00  30 Lo Sny   [id number]   Pen (3421)   0   [place]   02/28/2019  1   02/28/2019  Zolpidem Tartrate 10 MG Tablet  30 00  30 Wa Abo   [id number]   Pen (1060)   0   [place]   *Per CDC guidance, the MME conversion factors prescribed or provided as part of the medication-assisted treatment for opioid use disorder should not be used to benchmark against dosage thresholds meant for opioids prescribed for pain  Buprenorphine products have no agreed upon morphine equivalency, and as partial opioid agonists, are not expected to be associated with overdose risk in the same dose-dependent manner as doses for full agonist opioids  MME = morphine milligram equivalents  LME = Lorazepam milligram equivalents  MG = dose in milligrams     Providers  Total Providers: 3   Name Mainor Arellano Phone   [address], [address]  [address]   [address]   [place] [address]    Pharmacies  Total Pharmacies: 2   Name [place] Phone   7 [facility] [phone] [facility] [phone]   [address]  [facility]  ([phone]) [address] [phone]

## 2021-03-01 ENCOUNTER — TELEPHONE (OUTPATIENT)
Dept: FAMILY MEDICINE CLINIC | Facility: CLINIC | Age: 46
End: 2021-03-01

## 2021-03-01 NOTE — TELEPHONE ENCOUNTER
Phone call from Pauly Natarajan, states her right ear is blocked, she has no taste & she has a sinus headache  These are the same symptoms she gets when she has a sinus infection  She states she comes here &  gets an antibiotic  The infection clears up &  then comes back  ( last seen 1/7/21) She was wondering if we would refer her to ENT? Please advise Pauly Natarajan at [phone]

## 2021-03-02 ENCOUNTER — OFFICE VISIT (OUTPATIENT)
Dept: FAMILY MEDICINE CLINIC | Facility: CLINIC | Age: 46
End: 2021-03-02
Payer: COMMERCIAL

## 2021-03-02 ENCOUNTER — TELEPHONE (OUTPATIENT)
Dept: FAMILY MEDICINE CLINIC | Facility: CLINIC | Age: 46
End: 2021-03-02

## 2021-03-02 VITALS
OXYGEN SATURATION: 99 % | DIASTOLIC BLOOD PRESSURE: 82 MMHG | BODY MASS INDEX: 31.72 KG/M2 | RESPIRATION RATE: 16 BRPM | TEMPERATURE: 97.7 F | HEART RATE: 77 BPM | SYSTOLIC BLOOD PRESSURE: 124 MMHG | HEIGHT: 61 IN | WEIGHT: 168 LBS

## 2021-03-02 DIAGNOSIS — Z00.00 HEALTHCARE MAINTENANCE: Primary | ICD-10-CM

## 2021-03-02 DIAGNOSIS — E78.5 HYPERLIPIDEMIA, UNSPECIFIED HYPERLIPIDEMIA TYPE: ICD-10-CM

## 2021-03-02 DIAGNOSIS — H92.01 RIGHT EAR PAIN: ICD-10-CM

## 2021-03-02 DIAGNOSIS — Z12.31 ENCOUNTER FOR SCREENING MAMMOGRAM FOR MALIGNANT NEOPLASM OF BREAST: ICD-10-CM

## 2021-03-02 PROBLEM — D36.13 NEUROMA OF FOOT: Status: ACTIVE | Noted: 2021-03-02

## 2021-03-02 PROBLEM — M20.10 HALLUX VALGUS: Status: ACTIVE | Noted: 2021-03-02

## 2021-03-02 PROBLEM — Z72.0 TOBACCO USE: Status: ACTIVE | Noted: 2021-03-02

## 2021-03-02 PROCEDURE — 4004F PT TOBACCO SCREEN RCVD TLK: CPT | Performed by: FAMILY MEDICINE

## 2021-03-02 PROCEDURE — 99396 PREV VISIT EST AGE 40-64: CPT | Performed by: FAMILY MEDICINE

## 2021-03-02 PROCEDURE — 3008F BODY MASS INDEX DOCD: CPT | Performed by: FAMILY MEDICINE

## 2021-03-02 NOTE — ASSESSMENT & PLAN NOTE
Was discussed about low-fat diet and regular exercise  Was discussed about starting Lipitor  We will repeat blood work and then consider restarting medication

## 2021-03-02 NOTE — PROGRESS NOTES
Assessment/Plan:  Hyperlipidemia    Was discussed about low-fat diet and regular exercise  Was discussed about starting Lipitor  We will repeat blood work and then consider restarting medication  Healthcare maintenance    Was discussed about immunizations, diet, exercise and safety measures  Right ear pain    Was told to use Flonase nasal spray and take ibuprofen 600 mg 3 times a day for 1-2 weeks  I gave referral to see ENT  Diagnoses and all orders for this visit:    Healthcare maintenance  -     CBC and differential; Future  -     Comprehensive metabolic panel; Future  -     Lipid Panel with Direct LDL reflex; Future  -     TSH, 3rd generation with Free T4 reflex; Future    Right ear pain  -     Ambulatory Referral to Otolaryngology; Future  -     Ambulatory Referral to Otolaryngology; Future    Hyperlipidemia, unspecified hyperlipidemia type  -     CBC and differential; Future  -     Comprehensive metabolic panel; Future  -     Lipid Panel with Direct LDL reflex; Future  -     TSH, 3rd generation with Free T4 reflex; Future    Encounter for screening mammogram for malignant neoplasm of breast  -     Mammo screening bilateral w cad; Future    BMI 31 0-31 9,adult          There are no Patient Instructions on file for this visit  Return in about 6 months (around 9/2/2021)  Subjective:      Patient ID: Simeon Giles is a 54 y o  female  Chief Complaint   Patient presents with   Vanolyn Dress         She is here today for wellness exam and with complaint of right ear pressure and pain on and off  She denies any fever or chills  She stated she has lost the taste due to to postnasal drip  She already had her to COVID vaccines  She has history of hyperlipidemia  She has not been taking her Lipitor  She she is due for blood work        The following portions of the patient's history were reviewed and updated as appropriate: allergies, current medications, past family history, past medical history, past social history, past surgical history and problem list     Review of Systems   Constitutional: Negative for chills and fever  HENT: Positive for congestion, ear pain and sinus pressure  Negative for ear discharge and trouble swallowing  Eyes: Negative for visual disturbance  Respiratory: Negative for cough and shortness of breath  Cardiovascular: Negative for chest pain, palpitations and leg swelling  Gastrointestinal: Negative for abdominal pain, constipation and diarrhea  Endocrine: Negative for cold intolerance and heat intolerance  Genitourinary: Negative for difficulty urinating and dysuria  Musculoskeletal: Negative for gait problem  Skin: Negative for rash  Neurological: Negative for dizziness, tremors, seizures and headaches  Hematological: Negative for adenopathy  Psychiatric/Behavioral: Negative for behavioral problems  Current Outpatient Medications   Medication Sig Dispense Refill    albuterol (2 5 mg/3 mL) 0 083 % nebulizer solution Take 1 vial (2 5 mg total) by nebulization every 6 (six) hours as needed for wheezing or shortness of breath 150 mL 0    albuterol (PROVENTIL HFA,VENTOLIN HFA) 90 mcg/act inhaler TAKE 2 PUFFS BY MOUTH EVERY 4 HOURS AS NEEDED FOR WHEEZE 18 Inhaler 3    ALPRAZolam (XANAX) 2 MG tablet Take by mouth      Combivent Respimat inhaler INHALE 1 PUFF 4 TIMES A DAY 4 g 3    conjugated estrogens (PREMARIN) 0 45 mg tablet Take 1 tablet (0 45 mg total) by mouth daily Take daily for 21 days then do not take for 7 days   60 tablet 0    cyclobenzaprine (FLEXERIL) 10 mg tablet TAKE ONE TABLET DAILY AS NEEDED 30 tablet 3    diclofenac sodium (VOLTAREN) 1 % diclofenac 1 % topical gel      ergocalciferol (VITAMIN D2) 50,000 units Take 50,000 Units by mouth once a week  5    fluticasone (FLONASE) 50 mcg/act nasal spray SPRAY 2 SPRAYS INTO EACH NOSTRIL EVERY DAY 16 mL 0    ibuprofen (MOTRIN) 600 mg tablet TAKE 1 TABLET (600 MG TOTAL) BY MOUTH EVERY 8 (EIGHT) HOURS AS NEEDED FOR MILD PAIN 45 tablet 0    montelukast (SINGULAIR) 10 mg tablet TAKE 1 TABLET BY MOUTH EVERY DAY IN THE EVENING 90 tablet 0    Premarin 0 45 MG tablet TAKE 1 TABLET DAILY FOR 21 DAYS, THEN OFF FOR 7 DAYS   21 tablet 2    SUMAtriptan (IMITREX) 50 mg tablet TAKE 2 TABLETS (100 MG TOTAL) BY MOUTH ONCE AS NEEDED FOR MIGRAINE 9 tablet 0    tiotropium-olodaterol (STIOLTO RESPIMAT) 2 5-2 5 MCG/ACT inhaler Stiolto Respimat 2 5 mcg-2 5 mcg/actuation solution for inhalation      traZODone (DESYREL) 50 mg tablet TAKE 1 TABLET BY MOUTH EVERYDAY AT BEDTIME 30 tablet 2    zolpidem (AMBIEN) 10 mg tablet TAKE 1 TABLET BY MOUTH DAILY AT BEDTIME AS NEEDED FOR SLEEP 30 tablet 0    atorvastatin (LIPITOR) 20 mg tablet TAKE 1 TABLET BY MOUTH EVERY DAY (Patient not taking: Reported on 9/14/2020) 30 tablet 2    benzonatate (TESSALON) 200 MG capsule Take 1 capsule (200 mg total) by mouth 3 (three) times a day as needed for cough (Patient not taking: Reported on 4/23/2020) 20 capsule 0    gabapentin (NEURONTIN) 300 mg capsule Take 1 capsule (300 mg total) by mouth 3 (three) times a day (Patient not taking: Reported on 3/2/2021) 90 capsule 1    naproxen (EC NAPROSYN) 500 MG EC tablet one with food twice a day for pain      omeprazole (PriLOSEC) 20 mg delayed release capsule TAKE 1 CAPSULE BY MOUTH EVERY DAY (Patient not taking: Reported on 9/14/2020) 30 capsule 2    predniSONE 10 mg tablet Take 5 tablets for 2 days then decrease by 1 tablet every 2 days until you complete the course with 1 tablet for 2 days (Patient not taking: Reported on 1/7/2021) 30 tablet 0    promethazine-codeine (PHENERGAN WITH CODEINE) 6 25-10 mg/5 mL syrup Take 5 mL by mouth every 4 (four) hours as needed for cough (Patient not taking: Reported on 1/7/2021) 120 mL 0    WIXELA INHUB 500-50 MCG/DOSE inhaler INHALE 1 PUFF EVERY MORNING (Patient not taking: Reported on 9/14/2020) 60 each 3     No current facility-administered medications for this visit  Objective:    /82 (BP Location: Left arm, Patient Position: Sitting, Cuff Size: Adult)   Pulse 77   Temp 97 7 °F (36 5 °C) (Tympanic)   Resp 16   Ht 5' 1" (1 549 m)   Wt 76 2 kg (168 lb)   SpO2 99%   BMI 31 74 kg/m²        Physical Exam  Vitals signs and nursing note reviewed  Constitutional:       Appearance: She is well-developed  HENT:      Head: Normocephalic and atraumatic  Right Ear: A middle ear effusion is present  Left Ear: A middle ear effusion is present  Mouth/Throat:      Pharynx: Posterior oropharyngeal erythema present  Eyes:      Pupils: Pupils are equal, round, and reactive to light  Neck:      Musculoskeletal: Normal range of motion and neck supple  Cardiovascular:      Rate and Rhythm: Normal rate and regular rhythm  Heart sounds: Normal heart sounds  Pulmonary:      Effort: Pulmonary effort is normal       Breath sounds: Normal breath sounds  Abdominal:      General: Bowel sounds are normal       Palpations: Abdomen is soft  Musculoskeletal: Normal range of motion  Lymphadenopathy:      Cervical: No cervical adenopathy  Skin:     General: Skin is warm and dry  Neurological:      Mental Status: She is alert and oriented to person, place, and time  Cranial Nerves: No cranial nerve deficit  Aubrey Hinson MD  BMI Counseling: Body mass index is 31 74 kg/m²  The BMI is above normal  Nutrition recommendations include reducing portion sizes  Exercise recommendations include moderate aerobic physical activity for 150 minutes/week

## 2021-03-02 NOTE — TELEPHONE ENCOUNTER
Patient left a message stating the ENT she was referred to today cannot see her until July    She was able to get an appointment tomorrow at [facility] so needs a referral faxed to them at [phone]  For her right ear pain      Patient wants a call when this is done

## 2021-03-02 NOTE — ASSESSMENT & PLAN NOTE
Was told to use Flonase nasal spray and take ibuprofen 600 mg 3 times a day for 1-2 weeks  I gave referral to see ENT

## 2021-03-02 NOTE — TELEPHONE ENCOUNTER
Printed referral and faxed it to the number given [phone] for [facility]    LM for pt referral was faxed, any questions to call office

## 2021-03-07 DIAGNOSIS — J45.21 INTERMITTENT ASTHMA WITH ACUTE EXACERBATION, UNSPECIFIED ASTHMA SEVERITY: ICD-10-CM

## 2021-03-07 RX ORDER — IPRATROPIUM/ALBUTEROL SULFATE 20-100 MCG
MIST INHALER (GRAM) INHALATION
Qty: 4 G | Refills: 3 | Status: SHIPPED | OUTPATIENT
Start: 2021-03-07 | End: 2021-07-14

## 2021-03-08 ENCOUNTER — HOSPITAL ENCOUNTER (OUTPATIENT)
Dept: RADIOLOGY | Facility: IMAGING CENTER | Age: 46
Discharge: HOME | End: 2021-03-08
Payer: COMMERCIAL

## 2021-03-08 ENCOUNTER — TRANSCRIBE ORDERS (OUTPATIENT)
Dept: ADMINISTRATIVE | Facility: HOSPITAL | Age: 46
End: 2021-03-08

## 2021-03-08 DIAGNOSIS — M79.671 RIGHT FOOT PAIN: ICD-10-CM

## 2021-03-08 DIAGNOSIS — M79.671 RIGHT FOOT PAIN: Primary | ICD-10-CM

## 2021-03-08 PROCEDURE — 73630 X-RAY EXAM OF FOOT: CPT

## 2021-03-10 ENCOUNTER — HOSPITAL ENCOUNTER (OUTPATIENT)
Dept: RADIOLOGY | Facility: IMAGING CENTER | Age: 46
Discharge: HOME | End: 2021-03-10
Payer: COMMERCIAL

## 2021-03-10 VITALS — WEIGHT: 170 LBS | HEIGHT: 61 IN | BODY MASS INDEX: 32.1 KG/M2

## 2021-03-10 DIAGNOSIS — Z12.31 ENCOUNTER FOR SCREENING MAMMOGRAM FOR MALIGNANT NEOPLASM OF BREAST: ICD-10-CM

## 2021-03-10 PROCEDURE — 77067 SCR MAMMO BI INCL CAD: CPT

## 2021-03-11 ENCOUNTER — TELEPHONE (OUTPATIENT)
Dept: FAMILY MEDICINE CLINIC | Facility: CLINIC | Age: 46
End: 2021-03-11

## 2021-03-11 NOTE — TELEPHONE ENCOUNTER
----- Message from [address] [employer] sent at 3/11/2021 10:03 AM EST -----  I accidentally reviewed her mammogram results without sending a result note  Can you let her know that her mammogram was normal and we recommend repeat screening mammogram in 1 year  Thank you!

## 2021-04-29 DIAGNOSIS — R51.9 NONINTRACTABLE HEADACHE, UNSPECIFIED CHRONICITY PATTERN, UNSPECIFIED HEADACHE TYPE: ICD-10-CM

## 2021-04-29 DIAGNOSIS — J01.00 ACUTE NON-RECURRENT MAXILLARY SINUSITIS: ICD-10-CM

## 2021-04-29 DIAGNOSIS — R52 PAIN: ICD-10-CM

## 2021-04-29 DIAGNOSIS — G47.00 INSOMNIA, UNSPECIFIED TYPE: ICD-10-CM

## 2021-04-29 RX ORDER — FLUTICASONE PROPIONATE 50 MCG
SPRAY, SUSPENSION (ML) NASAL
Qty: 16 ML | Refills: 3 | Status: SHIPPED | OUTPATIENT
Start: 2021-04-29 | End: 2021-08-24

## 2021-04-30 RX ORDER — IBUPROFEN 600 MG/1
600 TABLET ORAL EVERY 8 HOURS PRN
Qty: 45 TABLET | Refills: 0 | Status: SHIPPED | OUTPATIENT
Start: 2021-04-30 | End: 2021-06-17

## 2021-04-30 RX ORDER — ZOLPIDEM TARTRATE 10 MG/1
TABLET ORAL
Qty: 30 TABLET | Refills: 0 | Status: SHIPPED | OUTPATIENT
Start: 2021-04-30 | End: 2021-06-17

## 2021-04-30 RX ORDER — SUMATRIPTAN 50 MG/1
100 TABLET, FILM COATED ORAL ONCE AS NEEDED
Qty: 9 TABLET | Refills: 0 | Status: SHIPPED | OUTPATIENT
Start: 2021-04-30 | End: 2021-05-25

## 2021-04-30 NOTE — TELEPHONE ENCOUNTER
JACK CUBA   Age: 51  Data as of: 04/30/2021    Demographics    Linked Records  Search Criteria            Summary    Summary  Total Prescriptions:    9    Total Prescribers:    3    Total Pharmacies:    2    Narcotics* (excluding Buprenorphine)  Current Qty:   0   Current MME/day:   0 00   30 Day Avg MME/day:   0 00   Buprenorphine*  Current Qty:   0   Current mg/day:   0 00   30 Day Avg mg/day:   0 00   Prescriptions    *Highlighted drugs could not be included in score calculations   Prescriptions  Total Prescriptions: 9    Total Private Pay: 0    Fill Date ID   Written Drug Qty Days Prescriber Rx # Pharmacy Refill   Daily Dose* Pymt Type      02/28/2021  1   02/28/2021  Zolpidem Tartrate 10 MG Tablet  30 00  30 Wa Abo   [id number]   Pen (3387)   0   [place]   01/10/2021  1   01/10/2021  Zolpidem Tartrate 10 MG Tablet  30 00  30 Wa Abo   [id number]   Pen (3387)   0   [place]   11/30/2020  1   11/30/2020  Zolpidem Tartrate 10 MG Tablet  30 00  30 Wa Abo   [id number]   Pen (3387)   0   [place]   11/03/2020  1   11/03/2020  Promethazine-Codeine Syrup  120 00  30 Ta Fel   [id number]   Kp (9929)   0  1 20 MME  [place]   09/20/2020  2   09/20/2020  Zolpidem Tartrate 10 MG Tablet  30 00  30 Wa Abo   [id number]   Pen (3387)   0   [place]   09/13/2019  1   09/13/2019  Zolpidem Tartrate 10 MG Tablet  30 00  30 Lo Sny   [id number]   Pen (3387)   0   [place]   08/05/2019  1   08/05/2019  Zolpidem Tartrate 10 MG Tablet  30 00  30 Lo Sny   [id number]   Pen (3387)   0   [place]   06/25/2019  1   06/25/2019  Zolpidem Tartrate 10 MG Tablet  30 00  30 Lo Sny   [id number]   Pen (3387)   0   [place]   05/26/2019  1   05/20/2019  Zolpidem Tartrate 10 MG Tablet  30 00  30 Lo Sny   [id number]   Pen (3387)   0   [place]   *Per CDC guidance, the MME conversion factors prescribed or provided as part of the medication-assisted treatment for opioid use disorder should not be used to benchmark against dosage thresholds meant for opioids prescribed for pain  Buprenorphine products have no agreed upon morphine equivalency, and as partial opioid agonists, are not expected to be associated with overdose risk in the same dose-dependent manner as doses for full agonist opioids  MME = morphine milligram equivalents  LME = Lorazepam milligram equivalents  MG = dose in milligrams     Providers  Total Providers: 3   Name [facility] Phone   [address] Pa-C [address] [facility] [address]  [address]   [address]   [place] [address]    Pharmacies  Total Pharmacies: 2   Name [facility] Phone   5 [facility] [phone]) [address] [place] [phone] [address] [facility]  [phone]

## 2021-05-17 DIAGNOSIS — E28.39 ESTROGEN DEFICIENCY: ICD-10-CM

## 2021-05-17 RX ORDER — ESTROGENS, CONJUGATED 0.45 MG/1
TABLET, FILM COATED ORAL
Qty: 21 TABLET | Refills: 2 | Status: SHIPPED | OUTPATIENT
Start: 2021-05-17 | End: 2021-08-02

## 2021-05-19 DIAGNOSIS — J44.1 CHRONIC OBSTRUCTIVE PULMONARY DISEASE WITH ACUTE EXACERBATION (HCC): ICD-10-CM

## 2021-05-20 RX ORDER — ALBUTEROL SULFATE 90 UG/1
AEROSOL, METERED RESPIRATORY (INHALATION)
Qty: 18 G | Refills: 3 | Status: SHIPPED | OUTPATIENT
Start: 2021-05-20 | End: 2021-08-24

## 2021-05-21 ENCOUNTER — TRANSCRIBE ORDERS (OUTPATIENT)
Dept: ADMINISTRATIVE | Facility: HOSPITAL | Age: 46
End: 2021-05-21

## 2021-05-21 ENCOUNTER — HOSPITAL ENCOUNTER (OUTPATIENT)
Dept: RADIOLOGY | Facility: IMAGING CENTER | Age: 46
Discharge: HOME | End: 2021-05-21
Payer: COMMERCIAL

## 2021-05-21 DIAGNOSIS — M79.671 RIGHT FOOT PAIN: ICD-10-CM

## 2021-05-21 DIAGNOSIS — M79.671 RIGHT FOOT PAIN: Primary | ICD-10-CM

## 2021-05-21 PROCEDURE — 73630 X-RAY EXAM OF FOOT: CPT

## 2021-05-25 DIAGNOSIS — R51.9 NONINTRACTABLE HEADACHE, UNSPECIFIED CHRONICITY PATTERN, UNSPECIFIED HEADACHE TYPE: ICD-10-CM

## 2021-05-25 RX ORDER — SUMATRIPTAN 50 MG/1
100 TABLET, FILM COATED ORAL ONCE AS NEEDED
Qty: 9 TABLET | Refills: 0 | Status: SHIPPED | OUTPATIENT
Start: 2021-05-25 | End: 2021-06-25

## 2021-05-26 DIAGNOSIS — F51.01 PRIMARY INSOMNIA: ICD-10-CM

## 2021-05-26 RX ORDER — TRAZODONE HYDROCHLORIDE 50 MG/1
TABLET ORAL
Qty: 30 TABLET | Refills: 2 | Status: SHIPPED | OUTPATIENT
Start: 2021-05-26 | End: 2021-09-17

## 2021-05-28 DIAGNOSIS — Z88.9 MULTIPLE ALLERGIES: ICD-10-CM

## 2021-05-28 RX ORDER — MONTELUKAST SODIUM 10 MG/1
TABLET ORAL
Qty: 90 TABLET | Refills: 0 | Status: SHIPPED | OUTPATIENT
Start: 2021-05-28 | End: 2021-08-24

## 2021-06-16 DIAGNOSIS — G47.00 INSOMNIA, UNSPECIFIED TYPE: ICD-10-CM

## 2021-06-16 DIAGNOSIS — R52 PAIN: ICD-10-CM

## 2021-06-17 RX ORDER — ZOLPIDEM TARTRATE 10 MG/1
TABLET ORAL
Qty: 30 TABLET | Refills: 0 | Status: SHIPPED | OUTPATIENT
Start: 2021-06-17 | End: 2021-08-02

## 2021-06-17 RX ORDER — IBUPROFEN 600 MG/1
600 TABLET ORAL EVERY 8 HOURS PRN
Qty: 45 TABLET | Refills: 0 | Status: SHIPPED | OUTPATIENT
Start: 2021-06-17 | End: 2021-08-02

## 2021-06-17 NOTE — TELEPHONE ENCOUNTER
JACK CUBA   Age: 51  Data as of: 06/17/2021    Demographics    Linked Records                       Search Criteria            Summary    Summary  Total Prescriptions:    9    Total Prescribers:    3    Total Pharmacies:    2    Narcotics* (excluding Buprenorphine)  Current Qty:   0   Current MME/day:   0 00   30 Day Avg MME/day:   0 00   Buprenorphine*  Current Qty:   0   Current mg/day:   0 00   30 Day Avg mg/day:   0 00   Prescriptions    *Highlighted drugs could not be included in score calculations   Prescriptions  Total Prescriptions: 9    Total Private Pay: 0    Fill Date ID   Written Drug Qty Days Prescriber Rx # Pharmacy Refill   Daily Dose* Pymt Type      04/30/2021  1   04/30/2021  Zolpidem Tartrate 10 MG Tablet  30 00  30 Wa Abo   [id number]   Pen (3387)   0   [place]   02/28/2021  1   02/28/2021  Zolpidem Tartrate 10 MG Tablet  30 00  30 Wa Abo   [id number]   Pen (3387)   0   [place]   01/10/2021  1   01/10/2021  Zolpidem Tartrate 10 MG Tablet  30 00  30 Wa Abo   [id number]   Pen (3387)   0   [place]   11/30/2020  1   11/30/2020  Zolpidem Tartrate 10 MG Tablet  30 00  30 Wa Abo   [id number]   Pen (3387)   0   [place]   11/03/2020  1   11/03/2020  Promethazine-Codeine Syrup  120 00  30 Ta Fel   [id number]   Kp (9929)   0  1 20 MME  [place]   09/20/2020  2   09/20/2020  Zolpidem Tartrate 10 MG Tablet  30 00  30 Wa Abo   [id number]   Pen (3387)   0   [place]   09/13/2019  1   09/13/2019  Zolpidem Tartrate 10 MG Tablet  30 00  30 Lo Sny   [id number]   Pen (3387)   0   [place]   08/05/2019  1   08/05/2019  Zolpidem Tartrate 10 MG Tablet  30 00  30 Lo Sny   [id number]   Pen (3387)   0   [place]   06/25/2019  1   06/25/2019  Zolpidem Tartrate 10 MG Tablet  30 00  30 Lo Sny   [id number]   Pen (3387)   0   [place]   *Per CDC guidance, the MME conversion factors prescribed or provided as part of the medication-assisted treatment for opioid use disorder should not be used to benchmark against dosage thresholds meant for opioids prescribed for pain  Buprenorphine products have no agreed upon morphine equivalency, and as partial opioid agonists, are not expected to be associated with overdose risk in the same dose-dependent manner as doses for full agonist opioids  MME = morphine milligram equivalents  LME = Lorazepam milligram equivalents  MG = dose in milligrams     Providers  Total Providers: 3   Name Mainor Arellano Phone   [address] Pa-C [address] [facility]  [address]   [place]   [facility] [address]    Pharmacies  Total Pharmacies: 2   Name [place] Phone   8 [facility] (3650) [address] [place] [phone] [address]  [facility]  [phone]

## 2021-06-25 DIAGNOSIS — R51.9 NONINTRACTABLE HEADACHE, UNSPECIFIED CHRONICITY PATTERN, UNSPECIFIED HEADACHE TYPE: ICD-10-CM

## 2021-06-25 RX ORDER — SUMATRIPTAN 50 MG/1
100 TABLET, FILM COATED ORAL ONCE AS NEEDED
Qty: 9 TABLET | Refills: 0 | Status: SHIPPED | OUTPATIENT
Start: 2021-06-25 | End: 2021-07-26

## 2021-07-02 DIAGNOSIS — J45.909 ASTHMA, UNSPECIFIED ASTHMA SEVERITY, UNSPECIFIED WHETHER COMPLICATED, UNSPECIFIED WHETHER PERSISTENT: ICD-10-CM

## 2021-07-02 RX ORDER — ALBUTEROL SULFATE 2.5 MG/3ML
SOLUTION RESPIRATORY (INHALATION)
Qty: 150 ML | Refills: 0 | Status: SHIPPED | OUTPATIENT
Start: 2021-07-02 | End: 2021-07-11

## 2021-07-07 DIAGNOSIS — J45.909 ASTHMA, UNSPECIFIED ASTHMA SEVERITY, UNSPECIFIED WHETHER COMPLICATED, UNSPECIFIED WHETHER PERSISTENT: ICD-10-CM

## 2021-07-11 RX ORDER — ALBUTEROL SULFATE 2.5 MG/3ML
SOLUTION RESPIRATORY (INHALATION)
Qty: 150 ML | Refills: 0 | Status: SHIPPED | OUTPATIENT
Start: 2021-07-11 | End: 2021-10-29

## 2021-07-13 DIAGNOSIS — J45.21 INTERMITTENT ASTHMA WITH ACUTE EXACERBATION, UNSPECIFIED ASTHMA SEVERITY: ICD-10-CM

## 2021-07-14 RX ORDER — IPRATROPIUM/ALBUTEROL SULFATE 20-100 MCG
MIST INHALER (GRAM) INHALATION
Qty: 4 G | Refills: 3 | Status: SHIPPED | OUTPATIENT
Start: 2021-07-14 | End: 2021-11-02

## 2021-07-20 ENCOUNTER — TELEPHONE (OUTPATIENT)
Dept: FAMILY MEDICINE CLINIC | Facility: CLINIC | Age: 46
End: 2021-07-20

## 2021-07-20 NOTE — TELEPHONE ENCOUNTER
I reviewed chart and I did not see anything that she was seen in office for her hand    I did leave her a voice message to call office and I responded back to her through her my chart

## 2021-07-20 NOTE — TELEPHONE ENCOUNTER
----- Message from Caleb Fothergill Sheats sent at 7/20/2021  3:08 PM EDT -----  Regarding: Non-Urgent Medical Question  Contact: [phone]  Referrel to see Dr Seema Hernandez   For hand

## 2021-07-23 NOTE — TELEPHONE ENCOUNTER
Pt returned my call and her hand injury was treated 5 years ago by our office  She is aware that she will need a visit if a ref is needed  She did schedule appt with ortho and a ref was not mentioned   She will call us if one is needed to schedule appointment

## 2021-07-25 DIAGNOSIS — R51.9 NONINTRACTABLE HEADACHE, UNSPECIFIED CHRONICITY PATTERN, UNSPECIFIED HEADACHE TYPE: ICD-10-CM

## 2021-07-26 RX ORDER — SUMATRIPTAN 50 MG/1
100 TABLET, FILM COATED ORAL ONCE AS NEEDED
Qty: 9 TABLET | Refills: 0 | Status: SHIPPED | OUTPATIENT
Start: 2021-07-26 | End: 2021-08-24

## 2021-08-01 DIAGNOSIS — E28.39 ESTROGEN DEFICIENCY: ICD-10-CM

## 2021-08-01 DIAGNOSIS — R52 PAIN: ICD-10-CM

## 2021-08-01 DIAGNOSIS — G47.00 INSOMNIA, UNSPECIFIED TYPE: ICD-10-CM

## 2021-08-02 RX ORDER — ZOLPIDEM TARTRATE 10 MG/1
TABLET ORAL
Qty: 30 TABLET | Refills: 0 | Status: SHIPPED | OUTPATIENT
Start: 2021-08-02 | End: 2021-09-21

## 2021-08-02 RX ORDER — IBUPROFEN 600 MG/1
600 TABLET ORAL EVERY 8 HOURS PRN
Qty: 45 TABLET | Refills: 0 | Status: SHIPPED | OUTPATIENT
Start: 2021-08-02 | End: 2021-09-21

## 2021-08-02 RX ORDER — ESTROGENS, CONJUGATED 0.45 MG/1
TABLET, FILM COATED ORAL
Qty: 21 TABLET | Refills: 2 | Status: SHIPPED | OUTPATIENT
Start: 2021-08-02 | End: 2021-09-10 | Stop reason: SDUPTHER

## 2021-08-02 NOTE — TELEPHONE ENCOUNTER
JACK CUBA   Age: 51  Data as of: 08/02/2021    Demographics    Linked Records                       Search Criteria            Summary    Summary  Total Prescriptions:    9    Total Prescribers:    3    Total Pharmacies:    2    Narcotics* (excluding Buprenorphine)  Current Qty:   0   Current MME/day:   0 00   30 Day Avg MME/day:   0 00   Buprenorphine*  Current Qty:   0   Current mg/day:   0 00   30 Day Avg mg/day:   0 00   Prescriptions    *Highlighted drugs could not be included in score calculations   Prescriptions  Total Prescriptions: 9    Total Private Pay: 0    Fill Date ID   Written Drug Qty Days Prescriber Rx # Pharmacy Refill   Daily Dose* Pymt Type      06/17/2021  1   06/17/2021  Zolpidem Tartrate 10 MG Tablet  30 00  30 Wa Abo   [id number]   Pen (1323[place]   04/30/2021  1   04/30/2021  Zolpidem Tartrate 10 MG Tablet  30 00  30 Wa Abo   [id number]   Pen (3387)   [place]   02/28/2021  1   02/28/2021  Zolpidem Tartrate 10 MG Tablet  30 00  30 Wa Abo   [id number]   Pen (3387)   [place]   01/10/2021  1   01/10/2021  Zolpidem Tartrate 10 MG Tablet  30 00  30 Wa Abo   [id number]   Pen (3387)   [place]   11/30/2020  1   11/30/2020  Zolpidem Tartrate 10 MG Tablet  30 00  30 Wa Abo   [id number]   Pen (3387)   [place]   11/03/2020  1   11/03/2020  Promethazine-Codeine Solution  120 00  30 Ta Fel   [id number]   Kp (9929)   0  1 20 MME  [place]   09/20/2020  2   09/20/2020  Zolpidem Tartrate 10 MG Tablet  30 00  30 Wa Abo   [id number]   Pen (3387)   [place]   09/13/2019  1   09/13/2019  Zolpidem Tartrate 10 MG Tablet  30 00  30 Lo Sny   [id number]   Pen (3387)   [place]   08/05/2019  1   08/05/2019  Zolpidem Tartrate 10 MG Tablet  30 00  30 Lo Sny   [id number]   Pen (3387)   [place]   *Per CDC guidance, the MME conversion factors prescribed or provided as part of the medication-assisted treatment for opioid use disorder should not be used to benchmark against dosage thresholds meant for opioids prescribed for pain  Buprenorphine products have no agreed upon morphine equivalency, and as partial opioid agonists, are not expected to be associated with overdose risk in the same dose-dependent manner as doses for full agonist opioids  MME = morphine milligram equivalents  LME = Lorazepam milligram equivalents  MG = dose in milligrams     Providers  Total Providers: 3   Name Mainor Arellano Phone   [address] [facility]  [address]   [address], [address]   [facility] [address]    Pharmacies  Total Pharmacies: 2   Name [place] Phone   6 [facility] [phone] [place] [phone]   [address] [facility]  [phone]

## 2021-08-24 DIAGNOSIS — R51.9 NONINTRACTABLE HEADACHE, UNSPECIFIED CHRONICITY PATTERN, UNSPECIFIED HEADACHE TYPE: ICD-10-CM

## 2021-08-24 DIAGNOSIS — Z88.9 MULTIPLE ALLERGIES: ICD-10-CM

## 2021-08-24 DIAGNOSIS — J01.00 ACUTE NON-RECURRENT MAXILLARY SINUSITIS: ICD-10-CM

## 2021-08-24 DIAGNOSIS — J44.1 CHRONIC OBSTRUCTIVE PULMONARY DISEASE WITH ACUTE EXACERBATION (HCC): ICD-10-CM

## 2021-08-24 RX ORDER — MONTELUKAST SODIUM 10 MG/1
TABLET ORAL
Qty: 90 TABLET | Refills: 0 | Status: SHIPPED | OUTPATIENT
Start: 2021-08-24 | End: 2021-11-18

## 2021-08-24 RX ORDER — SUMATRIPTAN 50 MG/1
100 TABLET, FILM COATED ORAL ONCE AS NEEDED
Qty: 9 TABLET | Refills: 0 | Status: SHIPPED | OUTPATIENT
Start: 2021-08-24 | End: 2021-09-20

## 2021-08-24 RX ORDER — ALBUTEROL SULFATE 90 UG/1
AEROSOL, METERED RESPIRATORY (INHALATION)
Qty: 18 G | Refills: 0 | Status: SHIPPED | OUTPATIENT
Start: 2021-08-24 | End: 2021-09-14

## 2021-08-24 RX ORDER — FLUTICASONE PROPIONATE 50 MCG
SPRAY, SUSPENSION (ML) NASAL
Qty: 16 ML | Refills: 0 | Status: SHIPPED | OUTPATIENT
Start: 2021-08-24 | End: 2021-09-20

## 2021-08-24 NOTE — TELEPHONE ENCOUNTER
I sent msg to patient to call and schedule an office visit   I sent refill to provider for approval due to override reasons prior to sending

## 2021-08-24 NOTE — TELEPHONE ENCOUNTER
Pt last seen 3/2/21 refill sent to pharmacy with no additional refills   Will message pt she is due for an office visit

## 2021-09-10 ENCOUNTER — OFFICE VISIT (OUTPATIENT)
Dept: OBGYN CLINIC | Facility: HOSPITAL | Age: 46
End: 2021-09-10
Payer: COMMERCIAL

## 2021-09-10 ENCOUNTER — HOSPITAL ENCOUNTER (OUTPATIENT)
Dept: RADIOLOGY | Facility: HOSPITAL | Age: 46
Discharge: HOME | End: 2021-09-10
Attending: ORTHOPAEDIC SURGERY
Payer: COMMERCIAL

## 2021-09-10 VITALS
SYSTOLIC BLOOD PRESSURE: 123 MMHG | HEART RATE: 62 BPM | BODY MASS INDEX: 33.12 KG/M2 | DIASTOLIC BLOOD PRESSURE: 79 MMHG | WEIGHT: 175.4 LBS | HEIGHT: 61 IN

## 2021-09-10 DIAGNOSIS — M24.542 CONTRACTURE OF FINGER JOINT, LEFT: ICD-10-CM

## 2021-09-10 DIAGNOSIS — M24.542 CONTRACTURE OF FINGER JOINT, LEFT: Primary | ICD-10-CM

## 2021-09-10 PROCEDURE — 99214 OFFICE O/P EST MOD 30 MIN: CPT | Performed by: ORTHOPAEDIC SURGERY

## 2021-09-10 PROCEDURE — 73140 X-RAY EXAM OF FINGER(S): CPT

## 2021-09-10 NOTE — PROGRESS NOTES
ASSESSMENT/PLAN:    Assessment:   Right ring finger PIP jt contracture     Plan:   We did discuss a reconstruction with a pip jt contracture release, pip jt fusion (in 45 degrees flexion) vs an amputation  We will order an MRI of her right ring finger at this time to evaluate the flexor and extensor tendons around the PIP jt  1mm slices  Follow Up: After Testing    To Do Next Visit:          _____________________________________________________  CHIEF COMPLAINT:  Chief Complaint   Patient presents with    Right Ring Finger - Pain         SUBJECTIVE:  Alyssa Preciado is a 51 y o  female who presents with Stiffness/LROM to the right ring finger  This started  6 year(s) ago as Due to a personal injury  Previous mallet finger 6+ years  She did go through a splinting protocol without relief  She did have surgical intervention with Dr Jennifer Marion at [facility] ([facility]) was a cadaver graft with multiple infections  Patient states that her finger is drooping and getting in the way of daily activities  Radiation: Yes to the  ring finger  Associated symptoms: Stiffness/LROM  Handedness: right  Work:     PAST MEDICAL HISTORY:  Past Medical History:   Diagnosis Date    Anxiety     h/o 2 panic attacks     Arthritis     l hip congenital dyspasia    Asthma     good control    Bleb     R eye    Chronic kidney disease     one kidney left side    Chronic pain     back- DJD in 2 upper 2 lower, buldging disc c-7    Chronic pain disorder     lower back and hips    Depression     Endometriosis     Glaucoma     b/l    Bleb in R eye    Glaucoma     H/O carpal tunnel syndrome     L    Headache     migraines    Hypercholesterolemia 1/17/2013    Insomnia     Migraines     Osteoarthritis     cervical spondylarthritis    Polypoid cystitis     POLYP CYSTS ON BUTTOCK AREA    Renal disorder     Reports single kidney    Right knee injury     SCRAPED GROWTH PLATE RIGHT KNEE       PAST SURGICAL HISTORY:  Past Surgical History:   Procedure Laterality Date    BLEPHAROPLASTY Right     BLEB RIGHT EYE    BUNIONECTOMY Bilateral     2 on R, 3 on left    CARPAL TUNNEL RELEASE Left     CT NEEDLE BX ASPIRATION INJECTION LOCALIZATION  3/8/2019    EYE SURGERY Right     bleb implant    EYE SURGERY Bilateral     lazer- glaucoma    FOOT HARDWARE REMOVAL Left 7/2/2018    Procedure: CALCANEAL REMOVAL OF HARDWARE;  Surgeon: Ramirez Collado DPM;  Location: [facility] MAIN OR;  Service: Podiatry    FOOT HARDWARE REMOVAL Left 7/5/2018    Procedure: LEFT CALCANEAL REMOVAL OF HARDWARE;  Surgeon: Ramirez Collado DPM;  Location: [facility] MAIN OR;  Service: Podiatry    FOOT SURGERY Right     BONE REMOVED BOTTOM OF RIGHT FOOT    HAND SURGERY Right     ring finger tendon severed    HARDWARE REMOVAL Left 7/5/2018    Procedure: LEFT BIG TOE REMOVAL OF HARDWARE;  Surgeon: Ramirez Collado DPM;  Location: [facility] MAIN OR;  Service: Podiatry    HIP SURGERY Left     reconstruction as child    HYSTERECTOMY      total abdominal    HYSTERECTOMY      TOTAL    JOINT REPLACEMENT      LTHR and reconstruction    KNEE ARTHROSCOPY      R knee    LEG SURGERY      LISFRANC ARTHRODESIS Left 11/3/2017    Procedure: FUSION FIRST MTPJ: Jez Nguyen; LATERAL ANKLE STABILIZATION;  Surgeon: Ramirez Collado DPM;  [facility];  Service: Podiatry    OOPHORECTOMY      age 35   Nicki Day PILONIDAL CYST EXCISION      midline    AZ DECOMPRESS LEG,ANT/LAT & POST Left 7/5/2018    Procedure: LEFT PERONEAL TENDON DEBRIDMENT ;  Surgeon: Ramirez Collado DPM;  Location: [facility] MAIN OR;  Service: Podiatry    AZ OSTEOTOMY HEEL BONE Left 11/3/2017    Procedure: OSTEOTOMY CALCANEUS;  Surgeon: Ramirez Collado DPM;  [facility];  Service: Podiatry    AZ REPAIR/GRAFT FLEX FOOT TENDON Left 7/2/2018    Procedure: PERONEAL TENDON EXPLORATION;  Surgeon: Ramirez Collado DPM;  Location: [facility] MAIN OR;  Service: Podiatry    REFRACTIVE SURGERY Bilateral     LASER SURGERY BOTH EYES    TOTAL HIP ARTHROPLASTY  2005  WRIST SURGERY Left        FAMILY HISTORY:  Family History   Problem Relation Age of Onset    Cancer Mother     Hypertension Mother     Bone cancer Mother 40        COD    Breast cancer Mother 40    Hypertension Father     Heart disease Father     Dementia Father     Liver cancer Paternal Grandmother 68    Hypertension Paternal Grandfather     Stroke Paternal Grandfather     Lung cancer Sister 40    Brain cancer Sister 52    No Known Problems Maternal Grandmother     No Known Problems Maternal Grandfather     No Known Problems Sister     Breast cancer Maternal Aunt     Breast cancer Maternal Aunt 48       SOCIAL HISTORY:  Social History     Tobacco Use    Smoking status: Current Every Day Smoker     Packs/day: 0 50     Years: 30 00     Pack years: 15 00     Types: Cigarettes    Smokeless tobacco: Never Used   Vaping Use    Vaping Use: Never used   Substance Use Topics    Alcohol use: No    Drug use: No       MEDICATIONS:    Current Outpatient Medications:     albuterol (2 5 mg/3 mL) 0 083 % nebulizer solution, USE 1 VIAL EVERY 6 (SIX) HOURS AS NEEDED FOR WHEEZING OR SHORTNESS OF BREATH, Disp: 150 mL, Rfl: 0    albuterol (PROVENTIL HFA,VENTOLIN HFA) 90 mcg/act inhaler, INHALE 2 PUFFS BY MOUTH EVERY 4 HOURS AS NEEDED FOR WHEEZE, Disp: 18 g, Rfl: 0    Combivent Respimat inhaler, INHALE 1 PUFF BY MOUTH 4 TIMES A DAY, Disp: 4 g, Rfl: 3    conjugated estrogens (PREMARIN) 0 45 mg tablet, Take 1 tablet (0 45 mg total) by mouth daily Take daily for 21 days then do not take for 7 days  , Disp: 60 tablet, Rfl: 0    cyclobenzaprine (FLEXERIL) 10 mg tablet, TAKE ONE TABLET DAILY AS NEEDED, Disp: 30 tablet, Rfl: 3    diclofenac sodium (VOLTAREN) 1 %, diclofenac 1 % topical gel, Disp: , Rfl:     ergocalciferol (VITAMIN D2) 50,000 units, Take 50,000 Units by mouth once a week, Disp: , Rfl: 5    fluticasone (FLONASE) 50 mcg/act nasal spray, SPRAY 2 SPRAYS INTO EACH NOSTRIL EVERY DAY, Disp: 16 mL, Rfl: 0    gabapentin (NEURONTIN) 300 mg capsule, Take 1 capsule (300 mg total) by mouth 3 (three) times a day (Patient not taking: Reported on 3/2/2021), Disp: 90 capsule, Rfl: 1    ibuprofen (MOTRIN) 600 mg tablet, TAKE 1 TABLET (600 MG TOTAL) BY MOUTH EVERY 8 (EIGHT) HOURS AS NEEDED FOR MILD PAIN, Disp: 45 tablet, Rfl: 0    montelukast (SINGULAIR) 10 mg tablet, TAKE 1 TABLET BY MOUTH EVERY DAY IN THE EVENING, Disp: 90 tablet, Rfl: 0    naproxen (EC NAPROSYN) 500 MG EC tablet, one with food twice a day for pain, Disp: , Rfl:     omeprazole (PriLOSEC) 20 mg delayed release capsule, TAKE 1 CAPSULE BY MOUTH EVERY DAY (Patient not taking: Reported on 9/14/2020), Disp: 30 capsule, Rfl: 2    predniSONE 10 mg tablet, Take 5 tablets for 2 days then decrease by 1 tablet every 2 days until you complete the course with 1 tablet for 2 days (Patient not taking: Reported on 1/7/2021), Disp: 30 tablet, Rfl: 0    Premarin 0 45 MG tablet, TAKE 1 TABLET DAILY FOR 21 DAYS, THEN OFF FOR 7 DAYS , Disp: 21 tablet, Rfl: 2    promethazine-codeine (PHENERGAN WITH CODEINE) 6 25-10 mg/5 mL syrup, Take 5 mL by mouth every 4 (four) hours as needed for cough (Patient not taking: Reported on 1/7/2021), Disp: 120 mL, Rfl: 0    SUMAtriptan (IMITREX) 50 mg tablet, TAKE 2 TABLETS (100 MG TOTAL) BY MOUTH ONCE AS NEEDED FOR MIGRAINE, Disp: 9 tablet, Rfl: 0    tiotropium-olodaterol (STIOLTO RESPIMAT) 2 5-2 5 MCG/ACT inhaler, Stiolto Respimat 2 5 mcg-2 5 mcg/actuation solution for inhalation, Disp: , Rfl:     traZODone (DESYREL) 50 mg tablet, TAKE 1 TABLET BY MOUTH EVERYDAY AT BEDTIME, Disp: 30 tablet, Rfl: 2    WIXELA INHUB 500-50 MCG/DOSE inhaler, INHALE 1 PUFF EVERY MORNING (Patient not taking: Reported on 9/14/2020), Disp: 60 each, Rfl: 3    zolpidem (AMBIEN) 10 mg tablet, TAKE 1 TABLET BY MOUTH DAILY AT BEDTIME AS NEEDED FOR SLEEP, Disp: 30 tablet, Rfl: 0    ALLERGIES:  Allergies   Allergen Reactions    Morphine Other (See Comments) Blood pressure drops    Morphine And Related     Penicillins Itching    Penicillins     Quazepam Rash       REVIEW OF SYSTEMS:  Pertinent items are noted in HPI  LABS:  HgA1c: No results found for: HGBA1C  BMP:   Lab Results   Component Value Date    GLUCOSE 93 06/21/2018    CALCIUM 10 5 (H) 12/13/2019     06/21/2018    K 4 7 12/13/2019    CO2 27 12/13/2019     12/13/2019    BUN 17 12/13/2019    CREATININE 0 97 12/13/2019         _____________________________________________________  PHYSICAL EXAMINATION:  Vital signs: Ht 5' 0 75" (1 543 m)   Wt 79 6 kg (175 lb 6 4 oz)   BMI 33 41 kg/m²   General: well developed and well nourished, alert, oriented times 3 and appears comfortable  Psychiatric: Normal  HEENT: Trachea Midline, No torticollis  Cardiovascular: No discernable arrhythmia  Pulmonary: No wheezing or stridor  Abdomen: No rebound or guarding  Extremities: No peripheral edema  Skin: No Erythema  Neurovascular: Sensation Intact to the Median, Ulnar, Radial Nerve, Motor Intact to the Median, Ulnar, Radial Nerve and Pulses Intact    MUSCULOSKELETAL EXAMINATION:  RIGHT SIDE:  ring finger: terminal tendon weakness, 10 degree extension lag at dip jt, hyperextension at pip jt of 10 degrees,  No active pip jt flexion, dip jt flexion 45 degees, MP jt flexion of 90 degrees, No passive motion at pip jt   _____________________________________________________  STUDIES REVIEWED:  Xray of right ring finger was reviewed in PACS by Dr David Scott on 9/10/2021 demonstrates no significant arthritic change or any bony changes consistent with chronic infection         PROCEDURES PERFORMED:  Procedures  No Procedures performed today   Scribe Attestation    I,:  Sarah Ayala am acting as a scribe while in the presence of the attending physician :       I,:  Tiago Martin MD personally performed the services described in this documentation    as scribed in my presence :

## 2021-09-14 DIAGNOSIS — J44.1 CHRONIC OBSTRUCTIVE PULMONARY DISEASE WITH ACUTE EXACERBATION (HCC): ICD-10-CM

## 2021-09-15 RX ORDER — ALBUTEROL SULFATE 90 UG/1
AEROSOL, METERED RESPIRATORY (INHALATION)
Qty: 18 G | Refills: 0 | Status: SHIPPED | OUTPATIENT
Start: 2021-09-15 | End: 2021-10-11

## 2021-09-16 DIAGNOSIS — R52 PAIN: ICD-10-CM

## 2021-09-16 DIAGNOSIS — G47.00 INSOMNIA, UNSPECIFIED TYPE: ICD-10-CM

## 2021-09-16 DIAGNOSIS — F51.01 PRIMARY INSOMNIA: ICD-10-CM

## 2021-09-17 NOTE — TELEPHONE ENCOUNTER
Pt sched for appt next week     PMED:Search   Report Prepared: 2021   Date Range: 2020 - 2021       Download PDF      Download CSV    JACK CUBA     Linked Records  Name  ID Gender Address   JACK CUBA 1975 1 female [place] [address]   JACK CUBA 1975 2 female [address]   Report Criteria  First NameJANET  Last NameSHEATS  DOB1975  Summary      Summary  Total Prescriptions    8    Total Private Pay    0    Total Prescribers    2    Total Pharmacies    2     Opioids* (excluding buprenorphine)  Current Qty    0 0    Current MME/day    0 0    30 Day Avg MME/day    0 0     Buprenorphine*  Current Qty    0 0    Current mg/day    0 0    30 Day Avg mg/day    0 0     Prescriptions    Filled  ID  Written  Drug  QTY  Days  Prescriber  Rx #  Pharmacy *  Refills  Daily Dose  Pymt Type      Filled  ID  Written  Drug  QTY  Days  Prescriber  Rx #  Pharmacy *  Refills  Daily Dose  Pymt Type      2021  1  2021  ZOLPIDEM TARTRATE 10 MG TABLET  30 0  30  WA ABO  [id number]  [facility] ([id number])  [place]    2021  1  2021  ZOLPIDEM TARTRATE 10 MG TABLET  30 0  30  WA ABO  [id number]  [facility] ([id number])  [place]   [place]    2021  1  2021  ZOLPIDEM TARTRATE 10 MG TABLET  30 0  30  WA ABO  [id number]  [facility] ([id number])  0   [place]    2021  1  2021  ZOLPIDEM TARTRATE 10 MG TABLET  30 0  30  WA ABO  [id number]  [facility] ([id number])  0   [place]    01/10/2021  1  01/10/2021  ZOLPIDEM TARTRATE 10 MG TABLET

## 2021-09-18 DIAGNOSIS — J01.00 ACUTE NON-RECURRENT MAXILLARY SINUSITIS: ICD-10-CM

## 2021-09-19 DIAGNOSIS — R51.9 NONINTRACTABLE HEADACHE, UNSPECIFIED CHRONICITY PATTERN, UNSPECIFIED HEADACHE TYPE: ICD-10-CM

## 2021-09-20 RX ORDER — SUMATRIPTAN 50 MG/1
100 TABLET, FILM COATED ORAL ONCE AS NEEDED
Qty: 9 TABLET | Refills: 0 | Status: SHIPPED | OUTPATIENT
Start: 2021-09-20 | End: 2021-10-15

## 2021-09-20 RX ORDER — FLUTICASONE PROPIONATE 50 MCG
SPRAY, SUSPENSION (ML) NASAL
Qty: 16 ML | Refills: 0 | Status: SHIPPED | OUTPATIENT
Start: 2021-09-20 | End: 2021-10-15

## 2021-09-21 RX ORDER — TRAZODONE HYDROCHLORIDE 50 MG/1
TABLET ORAL
Qty: 30 TABLET | Refills: 0 | Status: SHIPPED | OUTPATIENT
Start: 2021-09-21 | End: 2021-10-15

## 2021-09-21 RX ORDER — ZOLPIDEM TARTRATE 10 MG/1
TABLET ORAL
Qty: 30 TABLET | Refills: 0 | Status: SHIPPED | OUTPATIENT
Start: 2021-09-21 | End: 2021-10-30

## 2021-09-21 RX ORDER — IBUPROFEN 600 MG/1
600 TABLET ORAL EVERY 8 HOURS PRN
Qty: 45 TABLET | Refills: 0 | Status: SHIPPED | OUTPATIENT
Start: 2021-09-21 | End: 2021-12-10 | Stop reason: SDUPTHER

## 2021-09-23 ENCOUNTER — OFFICE VISIT (OUTPATIENT)
Dept: FAMILY MEDICINE CLINIC | Facility: CLINIC | Age: 46
End: 2021-09-23
Payer: COMMERCIAL

## 2021-09-23 VITALS
DIASTOLIC BLOOD PRESSURE: 80 MMHG | HEIGHT: 61 IN | OXYGEN SATURATION: 97 % | RESPIRATION RATE: 16 BRPM | SYSTOLIC BLOOD PRESSURE: 136 MMHG | HEART RATE: 79 BPM | TEMPERATURE: 98.3 F | WEIGHT: 177.2 LBS | BODY MASS INDEX: 33.46 KG/M2

## 2021-09-23 DIAGNOSIS — J45.998 ASTHMA, PERSISTENT CONTROLLED: ICD-10-CM

## 2021-09-23 DIAGNOSIS — E78.5 HYPERLIPIDEMIA, UNSPECIFIED HYPERLIPIDEMIA TYPE: Primary | ICD-10-CM

## 2021-09-23 DIAGNOSIS — G43.919 INTRACTABLE MIGRAINE WITHOUT STATUS MIGRAINOSUS, UNSPECIFIED MIGRAINE TYPE: ICD-10-CM

## 2021-09-23 DIAGNOSIS — F51.01 PRIMARY INSOMNIA: ICD-10-CM

## 2021-09-23 PROCEDURE — 99214 OFFICE O/P EST MOD 30 MIN: CPT | Performed by: FAMILY MEDICINE

## 2021-09-23 NOTE — PROGRESS NOTES
Assessment/Plan:    Primary insomnia  Well-controlled on Trazodone 50 mg nightly  Will continue to monitor and follow-up in 6 months  Asthma, persistent controlled  Well-controlled  Will continue to monitor and follow-up in 6 months  Migraines  Well-controlled on sumatriptan as needed for migraines  Will continue to monitor and follow-up in 6 months  Hyperlipidemia  Ordered repeat fasting lipid panel to be complete before her next visit  Will continue to monitor and follow-up in 6 months  Will add medication if needed to control cholesterol  Problem List Items Addressed This Visit        Respiratory    Asthma, persistent controlled     Well-controlled  Will continue to monitor and follow-up in 6 months  Cardiovascular and Mediastinum    Migraines     Well-controlled on sumatriptan as needed for migraines  Will continue to monitor and follow-up in 6 months  Other    Primary insomnia     Well-controlled on Trazodone 50 mg nightly  Will continue to monitor and follow-up in 6 months  Hyperlipidemia - Primary     Ordered repeat fasting lipid panel to be complete before her next visit  Will continue to monitor and follow-up in 6 months  Will add medication if needed to control cholesterol  Other Visit Diagnoses     BMI 33 0-33 9,adult                Subjective:      Patient ID: Adele Markham is a 51 y o  female  Helga Reid is a 51year old female who is presenting to the office today for routine 6 month follow-up  Patient has not had her blood work complete yet, but plans to go next week  She does not want to have the influenza vaccine today but will return in October to have it complete  Today in the office she has no complaints         The following portions of the patient's history were reviewed and updated as appropriate: allergies, current medications, past family history, past medical history, past social history, past surgical history and problem list     Review of Systems   Constitutional: Negative  Negative for chills and fever  HENT: Negative for trouble swallowing  Eyes: Negative for visual disturbance  Respiratory: Negative  Negative for cough and shortness of breath  Cardiovascular: Negative  Negative for chest pain, palpitations and leg swelling  Gastrointestinal: Negative  Negative for abdominal pain, constipation and diarrhea  Endocrine: Negative for cold intolerance and heat intolerance  Genitourinary: Negative for difficulty urinating and dysuria  Musculoskeletal: Negative for gait problem  Skin: Negative for rash  Neurological: Negative for dizziness, tremors, seizures and headaches  Hematological: Negative for adenopathy  Psychiatric/Behavioral: Negative for behavioral problems  Objective:      /80 (BP Location: Left arm, Patient Position: Sitting, Cuff Size: Large)   Pulse 79   Temp 98 3 °F (36 8 °C) (Tympanic)   Resp 16   Ht 5' 0 75" (1 543 m)   Wt 80 4 kg (177 lb 3 2 oz)   SpO2 97%   BMI 33 76 kg/m²          Physical Exam  Vitals and nursing note reviewed  Constitutional:       Appearance: Normal appearance  She is well-developed  HENT:      Head: Normocephalic and atraumatic  Right Ear: Tympanic membrane, ear canal and external ear normal       Left Ear: Tympanic membrane, ear canal and external ear normal    Eyes:      Pupils: Pupils are equal, round, and reactive to light  Cardiovascular:      Rate and Rhythm: Normal rate and regular rhythm  Pulses: Normal pulses  Heart sounds: Normal heart sounds  Pulmonary:      Effort: Pulmonary effort is normal       Breath sounds: Normal breath sounds  Abdominal:      General: Bowel sounds are normal       Palpations: Abdomen is soft  Musculoskeletal:         General: Normal range of motion  Cervical back: Normal range of motion and neck supple  Right lower leg: No edema  Left lower leg: No edema  Lymphadenopathy:      Cervical: No cervical adenopathy  Skin:     General: Skin is warm  Neurological:      Mental Status: She is alert and oriented to person, place, and time  Cranial Nerves: No cranial nerve deficit  BMI Counseling: Body mass index is 33 76 kg/m²  The BMI is above normal  Nutrition recommendations include reducing portion sizes, decreasing overall calorie intake and 3-5 servings of fruits/vegetables daily  Exercise recommendations include moderate aerobic physical activity for 150 minutes/week

## 2021-09-23 NOTE — ASSESSMENT & PLAN NOTE
Well-controlled on sumatriptan as needed for migraines  Will continue to monitor and follow-up in 6 months

## 2021-09-23 NOTE — ASSESSMENT & PLAN NOTE
Ordered repeat fasting lipid panel to be complete before her next visit  Will continue to monitor and follow-up in 6 months  Will add medication if needed to control cholesterol

## 2021-09-30 ENCOUNTER — HOSPITAL ENCOUNTER (OUTPATIENT)
Dept: RADIOLOGY | Facility: HOSPITAL | Age: 46
Discharge: HOME | End: 2021-09-30
Attending: ORTHOPAEDIC SURGERY
Payer: COMMERCIAL

## 2021-09-30 DIAGNOSIS — M24.542 CONTRACTURE OF FINGER JOINT, LEFT: ICD-10-CM

## 2021-09-30 PROCEDURE — G1004 CDSM NDSC: HCPCS

## 2021-09-30 PROCEDURE — 73218 MRI UPPER EXTREMITY W/O DYE: CPT

## 2021-10-10 DIAGNOSIS — J44.1 CHRONIC OBSTRUCTIVE PULMONARY DISEASE WITH ACUTE EXACERBATION (HCC): ICD-10-CM

## 2021-10-11 RX ORDER — ALBUTEROL SULFATE 90 UG/1
AEROSOL, METERED RESPIRATORY (INHALATION)
Qty: 18 G | Refills: 0 | Status: SHIPPED | OUTPATIENT
Start: 2021-10-11 | End: 2021-11-01

## 2021-10-14 DIAGNOSIS — F51.01 PRIMARY INSOMNIA: ICD-10-CM

## 2021-10-15 DIAGNOSIS — R51.9 NONINTRACTABLE HEADACHE, UNSPECIFIED CHRONICITY PATTERN, UNSPECIFIED HEADACHE TYPE: ICD-10-CM

## 2021-10-15 DIAGNOSIS — J01.00 ACUTE NON-RECURRENT MAXILLARY SINUSITIS: ICD-10-CM

## 2021-10-15 RX ORDER — SUMATRIPTAN 50 MG/1
100 TABLET, FILM COATED ORAL ONCE AS NEEDED
Qty: 9 TABLET | Refills: 0 | Status: SHIPPED | OUTPATIENT
Start: 2021-10-15 | End: 2021-11-10

## 2021-10-15 RX ORDER — FLUTICASONE PROPIONATE 50 MCG
SPRAY, SUSPENSION (ML) NASAL
Qty: 16 ML | Refills: 0 | Status: SHIPPED | OUTPATIENT
Start: 2021-10-15 | End: 2021-11-10

## 2021-10-15 RX ORDER — TRAZODONE HYDROCHLORIDE 50 MG/1
TABLET ORAL
Qty: 30 TABLET | Refills: 0 | Status: SHIPPED | OUTPATIENT
Start: 2021-10-15 | End: 2021-11-08 | Stop reason: HOSPADM

## 2021-10-29 DIAGNOSIS — J45.909 ASTHMA, UNSPECIFIED ASTHMA SEVERITY, UNSPECIFIED WHETHER COMPLICATED, UNSPECIFIED WHETHER PERSISTENT: ICD-10-CM

## 2021-10-29 DIAGNOSIS — G47.00 INSOMNIA, UNSPECIFIED TYPE: ICD-10-CM

## 2021-10-29 RX ORDER — ALBUTEROL SULFATE 2.5 MG/3ML
SOLUTION RESPIRATORY (INHALATION)
Qty: 150 ML | Refills: 0 | Status: SHIPPED | OUTPATIENT
Start: 2021-10-29 | End: 2021-11-30

## 2021-10-30 RX ORDER — ZOLPIDEM TARTRATE 10 MG/1
TABLET ORAL
Qty: 30 TABLET | Refills: 0 | Status: SHIPPED | OUTPATIENT
Start: 2021-10-30 | End: 2021-12-10 | Stop reason: SDUPTHER

## 2021-11-01 DIAGNOSIS — J44.1 CHRONIC OBSTRUCTIVE PULMONARY DISEASE WITH ACUTE EXACERBATION (HCC): ICD-10-CM

## 2021-11-01 RX ORDER — ALBUTEROL SULFATE 90 UG/1
AEROSOL, METERED RESPIRATORY (INHALATION)
Qty: 18 G | Refills: 1 | Status: SHIPPED | OUTPATIENT
Start: 2021-11-01 | End: 2021-12-21 | Stop reason: SDUPTHER

## 2021-11-02 DIAGNOSIS — J45.21 INTERMITTENT ASTHMA WITH ACUTE EXACERBATION, UNSPECIFIED ASTHMA SEVERITY: ICD-10-CM

## 2021-11-02 RX ORDER — IPRATROPIUM/ALBUTEROL SULFATE 20-100 MCG
MIST INHALER (GRAM) INHALATION
Qty: 4 G | Refills: 3 | Status: SHIPPED | OUTPATIENT
Start: 2021-11-02 | End: 2021-12-21 | Stop reason: SDUPTHER

## 2021-11-08 ENCOUNTER — PREP FOR PROCEDURE (OUTPATIENT)
Dept: OBGYN CLINIC | Facility: CLINIC | Age: 46
End: 2021-11-08

## 2021-11-08 ENCOUNTER — OFFICE VISIT (OUTPATIENT)
Dept: OBGYN CLINIC | Facility: CLINIC | Age: 46
End: 2021-11-08
Payer: COMMERCIAL

## 2021-11-08 VITALS
DIASTOLIC BLOOD PRESSURE: 83 MMHG | SYSTOLIC BLOOD PRESSURE: 123 MMHG | WEIGHT: 180.2 LBS | HEIGHT: 60 IN | BODY MASS INDEX: 35.38 KG/M2

## 2021-11-08 DIAGNOSIS — M24.541 CONTRACTURE OF FINGER JOINT, RIGHT: Primary | ICD-10-CM

## 2021-11-08 PROCEDURE — 99214 OFFICE O/P EST MOD 30 MIN: CPT | Performed by: ORTHOPAEDIC SURGERY

## 2021-11-10 DIAGNOSIS — J01.00 ACUTE NON-RECURRENT MAXILLARY SINUSITIS: ICD-10-CM

## 2021-11-10 DIAGNOSIS — R51.9 NONINTRACTABLE HEADACHE, UNSPECIFIED CHRONICITY PATTERN, UNSPECIFIED HEADACHE TYPE: ICD-10-CM

## 2021-11-10 RX ORDER — FLUTICASONE PROPIONATE 50 MCG
SPRAY, SUSPENSION (ML) NASAL
Qty: 16 ML | Refills: 0 | Status: SHIPPED | OUTPATIENT
Start: 2021-11-10 | End: 2022-01-07

## 2021-11-10 RX ORDER — SUMATRIPTAN 50 MG/1
100 TABLET, FILM COATED ORAL ONCE AS NEEDED
Qty: 9 TABLET | Refills: 0 | Status: SHIPPED | OUTPATIENT
Start: 2021-11-10 | End: 2021-12-10 | Stop reason: SDUPTHER

## 2021-11-15 ENCOUNTER — OFFICE VISIT (OUTPATIENT)
Dept: FAMILY MEDICINE CLINIC | Facility: CLINIC | Age: 46
End: 2021-11-15
Payer: COMMERCIAL

## 2021-11-15 ENCOUNTER — HOSPITAL ENCOUNTER (OUTPATIENT)
Dept: RADIOLOGY | Facility: IMAGING CENTER | Age: 46
Discharge: HOME | End: 2021-11-15
Payer: COMMERCIAL

## 2021-11-15 VITALS
BODY MASS INDEX: 34.95 KG/M2 | HEIGHT: 60 IN | SYSTOLIC BLOOD PRESSURE: 134 MMHG | RESPIRATION RATE: 16 BRPM | HEART RATE: 88 BPM | OXYGEN SATURATION: 95 % | DIASTOLIC BLOOD PRESSURE: 82 MMHG | WEIGHT: 178 LBS | TEMPERATURE: 97.3 F

## 2021-11-15 DIAGNOSIS — R10.84 GENERALIZED ABDOMINAL PAIN: Primary | ICD-10-CM

## 2021-11-15 DIAGNOSIS — R10.84 GENERALIZED ABDOMINAL PAIN: ICD-10-CM

## 2021-11-15 DIAGNOSIS — R14.0 BLOATING: ICD-10-CM

## 2021-11-15 DIAGNOSIS — E28.39 ESTROGEN DEFICIENCY: ICD-10-CM

## 2021-11-15 PROCEDURE — 99214 OFFICE O/P EST MOD 30 MIN: CPT | Performed by: FAMILY MEDICINE

## 2021-11-15 PROCEDURE — 74018 RADEX ABDOMEN 1 VIEW: CPT

## 2021-11-15 RX ORDER — PANTOPRAZOLE SODIUM 40 MG/1
40 TABLET, DELAYED RELEASE ORAL
Qty: 30 TABLET | Refills: 1 | Status: SHIPPED | OUTPATIENT
Start: 2021-11-15 | End: 2022-01-06

## 2021-11-18 DIAGNOSIS — Z88.9 MULTIPLE ALLERGIES: ICD-10-CM

## 2021-11-18 RX ORDER — MONTELUKAST SODIUM 10 MG/1
TABLET ORAL
Qty: 90 TABLET | Refills: 1 | Status: SHIPPED | OUTPATIENT
Start: 2021-11-18 | End: 2022-05-23

## 2021-11-29 DIAGNOSIS — J45.909 ASTHMA, UNSPECIFIED ASTHMA SEVERITY, UNSPECIFIED WHETHER COMPLICATED, UNSPECIFIED WHETHER PERSISTENT: ICD-10-CM

## 2021-11-30 RX ORDER — ALBUTEROL SULFATE 2.5 MG/3ML
SOLUTION RESPIRATORY (INHALATION)
Qty: 150 ML | Refills: 0 | Status: SHIPPED | OUTPATIENT
Start: 2021-11-30 | End: 2021-12-21 | Stop reason: SDUPTHER

## 2021-12-10 DIAGNOSIS — R52 PAIN AGGRAVATED BY WALKING: ICD-10-CM

## 2021-12-10 DIAGNOSIS — G47.00 INSOMNIA, UNSPECIFIED TYPE: ICD-10-CM

## 2021-12-10 DIAGNOSIS — J45.21 INTERMITTENT ASTHMA WITH ACUTE EXACERBATION, UNSPECIFIED ASTHMA SEVERITY: ICD-10-CM

## 2021-12-10 DIAGNOSIS — R52 PAIN: ICD-10-CM

## 2021-12-10 DIAGNOSIS — J44.1 COPD EXACERBATION (HCC): ICD-10-CM

## 2021-12-10 DIAGNOSIS — R51.9 NONINTRACTABLE HEADACHE, UNSPECIFIED CHRONICITY PATTERN, UNSPECIFIED HEADACHE TYPE: ICD-10-CM

## 2021-12-10 RX ORDER — SUMATRIPTAN 50 MG/1
100 TABLET, FILM COATED ORAL ONCE AS NEEDED
Qty: 9 TABLET | Refills: 0 | Status: SHIPPED | OUTPATIENT
Start: 2021-12-10 | End: 2022-01-06

## 2021-12-10 RX ORDER — IBUPROFEN 600 MG/1
600 TABLET ORAL EVERY 8 HOURS PRN
Qty: 45 TABLET | Refills: 0 | Status: SHIPPED | OUTPATIENT
Start: 2021-12-10 | End: 2022-01-11

## 2021-12-10 RX ORDER — CYCLOBENZAPRINE HCL 10 MG
TABLET ORAL
Qty: 30 TABLET | Refills: 0 | Status: SHIPPED | OUTPATIENT
Start: 2021-12-10 | End: 2022-01-07

## 2021-12-13 RX ORDER — ZOLPIDEM TARTRATE 10 MG/1
10 TABLET ORAL
Qty: 30 TABLET | Refills: 0 | Status: SHIPPED | OUTPATIENT
Start: 2021-12-13 | End: 2022-01-13

## 2021-12-13 RX ORDER — PREDNISONE 10 MG/1
TABLET ORAL
Qty: 30 TABLET | Refills: 0 | Status: SHIPPED | OUTPATIENT
Start: 2021-12-13

## 2021-12-13 RX ORDER — PROMETHAZINE HYDROCHLORIDE AND CODEINE PHOSPHATE 6.25; 1 MG/5ML; MG/5ML
5 SYRUP ORAL EVERY 4 HOURS PRN
Qty: 120 ML | Refills: 0 | Status: SHIPPED | OUTPATIENT
Start: 2021-12-13 | End: 2021-12-21 | Stop reason: SDUPTHER

## 2021-12-15 ENCOUNTER — TELEPHONE (OUTPATIENT)
Dept: FAMILY MEDICINE CLINIC | Facility: CLINIC | Age: 46
End: 2021-12-15

## 2021-12-20 ENCOUNTER — APPOINTMENT (OUTPATIENT)
Dept: RADIOLOGY | Facility: CLINIC | Age: 46
End: 2021-12-20
Payer: COMMERCIAL

## 2021-12-20 ENCOUNTER — TELEPHONE (OUTPATIENT)
Dept: FAMILY MEDICINE CLINIC | Facility: CLINIC | Age: 46
End: 2021-12-20

## 2021-12-20 DIAGNOSIS — J44.1 COPD EXACERBATION (HCC): Primary | ICD-10-CM

## 2021-12-20 DIAGNOSIS — R05.9 COUGH: ICD-10-CM

## 2021-12-20 DIAGNOSIS — J44.1 COPD EXACERBATION (HCC): ICD-10-CM

## 2021-12-20 PROCEDURE — 71046 X-RAY EXAM CHEST 2 VIEWS: CPT

## 2021-12-20 PROCEDURE — 0241U HB NFCT DS VIR RESP RNA 4 TRGT: CPT

## 2021-12-21 DIAGNOSIS — J45.21 INTERMITTENT ASTHMA WITH ACUTE EXACERBATION, UNSPECIFIED ASTHMA SEVERITY: ICD-10-CM

## 2021-12-21 DIAGNOSIS — J44.1 COPD EXACERBATION (HCC): ICD-10-CM

## 2021-12-21 DIAGNOSIS — J44.1 CHRONIC OBSTRUCTIVE PULMONARY DISEASE WITH ACUTE EXACERBATION (HCC): ICD-10-CM

## 2021-12-21 DIAGNOSIS — J45.909 ASTHMA, UNSPECIFIED ASTHMA SEVERITY, UNSPECIFIED WHETHER COMPLICATED, UNSPECIFIED WHETHER PERSISTENT: ICD-10-CM

## 2021-12-21 RX ORDER — IPRATROPIUM/ALBUTEROL SULFATE 20-100 MCG
1 MIST INHALER (GRAM) INHALATION 4 TIMES DAILY
Qty: 4 G | Refills: 1 | Status: SHIPPED | OUTPATIENT
Start: 2021-12-21 | End: 2022-02-17 | Stop reason: SDUPTHER

## 2021-12-22 RX ORDER — ALBUTEROL SULFATE 2.5 MG/3ML
2.5 SOLUTION RESPIRATORY (INHALATION) EVERY 6 HOURS PRN
Qty: 150 ML | Refills: 0 | Status: SHIPPED | OUTPATIENT
Start: 2021-12-22 | End: 2022-01-06 | Stop reason: SDUPTHER

## 2021-12-22 RX ORDER — PROMETHAZINE HYDROCHLORIDE AND CODEINE PHOSPHATE 6.25; 1 MG/5ML; MG/5ML
5 SYRUP ORAL EVERY 4 HOURS PRN
Qty: 120 ML | Refills: 0 | Status: SHIPPED | OUTPATIENT
Start: 2021-12-22 | End: 2022-04-21 | Stop reason: SDUPTHER

## 2021-12-22 RX ORDER — ALBUTEROL SULFATE 90 UG/1
2 AEROSOL, METERED RESPIRATORY (INHALATION) EVERY 6 HOURS PRN
Qty: 18 G | Refills: 0 | Status: SHIPPED | OUTPATIENT
Start: 2021-12-22 | End: 2022-01-06 | Stop reason: SDUPTHER

## 2021-12-23 ENCOUNTER — TELEPHONE (OUTPATIENT)
Dept: FAMILY MEDICINE CLINIC | Facility: CLINIC | Age: 46
End: 2021-12-23

## 2021-12-23 DIAGNOSIS — R05.9 COUGH: Primary | ICD-10-CM

## 2021-12-23 LAB
FLUAV RNA RESP QL NAA+PROBE: NEGATIVE
FLUBV RNA RESP QL NAA+PROBE: NEGATIVE
RSV RNA RESP QL NAA+PROBE: NEGATIVE
SARS-COV-2 RNA RESP QL NAA+PROBE: NEGATIVE

## 2021-12-23 RX ORDER — AZITHROMYCIN 250 MG/1
TABLET, FILM COATED ORAL
Qty: 6 TABLET | Refills: 0 | Status: SHIPPED | OUTPATIENT
Start: 2021-12-23 | End: 2021-12-27

## 2021-12-27 ENCOUNTER — OFFICE VISIT (OUTPATIENT)
Dept: FAMILY MEDICINE CLINIC | Facility: CLINIC | Age: 46
End: 2021-12-27
Payer: COMMERCIAL

## 2021-12-27 ENCOUNTER — TELEPHONE (OUTPATIENT)
Dept: FAMILY MEDICINE CLINIC | Facility: CLINIC | Age: 46
End: 2021-12-27

## 2021-12-27 ENCOUNTER — APPOINTMENT (OUTPATIENT)
Dept: LAB | Facility: IMAGING CENTER | Age: 46
End: 2021-12-27
Payer: COMMERCIAL

## 2021-12-27 ENCOUNTER — HOSPITAL ENCOUNTER (OUTPATIENT)
Dept: RADIOLOGY | Facility: IMAGING CENTER | Age: 46
Discharge: HOME | End: 2021-12-27
Payer: COMMERCIAL

## 2021-12-27 VITALS
TEMPERATURE: 98.1 F | SYSTOLIC BLOOD PRESSURE: 120 MMHG | HEART RATE: 74 BPM | RESPIRATION RATE: 16 BRPM | BODY MASS INDEX: 34.95 KG/M2 | WEIGHT: 178 LBS | HEIGHT: 60 IN | OXYGEN SATURATION: 97 % | DIASTOLIC BLOOD PRESSURE: 80 MMHG

## 2021-12-27 DIAGNOSIS — E78.49 OTHER HYPERLIPIDEMIA: ICD-10-CM

## 2021-12-27 DIAGNOSIS — J45.909 ASTHMA, UNSPECIFIED ASTHMA SEVERITY, UNSPECIFIED WHETHER COMPLICATED, UNSPECIFIED WHETHER PERSISTENT: ICD-10-CM

## 2021-12-27 DIAGNOSIS — R05.9 COUGH: Primary | ICD-10-CM

## 2021-12-27 DIAGNOSIS — R53.82 CHRONIC FATIGUE: ICD-10-CM

## 2021-12-27 DIAGNOSIS — R07.81 RIB PAIN ON LEFT SIDE: ICD-10-CM

## 2021-12-27 DIAGNOSIS — J44.1 COPD EXACERBATION (HCC): ICD-10-CM

## 2021-12-27 DIAGNOSIS — R05.9 COUGH: ICD-10-CM

## 2021-12-27 LAB
ALBUMIN SERPL BCP-MCNC: 3.8 G/DL (ref 3.5–5)
ALP SERPL-CCNC: 104 U/L (ref 46–116)
ALT SERPL W P-5'-P-CCNC: 30 U/L (ref 12–78)
ANION GAP SERPL CALCULATED.3IONS-SCNC: 5 MMOL/L (ref 4–13)
AST SERPL W P-5'-P-CCNC: 15 U/L (ref 5–45)
BASOPHILS # BLD AUTO: 0.03 THOUSANDS/ΜL (ref 0–0.1)
BASOPHILS NFR BLD AUTO: 0 % (ref 0–1)
BILIRUB SERPL-MCNC: 0.44 MG/DL (ref 0.2–1)
BUN SERPL-MCNC: 18 MG/DL (ref 5–25)
CALCIUM SERPL-MCNC: 10.4 MG/DL (ref 8.3–10.1)
CHLORIDE SERPL-SCNC: 101 MMOL/L (ref 100–108)
CHOLEST SERPL-MCNC: 268 MG/DL
CO2 SERPL-SCNC: 31 MMOL/L (ref 21–32)
CREAT SERPL-MCNC: 0.97 MG/DL (ref 0.6–1.3)
EOSINOPHIL # BLD AUTO: 0.25 THOUSAND/ΜL (ref 0–0.61)
EOSINOPHIL NFR BLD AUTO: 3 % (ref 0–6)
ERYTHROCYTE [DISTWIDTH] IN BLOOD BY AUTOMATED COUNT: 13.4 % (ref 11.6–15.1)
GFR SERPL CREATININE-BSD FRML MDRD: 69 ML/MIN/1.73SQ M
GLUCOSE SERPL-MCNC: 86 MG/DL (ref 65–140)
HCT VFR BLD AUTO: 41.5 % (ref 34.8–46.1)
HDLC SERPL-MCNC: 60 MG/DL
HGB BLD-MCNC: 13.6 G/DL (ref 11.5–15.4)
IMM GRANULOCYTES # BLD AUTO: 0.05 THOUSAND/UL (ref 0–0.2)
IMM GRANULOCYTES NFR BLD AUTO: 1 % (ref 0–2)
LDLC SERPL CALC-MCNC: 170 MG/DL (ref 0–100)
LYMPHOCYTES # BLD AUTO: 2.77 THOUSANDS/ΜL (ref 0.6–4.47)
LYMPHOCYTES NFR BLD AUTO: 30 % (ref 14–44)
MCH RBC QN AUTO: 30.4 PG (ref 26.8–34.3)
MCHC RBC AUTO-ENTMCNC: 32.8 G/DL (ref 31.4–37.4)
MCV RBC AUTO: 93 FL (ref 82–98)
MONOCYTES # BLD AUTO: 0.6 THOUSAND/ΜL (ref 0.17–1.22)
MONOCYTES NFR BLD AUTO: 6 % (ref 4–12)
NEUTROPHILS # BLD AUTO: 5.7 THOUSANDS/ΜL (ref 1.85–7.62)
NEUTS SEG NFR BLD AUTO: 60 % (ref 43–75)
NRBC BLD AUTO-RTO: 0 /100 WBCS
PLATELET # BLD AUTO: 308 THOUSANDS/UL (ref 149–390)
PMV BLD AUTO: 10.3 FL (ref 8.9–12.7)
POTASSIUM SERPL-SCNC: 4.4 MMOL/L (ref 3.5–5.3)
PROT SERPL-MCNC: 7.7 G/DL (ref 6.4–8.2)
RBC # BLD AUTO: 4.47 MILLION/UL (ref 3.81–5.12)
SODIUM SERPL-SCNC: 137 MMOL/L (ref 136–145)
TRIGL SERPL-MCNC: 189 MG/DL
TSH SERPL DL<=0.05 MIU/L-ACNC: 2.08 UIU/ML (ref 0.36–3.74)
WBC # BLD AUTO: 9.4 THOUSAND/UL (ref 4.31–10.16)

## 2021-12-27 PROCEDURE — 71101 X-RAY EXAM UNILAT RIBS/CHEST: CPT

## 2021-12-27 PROCEDURE — 99214 OFFICE O/P EST MOD 30 MIN: CPT | Performed by: FAMILY MEDICINE

## 2021-12-27 PROCEDURE — 80053 COMPREHEN METABOLIC PANEL: CPT

## 2021-12-27 PROCEDURE — 36415 COLL VENOUS BLD VENIPUNCTURE: CPT

## 2021-12-27 PROCEDURE — 84443 ASSAY THYROID STIM HORMONE: CPT

## 2021-12-27 PROCEDURE — 85025 COMPLETE CBC W/AUTO DIFF WBC: CPT

## 2021-12-27 PROCEDURE — 80061 LIPID PANEL: CPT

## 2021-12-27 RX ORDER — BENZONATATE 200 MG/1
200 CAPSULE ORAL 3 TIMES DAILY PRN
Qty: 30 CAPSULE | Refills: 0 | Status: SHIPPED | OUTPATIENT
Start: 2021-12-27 | End: 2022-01-06 | Stop reason: SDUPTHER

## 2021-12-30 ENCOUNTER — TELEPHONE (OUTPATIENT)
Dept: FAMILY MEDICINE CLINIC | Facility: CLINIC | Age: 46
End: 2021-12-30

## 2022-01-03 ENCOUNTER — TELEPHONE (OUTPATIENT)
Dept: FAMILY MEDICINE CLINIC | Facility: CLINIC | Age: 47
End: 2022-01-03

## 2022-01-03 NOTE — TELEPHONE ENCOUNTER
----- Message from Zane Garner sent at 1/2/2022  5:47 PM EST -----  Regarding: Er  Please review er visit from 12/31  Pain is same and intense  Mri maybe to see how much damage is down  Need pain management

## 2022-01-06 ENCOUNTER — TELEPHONE (OUTPATIENT)
Dept: FAMILY MEDICINE CLINIC | Facility: CLINIC | Age: 47
End: 2022-01-06

## 2022-01-06 DIAGNOSIS — J44.1 CHRONIC OBSTRUCTIVE PULMONARY DISEASE WITH ACUTE EXACERBATION (HCC): ICD-10-CM

## 2022-01-06 DIAGNOSIS — J45.909 ASTHMA, UNSPECIFIED ASTHMA SEVERITY, UNSPECIFIED WHETHER COMPLICATED, UNSPECIFIED WHETHER PERSISTENT: ICD-10-CM

## 2022-01-06 DIAGNOSIS — R51.9 NONINTRACTABLE HEADACHE, UNSPECIFIED CHRONICITY PATTERN, UNSPECIFIED HEADACHE TYPE: ICD-10-CM

## 2022-01-06 DIAGNOSIS — R10.84 GENERALIZED ABDOMINAL PAIN: ICD-10-CM

## 2022-01-06 DIAGNOSIS — R14.0 BLOATING: ICD-10-CM

## 2022-01-06 DIAGNOSIS — R05.9 COUGH: ICD-10-CM

## 2022-01-06 DIAGNOSIS — J45.21 INTERMITTENT ASTHMA WITH ACUTE EXACERBATION, UNSPECIFIED ASTHMA SEVERITY: ICD-10-CM

## 2022-01-06 RX ORDER — SUMATRIPTAN 50 MG/1
100 TABLET, FILM COATED ORAL ONCE AS NEEDED
Qty: 9 TABLET | Refills: 0 | Status: SHIPPED | OUTPATIENT
Start: 2022-01-06 | End: 2022-02-28

## 2022-01-06 RX ORDER — BENZONATATE 200 MG/1
200 CAPSULE ORAL 3 TIMES DAILY PRN
Qty: 30 CAPSULE | Refills: 0 | Status: SHIPPED | OUTPATIENT
Start: 2022-01-06 | End: 2022-01-06 | Stop reason: SDUPTHER

## 2022-01-06 RX ORDER — PANTOPRAZOLE SODIUM 40 MG/1
TABLET, DELAYED RELEASE ORAL
Qty: 30 TABLET | Refills: 1 | Status: SHIPPED | OUTPATIENT
Start: 2022-01-06 | End: 2022-03-02

## 2022-01-06 NOTE — TELEPHONE ENCOUNTER
----- Message from Tye Garner sent at 1/5/2022 11:54 AM EST -----  Regarding: Er  So per the er papers I'm to do a making follow up  Am I an appt with Dr GLENN sahu who   I really need some direction here with the ribs

## 2022-01-06 NOTE — TELEPHONE ENCOUNTER
Pt aware of medical advise and agreed  She feels the pain is consistent because of her cough   She would like a refill on her tessalon pearls for cough

## 2022-01-06 NOTE — TELEPHONE ENCOUNTER
Pain management do not manage rib pain  She can take ibuprofen 600-800 mg 3 times a day on full stomach for the next 7-10 days  If that does not help call back and I will consider sending prescription for Toradol

## 2022-01-07 DIAGNOSIS — R52 PAIN AGGRAVATED BY WALKING: ICD-10-CM

## 2022-01-07 DIAGNOSIS — J01.00 ACUTE NON-RECURRENT MAXILLARY SINUSITIS: ICD-10-CM

## 2022-01-07 RX ORDER — ALBUTEROL SULFATE 2.5 MG/3ML
2.5 SOLUTION RESPIRATORY (INHALATION) EVERY 6 HOURS PRN
Qty: 150 ML | Refills: 0 | Status: SHIPPED | OUTPATIENT
Start: 2022-01-07 | End: 2022-02-28

## 2022-01-07 RX ORDER — CYCLOBENZAPRINE HCL 10 MG
TABLET ORAL
Qty: 30 TABLET | Refills: 0 | Status: SHIPPED | OUTPATIENT
Start: 2022-01-07 | End: 2022-02-28

## 2022-01-07 RX ORDER — FLUTICASONE PROPIONATE 50 MCG
SPRAY, SUSPENSION (ML) NASAL
Qty: 16 ML | Refills: 0 | Status: SHIPPED | OUTPATIENT
Start: 2022-01-07 | End: 2022-04-01

## 2022-01-07 RX ORDER — ALBUTEROL SULFATE 90 UG/1
2 AEROSOL, METERED RESPIRATORY (INHALATION) EVERY 6 HOURS PRN
Qty: 18 G | Refills: 0 | Status: SHIPPED | OUTPATIENT
Start: 2022-01-07 | End: 2022-01-25

## 2022-01-07 RX ORDER — BENZONATATE 200 MG/1
200 CAPSULE ORAL 3 TIMES DAILY PRN
Qty: 30 CAPSULE | Refills: 0 | Status: SHIPPED | OUTPATIENT
Start: 2022-01-07

## 2022-01-11 DIAGNOSIS — R52 PAIN: ICD-10-CM

## 2022-01-11 RX ORDER — IBUPROFEN 600 MG/1
600 TABLET ORAL EVERY 8 HOURS PRN
Qty: 45 TABLET | Refills: 0 | Status: SHIPPED | OUTPATIENT
Start: 2022-01-11 | End: 2022-02-28

## 2022-01-12 DIAGNOSIS — G47.00 INSOMNIA, UNSPECIFIED TYPE: ICD-10-CM

## 2022-01-13 RX ORDER — ZOLPIDEM TARTRATE 10 MG/1
TABLET ORAL
Qty: 30 TABLET | Refills: 0 | Status: SHIPPED | OUTPATIENT
Start: 2022-01-13 | End: 2022-02-17 | Stop reason: SDUPTHER

## 2022-01-13 NOTE — TELEPHONE ENCOUNTER
[facility] requesting refill on Ambien  Lat seen 21    Luke Davies     Linked Records  Name  ID Gender Address   JACK CUBA 1975 1 female [place] [address]   [facility] 1975 [facility] [address]     Report Criteria  First NameJanet  Last NameSheats  DOY77/    Summary      Summary  Total Prescriptions    10    Total Private Pay    1    Total Prescribers    3    Total Pharmacies    1     Opioids* (excluding buprenorphine)  Current Qty    0 0    Current MME/day    1 2    30 Day Avg MME/day    1 75     Buprenorphine*  Current Qty    0 0    Current mg/day    0 0    30 Day Avg mg/day    0 0         Prescriptions    Filled  ID  Written  Drug  QTY  Days  Prescriber  Rx #  Pharmacy *  Refills  Daily Dose  Pymt Type      Filled  ID  Written  Drug  QTY  Days  Prescriber  Rx #  Pharmacy *  Refills  Daily Dose  Pymt Type      2021  1  2021  OXYCODONE-ACETAMINOPHEN 5-325  7 0  3  AN SHE  [id number]  [facility] ([id number])  0  17 5 [place]    20[place]  1  2021  PROMETHAZINE-CODEINE SOLUTION  120 0  30  [facility]  [id number]  [facility] ([id number])  0  1 2 [place]    20[place]  1  2021  PROMETHAZINE-CODEINE SOLUTION  120 0  4  [place] ABO  [id number]  [facility] ([id number])  0  9 0 [place]    2021  2  2021  ZOLPIDEM TARTRATE 10 MG TABLET  30 0  30  [place] [facility]  [id number]  [place] ([id number])  [place]   [place]    10/30/2021  1  10/30/2021  ZOLPIDEM TARTRATE 10 MG TABLET  30 0  30  [place] [facility]  [id number]  [place] ([id number][place]    2021  1  2021  ZOLPIDEM TARTRATE 10 MG TABLET  30 0  30  KA GRA  [id number]  [place] ([id number])  [place]   [place]    2021  1  2021  ZOLPIDEM TARTRATE 10 MG TABLET  30 0  30  [place] ABO  [id number]  [place] ([id number])  [place]   [place]    2021  1  2021  ZOLPIDEM TARTRATE 10 MG TABLET  30 0  30  [place] A[facility]  [id number]  [place] ([id number])  [place]    2021  1  2021  ZOLPIDEM TARTRATE 10 MG TABLET  30 0  30  [place] ABO  [id number]  [place] ([id number])  0   [place]    02/28/2021  1  02/28/2021  ZOLPIDEM TARTRATE 10 MG TABLET  30 0  30  [place] [facility]  [id number]  [place] ([id number])  0   [place]

## 2022-01-17 ENCOUNTER — PATIENT MESSAGE (OUTPATIENT)
Dept: FAMILY MEDICINE CLINIC | Facility: CLINIC | Age: 47
End: 2022-01-17

## 2022-01-17 ENCOUNTER — TELEPHONE (OUTPATIENT)
Dept: FAMILY MEDICINE CLINIC | Facility: CLINIC | Age: 47
End: 2022-01-17

## 2022-01-17 NOTE — TELEPHONE ENCOUNTER
----- Message from Lena Garner sent at 1/17/2022 11:14 AM EST -----  Regarding: Note   No symptoms pain in ribs have subsided

## 2022-01-17 NOTE — TELEPHONE ENCOUNTER
I did put note in chart for her to be able to go back into volunteering that she was negative for covid

## 2022-01-17 NOTE — LETTER
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

## 2022-01-25 DIAGNOSIS — J44.1 CHRONIC OBSTRUCTIVE PULMONARY DISEASE WITH ACUTE EXACERBATION (HCC): ICD-10-CM

## 2022-01-25 RX ORDER — ALBUTEROL SULFATE 90 UG/1
AEROSOL, METERED RESPIRATORY (INHALATION)
Qty: 18 G | Refills: 1 | Status: SHIPPED | OUTPATIENT
Start: 2022-01-25 | End: 2022-02-17 | Stop reason: SDUPTHER

## 2022-02-17 ENCOUNTER — TELEPHONE (OUTPATIENT)
Dept: FAMILY MEDICINE CLINIC | Facility: CLINIC | Age: 47
End: 2022-02-17

## 2022-02-17 DIAGNOSIS — J45.21 INTERMITTENT ASTHMA WITH ACUTE EXACERBATION, UNSPECIFIED ASTHMA SEVERITY: ICD-10-CM

## 2022-02-17 DIAGNOSIS — G47.00 INSOMNIA, UNSPECIFIED TYPE: ICD-10-CM

## 2022-02-17 DIAGNOSIS — J44.1 CHRONIC OBSTRUCTIVE PULMONARY DISEASE WITH ACUTE EXACERBATION (HCC): ICD-10-CM

## 2022-02-17 NOTE — TELEPHONE ENCOUNTER
I will message pt back to call or send message through her my chart on needed refills as we did not receive any requests from [facility]

## 2022-02-17 NOTE — TELEPHONE ENCOUNTER
----- Message from Marilou Garner sent at 2/17/2022  8:34 AM EST -----  Regarding: Scripts  Ambien   Both inhalers

## 2022-02-17 NOTE — TELEPHONE ENCOUNTER
----- Message from Anisha Garner sent at 2/16/2022  4:10 PM EST -----  Regarding: Scripts  There are several scripts that have been waiting for approval of refills from [facility]

## 2022-02-18 RX ORDER — IPRATROPIUM/ALBUTEROL SULFATE 20-100 MCG
1 MIST INHALER (GRAM) INHALATION 4 TIMES DAILY
Qty: 4 G | Refills: 1 | Status: SHIPPED | OUTPATIENT
Start: 2022-02-18 | End: 2022-03-14 | Stop reason: SDUPTHER

## 2022-02-18 RX ORDER — ZOLPIDEM TARTRATE 10 MG/1
10 TABLET ORAL
Qty: 30 TABLET | Refills: 0 | Status: SHIPPED | OUTPATIENT
Start: 2022-02-18 | End: 2022-03-18 | Stop reason: SDUPTHER

## 2022-02-18 RX ORDER — ALBUTEROL SULFATE 90 UG/1
2 AEROSOL, METERED RESPIRATORY (INHALATION) EVERY 6 HOURS PRN
Qty: 18 G | Refills: 1 | Status: SHIPPED | OUTPATIENT
Start: 2022-02-18 | End: 2022-04-01 | Stop reason: SDUPTHER

## 2022-02-27 DIAGNOSIS — J45.909 ASTHMA, UNSPECIFIED ASTHMA SEVERITY, UNSPECIFIED WHETHER COMPLICATED, UNSPECIFIED WHETHER PERSISTENT: ICD-10-CM

## 2022-02-27 DIAGNOSIS — R52 PAIN AGGRAVATED BY WALKING: ICD-10-CM

## 2022-02-27 DIAGNOSIS — R52 PAIN: ICD-10-CM

## 2022-02-27 DIAGNOSIS — R51.9 NONINTRACTABLE HEADACHE, UNSPECIFIED CHRONICITY PATTERN, UNSPECIFIED HEADACHE TYPE: ICD-10-CM

## 2022-02-28 RX ORDER — CYCLOBENZAPRINE HCL 10 MG
TABLET ORAL
Qty: 30 TABLET | Refills: 0 | Status: SHIPPED | OUTPATIENT
Start: 2022-02-28 | End: 2022-05-23

## 2022-02-28 RX ORDER — IBUPROFEN 600 MG/1
600 TABLET ORAL EVERY 8 HOURS PRN
Qty: 45 TABLET | Refills: 0 | Status: SHIPPED | OUTPATIENT
Start: 2022-02-28 | End: 2022-04-01 | Stop reason: SDUPTHER

## 2022-02-28 RX ORDER — SUMATRIPTAN 50 MG/1
100 TABLET, FILM COATED ORAL ONCE AS NEEDED
Qty: 9 TABLET | Refills: 0 | Status: SHIPPED | OUTPATIENT
Start: 2022-02-28 | End: 2022-04-04

## 2022-02-28 RX ORDER — ALBUTEROL SULFATE 2.5 MG/3ML
SOLUTION RESPIRATORY (INHALATION)
Qty: 150 ML | Refills: 0 | Status: SHIPPED | OUTPATIENT
Start: 2022-02-28 | End: 2022-06-02

## 2022-03-02 DIAGNOSIS — R10.84 GENERALIZED ABDOMINAL PAIN: ICD-10-CM

## 2022-03-02 DIAGNOSIS — R14.0 BLOATING: ICD-10-CM

## 2022-03-02 RX ORDER — PANTOPRAZOLE SODIUM 40 MG/1
TABLET, DELAYED RELEASE ORAL
Qty: 30 TABLET | Refills: 1 | Status: SHIPPED | OUTPATIENT
Start: 2022-03-02 | End: 2022-05-09

## 2022-03-14 DIAGNOSIS — E28.39 ESTROGEN DEFICIENCY: ICD-10-CM

## 2022-03-14 DIAGNOSIS — J45.21 INTERMITTENT ASTHMA WITH ACUTE EXACERBATION, UNSPECIFIED ASTHMA SEVERITY: ICD-10-CM

## 2022-03-14 RX ORDER — IPRATROPIUM/ALBUTEROL SULFATE 20-100 MCG
1 MIST INHALER (GRAM) INHALATION 4 TIMES DAILY
Qty: 4 G | Refills: 0 | Status: SHIPPED | OUTPATIENT
Start: 2022-03-14 | End: 2022-04-01 | Stop reason: SDUPTHER

## 2022-03-14 RX ORDER — ESTROGENS, CONJUGATED 0.45 MG/1
TABLET, FILM COATED ORAL
Qty: 21 TABLET | Refills: 5 | Status: SHIPPED | OUTPATIENT
Start: 2022-03-14 | End: 2022-03-14 | Stop reason: SDUPTHER

## 2022-03-16 NOTE — TELEPHONE ENCOUNTER
Fax from [facility], requesting refill of zolpidem tartrate 10mg
Patient/Caregiver provided printed discharge information.

## 2022-03-18 DIAGNOSIS — G47.00 INSOMNIA, UNSPECIFIED TYPE: ICD-10-CM

## 2022-03-18 RX ORDER — ZOLPIDEM TARTRATE 10 MG/1
10 TABLET ORAL
Qty: 30 TABLET | Refills: 0 | Status: SHIPPED | OUTPATIENT
Start: 2022-03-18 | End: 2022-05-03 | Stop reason: SDUPTHER

## 2022-03-18 NOTE — TELEPHONE ENCOUNTER
Pt last seen 12/27/21 and I copied past refills into chart from pdmp web site  Sent to provider for approval    Select Patient Id Name D O B  R Coujulia 106   [] 1 JACK DOAN 45- F 1242 [facility] -[id number] [place]   [] 2 JACK DOAN 77- F 26 [place] [id number] [place]   [] 3 Isabel DOAN 14- F 1242 [facility] [id number] [place]           Search Criteria    Name Date of Birth Date Range   jack doan 78- [birth year]- To 03-     Requester Name Requested Date   Chick Goodell Fri Mar 18 2022 13:20:28 GMT-0400 Mathischeng Son Daylight Time)     Summary   Prescriptions  11  Prescribers  3  Pharmacies  2  View Map    Drug Classes   Benzodiazepines  0  Stimulants  0  Opioids  1  Muscle Relaxants  0    Opioid Dosage   Total MME for Active Prescriptions  0    Average MME  17 50  MME Graph   MME Calculator       · Prescriptions  · Notifications    · Prescribers  · Pharmacies  · MME Graph      [] [place]   Drug Categories:      [] Opioids       [] Others       Show  entries  Search:  Patient Id Prescription #  Filled Written Drug Label Qty Days Strength MME** Prescriber Pharmacy Payment REFILL #/Auth State Detail   1  [id number] 02/18/2022 02/18/2022 Zolpidem Tartrate (Tablet)  30 0 30 10 MG NA IKE) [facility] / 00 [place]    2  [id number] 01/13/2022 01/13/2022 Zolpidem Tartrate (Tablet)  30 0 30 10 MG NA IKE) [facility] Medicaid 00 / 0 [place]    3  [id number] 12/31/2021 12/31/2021 oxyCODONE HCL-ACETAMINOPHEN (Tablet)  7 0 3 325 MG-5 MG 17 50 [facility] Medicaid 00 / 0 [place]    3  [id number] 12/22/2021 12/22/2021 Promethazine Hydrochloride/codeine (Syrup)  120 0 30 10 MG/5 ML-6 25 MG/5 NA IKE) [facility] Medicaid 00 / 0 [place]    3  [id number] 12/13/2021 12/13/2021 Promethazine Hydrochloride/codeine (Syrup)  120 0 4 10 MG/5 ML-6 25 MG/5 NA MARTA(MD) [facility]  Private Pay 00 / 0 [place]    2  [id number] 12/13/2021 12/13/2021 Zolpidem Tartrate (Tablet)  30 0 30 10 MG NA WASGIANAM(MD) [facility], L  C  Medicaid 00 / 0 [place]    3  [id number] 10/30/2021  10/30/2021 Zolpidem Tartrate (Tablet)  30 0 30 10 MG NA MARTA(MD) [facility], L [facility]  Medicaid 00 / 0 [place]    3  [id number] 09/21/2021 09/21/2021 Zolpidem Tartrate (Tablet)  30 0 30 10 MG NA ESDRAS HERRING  [facility], L [facility]  Medicaid 00 / 0 [place]    3  [id number] 08/02/2021 08/02/2021 Zolpidem Tartrate (Tablet)  30 0 30 10 MG NA MARTA(MD) [facility], L [facility]  Medicaid 00 / 0 [place]    3  [id number] 06/17/2021 06/17/2021 Zolpidem Tartrate (Tablet)  30 0 30 10 MG NA WASGIANAM(MD) [facility], L [facility]    Medicaid 00 / 0 [place]    Showing 1 to 10 of 11 entries  [id number]    · Prescriptions  · Notifications    · Prescribers  · Pharmacies  · MME Graph      [] [place]    Drug Categories:      [] Opioids       [] Others       Show  entries  Search:     OXYCODONE HCL-ACETAMINOPHEN (Tablet)  3 days   Written: 12-  Filled: 12-    Qty: 7 0  Strength: 325 MG-5 MG    NDC:  [id number]      PROMETHAZINE HYDROCHLORIDE/CODEINE (Syrup)  30 days   Written: 12-  Filled: 12-    Qty: 120 0  Strength: 10 MG/5 ML-6 25 MG/5    NDC:  [id number]      PROMETHAZINE HYDROCHLORIDE/CODEINE (Syrup)  4 days   Written: 12-  Filled: 12-    Qty: 120 0  Strength: 10 MG/5 ML-6 25 MG/5    NDC:  [id number]      ZOLPIDEM TARTRATE (Tablet)  30 days   Written: 01-  Filled: 01-    Qty: 30 0  Strength: 10 MG    NDC:  [id number]      ZOLPIDEM TARTRATE (Tablet)  30 days   Written: 02-  Filled: 02-    Qty: 30 0  Strength: 10 MG    NDC:  [id number]      ZOLPIDEM TARTRATE (Tablet)  30 days   Written: 04-  Filled: 04-    Qty: 30 0  Strength: 10 MG    NDC: [id number]      ZOLPIDEM TARTRATE (Tablet)  30 days   Written: 06-  Filled: 06-    Qty: 30 0  Strength: 10 MG    NDC:  [id number]      ZOLPIDEM TARTRATE (Tablet)  30 days   Written: 08-  Filled: 08-    Qty: 30 0  Strength: 10 MG    NDC:  [id number]      ZOLPIDEM TARTRATE (Tablet)  30 days   Written: 09-  Filled: 09-    Qty: 30 0  Strength: 10 MG    NDC:  [id number]      ZOLPIDEM TARTRATE (Tablet)  30 days   Written: 10-  Filled: 10-    Qty: 30 0  Strength: 10 MG    NDC:  [id number]      Showing 1 to 10 of 11 entries  [id number]       * Per CDC guidance, the conversion factors and associated daily morphine milligram equivalents for drugs prescribed as part of medication-assisted treatment for opioid use disorder should not be used to benchmark against dosage thresholds meant for opioids prescribed for pain  Report Disclaimer:  Information from the [place] [employer] Program (PDMP) database is protected health information and any information accessed must be treated as confidential  Any person who knowingly or intentionally makes an unauthorized disclosure of information from the [address] [place] [address] will be subject to civil and criminal penalties as set forth in the ABC-MAP Act 2014-191, Act of Oct  27, 2014, P L  2911  The information in the [address] [place] [address] is submitted by pharmacies and may contain errors and omissions  Independent verification of prescription information with pharmacies and prescribers may sometimes be prudent or necessary        Educational content  CDC MME calculation guidelines  [place] Prescribing Guidelines  Letter Regarding the Misapplication of Prescribing Guidelines   Guide for Appropriate Tapering or Discontinuation of Long-Term Opioid Use   #[id number]

## 2022-04-01 DIAGNOSIS — R52 PAIN: ICD-10-CM

## 2022-04-01 DIAGNOSIS — J44.1 CHRONIC OBSTRUCTIVE PULMONARY DISEASE WITH ACUTE EXACERBATION (HCC): ICD-10-CM

## 2022-04-01 DIAGNOSIS — J45.21 INTERMITTENT ASTHMA WITH ACUTE EXACERBATION, UNSPECIFIED ASTHMA SEVERITY: ICD-10-CM

## 2022-04-01 DIAGNOSIS — J01.00 ACUTE NON-RECURRENT MAXILLARY SINUSITIS: ICD-10-CM

## 2022-04-01 RX ORDER — FLUTICASONE PROPIONATE 50 MCG
SPRAY, SUSPENSION (ML) NASAL
Qty: 16 ML | Refills: 0 | Status: SHIPPED | OUTPATIENT
Start: 2022-04-01 | End: 2022-04-28

## 2022-04-01 RX ORDER — ALBUTEROL SULFATE 90 UG/1
2 AEROSOL, METERED RESPIRATORY (INHALATION) EVERY 6 HOURS PRN
Qty: 18 G | Refills: 0 | Status: SHIPPED | OUTPATIENT
Start: 2022-04-01 | End: 2022-04-20

## 2022-04-01 RX ORDER — IBUPROFEN 600 MG/1
600 TABLET ORAL EVERY 8 HOURS PRN
Qty: 45 TABLET | Refills: 0 | Status: SHIPPED | OUTPATIENT
Start: 2022-04-01 | End: 2022-04-18

## 2022-04-01 RX ORDER — IPRATROPIUM/ALBUTEROL SULFATE 20-100 MCG
1 MIST INHALER (GRAM) INHALATION 4 TIMES DAILY
Qty: 4 G | Refills: 0 | Status: SHIPPED | OUTPATIENT
Start: 2022-04-01 | End: 2022-05-09

## 2022-04-02 DIAGNOSIS — R51.9 NONINTRACTABLE HEADACHE, UNSPECIFIED CHRONICITY PATTERN, UNSPECIFIED HEADACHE TYPE: ICD-10-CM

## 2022-04-04 RX ORDER — SUMATRIPTAN 50 MG/1
100 TABLET, FILM COATED ORAL ONCE AS NEEDED
Qty: 9 TABLET | Refills: 0 | Status: SHIPPED | OUTPATIENT
Start: 2022-04-04 | End: 2022-05-09

## 2022-04-18 DIAGNOSIS — R52 PAIN: ICD-10-CM

## 2022-04-18 RX ORDER — IBUPROFEN 600 MG/1
600 TABLET ORAL EVERY 8 HOURS PRN
Qty: 45 TABLET | Refills: 0 | Status: SHIPPED | OUTPATIENT
Start: 2022-04-18 | End: 2022-06-02

## 2022-04-19 ENCOUNTER — PREP FOR PROCEDURE (OUTPATIENT)
Dept: OBGYN CLINIC | Facility: CLINIC | Age: 47
End: 2022-04-19

## 2022-04-20 DIAGNOSIS — J44.1 CHRONIC OBSTRUCTIVE PULMONARY DISEASE WITH ACUTE EXACERBATION (HCC): ICD-10-CM

## 2022-04-20 RX ORDER — ALBUTEROL SULFATE 90 UG/1
AEROSOL, METERED RESPIRATORY (INHALATION)
Qty: 18 G | Refills: 0 | Status: SHIPPED | OUTPATIENT
Start: 2022-04-20 | End: 2022-05-16

## 2022-04-21 ENCOUNTER — TELEPHONE (OUTPATIENT)
Dept: OBGYN CLINIC | Facility: CLINIC | Age: 47
End: 2022-04-21

## 2022-04-21 DIAGNOSIS — J44.1 COPD EXACERBATION (HCC): ICD-10-CM

## 2022-04-21 NOTE — TELEPHONE ENCOUNTER
Should she come in? Pt is requesting her cough syrup with codeine and I messaged her back why she was asking for the cough syrup since its not a maintenance medication and below is how she responded  Pt last seen 12/27/21 and was given the medication  I copied past refill from [facility] web site and sent to provider for review and approval      2 DYLAN DOAN 1975 [address]-[address] [place]   [] 3 DYLAN DOAN 1975 F 1242 [facility]-[address] R [facility] 2 [place]           Search Criteria    Name Date of Birth Date Range   dylan doan 79- 04- To 04-     Requester Name Requested Date   David Zapata Apr 21 2022 08:37:19 GMT-0400 Martine Mijares Daylight Time)     Summary   Prescriptions  12  Prescribers  3  Pharmacies  2  View Map    Drug Classes   Benzodiazepines  0  Stimulants  0  Opioids  1  Muscle Relaxants  0    Opioid Dosage   Total MME for Active Prescriptions  0    Average MME  17 50  MME Graph   MME Calculator       · Prescriptions  · Notifications    · Prescribers  · Pharmacies  · MME Graph      [] [place]   Drug Categories:      [] Opioids       [] Others       Show  entries  Search:  Patient Id Prescription #  Filled Written Drug Label Qty Days Strength MME** Prescriber Pharmacy Payment REFILL #/Auth State Detail   1  [id number] 03/18/2022 03/18/2022 Zolpidem Tartrate (Tablet)  30 0 30 10 MG NA ÁNGELAM(MD) [facility] 00 / 00 [place]    1  [id number] 02/18/2022 02/18/2022 Zolpidem Tartrate (Tablet)  30 0 30 10 MG NA DEMISIM(MD) [facility] / 00 [place]    2  [id number] 01/13/2022 01/13/2022 Zolpidem Tartrate (Tablet)  30 0 30 10 MG NA ÁNGELAM(MD) NOLANGLENN  [facility]  Medicaid 00 / 0 [place]    3  [id number] 12/31/2021 12/31/2021 oxyCODONE HCL-ACETAMINOPHEN (Tablet)  7 0 3 325 MG-5 MG 17 50 [facility]  [facility]    Medicaid 00 / 0 [place]    3  [id number] 12/22/2021 12/22/2021 Promethazine Hydrochloride/codeine (Syrup)  120 0 30 10 MG/5 ML-6 25 MG/5 NA MARTA(MD) [facility], L [facility]  Medicaid 00 / 0 [place]    3  [id number] 12/13/2021 12/13/2021 Promethazine Hydrochloride/codeine (Syrup)  120 0 4 10 MG/5 ML-6 25 MG/5 NA ÁNGELAM(MD) [facility]  Private Pay 00 / 0 [place]    2  [id number] 12/13/2021 12/13/2021 Zolpidem Tartrate (Tablet)  30 0 30 10 MG NA WASSIM(MD) [facility], L [facility]  Medicaid 00 / 0 [place]    3  [id number] 10/30/2021  10/30/2021 Zolpidem Tartrate (Tablet)  30 0 30 10 MG NA WASSIM(MD) [facility], L [facility]  Medicaid 00 / 0 [place]    3  [id number] 09/21/2021 09/21/2021 Zolpidem Tartrate (Tablet)  30 0 30 10 MG NA ESDRAS HERRING  [facility], L [facility]  Medicaid 00 / 0 [place]    3  [id number] 08/02/2021 08/02/2021 Zolpidem Tartrate (Tablet)  30 0 30 10 MG NA WASSIM(MD) [facility], L [facility]    Medicaid 00 / 0 [place]    Showing 1 to 10 of 12 entries  [id number]    · Prescriptions  · Notifications    · Prescribers  · Pharmacies  · MME Graph      [] [place]    Drug Categories:      [] Opioids       [] Others       Show  entries  Search:     OXYCODONE HCL-ACETAMINOPHEN (Tablet)  3 days   Written: 12-  Filled: 12-    Qty: 7 0  Strength: 325 MG-5 MG    NDC:  [id number]      PROMETHAZINE HYDROCHLORIDE/CODEINE (Syrup)  30 days   Written: 12-  Filled: 12-    Qty: 120 0  Strength: 10 MG/5 ML-6 25 MG/5    NDC:  [id number]      PROMETHAZINE HYDROCHLORIDE/CODEINE (Syrup)  4 days   Written: 12-  Filled: 12-    Qty: 120 0  Strength: 10 MG/5 ML-6 25 MG/5    NDC:  [id number]      ZOLPIDEM TARTRATE (Tablet)  30 days   Written: 01-  Filled: 01-    Qty: 30 0  Strength: 10 MG    NDC:  [id number]      ZOLPIDEM TARTRATE (Tablet)  30 days   Written: 02-  Filled: 02-    Qty: 30 0  Strength: 10 MG    NDC:  [id number]      ZOLPIDEM TARTRATE (Tablet)  30 days   Written: 03-  Filled: 03-    Qty: 30 0  Strength: 10 MG    NDC:  [id number]      ZOLPIDEM TARTRATE (Tablet)  30 days   Written: 04-  Filled: 04-    Qty: 30 0  Strength: 10 MG    NDC:  [id number]      ZOLPIDEM TARTRATE (Tablet)  30 days   Written: 06-  Filled: 06-    Qty: 30 0  Strength: 10 MG    NDC:  [id number]      ZOLPIDEM TARTRATE (Tablet)  30 days   Written: 08-  Filled: 08-    Qty: 30 0  Strength: 10 MG    NDC:  [id number]      ZOLPIDEM TARTRATE (Tablet)  30 days   Written: 09-  Filled: 09-    Qty: 30 0  Strength: 10 MG    NDC:  [id number]      Showing 1 to 10 of 12 entries  [id number]       * Per CDC guidance, the conversion factors and associated daily morphine milligram equivalents for drugs prescribed as part of medication-assisted treatment for opioid use disorder should not be used to benchmark against dosage thresholds meant for opioids prescribed for pain  Report Disclaimer:  Information from the [place] [employer] Program (PDMP) database is protected health information and any information accessed must be treated as confidential  Any person who knowingly or intentionally makes an unauthorized disclosure of information from the [address] [place] [address] will be subject to civil and criminal penalties as set forth in the ABC-MAP Act 2014-191, Act of Oct  27, 2014, P L  4010  The information in the [address] [place] [address] [place] is submitted by pharmacies and may contain errors and omissions  Independent verification of prescription information with pharmacies and prescribers may sometimes be prudent or necessary        Educational content  CDC MME calculation guidelines  [place] Prescribing Guidelines  Letter Regarding the Misapplication of Prescribing Guidelines   Guide for Appropriate Tapering or Discontinuation of Long-Term Opioid Use   #[id number]         Close Alerts

## 2022-04-21 NOTE — TELEPHONE ENCOUNTER
----- Message from Dariusz Garner sent at 4/20/2022  5:23 PM EDT -----  Regarding: refill  Fausto Ricketts   Yes I stayed with a severe nasal drip and cough last week    Just like I had in December

## 2022-04-22 RX ORDER — PROMETHAZINE HYDROCHLORIDE AND CODEINE PHOSPHATE 6.25; 1 MG/5ML; MG/5ML
5 SYRUP ORAL EVERY 4 HOURS PRN
Qty: 120 ML | Refills: 0 | Status: SHIPPED | OUTPATIENT
Start: 2022-04-22

## 2022-04-28 DIAGNOSIS — J01.00 ACUTE NON-RECURRENT MAXILLARY SINUSITIS: ICD-10-CM

## 2022-04-28 RX ORDER — FLUTICASONE PROPIONATE 50 MCG
SPRAY, SUSPENSION (ML) NASAL
Qty: 16 ML | Refills: 3 | Status: SHIPPED | OUTPATIENT
Start: 2022-04-28

## 2022-05-03 DIAGNOSIS — G47.00 INSOMNIA, UNSPECIFIED TYPE: ICD-10-CM

## 2022-05-03 RX ORDER — ZOLPIDEM TARTRATE 10 MG/1
10 TABLET ORAL
Qty: 30 TABLET | Refills: 0 | Status: SHIPPED | OUTPATIENT
Start: 2022-05-03 | End: 2022-06-02

## 2022-05-03 NOTE — TELEPHONE ENCOUNTER
Last seen 12/27/21 and I copied past refills into chart from pdmp web site  Sent to provider or approval     1 DYLANADAN DOAN 1975 F [id number] [place]   [] 2 DYLANADAN DOAN 1975 F 26 [id number] [place]   [] 3 DYLANADAN DOAN 1975 F 1242 JENNA GOMEZ [id number] ALEXA [place]           Search Criteria    Name Date of Birth Date Range   dylan doan 55- 05- To 05-     Requester Name Requested Date   Clara Trammell May 03 2022 07:30:28 GMT-0400 Hawk Beal Daylight Time)     Summary   Prescriptions  10  Prescribers  3  Pharmacies  2  View Map    Drug Classes   Benzodiazepines  0  Stimulants  0  Opioids  1  Muscle Relaxants  0    Opioid Dosage   Total MME for Active Prescriptions  0    Average MME  30 71  MME Graph   MME Calculator       · Prescriptions  · Notifications    · Prescribers  · Pharmacies  · MME Graph      [] [place]   Drug Categories:      [] Opioids       [] Others       Show  entries  Search:  Patient Id Prescription #  Filled Written Drug Label Qty Days Strength MME** Prescriber Pharmacy Payment REFILL #/Auth State Detail   1  [id number] 04/22/2022 04/22/2022 Promethazine Hcl-codeine Phosphate (Syrup)  120 0 5 10 MG/5 ML-6 25 MG/5 36 0 MARTA(MD) [facility] 00 / 00 [place]    1  [id number] 03/18/2022 03/18/2022 Zolpidem Tartrate (Tablet)  30 0 30 10 MG NA ÁNGELAM(MD) [facility] 00 / 00 [place]    1  [id number] 02/18/2022 02/18/2022 Zolpidem Tartrate (Tablet)  30 0 30 10 MG NA DEMISIM(MD) [facility] / 00 [place]    3  [id number] 12/31/2021 12/31/2021 oxyCODONE HCL-ACETAMINOPHEN (Tablet)  7 0 3 325 MG-5 MG 17 50 JHON FRAGA  [facility] Medicaid 00 / 0 [place]    3  [id number] 12/22/2021 12/22/2021 Promethazine Hydrochloride/codeine (Syrup)  120 0 30 10 MG/5 ML-6 25 MG/5 NA IKE) [facility], [facility] C    Medicaid 00 / 0 [place]    3  [id number] 12/13/2021 12/13/2021 Promethazine Hydrochloride/codeine (Syrup)  120 0 4 10 MG/5 ML-6 25 MG/5 NA MARTA(MD) [facility] C  Private Pay 00 / 0 [place]    3  [id number] 10/30/2021  10/30/2021 Zolpidem Tartrate (Tablet)  30 0 30 10 MG NA WASSIM(MD) [facility], L  C  Medicaid 00 / 0 [place]    3  [id number] 09/21/2021 09/21/2021 Zolpidem Tartrate (Tablet)  30 0 30 10 MG NA ESDRAS INOCENCIAIN  [facility], L L C  Medicaid 00 / 0 [place]    3  [id number] 08/02/2021 08/02/2021 Zolpidem Tartrate (Tablet)  30 0 30 10 MG NA WASSIM(MD) [facility], [facility] L C  Medicaid 00 / 0 [place]    3  [id number] 06/17/2021 06/17/2021 Zolpidem Tartrate (Tablet)  30 0 30 10 MG NA WASSIM(MD) [facility] L C    Medicaid 00 / 0 [place]    Showing 1 to 10 of 10 entries  [id number]    · Prescriptions  · Notifications    · Prescribers  · Pharmacies  · MME Graph      [] [place]    Drug Categories:      [] Opioids       [] Others       Show  entries  Search:     OXYCODONE HCL-ACETAMINOPHEN (Tablet)  3 days   Written: 12-  Filled: 12-    Qty: 7 0  Strength: 325 MG-5 MG    NDC:  [id number]      PROMETHAZINE HCL-CODEINE PHOSPHATE (Syrup)  5 days   Written: 04-  Filled: 04-    Qty: 120 0  Strength: 10 MG/5 ML-6 25 MG/5    NDC:  [id number]      PROMETHAZINE HYDROCHLORIDE/CODEINE (Syrup)  30 days   Written: 12-  Filled: 12-    Qty: 120 0  Strength: 10 MG/5 ML-6 25 MG/5    NDC:  [id number]      PROMETHAZINE HYDROCHLORIDE/CODEINE (Syrup)  4 days   Written: 12-  Filled: 12-    Qty: 120 0  Strength: 10 MG/5 ML-6 25 MG/5    NDC:  [id number]      ZOLPIDEM TARTRATE (Tablet)  30 days   Written: 02-  Filled: 02-    Qty: 30 0  Strength: 10 MG    NDC:  [id number]      ZOLPIDEM TARTRATE (Tablet)  30 days   Written: 03-  Filled: 03-    Qty: 30 0  Strength: 10 MG    NDC: [id number]      ZOLPIDEM TARTRATE (Tablet)  30 days   Written: 06-  Filled: 06-    Qty: 30 0  Strength: 10 MG    NDC:  [id number]      ZOLPIDEM TARTRATE (Tablet)  30 days   Written: 08-  Filled: 08-    Qty: 30 0  Strength: 10 MG    NDC:  [id number]      ZOLPIDEM TARTRATE (Tablet)  30 days   Written: 09-  Filled: 09-    Qty: 30 0  Strength: 10 MG    NDC:  [id number]      ZOLPIDEM TARTRATE (Tablet)  30 days   Written: 10-  Filled: 10-    Qty: 30 0  Strength: 10 MG    NDC:  [id number]      Showing 1 to 10 of 10 entries  [id number]       * Per CDC guidance, the conversion factors and associated daily morphine milligram equivalents for drugs prescribed as part of medication-assisted treatment for opioid use disorder should not be used to benchmark against dosage thresholds meant for opioids prescribed for pain  Report Disclaimer:  Information from the [place] [employer] Program (PDMP) database is protected health information and any information accessed must be treated as confidential  Any person who knowingly or intentionally makes an unauthorized disclosure of information from the [address] [place] [address] will be subject to civil and criminal penalties as set forth in the ABC-MAP Act 2014-191, Act of Oct  27, 2014, P L  2911  The information in the [address] [place] [address] is submitted by pharmacies and may contain errors and omissions  Independent verification of prescription information with pharmacies and prescribers may sometimes be prudent or necessary        Educational content  CDC MME calculation guidelines  [place] Prescribing Guidelines  Letter Regarding the Misapplication of Prescribing Guidelines   Guide for Appropriate Tapering or Discontinuation of Long-Term Opioid Use   #[id number]         Close Alerts

## 2022-05-04 DIAGNOSIS — E28.39 ESTROGEN DEFICIENCY: ICD-10-CM

## 2022-05-05 NOTE — TELEPHONE ENCOUNTER
Refills have been requested for the following medications:         conjugated estrogens (Premarin) 0 45 mg tablet Keke Quezada MD]     Preferred pharmacy: [facility]    Last seen 12/27/21  Medication sent to pharmacy

## 2022-05-08 DIAGNOSIS — R10.84 GENERALIZED ABDOMINAL PAIN: ICD-10-CM

## 2022-05-08 DIAGNOSIS — R14.0 BLOATING: ICD-10-CM

## 2022-05-09 DIAGNOSIS — R51.9 NONINTRACTABLE HEADACHE, UNSPECIFIED CHRONICITY PATTERN, UNSPECIFIED HEADACHE TYPE: ICD-10-CM

## 2022-05-09 DIAGNOSIS — J45.21 INTERMITTENT ASTHMA WITH ACUTE EXACERBATION, UNSPECIFIED ASTHMA SEVERITY: ICD-10-CM

## 2022-05-09 RX ORDER — IPRATROPIUM/ALBUTEROL SULFATE 20-100 MCG
MIST INHALER (GRAM) INHALATION
Qty: 4 G | Refills: 1 | Status: SHIPPED | OUTPATIENT
Start: 2022-05-09 | End: 2022-07-01

## 2022-05-09 RX ORDER — SUMATRIPTAN 50 MG/1
100 TABLET, FILM COATED ORAL ONCE AS NEEDED
Qty: 9 TABLET | Refills: 1 | Status: SHIPPED | OUTPATIENT
Start: 2022-05-09 | End: 2022-07-20

## 2022-05-09 RX ORDER — PANTOPRAZOLE SODIUM 40 MG/1
TABLET, DELAYED RELEASE ORAL
Qty: 30 TABLET | Refills: 1 | Status: SHIPPED | OUTPATIENT
Start: 2022-05-09 | End: 2022-07-05

## 2022-05-14 DIAGNOSIS — J44.1 CHRONIC OBSTRUCTIVE PULMONARY DISEASE WITH ACUTE EXACERBATION (HCC): ICD-10-CM

## 2022-05-16 RX ORDER — ALBUTEROL SULFATE 90 UG/1
AEROSOL, METERED RESPIRATORY (INHALATION)
Qty: 18 G | Refills: 0 | Status: SHIPPED | OUTPATIENT
Start: 2022-05-16 | End: 2022-06-09

## 2022-05-21 DIAGNOSIS — R52 PAIN AGGRAVATED BY WALKING: ICD-10-CM

## 2022-05-21 DIAGNOSIS — Z88.9 MULTIPLE ALLERGIES: ICD-10-CM

## 2022-05-23 RX ORDER — CYCLOBENZAPRINE HCL 10 MG
TABLET ORAL
Qty: 30 TABLET | Refills: 0 | Status: SHIPPED | OUTPATIENT
Start: 2022-05-23 | End: 2022-07-01

## 2022-05-23 RX ORDER — MONTELUKAST SODIUM 10 MG/1
TABLET ORAL
Qty: 90 TABLET | Refills: 0 | Status: SHIPPED | OUTPATIENT
Start: 2022-05-23

## 2022-05-24 DIAGNOSIS — E28.39 ESTROGEN DEFICIENCY: ICD-10-CM

## 2022-05-24 RX ORDER — ESTROGENS, CONJUGATED 0.45 MG/1
TABLET, FILM COATED ORAL
Qty: 21 TABLET | Refills: 0 | Status: SHIPPED | OUTPATIENT
Start: 2022-05-24 | End: 2022-06-30

## 2022-06-01 DIAGNOSIS — J45.909 ASTHMA, UNSPECIFIED ASTHMA SEVERITY, UNSPECIFIED WHETHER COMPLICATED, UNSPECIFIED WHETHER PERSISTENT: ICD-10-CM

## 2022-06-01 DIAGNOSIS — G47.00 INSOMNIA, UNSPECIFIED TYPE: ICD-10-CM

## 2022-06-01 DIAGNOSIS — R52 PAIN: ICD-10-CM

## 2022-06-02 RX ORDER — ALBUTEROL SULFATE 2.5 MG/3ML
SOLUTION RESPIRATORY (INHALATION)
Qty: 150 ML | Refills: 0 | Status: SHIPPED | OUTPATIENT
Start: 2022-06-02

## 2022-06-02 RX ORDER — ZOLPIDEM TARTRATE 10 MG/1
TABLET ORAL
Qty: 30 TABLET | Refills: 0 | Status: SHIPPED | OUTPATIENT
Start: 2022-06-02 | End: 2022-07-01

## 2022-06-02 RX ORDER — IBUPROFEN 600 MG/1
600 TABLET ORAL EVERY 8 HOURS PRN
Qty: 45 TABLET | Refills: 0 | Status: SHIPPED | OUTPATIENT
Start: 2022-06-02 | End: 2022-07-05

## 2022-06-02 NOTE — TELEPHONE ENCOUNTER
Pharmacy requesting refill on albuterol, ibuprofen, and Ambien  Last seen 12/27/21  Message sent on mychart she is due for 6 month follow up appt  1 JACKADAN CUBA 1975 F R [address]   [] 2 JACKADAN CUBA 1975 F [address] [place] [id number] [place]   [] 3 JACK CUBA 40- F [address] [place] [id number] R [place] 2 [place]           Search Criteria    Name Date of Birth Date Range   Tanna Esteban 19- 06- To 06-     Requester Name Requested Date   Margaret Thorne Jun 02 2022 10:06:59 GMT-0400 Connor Bey Daylight Time)     Summary   Prescriptions  19  Prescribers  4  Pharmacies  2  View Map    Drug Classes   Benzodiazepines  0  Stimulants  0  Opioids  1  Muscle Relaxants  0    Opioid Dosage   Total MME for Active Prescriptions  0    Average MME  10 81  MME Graph   MME Calculator       · Prescriptions  · Notifications    · Prescribers  · Pharmacies  · MME Graph      [] [place]   Drug Categories:      [] Opioids       [] Others       Show  entries  Search:  Patient Id Prescription #  Filled Written Drug Label Qty Days Strength MME** Prescriber Pharmacy Payment REFILL #/Auth State Detail   1  [id number] 05/03/2022 05/03/2022 Zolpidem Tartrate (Tablet)  30 0 30 10 MG NA ÁNGELAM(MD) [facility], [facility] Medicaid 00 / 0 [place]    2  [id number] 04/22/2022 04/22/2022 Promethazine Hcl-codeine Phosphate (Syrup)  120 0 5 10 MG/5 ML-6 25 MG/5 36 0 ÁNGELAM(MD) [facility] 00 / 00 [place]    2  [id number] 03/18/2022 03/18/2022 Zolpidem Tartrate (Tablet)  30 0 30 10 MG NA ÁNGELAM(MD) [facility] 00 / 00 [place]    2  [id number] 02/18/2022 02/18/2022 Zolpidem Tartrate (Tablet)  30 0 30 10 MG NA WASSIM(MD) [facility] 00 / 00 [place]    1  [id number] 01/13/2022 01/13/2022 Zolpidem Tartrate (Tablet)  30 0 30 10 MG NA IKE) [facility]    Medicaid 00 / 0 [place]    3  [id number] 12/31/2021 12/31/2021 oxyCODONE HCL-ACETAMINOPHEN (Tablet)  7 0 3 325 MG-5 MG 17 50 JHON FLAKITO  [facility], L L C Medicaid 00 / 0 [place]    3  [id number] 12/22/2021 12/22/2021 Promethazine Hydrochloride/codeine (Syrup)  120 0 30 10 MG/5 ML-6 25 MG/5 NA MARTA(MD) [facility], L L C Medicaid 00 / 0 [place]    3  [id number] 12/13/2021 12/13/2021 Promethazine Hydrochloride/codeine (Syrup)  120 0 4 10 MG/5 ML-6 25 MG/5 NA MARTA(MD) [facility], [facility]  Private Pay 00 / 0 [place]    1  [id number] 12/13/2021 12/13/2021 Zolpidem Tartrate (Tablet)  30 0 30 10 MG NA MARTA(MD) [facility], L L C Medicaid 00 / 0 [place]    3  [id number] 10/30/2021  10/30/2021 Zolpidem Tartrate (Tablet)  30 0 30 10 MG NA MARTA(MD)

## 2022-06-09 DIAGNOSIS — J44.1 CHRONIC OBSTRUCTIVE PULMONARY DISEASE WITH ACUTE EXACERBATION (HCC): ICD-10-CM

## 2022-06-09 RX ORDER — ALBUTEROL SULFATE 90 UG/1
AEROSOL, METERED RESPIRATORY (INHALATION)
Qty: 18 G | Refills: 0 | Status: SHIPPED | OUTPATIENT
Start: 2022-06-09 | End: 2022-07-25

## 2022-06-30 DIAGNOSIS — E28.39 ESTROGEN DEFICIENCY: ICD-10-CM

## 2022-06-30 RX ORDER — ESTROGENS, CONJUGATED 0.45 MG/1
TABLET, FILM COATED ORAL
Qty: 21 TABLET | Refills: 0 | Status: SHIPPED | OUTPATIENT
Start: 2022-06-30 | End: 2022-07-25

## 2022-07-01 DIAGNOSIS — G47.00 INSOMNIA, UNSPECIFIED TYPE: ICD-10-CM

## 2022-07-01 DIAGNOSIS — R52 PAIN AGGRAVATED BY WALKING: ICD-10-CM

## 2022-07-01 DIAGNOSIS — J45.21 INTERMITTENT ASTHMA WITH ACUTE EXACERBATION, UNSPECIFIED ASTHMA SEVERITY: ICD-10-CM

## 2022-07-01 RX ORDER — IPRATROPIUM/ALBUTEROL SULFATE 20-100 MCG
MIST INHALER (GRAM) INHALATION
Qty: 4 G | Refills: 1 | Status: SHIPPED | OUTPATIENT
Start: 2022-07-01 | End: 2022-08-22

## 2022-07-01 RX ORDER — ZOLPIDEM TARTRATE 10 MG/1
TABLET ORAL
Qty: 30 TABLET | Refills: 0 | Status: SHIPPED | OUTPATIENT
Start: 2022-07-01 | End: 2022-08-04

## 2022-07-01 RX ORDER — CYCLOBENZAPRINE HCL 10 MG
TABLET ORAL
Qty: 30 TABLET | Refills: 0 | Status: SHIPPED | OUTPATIENT
Start: 2022-07-01 | End: 2022-08-02

## 2022-07-01 NOTE — TELEPHONE ENCOUNTER
Pt last seen 12/27/21, rto 7/13/22  Refill request sent to provider  pmed; Patient Prescription Report        Patients      Select Patient Id Name D O B  R [address] 106   [] 1 JACK DOAN 18- F [facility] [id number] [place]   [] 2 JACK DOAN 1975 F [address] [place] [id number] [place]   [] 3 Koko DOAN 56- F [address] [place] [id number] [facility]           Search Criteria    Name Date of Birth Date Range   jack doan 57- 07- To 07-     Requester Name Requested Date   Beto Laird Fri Jul 01 2022 07:31:22 GMT-0400 Tapan Horn Daylight Time)     Summary   Prescriptions  13  Prescribers  3  Pharmacies  2  View Map    Drug Classes   Benzodiazepines  0  Stimulants  0  Opioids  1  Muscle Relaxants  0    Opioid Dosage   Total MME for Active Prescriptions  0    Average MME  30 71  MME Graph   MME Calculator       · Prescriptions  · Notifications    · Prescribers  · Pharmacies  · MME Graph      [] [place]   Drug Categories:      [] Opioids       [] Others       Show  entries  Search:  Patient Id Prescription #  Filled Written Drug Label Qty Days Strength MME** Prescriber Pharmacy Payment REFILL #/Auth State Detail   1  [id number] 06/02/2022 06/02/2022 Zolpidem Tartrate (Tablet)  30 0 30 10 MG NA MARTA(MD) [facility], [facility]  Medicaid 0 / 0 [place]    1  [id number] 05/03/2022 05/03/2022 Zolpidem Tartrate (Tablet)  30 0 30 10 MG NA IKE) [facility], L [facility]    Medicaid 00 / 0 [place]    2  [id number] 04/22/2022 04/22/2022 Promethazine Hcl-codeine Phosphate (Syrup)  120 0 5 10 MG/5 ML-6 25 MG/5 36 0 IKE) [facility] 00 / 00 [place]    2  [id number] 03/18/2022 03/18/2022 Zolpidem Tartrate (Tablet)  30 0 30 10 MG NA IKE) [facility] 00 / 00 [place]    2  [id number] 02/18/2022 02/18/2022 Zolpidem Tartrate (Tablet)  30 0 30 10 MG NA MARTA(MD) [facility] [address] [place]    1  [id number] 01/13/2022 01/13/2022 Zolpidem Tartrate (Tablet)  30 0

## 2022-07-02 DIAGNOSIS — R10.84 GENERALIZED ABDOMINAL PAIN: ICD-10-CM

## 2022-07-02 DIAGNOSIS — R14.0 BLOATING: ICD-10-CM

## 2022-07-04 DIAGNOSIS — R52 PAIN: ICD-10-CM

## 2022-07-05 RX ORDER — PANTOPRAZOLE SODIUM 40 MG/1
TABLET, DELAYED RELEASE ORAL
Qty: 30 TABLET | Refills: 1 | Status: SHIPPED | OUTPATIENT
Start: 2022-07-05 | End: 2022-08-29

## 2022-07-05 RX ORDER — IBUPROFEN 600 MG/1
600 TABLET ORAL EVERY 8 HOURS PRN
Qty: 45 TABLET | Refills: 0 | Status: SHIPPED | OUTPATIENT
Start: 2022-07-05 | End: 2022-08-22

## 2022-07-20 DIAGNOSIS — R51.9 NONINTRACTABLE HEADACHE, UNSPECIFIED CHRONICITY PATTERN, UNSPECIFIED HEADACHE TYPE: ICD-10-CM

## 2022-07-20 RX ORDER — SUMATRIPTAN 50 MG/1
100 TABLET, FILM COATED ORAL ONCE AS NEEDED
Qty: 9 TABLET | Refills: 0 | Status: SHIPPED | OUTPATIENT
Start: 2022-07-20 | End: 2022-08-22

## 2022-07-25 DIAGNOSIS — J44.1 CHRONIC OBSTRUCTIVE PULMONARY DISEASE WITH ACUTE EXACERBATION (HCC): ICD-10-CM

## 2022-07-25 DIAGNOSIS — E28.39 ESTROGEN DEFICIENCY: ICD-10-CM

## 2022-07-25 RX ORDER — ALBUTEROL SULFATE 90 UG/1
AEROSOL, METERED RESPIRATORY (INHALATION)
Qty: 16 G | Refills: 0 | Status: SHIPPED | OUTPATIENT
Start: 2022-07-25 | End: 2022-08-17

## 2022-07-25 RX ORDER — ESTROGENS, CONJUGATED 0.45 MG/1
TABLET, FILM COATED ORAL
Qty: 21 TABLET | Refills: 0 | Status: SHIPPED | OUTPATIENT
Start: 2022-07-25 | End: 2022-08-12

## 2022-07-26 ENCOUNTER — OFFICE VISIT (OUTPATIENT)
Dept: FAMILY MEDICINE CLINIC | Facility: CLINIC | Age: 47
End: 2022-07-26
Payer: COMMERCIAL

## 2022-07-26 VITALS
WEIGHT: 176 LBS | TEMPERATURE: 97.9 F | SYSTOLIC BLOOD PRESSURE: 118 MMHG | HEIGHT: 60 IN | OXYGEN SATURATION: 98 % | DIASTOLIC BLOOD PRESSURE: 72 MMHG | BODY MASS INDEX: 34.55 KG/M2 | HEART RATE: 75 BPM | RESPIRATION RATE: 16 BRPM

## 2022-07-26 DIAGNOSIS — Z00.00 HEALTHCARE MAINTENANCE: ICD-10-CM

## 2022-07-26 DIAGNOSIS — Z72.0 TOBACCO USE: ICD-10-CM

## 2022-07-26 DIAGNOSIS — Z12.31 ENCOUNTER FOR SCREENING MAMMOGRAM FOR BREAST CANCER: ICD-10-CM

## 2022-07-26 DIAGNOSIS — G43.919 INTRACTABLE MIGRAINE WITHOUT STATUS MIGRAINOSUS, UNSPECIFIED MIGRAINE TYPE: ICD-10-CM

## 2022-07-26 DIAGNOSIS — J45.998 ASTHMA, PERSISTENT CONTROLLED: ICD-10-CM

## 2022-07-26 DIAGNOSIS — E78.5 HYPERLIPIDEMIA, UNSPECIFIED HYPERLIPIDEMIA TYPE: Primary | ICD-10-CM

## 2022-07-26 PROCEDURE — 99396 PREV VISIT EST AGE 40-64: CPT | Performed by: FAMILY MEDICINE

## 2022-07-26 NOTE — ASSESSMENT & PLAN NOTE
Not well controlled  Last labs 12/21 showed elevated cholesterol, triglycerides, and LDL  Lipid panel ordered, patient to go for blood work ASAP  Will consider beginning a statin medication if levels continue to be elevated  Will continue to monitor and follow up in 6 months or sooner if necessary

## 2022-07-26 NOTE — ASSESSMENT & PLAN NOTE
Well controlled and asymptomatic  Continue same  Will continue to monitor and follow up in 6 months

## 2022-07-26 NOTE — ASSESSMENT & PLAN NOTE
Well controlled on sumatriptan PRN for migraines  Will continue to monitor and follow up in 6 months

## 2022-07-26 NOTE — PROGRESS NOTES
Assessment/Plan:    Asthma, persistent controlled  Well controlled and asymptomatic  Continue same  Will continue to monitor and follow up in 6 months  Migraines  Well controlled on sumatriptan PRN for migraines  Will continue to monitor and follow up in 6 months  Hyperlipidemia  Not well controlled  Last labs 12/21 showed elevated cholesterol, triglycerides, and LDL  Lipid panel ordered, patient to go for blood work ASAP  Will consider beginning a statin medication if levels continue to be elevated  Will continue to monitor and follow up in 6 months or sooner if necessary  Encounter for screening mammogram for breast cancer  Order for annual screening mammogram placed  Tobacco use  Patient smoking 1 PPD x 30+ years  She has previously tried to quit without success and is not interested in quitting at this time  Recommended cutting back and slowly decreasing tobacco intake  Will continue to monitor and follow up in 6 months  Healthcare maintenance  It was discussed about immunizations, diet, exercise and safety measures  Problem List Items Addressed This Visit        Respiratory    Asthma, persistent controlled     Well controlled and asymptomatic  Continue same  Will continue to monitor and follow up in 6 months  Cardiovascular and Mediastinum    Migraines     Well controlled on sumatriptan PRN for migraines  Will continue to monitor and follow up in 6 months  Other    Healthcare maintenance     It was discussed about immunizations, diet, exercise and safety measures  Tobacco use     Patient smoking 1 PPD x 30+ years  She has previously tried to quit without success and is not interested in quitting at this time  Recommended cutting back and slowly decreasing tobacco intake  Will continue to monitor and follow up in 6 months  Hyperlipidemia - Primary     Not well controlled  Last labs 12/21 showed elevated cholesterol, triglycerides, and LDL   Lipid panel ordered, patient to go for blood work ASAP  Will consider beginning a statin medication if levels continue to be elevated  Will continue to monitor and follow up in 6 months or sooner if necessary  Relevant Orders    CBC and differential    Comprehensive metabolic panel    Lipid Panel with Direct LDL reflex    TSH, 3rd generation with Free T4 reflex    Encounter for screening mammogram for breast cancer     Order for annual screening mammogram placed  Relevant Orders    Mammo screening bilateral w 3d & cad      Other Visit Diagnoses     BMI 34 0-34 9,adult                Subjective:      Patient ID: Rosario Nieves is a 49 y o  female  HPI  Patient is a 18-year-old male with a PMH of HLD, asthma/COPD, cervical/lumbar degenerative disc disease, osteoarthritis, and migraines presenting to the office for an annual well visit  She reports taking her medications as prescribed and denies any side effects  She reports migraines that occur 1x per week and are relieved with sumatriptan  Denies associated symptoms  She states overall she's doing well and denies any other complaints at this time  She is due for blood work and her annual mammogram     She reports smoking 1 PPD for the past 30+ years and is not interested in quitting at this time  She previously tried quitting and she tore an intercostal muscle due to intense coughing  She reports that this pain and coughing acts up whenever she tries to quit  The following portions of the patient's history were reviewed and updated as appropriate: allergies, current medications, past family history, past medical history, past social history, past surgical history and problem list     Review of Systems   Constitutional: Negative for chills, diaphoresis, fatigue and fever  HENT: Negative for ear pain and sore throat  Eyes: Negative for pain and visual disturbance  Respiratory: Negative for cough and shortness of breath      Cardiovascular: Negative for chest pain, palpitations and leg swelling  Gastrointestinal: Negative for abdominal pain, constipation, diarrhea, nausea and vomiting  Genitourinary: Negative for dysuria, frequency, hematuria and urgency  Musculoskeletal: Negative for arthralgias and back pain  Skin: Negative for color change and rash  Neurological: Positive for headaches  Negative for dizziness, seizures, syncope and light-headedness  All other systems reviewed and are negative  Objective:      /72 (BP Location: Left arm, Patient Position: Sitting, Cuff Size: Large)   Pulse 75   Temp 97 9 °F (36 6 °C) (Tympanic)   Resp 16   Ht 5' (1 524 m)   Wt 79 8 kg (176 lb)   SpO2 98%   BMI 34 37 kg/m²          Physical Exam  Vitals and nursing note reviewed  Constitutional:       General: She is not in acute distress  Appearance: Normal appearance  She is not toxic-appearing  HENT:      Head: Normocephalic and atraumatic  Right Ear: Tympanic membrane, ear canal and external ear normal       Left Ear: Tympanic membrane, ear canal and external ear normal       Mouth/Throat:      Mouth: Mucous membranes are moist       Pharynx: Oropharynx is clear  Eyes:      Conjunctiva/sclera: Conjunctivae normal    Cardiovascular:      Rate and Rhythm: Normal rate and regular rhythm  Heart sounds: No murmur heard  No friction rub  No gallop  Pulmonary:      Effort: Pulmonary effort is normal       Breath sounds: Normal breath sounds  No wheezing, rhonchi or rales  Musculoskeletal:         General: Normal range of motion  Cervical back: Normal range of motion  Right lower leg: No edema  Left lower leg: No edema  Skin:     General: Skin is warm and dry  Neurological:      General: No focal deficit present  Mental Status: She is alert  Psychiatric:         Mood and Affect: Mood normal        BMI Counseling: Body mass index is 34 37 kg/m²   The BMI is above normal  Nutrition recommendations include reducing portion sizes, decreasing overall calorie intake and 3-5 servings of fruits/vegetables daily  Exercise recommendations include moderate aerobic physical activity for 150 minutes/week

## 2022-07-26 NOTE — ASSESSMENT & PLAN NOTE
Patient smoking 1 PPD x 30+ years  She has previously tried to quit without success and is not interested in quitting at this time  Recommended cutting back and slowly decreasing tobacco intake  Will continue to monitor and follow up in 6 months

## 2022-08-02 DIAGNOSIS — R52 PAIN AGGRAVATED BY WALKING: ICD-10-CM

## 2022-08-02 RX ORDER — CYCLOBENZAPRINE HCL 10 MG
TABLET ORAL
Qty: 30 TABLET | Refills: 0 | Status: SHIPPED | OUTPATIENT
Start: 2022-08-02 | End: 2022-09-06

## 2022-08-03 ENCOUNTER — PATIENT MESSAGE (OUTPATIENT)
Dept: FAMILY MEDICINE CLINIC | Facility: CLINIC | Age: 47
End: 2022-08-03

## 2022-08-03 DIAGNOSIS — G47.00 INSOMNIA, UNSPECIFIED TYPE: ICD-10-CM

## 2022-08-03 NOTE — TELEPHONE ENCOUNTER
Pt last seen 7/26/22 and I copied past refills [facility] and sent to provider for approval      1 JACKADAN DOAN 1975 F [address] [place]    [] 2 JACKADAN DOAN 1975 F [address] [place]   [] 3 JACKADAN DOAN 1975 F [address] [id number]           Search Criteria    NAME DATE OF BIRTH DATE RANGE   jack doan [birth year]-     REQUESTER NAME REQUESTED DATE   Neeraj Clemons Wed Aug 03 2022 15:49:41 GMT-0400 [place] Daylight Time)     Summary  Prescriptions  13  Prescribers  3  Pharmacies  2  View Map    Drug Classes  Benzodiazepines  0  Stimulants  0  Opioids  1  Muscle Relaxants  0    Opioid Dosage  Total MME for Active Prescriptions  0    Average MME  30 71  MME Graph   MME Calculator       · Prescriptions  · Notifications    · Prescribers  · Pharmacies  · MME Graph      [] [place] Drug Categories:     [] Opioids      [] Others      Showentries  Search:  PATIENT ID PRESCRIPTION # FILLED WRITTEN DRUG LABEL QTY DAYS STRENGTH MME** PRESCRIBER PHARMACY PAYMENT REFILL #/AUTH STATE DETAIL   1 [id number] 07/01/2022 07/01/2022 Zolpidem Tartrate (Tablet) 30 0 30 10 MG NA ÁNGELAM(MD) [facility]  [place] / 0 [place]    1 [id number] 06/02/2022 06/02/2022 Zolpidem Tartrate (Tablet) 30 0 30 10 MG NA MARTA(MD) [facility]  [place]    1 [id number] 05/03/2022 05/03/2022 Zolpidem Tartrate (Tablet) 30 0 30 10 MG NA WASSIM(MD) [facility]   [place] 00 / 0 [place]    2 [id number] 04/22/2022 04/22/2022 Promethazine Hcl-codeine Phosphate (Syrup) 120 0 5 10 MG/5 ML-6 25 MG/5 36 0 WASSIM(MD) [employer] 00 / 00 [place]    2 [id number] 03/18/2022 03/18/2022 Zolpidem Tartrate (Tablet) 30 0 30 10 MG NA IKE) [employer] / 00 [place]    2 [id number] 02/18/2022 02/18/2022 Zolpidem Tartrate (Tablet) 30 0 30 10 MG NA IKE) [address] / 00 [place]    1 [id number] 01/13/2022 01/13/2022 Zolpidem Tartrate (Tablet) 30 0 30 10 MG NA IKE) [facility] [place] 00 / 0 [place]    3 [id number] 12/31/2021 12/31/2021 oxyCODONE HCL-ACETAMINOPHEN (Tablet) 7 0 3 325 MG-5 MG 17 50 J[facility]ON FRAGA [facility] [place] 00 / 0 [place]    3 [id number] 12/22/2021 12/22/2021 Promethazine Hydrochloride/codeine (Syrup) 120 0 30 10 MG/5 ML-6 25 MG/5 NA IKE) [facility] [place] 00 / 0 [place]    3 [id number] 12/13/2021 12/13/2021 Promethazine Hydrochloride/codeine (Syrup) 120 0 4 10 MG/5 ML-6 25 MG/5 NA IKE) [facility], [facility]  Private Pay 00 / 0 [place]    Showing 1 to 10 of 13 entries  [id number]       * Per CDC guidance, the conversion factors and associated daily morphine milligram equivalents for drugs prescribed as part of medication-assisted treatment for opioid use disorder should not be used to benchmark against dosage thresholds meant for opioids prescribed for pain  Report Disclaimer:  Information from the [place] [employer] (PDMP) database is protected health information and any information accessed must be treated as confidential  Any person who knowingly or intentionally makes an unauthorized disclosure of information from the [address] [place] [address] will be subject to civil and criminal penalties as set forth in the ABC-[employer] Act 2014-191, Act of Oct  27, 2014, P L  2918  The information in the [address] [place] [address] is submitted by pharmacies and may contain errors and omissions  Independent verification of prescription information with pharmacies and prescribers may sometimes be prudent or necessary        Educational content  CDC MME calculation guidelines  [place] Prescribing Guidelines

## 2022-08-04 RX ORDER — ZOLPIDEM TARTRATE 10 MG/1
TABLET ORAL
Qty: 30 TABLET | Refills: 0 | Status: SHIPPED | OUTPATIENT
Start: 2022-08-04 | End: 2022-09-08

## 2022-08-12 DIAGNOSIS — E28.39 ESTROGEN DEFICIENCY: ICD-10-CM

## 2022-08-12 RX ORDER — ESTROGENS, CONJUGATED 0.45 MG/1
TABLET, FILM COATED ORAL
Qty: 21 TABLET | Refills: 0 | Status: SHIPPED | OUTPATIENT
Start: 2022-08-12 | End: 2022-09-12

## 2022-08-15 ENCOUNTER — OFFICE VISIT (OUTPATIENT)
Dept: FAMILY MEDICINE CLINIC | Facility: CLINIC | Age: 47
End: 2022-08-15
Payer: COMMERCIAL

## 2022-08-15 VITALS
SYSTOLIC BLOOD PRESSURE: 120 MMHG | HEIGHT: 60 IN | BODY MASS INDEX: 34.06 KG/M2 | HEART RATE: 79 BPM | WEIGHT: 173.5 LBS | DIASTOLIC BLOOD PRESSURE: 70 MMHG | TEMPERATURE: 98.3 F | OXYGEN SATURATION: 95 % | RESPIRATION RATE: 16 BRPM

## 2022-08-15 DIAGNOSIS — M25.512 ACUTE PAIN OF LEFT SHOULDER: Primary | ICD-10-CM

## 2022-08-15 PROCEDURE — 99214 OFFICE O/P EST MOD 30 MIN: CPT | Performed by: FAMILY MEDICINE

## 2022-08-15 RX ORDER — PREDNISONE 50 MG/1
50 TABLET ORAL DAILY
Qty: 5 TABLET | Refills: 0 | Status: SHIPPED | OUTPATIENT
Start: 2022-08-15 | End: 2022-08-20

## 2022-08-15 NOTE — PROGRESS NOTES
Assessment/Plan:  Acute pain of left shoulder  She was given prescription for prednisone  Referral to Orthopedics and I am going to check an x-ray of her left shoulder  Apply ice and avoid movement that triggered the pain  Diagnoses and all orders for this visit:    Acute pain of left shoulder  -     predniSONE 50 mg tablet; Take 1 tablet (50 mg total) by mouth daily for 5 days  -     XR shoulder 2+ vw left; Future  -     Ambulatory Referral to Orthopedic Surgery; Future        There are no Patient Instructions on file for this visit  Return if symptoms worsen or fail to improve  Subjective:      Patient ID: Lowell Gerber is a 49 y o  female  Chief Complaint   Patient presents with    Shoulder Pain     left       She is here today with complaint of left shoulder pain started couple days ago when she was walking the dog and he pulled her down and fell on her shoulder with her arm extended  The following portions of the patient's history were reviewed and updated as appropriate: allergies, current medications, past family history, past medical history, past social history, past surgical history and problem list     Review of Systems   Constitutional: Negative for chills and fever  HENT: Negative for trouble swallowing  Eyes: Negative for visual disturbance  Respiratory: Negative for cough and shortness of breath  Cardiovascular: Negative for chest pain, palpitations and leg swelling  Gastrointestinal: Negative for abdominal pain, constipation and diarrhea  Endocrine: Negative for cold intolerance and heat intolerance  Genitourinary: Negative for difficulty urinating and dysuria  Musculoskeletal: Negative for gait problem  Left shoulder pain   Skin: Negative for rash  Neurological: Negative for dizziness, tremors, seizures and headaches  Hematological: Negative for adenopathy  Psychiatric/Behavioral: Negative for behavioral problems           Current Outpatient Medications   Medication Sig Dispense Refill    albuterol (2 5 mg/3 mL) 0 083 % nebulizer solution TAKE 3 ML (2 5 MG) BY NEBULIZATION EVERY 6 (SIX) HOURS AS NEEDED FOR WHEEZING OR SHORTNESS OF BREATH 150 mL 0    albuterol (PROVENTIL HFA,VENTOLIN HFA) 90 mcg/act inhaler INHALE 2 PUFFS BY MOUTH EVERY 6 HOURS AS NEEDED FOR WHEEZING 16 g 0    benzonatate (TESSALON) 200 MG capsule Take 1 capsule (200 mg total) by mouth 3 (three) times a day as needed for cough 30 capsule 0    Combivent Respimat inhaler INHALE 1 PUFF BY MOUTH 4 TIMES A DAY 4 g 1    cyclobenzaprine (FLEXERIL) 10 mg tablet TAKE 1 TABLET BY MOUTH EVERY DAY AS NEEDED 30 tablet 0    fluticasone (FLONASE) 50 mcg/act nasal spray SPRAY 2 SPRAYS INTO EACH NOSTRIL EVERY DAY 16 mL 3    fluticasone-salmeterol (Wixela Inhub) 500-50 mcg/dose inhaler Inhale 1 puff daily Rinse mouth after use  60 blister 0    ibuprofen (MOTRIN) 600 mg tablet TAKE 1 TABLET (600 MG TOTAL) BY MOUTH EVERY 8 (EIGHT) HOURS AS NEEDED FOR MILD PAIN  45 tablet 0    montelukast (SINGULAIR) 10 mg tablet TAKE 1 TABLET BY MOUTH EVERY DAY IN THE EVENING 90 tablet 0    pantoprazole (PROTONIX) 40 mg tablet TAKE 1 TABLET BY MOUTH EVERY DAY BEFORE BREAKFAST 30 tablet 1    predniSONE 50 mg tablet Take 1 tablet (50 mg total) by mouth daily for 5 days 5 tablet 0    Premarin 0 45 MG tablet TAKE 1 TABLET (0 45 MG TOTAL) BY MOUTH DAILY TAKE DAILY FOR 21 DAYS THEN DO NOT TAKE FOR 7 DAYS  21 tablet 0    promethazine-codeine (PHENERGAN WITH CODEINE) 6 25-10 mg/5 mL syrup Take 5 mL by mouth every 4 (four) hours as needed for cough 120 mL 0    SUMAtriptan (IMITREX) 50 mg tablet TAKE 2 TABLETS (100 MG TOTAL) BY MOUTH ONCE AS NEEDED FOR MIGRAINE 9 tablet 0    zolpidem (AMBIEN) 10 mg tablet TAKE 1 TABLET BY MOUTH DAILY AT BEDTIME AS NEEDED FOR SLEEP  30 tablet 0     No current facility-administered medications for this visit         Objective:    /70 (BP Location: Left arm, Patient Position: Sitting, Cuff Size: Large)   Pulse 79   Temp 98 3 °F (36 8 °C) (Tympanic)   Resp 16   Ht 5' (1 524 m)   Wt 78 7 kg (173 lb 8 oz)   SpO2 95%   BMI 33 88 kg/m²        Physical Exam  Vitals and nursing note reviewed  Constitutional:       Appearance: She is well-developed  HENT:      Head: Normocephalic and atraumatic  Eyes:      Pupils: Pupils are equal, round, and reactive to light  Cardiovascular:      Rate and Rhythm: Normal rate and regular rhythm  Heart sounds: Normal heart sounds  Pulmonary:      Effort: Pulmonary effort is normal       Breath sounds: Normal breath sounds  Abdominal:      General: Bowel sounds are normal       Palpations: Abdomen is soft  Musculoskeletal:         General: Tenderness (Tenderness to palpation the posterior and medial aspect of the left shoulder  Range of motion limited due to pain ) present  Cervical back: Normal range of motion and neck supple  Lymphadenopathy:      Cervical: No cervical adenopathy  Skin:     General: Skin is warm  Neurological:      Mental Status: She is alert and oriented to person, place, and time  Cranial Nerves: No cranial nerve deficit                  Oly Lobo MD

## 2022-08-15 NOTE — ASSESSMENT & PLAN NOTE
She was given prescription for prednisone  Referral to Orthopedics and I am going to check an x-ray of her left shoulder  Apply ice and avoid movement that triggered the pain

## 2022-08-16 ENCOUNTER — HOSPITAL ENCOUNTER (OUTPATIENT)
Dept: RADIOLOGY | Facility: IMAGING CENTER | Age: 47
Discharge: HOME | End: 2022-08-16
Payer: COMMERCIAL

## 2022-08-16 ENCOUNTER — APPOINTMENT (OUTPATIENT)
Dept: LAB | Facility: IMAGING CENTER | Age: 47
End: 2022-08-16
Payer: COMMERCIAL

## 2022-08-16 DIAGNOSIS — E78.5 HYPERLIPIDEMIA, UNSPECIFIED HYPERLIPIDEMIA TYPE: ICD-10-CM

## 2022-08-16 DIAGNOSIS — M25.512 ACUTE PAIN OF LEFT SHOULDER: ICD-10-CM

## 2022-08-16 LAB
ALBUMIN SERPL BCP-MCNC: 3.6 G/DL (ref 3.5–5)
ALP SERPL-CCNC: 87 U/L (ref 46–116)
ALT SERPL W P-5'-P-CCNC: 17 U/L (ref 12–78)
ANION GAP SERPL CALCULATED.3IONS-SCNC: 3 MMOL/L (ref 4–13)
AST SERPL W P-5'-P-CCNC: 12 U/L (ref 5–45)
BASOPHILS # BLD AUTO: 0.01 THOUSANDS/ΜL (ref 0–0.1)
BASOPHILS NFR BLD AUTO: 0 % (ref 0–1)
BILIRUB SERPL-MCNC: 0.28 MG/DL (ref 0.2–1)
BUN SERPL-MCNC: 14 MG/DL (ref 5–25)
CALCIUM SERPL-MCNC: 10.1 MG/DL (ref 8.3–10.1)
CHLORIDE SERPL-SCNC: 109 MMOL/L (ref 96–108)
CHOLEST SERPL-MCNC: 231 MG/DL
CO2 SERPL-SCNC: 26 MMOL/L (ref 21–32)
CREAT SERPL-MCNC: 1.03 MG/DL (ref 0.6–1.3)
EOSINOPHIL # BLD AUTO: 0.02 THOUSAND/ΜL (ref 0–0.61)
EOSINOPHIL NFR BLD AUTO: 0 % (ref 0–6)
ERYTHROCYTE [DISTWIDTH] IN BLOOD BY AUTOMATED COUNT: 13.3 % (ref 11.6–15.1)
GFR SERPL CREATININE-BSD FRML MDRD: 64 ML/MIN/1.73SQ M
GLUCOSE P FAST SERPL-MCNC: 105 MG/DL (ref 65–99)
HCT VFR BLD AUTO: 39.8 % (ref 34.8–46.1)
HDLC SERPL-MCNC: 62 MG/DL
HGB BLD-MCNC: 12.6 G/DL (ref 11.5–15.4)
IMM GRANULOCYTES # BLD AUTO: 0.02 THOUSAND/UL (ref 0–0.2)
IMM GRANULOCYTES NFR BLD AUTO: 0 % (ref 0–2)
LDLC SERPL CALC-MCNC: 155 MG/DL (ref 0–100)
LYMPHOCYTES # BLD AUTO: 1.52 THOUSANDS/ΜL (ref 0.6–4.47)
LYMPHOCYTES NFR BLD AUTO: 17 % (ref 14–44)
MCH RBC QN AUTO: 30.1 PG (ref 26.8–34.3)
MCHC RBC AUTO-ENTMCNC: 31.7 G/DL (ref 31.4–37.4)
MCV RBC AUTO: 95 FL (ref 82–98)
MONOCYTES # BLD AUTO: 0.47 THOUSAND/ΜL (ref 0.17–1.22)
MONOCYTES NFR BLD AUTO: 5 % (ref 4–12)
NEUTROPHILS # BLD AUTO: 6.96 THOUSANDS/ΜL (ref 1.85–7.62)
NEUTS SEG NFR BLD AUTO: 78 % (ref 43–75)
NRBC BLD AUTO-RTO: 0 /100 WBCS
PLATELET # BLD AUTO: 260 THOUSANDS/UL (ref 149–390)
PMV BLD AUTO: 11.5 FL (ref 8.9–12.7)
POTASSIUM SERPL-SCNC: 5 MMOL/L (ref 3.5–5.3)
PROT SERPL-MCNC: 7.1 G/DL (ref 6.4–8.4)
RBC # BLD AUTO: 4.19 MILLION/UL (ref 3.81–5.12)
SODIUM SERPL-SCNC: 138 MMOL/L (ref 135–147)
TRIGL SERPL-MCNC: 69 MG/DL
TSH SERPL DL<=0.05 MIU/L-ACNC: 0.95 UIU/ML (ref 0.45–4.5)
WBC # BLD AUTO: 9 THOUSAND/UL (ref 4.31–10.16)

## 2022-08-16 PROCEDURE — 85025 COMPLETE CBC W/AUTO DIFF WBC: CPT

## 2022-08-16 PROCEDURE — 36415 COLL VENOUS BLD VENIPUNCTURE: CPT

## 2022-08-16 PROCEDURE — 80061 LIPID PANEL: CPT

## 2022-08-16 PROCEDURE — 84443 ASSAY THYROID STIM HORMONE: CPT

## 2022-08-16 PROCEDURE — 73030 X-RAY EXAM OF SHOULDER: CPT

## 2022-08-16 PROCEDURE — 80053 COMPREHEN METABOLIC PANEL: CPT

## 2022-08-17 ENCOUNTER — TELEPHONE (OUTPATIENT)
Dept: FAMILY MEDICINE CLINIC | Facility: CLINIC | Age: 47
End: 2022-08-17

## 2022-08-17 DIAGNOSIS — J44.1 CHRONIC OBSTRUCTIVE PULMONARY DISEASE WITH ACUTE EXACERBATION (HCC): ICD-10-CM

## 2022-08-17 RX ORDER — ALBUTEROL SULFATE 90 UG/1
AEROSOL, METERED RESPIRATORY (INHALATION)
Qty: 18 G | Refills: 2 | Status: SHIPPED | OUTPATIENT
Start: 2022-08-17 | End: 2022-10-24

## 2022-08-17 NOTE — TELEPHONE ENCOUNTER
----- Message from [place], [place] sent at 8/17/2022  9:59 AM EDT -----  Brigitte Gaston showed arthritis and calcific tendinitis  She has appt with ortho on 8/18

## 2022-08-17 NOTE — TELEPHONE ENCOUNTER
----- Message from [address] sent at 8/17/2022 10:00 AM EDT -----  Labs show elevated fasting glucose  Cholesterol is elevated but improved from previous  Rest of labs are stable

## 2022-08-18 ENCOUNTER — OFFICE VISIT (OUTPATIENT)
Dept: OBGYN CLINIC | Facility: CLINIC | Age: 47
End: 2022-08-18
Payer: COMMERCIAL

## 2022-08-18 VITALS
WEIGHT: 173 LBS | BODY MASS INDEX: 33.96 KG/M2 | SYSTOLIC BLOOD PRESSURE: 134 MMHG | HEIGHT: 60 IN | DIASTOLIC BLOOD PRESSURE: 74 MMHG

## 2022-08-18 DIAGNOSIS — M54.12 LEFT CERVICAL RADICULOPATHY: Primary | ICD-10-CM

## 2022-08-18 PROCEDURE — 99203 OFFICE O/P NEW LOW 30 MIN: CPT | Performed by: PHYSICIAN ASSISTANT

## 2022-08-18 PROCEDURE — 99213 OFFICE O/P EST LOW 20 MIN: CPT | Performed by: PHYSICIAN ASSISTANT

## 2022-08-18 RX ORDER — METHOCARBAMOL 500 MG/1
500 TABLET, FILM COATED ORAL 3 TIMES DAILY
Qty: 60 TABLET | Refills: 0 | Status: SHIPPED | OUTPATIENT
Start: 2022-08-18

## 2022-08-18 NOTE — PROGRESS NOTES
Patient Name:  Shawn Hu  MRN:  [id number]    Assessment & Plan     Left sided cervical radiculopathy  1  MRI of the cervical spine ordered today  2  Prescription for Robaxin due to spasm  3  Referral to physical therapy  4  Continue ibuprofen  5  Referral placed for pain management to be seen after MRI is completed  Chief Complaint     Left shoulder and cervical spine pain    History of the Present Illness     Shawn Hu is a 49 y o  female who reports to the office today for evaluation of her left shoulder and cervical spine  Patient states she is walking her dog approximately two weeks ago when the dog pulled on the lease jarring her right shoulder and neck  Since then she notes pain along the left side of the cervical spine with radiation distally to the digits  She notes associated numbness and tingling as well  Discomfort is worse with increased activity  She notes a recent flare-up in her pain and numbness and tingling after playing volleyball  She notes associated weakness due to pain  No instability  She has been taking prednisone and ibuprofen without improvement  No bowel or bladder dysfunction  No saddle paresthesias  No gait or balance issues  Physical Exam     /74   Ht 5' (1 524 m)   Wt 78 5 kg (173 lb)   BMI 33 79 kg/m²     Cervical spine:  No gross deformity  Skin intact  No tenderness to palpation midline  There is tenderness to palpation left paraspinal musculature and left trapezius  No tenderness to palpation AC joint the shoulder  No tenderness to palpation anterior posterior shoulder  Mild tenderness to palpation lateral shoulder  Cervical spine range of motion is intact and full with discomfort with flexion and extension  PROM of the shoulder is , ER-abd 90, IR-abd 50  Impingement signs are mildly positive    Negative empty can test   Negative speed's test   Negative cross-body adduction test   Motor intact C5-T1 bilaterally 5/5 strength  Sensation intact but diminished throughout the entire left upper extremity  Positive Spurling's on the left  Negative Dulce's bilaterally  Eyes: Anicteric sclerae  ENT: Trachea midline  Lungs: Normal respiratory effort  CV: Capillary refill is less than 2 seconds  Skin: Intact without erythema  Lymph: No palpable lymphadenopathy  Neuro: Sensation is grossly intact to light touch  Psych: Mood and affect are appropriate  Data Review     I have personally reviewed pertinent films in PACS, and my interpretation follows:    X-rays left shoulder 8/16/22: Calcification superior to the greater tuberosity indicative of calcific tendinitis/bursitis  No fracture or dislocation  No significant degenerative change  X-rays cervical spine 8/18/22:  Multilevel DDD appreciated  Loss of cervical lordosis suggestive of spasm  No fracture or spondylolisthesis evident  Past Medical History:   Diagnosis Date    Anxiety     h/o 2 panic attacks     Arthritis     l hip congenital dyspasia    Asthma     good control    Bleb     R eye    Chronic kidney disease     one kidney left side    Chronic pain     back- DJD in 2 upper 2 lower, buldging disc c-7    Chronic pain disorder     lower back and hips    Depression     Endometriosis     GERD (gastroesophageal reflux disease)     Glaucoma     b/l    Bleb in R eye    Glaucoma     H/O carpal tunnel syndrome     L    Headache     migraines    History of cigarette smoking     History of transfusion     Hypercholesterolemia 1/17/2013    Insomnia     Migraines     Osteoarthritis     cervical spondylarthritis    Polypoid cystitis     POLYP CYSTS ON BUTTOCK AREA    Renal disorder     Reports single kidney    Right knee injury     SCRAPED GROWTH PLATE RIGHT KNEE    Seasonal allergies        Past Surgical History:   Procedure Laterality Date    BLEPHAROPLASTY Right     BLEB RIGHT EYE    BUNIONECTOMY Bilateral     2 on R, 3 on left    CARPAL TUNNEL RELEASE Left     CT NEEDLE BX ASPIRATION INJECTION LOCALIZATION  3/8/2019    EYE SURGERY Right     bleb implant    EYE SURGERY Bilateral     lazer- glaucoma    FOOT HARDWARE REMOVAL Left 7/2/2018    Procedure: CALCANEAL REMOVAL OF HARDWARE;  Surgeon: Jenny Ardon DPM;  Location: [facility] MAIN OR;  Service: Podiatry    FOOT HARDWARE REMOVAL Left 7/5/2018    Procedure: LEFT CALCANEAL REMOVAL OF HARDWARE;  Surgeon: Jenny Ardon DPM;  Location: [facility] MAIN OR;  Service: Podiatry    FOOT SURGERY Right     BONE REMOVED BOTTOM OF RIGHT FOOT    HAND SURGERY Right     ring finger tendon severed    HARDWARE REMOVAL Left 7/5/2018    Procedure: LEFT BIG TOE REMOVAL OF HARDWARE;  Surgeon: Jenny Ardon DPM;  Location: [facility] MAIN OR;  Service: Podiatry    HIP SURGERY Left     reconstruction as child    HYSTERECTOMY      total abdominal    HYSTERECTOMY      TOTAL    JOINT REPLACEMENT      LTHR and reconstruction    KNEE ARTHROSCOPY      R knee    LEG SURGERY      LISFRANC ARTHRODESIS Left 11/3/2017    Procedure: FUSION FIRST MTPJ: Michael Zapien; LATERAL ANKLE STABILIZATION;  Surgeon: Jenny Ardon DPM;  Location: [facility];  Service: Podiatry    OOPHORECTOMY      age 35   Jullie Liming PILONIDAL CYST EXCISION      midline    NJ DECOMPRESS LEG,ANT/LAT & POST Left 7/5/2018    Procedure: LEFT PERONEAL TENDON DEBRIDMENT ;  Surgeon: Jenny Ardon DPM;  Location: [facility] MAIN OR;  Service: Podiatry    NJ OSTEOTOMY HEEL BONE Left 11/3/2017    Procedure: OSTEOTOMY CALCANEUS;  Surgeon: Jenny Ardon DPM;  Location: [facility];  Service: Podiatry    NJ REPAIR/GRAFT FLEX FOOT TENDON Left 7/2/2018    Procedure: PERONEAL TENDON EXPLORATION;  Surgeon: Jenny Ardon DPM;  Location: [facility] MAIN OR;  Service: Podiatry    REFRACTIVE SURGERY Bilateral     LASER SURGERY BOTH EYES    TOTAL HIP ARTHROPLASTY  2005    WRIST SURGERY Left        Allergies   Allergen Reactions    Morphine Other (See Comments)     Blood pressure drops    Morphine And Related Other (See Comments)     Blood pressure drops    Penicillins Itching and Rash    Quazepam Itching       Current Outpatient Medications on File Prior to Visit   Medication Sig Dispense Refill    albuterol (2 5 mg/3 mL) 0 083 % nebulizer solution TAKE 3 ML (2 5 MG) BY NEBULIZATION EVERY 6 (SIX) HOURS AS NEEDED FOR WHEEZING OR SHORTNESS OF BREATH 150 mL 0    albuterol (PROVENTIL HFA,VENTOLIN HFA) 90 mcg/act inhaler TAKE 2 PUFFS BY MOUTH EVERY 6 HOURS AS NEEDED FOR WHEEZE 18 g 2    benzonatate (TESSALON) 200 MG capsule Take 1 capsule (200 mg total) by mouth 3 (three) times a day as needed for cough 30 capsule 0    Combivent Respimat inhaler INHALE 1 PUFF BY MOUTH 4 TIMES A DAY 4 g 1    cyclobenzaprine (FLEXERIL) 10 mg tablet TAKE 1 TABLET BY MOUTH EVERY DAY AS NEEDED 30 tablet 0    fluticasone (FLONASE) 50 mcg/act nasal spray SPRAY 2 SPRAYS INTO EACH NOSTRIL EVERY DAY 16 mL 3    fluticasone-salmeterol (Wixela Inhub) 500-50 mcg/dose inhaler Inhale 1 puff daily Rinse mouth after use  60 blister 0    ibuprofen (MOTRIN) 600 mg tablet TAKE 1 TABLET (600 MG TOTAL) BY MOUTH EVERY 8 (EIGHT) HOURS AS NEEDED FOR MILD PAIN  45 tablet 0    montelukast (SINGULAIR) 10 mg tablet TAKE 1 TABLET BY MOUTH EVERY DAY IN THE EVENING 90 tablet 0    pantoprazole (PROTONIX) 40 mg tablet TAKE 1 TABLET BY MOUTH EVERY DAY BEFORE BREAKFAST 30 tablet 1    predniSONE 50 mg tablet Take 1 tablet (50 mg total) by mouth daily for 5 days 5 tablet 0    Premarin 0 45 MG tablet TAKE 1 TABLET (0 45 MG TOTAL) BY MOUTH DAILY TAKE DAILY FOR 21 DAYS THEN DO NOT TAKE FOR 7 DAYS  21 tablet 0    promethazine-codeine (PHENERGAN WITH CODEINE) 6 25-10 mg/5 mL syrup Take 5 mL by mouth every 4 (four) hours as needed for cough 120 mL 0    SUMAtriptan (IMITREX) 50 mg tablet TAKE 2 TABLETS (100 MG TOTAL) BY MOUTH ONCE AS NEEDED FOR MIGRAINE 9 tablet 0    zolpidem (AMBIEN) 10 mg tablet TAKE 1 TABLET BY MOUTH DAILY AT BEDTIME AS NEEDED FOR SLEEP   30 tablet 0 No current facility-administered medications on file prior to visit  Social History     Tobacco Use    Smoking status: Current Every Day Smoker     Packs/day: 0 50     Years: 30 00     Pack years: 15 00     Types: Cigarettes    Smokeless tobacco: Never Used   Vaping Use    Vaping Use: Never used   Substance Use Topics    Alcohol use: No    Drug use: No       Family History   Problem Relation Age of Onset    Cancer Mother     Hypertension Mother     Bone cancer Mother 40        COD    Breast cancer Mother 40    Hypertension Father     Heart disease Father     Dementia Father     Liver cancer Paternal Grandmother 68    Hypertension Paternal Grandfather     Stroke Paternal Grandfather     Lung cancer Sister 40    Brain cancer Sister 52    No Known Problems Maternal Grandmother     No Known Problems Maternal Grandfather     No Known Problems Sister     Breast cancer Maternal Aunt     Breast cancer Maternal Aunt 53       Review of Systems     As stated in the HPI  All other systems reviewed and are negative

## 2022-08-21 DIAGNOSIS — R51.9 NONINTRACTABLE HEADACHE, UNSPECIFIED CHRONICITY PATTERN, UNSPECIFIED HEADACHE TYPE: ICD-10-CM

## 2022-08-21 DIAGNOSIS — J45.21 INTERMITTENT ASTHMA WITH ACUTE EXACERBATION, UNSPECIFIED ASTHMA SEVERITY: ICD-10-CM

## 2022-08-22 DIAGNOSIS — R52 PAIN: ICD-10-CM

## 2022-08-22 RX ORDER — SUMATRIPTAN 50 MG/1
100 TABLET, FILM COATED ORAL ONCE AS NEEDED
Qty: 9 TABLET | Refills: 0 | Status: SHIPPED | OUTPATIENT
Start: 2022-08-22 | End: 2022-09-19

## 2022-08-22 RX ORDER — IBUPROFEN 600 MG/1
600 TABLET ORAL EVERY 8 HOURS PRN
Qty: 45 TABLET | Refills: 0 | Status: SHIPPED | OUTPATIENT
Start: 2022-08-22 | End: 2022-09-08

## 2022-08-22 RX ORDER — IPRATROPIUM/ALBUTEROL SULFATE 20-100 MCG
MIST INHALER (GRAM) INHALATION
Qty: 4 G | Refills: 1 | Status: SHIPPED | OUTPATIENT
Start: 2022-08-22 | End: 2022-10-11

## 2022-08-22 NOTE — TELEPHONE ENCOUNTER
Pharmacy requesting refill on combivent inhaler and Imitrex  Last seen 8/15/22  Medication sent to pharmacy

## 2022-08-25 ENCOUNTER — HOSPITAL ENCOUNTER (OUTPATIENT)
Dept: MRI IMAGING | Facility: HOSPITAL | Age: 47
End: 2022-08-25
Payer: COMMERCIAL

## 2022-08-25 DIAGNOSIS — M54.12 LEFT CERVICAL RADICULOPATHY: ICD-10-CM

## 2022-08-25 PROCEDURE — 72141 MRI NECK SPINE W/O DYE: CPT

## 2022-08-25 PROCEDURE — G1004 CDSM NDSC: HCPCS

## 2022-08-28 DIAGNOSIS — Z88.9 MULTIPLE ALLERGIES: ICD-10-CM

## 2022-08-28 DIAGNOSIS — R10.84 GENERALIZED ABDOMINAL PAIN: ICD-10-CM

## 2022-08-28 DIAGNOSIS — R14.0 BLOATING: ICD-10-CM

## 2022-08-29 RX ORDER — MONTELUKAST SODIUM 10 MG/1
TABLET ORAL
Qty: 90 TABLET | Refills: 1 | Status: SHIPPED | OUTPATIENT
Start: 2022-08-29

## 2022-08-29 RX ORDER — PANTOPRAZOLE SODIUM 40 MG/1
TABLET, DELAYED RELEASE ORAL
Qty: 90 TABLET | Refills: 1 | Status: SHIPPED | OUTPATIENT
Start: 2022-08-29

## 2022-08-29 NOTE — TELEPHONE ENCOUNTER
Pharmacy requesting refill on Singulair and Pantoprazole   Last seen 8/15/22  Medications sent to pharmacy

## 2022-08-30 ENCOUNTER — OFFICE VISIT (OUTPATIENT)
Dept: OBGYN CLINIC | Facility: CLINIC | Age: 47
End: 2022-08-30
Payer: COMMERCIAL

## 2022-08-30 VITALS
BODY MASS INDEX: 34.95 KG/M2 | SYSTOLIC BLOOD PRESSURE: 138 MMHG | WEIGHT: 178 LBS | HEIGHT: 60 IN | DIASTOLIC BLOOD PRESSURE: 88 MMHG | HEART RATE: 73 BPM

## 2022-08-30 DIAGNOSIS — M50.30 BULGING OF CERVICAL INTERVERTEBRAL DISC: ICD-10-CM

## 2022-08-30 DIAGNOSIS — M54.12 LEFT CERVICAL RADICULOPATHY: Primary | ICD-10-CM

## 2022-08-30 DIAGNOSIS — S43.422A SPRAIN OF LEFT ROTATOR CUFF CAPSULE, INITIAL ENCOUNTER: ICD-10-CM

## 2022-08-30 PROCEDURE — 99214 OFFICE O/P EST MOD 30 MIN: CPT | Performed by: FAMILY MEDICINE

## 2022-08-30 NOTE — PATIENT INSTRUCTIONS
F/u 6 wks  Referral to pain management  Icing/heat/OTC pain meds as needed  Home exercises  Begin physical therapy  Consider referral to Ortho hand- hx of CTR if no improvement with pain management  N/t most likely due to radiculopathy

## 2022-08-30 NOTE — PROGRESS NOTES
[facility] [address] [facility]  [address]  [address] [address] [phone]      Chief Complaint:  Chief Complaint   Patient presents with    Left Shoulder - Follow-up       Vitals:  /88   Pulse 73   Ht 5' (1 524 m)   Wt 80 7 kg (178 lb)   BMI 34 76 kg/m²     The following portions of the patient's history were reviewed and updated as appropriate: allergies, current medications, past family history, past medical history, past social history, past surgical history, and problem list       Subjective:   Patient ID: Jarett Pan is a 49 y o  female  Here for f/u neck pain/MRI results  Having less pain but turning left causing sharp pinch  Pain radiates down L arm  N/t in L hand  Dec sensation in L hand    MRI CERVICAL SPINE WITHOUT CONTRAST     INDICATION: M54 12: Radiculopathy, cervical region      COMPAR[facility]     TECHNIQUE:  Sagittal T1, sagittal T2, sagittal inversion recovery, axial T2, axial  2D merge     IMAGE QUALITY:  Diagnostic     FINDINGS:     ALIGNMENT:  Mild reversal of the mid to upper cervical lordosis  No compression fracture  No subluxation  No scoliosis      MARROW SIGNAL:  Normal marrow signal is identified within the visualized bony structures  No discrete marrow lesion      CERVICAL AND VISUALIZED THORACIC CORD:  Normal signal within the visualized cord      PREVERTEBRAL AND PARASPINAL SOFT TISSUES:  Normal      VISUALIZED POSTERIOR FOSSA:  The visualized posterior fossa demonstrates no abnormal signal      CERVICAL DISC SPACES:     C2-3: No focal disc herniation, central canal stenosis, or neural foraminal narrowing        C3-4: Broad-based right paracentral, increased in size, abutting the ventral aspect of the cord  No central canal narrowing  Mild right subarticular/lateral recess narrowing    No neural foraminal stenosis      C4-5: Diffuse disc bulge, eccentric to the right   Mild central canal narrowing  Mild right neural foraminal stenosis      C5-6: Diffuse disc osteophyte complex with bilateral uncovertebral hypertrophy partially effacing the ventral subarachnoid space  Mild central canal narrowing  Moderate bilateral neural foraminal stenosis is again      C6-7: Diffuse disc osteophyte complex with bilateral uncovertebral with a stable broad-based central disc protrusion  Mild central canal narrowing  Moderate bilateral neural foraminal stenosis      C7-T1: No focal disc herniation, central canal stenosis, or neural foraminal narrowing  UPPER THORACIC DISC SPACES:  Normal      IMPRESSION:  1  Mild reversal of the mid to upper cervical lordosis  The cervical vertebral body heights are maintained demonstrating no acute fracture or subluxation  2  Multilevel degenerative disc disease with no new disc herniation compared to 3/12/2018  3  C3-4 broad-based right paracentral disc protrusion, increased in size and abutting the ventral aspect of the cord  4  Stable disc osteophyte complexes at C5-6 and C6-7 levels with an unchanged central disc protrusion at C6-7 causing mild central canal narrowing  5   The cervical cord appears normal in caliber and signal      Workstation performed: [id number]         Review of Systems   Constitutional: Negative for fatigue and fever  Respiratory: Negative for shortness of breath  Cardiovascular: Negative for chest pain  Gastrointestinal: Negative for abdominal pain and nausea  Genitourinary: Negative for dysuria  Musculoskeletal: Positive for back pain  Skin: Negative for rash and wound  Neurological: Negative for weakness and headaches  Objective:  Back Exam     Tenderness   The patient is experiencing tenderness in the cervical     Range of Motion   The patient has normal back ROM      Comments:  Pain with flex/ext/rot B/lat flex B  Pos spurling L      Left Shoulder Exam     Tenderness   Left shoulder tenderness location: post shoulder TTP  Range of Motion   The patient has normal left shoulder ROM  Muscle Strength   The patient has normal left shoulder strength  Tests   Archuleta test: positive    Comments:  Neg push off test            Physical Exam  Constitutional:       Appearance: Normal appearance  She is normal weight  Eyes:      Extraocular Movements: Extraocular movements intact  Pulmonary:      Effort: Pulmonary effort is normal    Musculoskeletal:         General: Tenderness present  Cervical back: Normal range of motion  Comments: L hand dec sensation fingers 1-3  Equivocal tinel/phalen   Skin:     General: Skin is warm and dry  Neurological:      General: No focal deficit present  Mental Status: She is alert and oriented to person, place, and time  Mental status is at baseline  Psychiatric:         Mood and Affect: Mood normal          Behavior: Behavior normal          Thought Content: Thought content normal          Judgment: Judgment normal          I have personally reviewed pertinent films in PACS and my interpretation is MRI C spine-  C3/4 broad based disc bulge         Assessment/Plan:  Assessment/Plan   Diagnoses and all orders for this visit:    Left cervical radiculopathy  -     Ambulatory Referral to Physical Therapy; Future  -     Ambulatory Referral to Pain Management; Future    Bulging of cervical intervertebral disc  -     Ambulatory Referral to Physical Therapy; Future  -     Ambulatory Referral to Pain Management; Future    Sprain of left rotator cuff capsule, initial encounter  -     Ambulatory Referral to Physical Therapy; Future        Return in about 6 weeks (around 10/11/2022) for Sekou Pelletier MD

## 2022-08-31 DIAGNOSIS — J01.00 ACUTE NON-RECURRENT MAXILLARY SINUSITIS: ICD-10-CM

## 2022-08-31 RX ORDER — FLUTICASONE PROPIONATE 50 MCG
SPRAY, SUSPENSION (ML) NASAL
Qty: 16 ML | Refills: 3 | Status: SHIPPED | OUTPATIENT
Start: 2022-08-31

## 2022-09-04 DIAGNOSIS — R52 PAIN AGGRAVATED BY WALKING: ICD-10-CM

## 2022-09-06 RX ORDER — CYCLOBENZAPRINE HCL 10 MG
TABLET ORAL
Qty: 30 TABLET | Refills: 0 | Status: SHIPPED | OUTPATIENT
Start: 2022-09-06 | End: 2022-10-06

## 2022-09-07 DIAGNOSIS — J45.909 ASTHMA, UNSPECIFIED ASTHMA SEVERITY, UNSPECIFIED WHETHER COMPLICATED, UNSPECIFIED WHETHER PERSISTENT: ICD-10-CM

## 2022-09-07 DIAGNOSIS — G47.00 INSOMNIA, UNSPECIFIED TYPE: ICD-10-CM

## 2022-09-07 DIAGNOSIS — R52 PAIN: ICD-10-CM

## 2022-09-08 ENCOUNTER — EVALUATION (OUTPATIENT)
Dept: PHYSICAL THERAPY | Facility: CLINIC | Age: 47
End: 2022-09-08
Payer: COMMERCIAL

## 2022-09-08 DIAGNOSIS — M75.42 IMPINGEMENT SYNDROME OF LEFT SHOULDER: ICD-10-CM

## 2022-09-08 DIAGNOSIS — M54.12 LEFT CERVICAL RADICULOPATHY: Primary | ICD-10-CM

## 2022-09-08 PROCEDURE — 97162 PT EVAL MOD COMPLEX 30 MIN: CPT

## 2022-09-08 PROCEDURE — 97112 NEUROMUSCULAR REEDUCATION: CPT

## 2022-09-08 PROCEDURE — 97140 MANUAL THERAPY 1/> REGIONS: CPT

## 2022-09-08 RX ORDER — IBUPROFEN 600 MG/1
600 TABLET ORAL EVERY 8 HOURS PRN
Qty: 45 TABLET | Refills: 2 | Status: SHIPPED | OUTPATIENT
Start: 2022-09-08

## 2022-09-08 RX ORDER — ZOLPIDEM TARTRATE 10 MG/1
TABLET ORAL
Qty: 30 TABLET | Refills: 0 | Status: SHIPPED | OUTPATIENT
Start: 2022-09-08 | End: 2022-10-06

## 2022-09-08 RX ORDER — ALBUTEROL SULFATE 2.5 MG/3ML
SOLUTION RESPIRATORY (INHALATION)
Qty: 150 ML | Refills: 2 | Status: SHIPPED | OUTPATIENT
Start: 2022-09-08

## 2022-09-08 NOTE — PROGRESS NOTES
PT Evaluation     Today's date: 2022  Patient name: Angie Leslie  : 1975  MRN: [id number]  Referring provider: Juan Day MD  Dx:   Encounter Diagnosis     ICD-10-CM    1  Left cervical radiculopathy  M54 12    2  Impingement syndrome of left shoulder  M75 42        Start Time: 1110  Stop Time: 1200  Total time in clinic (min): 50 minutes    Assessment  Assessment details: Patient is a 49 y o  female who reports to outpatient physical therapy with L neck/shoulder pain  Probable movement impairment diagnosis of L cervical radiculopathy, L shoulder impingement/RTC tendinopathy resulting in pathoanatomical symptoms of decreased neck/shoulder ROM, L neck/shoulder pain, decreased cervical/L shoulder strength, numbness/tingling of L hand/UE, decreased L UE sensation and limiting ability to , complete normal daily activity  Imaging, decreased L cervical rotation, ULTT, positive Spurling test indicate likely L cervical radiculopathy however patient did not centralize with traction/distraction today  Patient was educated on HEP  Skilled physical therapy is warranted for the application of the interventions found below in the planned intervention section  Etiologic factors include specific KAYLEE of L UE traction  Prognosis is good given HEP compliance and attendance to physical therapy 2x a week for 8 weeks  Positive prognostic indicators include low irritability  Negative prognostic indicators include decreased sensation/numbness  Please contact me if you have any questions or recommendations  Thank you for the referral and the opportunity to share in Geraldine's care  Patient verbalized understanding of POC, HEP, and return demonstrated HEP  Impairments: abnormal or restricted ROM, activity intolerance, impaired physical strength, lacks appropriate home exercise program, pain with function and poor posture     Goals  STG (4 weeks)  1   Patient's L shoulder flexion AROM will increase to [facility] with 0/10 pain for increased ability to perform overhead ADLs  2  Patient will have )/10 pain in neck at rest   3  Patient's L shoulder strength will increase to 5/5 for increased ability to lift  4  Patient will report 50% reduction in numbness/tingling symptoms  LTG (8 weeks)  1  Patient's UE AROM equal bilaterally for ability to complete hair hygiene, overhead, and behind the back ADLs  2  Patient's UE strength will be equal bilaterally for ability to lift and carry at PLOF  3  Patient will be independent with home exercise program for continued maintenance post PT discharge  4  Patient will report elimination of numbness/tingling symptoms  Plan  Plan details: Re-Evaluation in 4 weeks  F/u w/MD in 4 weeks  DNF/scapular/RTC strengthening  Cervical traction  Neural mobilization  Cervical ROM/STM  Shoulder ROM  Planned therapy interventions: manual therapy, neuromuscular re-education, patient education, postural training, therapeutic activities, therapeutic exercise, home exercise program and functional ROM exercises  Duration in weeks: 12  Plan of Care beginning date: 9/8/2022  Plan of Care expiration date: 12/8/2022  Treatment plan discussed with: patient        Subjective Evaluation    History of Present Illness  Mechanism of injury: Damian Mo presents to outpatient physical therapy with a referral for L cervical radiculopathy, sprain of L RTC, bulging cervical IV disc  Damian Mo reports pain started 3-4 weeks ago when dog pulled patient across yard and has continued since  Biggest complaint is numbness in finger tips  HPI/Primary Complaint: all of L fingers are tingling; feels like bubble wrap in neck; can get tingling in L UE with watching TV; once in a while pain under armpit; can have some sharp neck pains, but not all of the time; denies loss of sensation, but L arm feels like a vibration with touch; probably some loss of strength; most difficult motion is lifting straight forward    is difficult, does not feel like she has full control of L hand  Sleeping is difficult, has difficulty finding a comfortable spot  Relevant PMH: Head aches, arthritis, anxiety, SANGEETA L, R ankle surgery, CTS  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;   Pain location: worst pain is at shoulder (underneath/posterior)   Aggravating factors: lifting arm forward  Relieving factors:  rest  Activity Limitations: elevating arm; working;   Patient Goals: return to full activity, decrease NT, decrease pain, improve  strength    Imaging:   IMPRESSION:  1  Mild reversal of the mid to upper cervical lordosis  The cervical vertebral body heights are maintained demonstrating no acute fracture or subluxation  2  Multilevel degenerative disc disease with no new disc herniation compared to 3/12/2018  3  C3-4 broad-based right paracentral disc protrusion, increased in size and abutting the ventral aspect of the cord  4  Stable disc osteophyte complexes at C5-6 and C6-7 levels with an unchanged central disc protrusion at C6-7 causing mild central canal narrowing  5   The cervical cord appears normal in caliber and signal     MD Notes:   F/u 6 wks  Referral to pain management  Icing/heat/OTC pain meds as needed  Home exercises  Begin physical therapy  Consider referral to Ortho hand- hx of CTR if no improvement with pain management  N/t most likely due to radiculopathy    Pain  At worst pain ratin    Hand dominance: right          Objective     General Comments:      Cervical/Thoracic Comments  Sitting Posture: forward head, rounder shoulders    Palpation: TTP L UT, L under arm, suboccipitals   Light touch= diminished throughout L UE    ROM:  Cervical:  Flex: + increase pain back of neck and increased S/S down L UE  Ext: -  Right Side Bend: 30  Left Side Bend: 30  Right Rotation: 60  Left Rotation: 50    Repeated motions:   Ext: no change  Seated chin tuck: increased numbness    Scapular retraction: UT compensation; soreness at inferior angle of L scapular + clicking with max contraction    Shoulder:  Flex: , L: AROM: 135, PROM: 138; Abd:  L: AROM: 135, PROM: 140;  ER:  L: AROM: WFL,   IR: L: AROM: WFL,     MMTs:  Flex: R: 5/5, L: 3+/5;  Abd: R: 5/5, L: 4/5;  ER: R: 5/5 L: 4/5;  IR: R: 5/5, L: 4+/5  Clinical Examination Tests:  Cervical Compression: +  Spurling A: R: - L: +  Distraction Test: -    Strength: Deep Neck Flexors: weak, uncomfortable;  : R= 55 lb, L= 55 lb    Neural tension/irritation:  Upper Limb Tension Test (ULTT): + L median;   Phalen's test: -   Durkan's test: - L    Shoulder Impingement:  Kathe-Jeffrey: +;   Painful Arc: +;  Infraspinatus Test: -     Rotator Cuff:  Drop Arm Sign -;   Infraspinatus test: -;     Scapular dysfunction:  Scapular Assist test: -  Flowsheet Rows    Flowsheet Row Most Recent Value   PT/OT G-Codes    Current Score 66   Projected Score 70             Precautions: Head aches, arthritis, anxiety, SANGEETA L, R ankle surgery, CTS  HEP: [id number]  (9/8)  RE: (10/8)  POC: (12/8)  FOTO: 66 (9/8)    Manuals 9/8            Man  C/s traction TC            L shld PROM             C/s PROM             SOR/STM             L med  N  glide TC                                      Neuro Re-Ed             scap retraction :05x15            Supine DNF :05x15            L med  N  glide No head movement x15                                                                Ther Ex             scap 1-4  *                                                                                                      Ther Activity                                       Gait Training                                       Modalities             Mech   Traction  *

## 2022-09-08 NOTE — TELEPHONE ENCOUNTER
Pharmacy requesting refill on albuterol solution, ibuprofen, and Ambien   Last seen 8/15/22     1 JACKADAN CUBA 1975 F [address] MIKE81251 RADHA [place]   [] 2 JACKADAN CUBA 1975 F [address]42 [address] MIKE72594 GHISLAINE [place]   [] 3 JACKADAN CUBA 1975 F [address] [place] [id number] ALEXA PALMA           Search Criteria    Name Date of Birth Date Range   Misti Forrester 58- 09- To 09-     Requester Name Requested Date   [facility] Sep 08 2022 08:04:02 GMT-0400 [place] Daylight Time)     Summary   Prescriptions  14  Prescribers  3  Pharmacies  2  View Map    Drug Classes   Benzodiazepines  0  Stimulants  0  Opioids  1  Muscle Relaxants  0    Opioid Dosage   Total MME for Active Prescriptions  0    Average MME  30 71  MME Graph   MME Calculator       · Prescriptions  · Notifications    · Prescribers  · Pharmacies  · MME Graph      [] PA   Drug Categories:      [] Opioids       [] Others       Show  entries  Search:  Patient Id Prescription #  Filled Written Drug Label Qty Days Strength MME** Prescriber Pharmacy Payment REFILL #/Auth State Detail   1  [id number] 08/04/2022 08/04/2022 Zolpidem Tartrate (Tablet)  30 0 30 10 MG NA IKE) [facility]  Medicaid 0 / 0 [place]    1  [id number] 07/01/2022 07/01/2022 Zolpidem Tartrate (Tablet)  30 0 30 10 MG NA IKE) [facility]  Medicaid 0 / 0 [place]    1  [id number] 06/02/2022 06/02/2022 Zolpidem Tartrate (Tablet)  30 0 30 10 MG NA IKE) [facility]  Medicaid 0 / 0 [place]    1  [id number] 05/03/2022 05/03/2022 Zolpidem Tartrate (Tablet)  30 0 30 10 MG NA ÁNGELAM(MD) [facility]    Medicaid 00 / 0 [place]    2  [id number] 04/22/2022 04/22/2022 Promethazine Hcl-codeine Phosphate (Syrup)  120 0 5 10 MG/5 ML-6 25 MG/5 36 0 IKE) [facility] 00 / 00 [place]    2  [id number] 03/18/2022 03/18/2022 Zolpidem Tartrate (Tablet)  30 0 30 10 MG NA WASSIM(MD) [facility] 00 / 00 [place]    2  [id number] 02/18/2022 02/18/2022 Zolpidem Tartrate (Tablet)  30 0 30 10 MG NA ÁNGELAM(MD) [facility] 00 / 00 [place]    1  [id number] 01/13/2022 01/13/2022 Zolpidem Tartrate (Tablet)  30 0 30 10 MG NA [facility]A(MD) [facility] [place] 00 / 0 [place]    3  [id number] 12/31/2021 12/31/2021 oxyCODONE HCL-ACETAMINOPHEN (Tablet)  7 0 3 325 MG-5 MG 17 50 [facility] Medicaid 00 / 0 [place]    3  [id number] 12/22/2021 12/22/2021 Promethazine Hydrochloride/codeine (Syrup)  120 0 30 10 MG/5 ML-6 25 MG/5 NA MARTA(MD) A

## 2022-09-11 DIAGNOSIS — E28.39 ESTROGEN DEFICIENCY: ICD-10-CM

## 2022-09-12 RX ORDER — ESTROGENS, CONJUGATED 0.45 MG/1
TABLET, FILM COATED ORAL
Qty: 21 TABLET | Refills: 0 | Status: SHIPPED | OUTPATIENT
Start: 2022-09-12 | End: 2022-10-06

## 2022-09-12 NOTE — PROGRESS NOTES
Daily Note     Today's date: 2022  Patient name: Wily Gill  : 1975  MRN: [id number]  Referring provider: Blanco Olivares MD  Dx:   Encounter Diagnosis     ICD-10-CM    1  Left cervical radiculopathy  M54 12    2  Impingement syndrome of left shoulder  M75 42        Start Time: 1101  Stop Time: 1155  Total time in clinic (min): 54 minutes    Subjective: Feels okay, no change  No difficulty with exercises  Some pain with scapular retraction if she goes to hard  To see neck/spine MD today  Objective: See treatment diary below    Assessment: Tolerated treatment fair, active warm up on pulley, demonstrated good ROM  Demonstrated good technique with DNF exercise in supine  Tolerated manual traction fair, no change in overall symptoms  Improved L shoulder PROM today, no c/o pain, near full PROM  Good technique with scap 1-3 exercises, added to HEP  Trial of cervical mechanical traction today- patient reported no change in distal numbness symptoms post  Patient demonstrated fatigue post treatment and would benefit from continued PT  Plan: Continue per plan of care  MD visit later today  Assess response to traction  Precautions: Head aches, arthritis, anxiety, SANGEETA L, R ankle surgery, CTS  HEP: [id number]  ()  RE: (10/8)  POC: ()  FOTO: 66 ()    Manuals            Man  C/s traction TC TC           L shld PROM  TC           C/s PROM  TC           SOR/STM  TC           L med  N  glide TC TC                                     Neuro Re-Ed             scap retraction :05x15 :05x15           Supine DNF :05x15 :05x15           L med   N  glide No head movement x15 x20                                                               Ther Ex             Pulley  5 min           scap 1-4  Red x15 1-3           pec stretch wall  L 10"x10                                                                                         Ther Activity                                       Gait Training Modalities             Mech   Traction  17 5-10 5  25"-10"  (10min)

## 2022-09-13 ENCOUNTER — OFFICE VISIT (OUTPATIENT)
Dept: PHYSICAL THERAPY | Facility: CLINIC | Age: 47
End: 2022-09-13
Payer: COMMERCIAL

## 2022-09-13 DIAGNOSIS — M54.12 LEFT CERVICAL RADICULOPATHY: Primary | ICD-10-CM

## 2022-09-13 DIAGNOSIS — M75.42 IMPINGEMENT SYNDROME OF LEFT SHOULDER: ICD-10-CM

## 2022-09-13 PROCEDURE — 97012 MECHANICAL TRACTION THERAPY: CPT

## 2022-09-13 PROCEDURE — 97110 THERAPEUTIC EXERCISES: CPT

## 2022-09-13 PROCEDURE — 97112 NEUROMUSCULAR REEDUCATION: CPT

## 2022-09-13 PROCEDURE — 97140 MANUAL THERAPY 1/> REGIONS: CPT

## 2022-09-15 ENCOUNTER — APPOINTMENT (OUTPATIENT)
Dept: PHYSICAL THERAPY | Facility: CLINIC | Age: 47
End: 2022-09-15
Payer: COMMERCIAL

## 2022-09-18 DIAGNOSIS — R51.9 NONINTRACTABLE HEADACHE, UNSPECIFIED CHRONICITY PATTERN, UNSPECIFIED HEADACHE TYPE: ICD-10-CM

## 2022-09-19 RX ORDER — SUMATRIPTAN 50 MG/1
100 TABLET, FILM COATED ORAL ONCE AS NEEDED
Qty: 9 TABLET | Refills: 0 | Status: SHIPPED | OUTPATIENT
Start: 2022-09-19 | End: 2022-10-24

## 2022-09-20 ENCOUNTER — APPOINTMENT (OUTPATIENT)
Dept: PHYSICAL THERAPY | Facility: CLINIC | Age: 47
End: 2022-09-20
Payer: COMMERCIAL

## 2022-09-22 ENCOUNTER — APPOINTMENT (OUTPATIENT)
Dept: PHYSICAL THERAPY | Facility: CLINIC | Age: 47
End: 2022-09-22
Payer: COMMERCIAL

## 2022-09-26 NOTE — PROGRESS NOTES
PT Discharge    Today's date: 2022  Patient name: Gricelda Sol  : 1975  MRN: [id number]  Referring provider: Armando Younger MD  Dx:   Encounter Diagnosis     ICD-10-CM    1  Left cervical radiculopathy  M54 12    2  Impingement syndrome of left shoulder  M75 42        Start Time: 1101  Stop Time: 1155  Total time in clinic (min): 54 minutes    Assessment/Plan Daquan Garner has made excellent progress towards her goals and has demonstrated independence with her HEP  Due to her progress towards her goals, her independence with her HEP and her reported self-efficacy with her HEP she is being D/Delroy to her HEP at this time  Subjective Patient reports MD said she no longer needs PT       Objective see flow sheet    Flowsheet Rows    Flowsheet Row Most Recent Value   PT/OT G-Codes    Current Score 96   Projected Score 70

## 2022-09-27 ENCOUNTER — APPOINTMENT (OUTPATIENT)
Dept: PHYSICAL THERAPY | Facility: CLINIC | Age: 47
End: 2022-09-27
Payer: COMMERCIAL

## 2022-09-29 ENCOUNTER — APPOINTMENT (OUTPATIENT)
Dept: PHYSICAL THERAPY | Facility: CLINIC | Age: 47
End: 2022-09-29
Payer: COMMERCIAL

## 2022-10-05 DIAGNOSIS — G47.00 INSOMNIA, UNSPECIFIED TYPE: ICD-10-CM

## 2022-10-05 DIAGNOSIS — R52 PAIN AGGRAVATED BY WALKING: ICD-10-CM

## 2022-10-05 DIAGNOSIS — E28.39 ESTROGEN DEFICIENCY: ICD-10-CM

## 2022-10-06 ENCOUNTER — VBI (OUTPATIENT)
Dept: ADMINISTRATIVE | Facility: OTHER | Age: 47
End: 2022-10-06

## 2022-10-06 RX ORDER — CYCLOBENZAPRINE HCL 10 MG
TABLET ORAL
Qty: 30 TABLET | Refills: 0 | Status: SHIPPED | OUTPATIENT
Start: 2022-10-06

## 2022-10-06 RX ORDER — ESTROGENS, CONJUGATED 0.45 MG/1
TABLET, FILM COATED ORAL
Qty: 21 TABLET | Refills: 0 | Status: SHIPPED | OUTPATIENT
Start: 2022-10-06 | End: 2022-10-24

## 2022-10-06 RX ORDER — ZOLPIDEM TARTRATE 10 MG/1
TABLET ORAL
Qty: 30 TABLET | Refills: 0 | Status: SHIPPED | OUTPATIENT
Start: 2022-10-06

## 2022-10-06 NOTE — TELEPHONE ENCOUNTER
Pt seen 8/15/22  Refill request sent to provider  pmed; Patient Prescription Report        Patients      Select Patient Id Name D O B  R Coujoyceda 106   [] 1 [facility] 8- F 6095 [facility] Hines [place]   [] 2 Gabriella Martins 15- F 12[address] [place] [id number] [facility] [place]           Search Criteria    Name Date of Birth Date Range   Gabriella Martins 14- 10- To 10-     Requester Name Requested Date   [facility] Oct 06 2022 07:54:03 GMT-0400 [facility] Daylight Time)     Summary   Prescriptions  11  Prescribers  2  Pharmacies  1  View Map    Drug Classes   Benzodiazepines  0  Stimulants  0  Opioids  1  Muscle Relaxants  0    Opioid Dosage   Total MME for Active Prescriptions  0    Average MME  17 50  MME Graph   MME Calculator       · Prescriptions  · Notifications    · Prescribers  · Pharmacies  · MME Graph      [] PA   Drug Categories:      [] Opioids       [] Others       Show  entries  Search:  Patient Id Prescription #  Filled Written Drug Label Qty Days Strength MME** Prescriber Pharmacy Payment REFILL #/Auth State Detail   1  [id number] 09/08/2022 09/08/2022 Zolpidem Tartrate (Tablet)  30 0 30 10 MG NA MARTA(MD) [facility]  Medicaid 0 / 0 [place]    1  [id number] 08/04/2022 08/04/2022 Zolpidem Tartrate (Tablet)  30 0 30 10 MG NA IKE) [facility]  Medicaid 0 / 0 [place]    1  [id number] 07/01/2022 07/01/2022 Zolpidem Tartrate (Tablet)  30 0 30 10 MG NA ÁNGELAM(MD) [facility]    Medicaid 0 / 0 [place]    1  [id number] 06/02/2022 06/02/2022 Zolpidem Tartrate (Tablet)

## 2022-10-11 DIAGNOSIS — J45.21 INTERMITTENT ASTHMA WITH ACUTE EXACERBATION, UNSPECIFIED ASTHMA SEVERITY: ICD-10-CM

## 2022-10-11 RX ORDER — IPRATROPIUM/ALBUTEROL SULFATE 20-100 MCG
MIST INHALER (GRAM) INHALATION
Qty: 4 G | Refills: 1 | Status: SHIPPED | OUTPATIENT
Start: 2022-10-11

## 2022-10-12 PROBLEM — J06.9 ACUTE UPPER RESPIRATORY INFECTION: Status: RESOLVED | Noted: 2021-01-07 | Resolved: 2022-10-12

## 2022-10-22 DIAGNOSIS — J44.1 CHRONIC OBSTRUCTIVE PULMONARY DISEASE WITH ACUTE EXACERBATION (HCC): ICD-10-CM

## 2022-10-24 DIAGNOSIS — R51.9 NONINTRACTABLE HEADACHE, UNSPECIFIED CHRONICITY PATTERN, UNSPECIFIED HEADACHE TYPE: ICD-10-CM

## 2022-10-24 DIAGNOSIS — E28.39 ESTROGEN DEFICIENCY: ICD-10-CM

## 2022-10-24 RX ORDER — ALBUTEROL SULFATE 90 UG/1
AEROSOL, METERED RESPIRATORY (INHALATION)
Qty: 18 G | Refills: 2 | Status: SHIPPED | OUTPATIENT
Start: 2022-10-24

## 2022-10-24 RX ORDER — SUMATRIPTAN 50 MG/1
100 TABLET, FILM COATED ORAL ONCE AS NEEDED
Qty: 9 TABLET | Refills: 0 | Status: SHIPPED | OUTPATIENT
Start: 2022-10-24

## 2022-10-24 RX ORDER — ESTROGENS, CONJUGATED 0.45 MG/1
TABLET, FILM COATED ORAL
Qty: 21 TABLET | Refills: 0 | Status: SHIPPED | OUTPATIENT
Start: 2022-10-24

## 2022-11-02 DIAGNOSIS — R52 PAIN AGGRAVATED BY WALKING: ICD-10-CM

## 2022-11-03 DIAGNOSIS — G47.00 INSOMNIA, UNSPECIFIED TYPE: ICD-10-CM

## 2022-11-03 RX ORDER — CYCLOBENZAPRINE HCL 10 MG
TABLET ORAL
Qty: 30 TABLET | Refills: 0 | Status: SHIPPED | OUTPATIENT
Start: 2022-11-03

## 2022-11-04 RX ORDER — ZOLPIDEM TARTRATE 10 MG/1
TABLET ORAL
Qty: 30 TABLET | Refills: 0 | Status: SHIPPED | OUTPATIENT
Start: 2022-11-04

## 2022-11-04 NOTE — TELEPHONE ENCOUNTER
Last seen 8/15/22 and I copied past refill into chart from pdmp website and sent to provider for approval      1 DYLANADAN DOAN 1975 F [address] [id number] RADHA [place]    [] 2 DYLANADAN DOAN 1975 F [address] [facility] [address] [place]   [] 3 DYLANADAN DOAN 1975 F 1242 [facility] ALEXA PALMA           Search Criteria    NAME DATE OF BIRTH DATE RANGE   dylan doan 89- 11- To 11-     REQUESTER NAME REQUESTED DATE   Dinorah [facility] Nov 04 2022 09:08:34 GMT-0400 [employer] [facility] Daylight Time)     Summary  Prescriptions  14  Prescribers  3  Pharmacies  2  View Map    Drug Classes  Benzodiazepines  0  Stimulants  0  Opioids  1  Muscle Relaxants  0    Opioid Dosage  Total MME for Active Prescriptions  0    Average MME  28 07  MME Graph   MME Calculator       · Prescriptions  · Notifications    · Prescribers  · Pharmacies  · MME Graph      [] PA Drug Categories:     [] Opioids      [] Others      Showentries  Search:  PATIENT ID PRESCRIPTION # FILLED WRITTEN DRUG LABEL QTY DAYS STRENGTH MME** PRESCRIBER PHARMACY PAYMENT REFILL #/AUTH STATE DETAIL   1 [id number] 10/06/2022 10/06/2022 Zolpidem Tartrate (Tablet) 30 0 30 10 MG NA ESDRAS HERRING [facility]  Medicaid 0 / 0 [place]    1 [id number] 09/08/2022 09/08/2022 Zolpidem Tartrate (Tablet) 30 0 30 10 MG NA IKE) [facility]  Medicaid 0 / 0 [place]    1 [id number] 08/04/2022 08/04/2022 Zolpidem Tartrate (Tablet) 30 0 30 10 MG NA IKE) [facility]  Medicaid 0 / 0 [place]    1 [id number] 07/01/2022 07/01/2022 Zolpidem Tartrate (Tablet) 30 0 30 10 MG NA ÁNGELAM(MD) [facility]  Medicaid 0 / 0 [place]    1 [id number] 06/02/2022 06/02/2022 Zolpidem Tartrate (Tablet) 30 0 30 10 MG NA MARTA(MD) [facility]   Medicaid 0 / 0 [place]    1 [id number] 05/03/2022 05/03/2022 Zolpidem Tartrate (Tablet) 30 0 30 10 MG NA MARTA(MD) [facility]  Medicaid 00 / 0 [place]    2 [id number] 04/22/2022 04/22/2022 Promethazine Hcl-codeine Phosphate (Syrup) 120 0 5 10 MG/5 ML-6 25 MG/5 36 0 WASSIM(MD) [employer] 00 / 00 [place]    2 [id number] 03/18/2022 03/18/2022 Zolpidem Tartrate (Tablet) 30 0 30 10 MG NA WASSIM(MD) [employer] 00 / 00 [place]    2 [id number] 02/18/2022 02/18/2022 Zolpidem Tartrate (Tablet) 30 0 30 10 MG NA WASSIM(MD) [employer] 00 / 00 [place]    1 [id number] 01/13/2022 01/13/2022 Zolpidem Tartrate (Tablet) 30 0 30 10 MG NA WASSIM(MD) [facility]  Medicaid 00 / 0 [place]    Showing 1 to 10 of 14 entries  [id number]       * Per CDC guidance, the conversion factors and associated daily morphine milligram equivalents for drugs prescribed as part of medication-assisted treatment for opioid use disorder should not be used to benchmark against dosage thresholds meant for opioids prescribed for pain  Report Disclaimer:  Information from the [place] [employer] Program (PDMP) database is protected health information and any information accessed must be treated as confidential  Any person who knowingly or intentionally makes an unauthorized disclosure of information from the [address] [place] [address] will be subject to civil and criminal penalties as set forth in the ABC-MAP Act 2014-191, Act of Oct  27, 2014, P L  2912  The information in the [address] [place] [address] is submitted by pharmacies and may contain errors and omissions  Independent verification of prescription information with pharmacies and prescribers may sometimes be prudent or necessary        Educational content  CDC MME calculation guidelines  [place] Prescribing Guidelines  Letter Regarding the Misapplication of Prescribing Guidelines   Guide for Appropriate Tapering or Discontinuation of Long-Term Opioid Use #[id number]

## 2022-11-12 DIAGNOSIS — E28.39 ESTROGEN DEFICIENCY: ICD-10-CM

## 2022-11-14 RX ORDER — ESTROGENS, CONJUGATED 0.45 MG/1
TABLET, FILM COATED ORAL
Qty: 21 TABLET | Refills: 3 | Status: SHIPPED | OUTPATIENT
Start: 2022-11-14

## 2022-11-21 DIAGNOSIS — R51.9 NONINTRACTABLE HEADACHE, UNSPECIFIED CHRONICITY PATTERN, UNSPECIFIED HEADACHE TYPE: ICD-10-CM

## 2022-11-21 RX ORDER — SUMATRIPTAN 50 MG/1
100 TABLET, FILM COATED ORAL ONCE AS NEEDED
Qty: 9 TABLET | Refills: 0 | Status: SHIPPED | OUTPATIENT
Start: 2022-11-21

## 2022-11-30 DIAGNOSIS — R52 PAIN AGGRAVATED BY WALKING: ICD-10-CM

## 2022-11-30 DIAGNOSIS — R52 PAIN: ICD-10-CM

## 2022-12-01 DIAGNOSIS — J45.21 INTERMITTENT ASTHMA WITH ACUTE EXACERBATION, UNSPECIFIED ASTHMA SEVERITY: ICD-10-CM

## 2022-12-01 RX ORDER — CYCLOBENZAPRINE HCL 10 MG
TABLET ORAL
Qty: 30 TABLET | Refills: 0 | Status: SHIPPED | OUTPATIENT
Start: 2022-12-01

## 2022-12-01 RX ORDER — IBUPROFEN 600 MG/1
600 TABLET ORAL EVERY 8 HOURS PRN
Qty: 45 TABLET | Refills: 2 | Status: SHIPPED | OUTPATIENT
Start: 2022-12-01

## 2022-12-01 RX ORDER — IPRATROPIUM/ALBUTEROL SULFATE 20-100 MCG
MIST INHALER (GRAM) INHALATION
Qty: 4 G | Refills: 0 | Status: SHIPPED | OUTPATIENT
Start: 2022-12-01

## 2022-12-07 DIAGNOSIS — G47.00 INSOMNIA, UNSPECIFIED TYPE: ICD-10-CM

## 2022-12-07 RX ORDER — ZOLPIDEM TARTRATE 10 MG/1
TABLET ORAL
Qty: 30 TABLET | Refills: 0 | Status: SHIPPED | OUTPATIENT
Start: 2022-12-07

## 2022-12-07 NOTE — TELEPHONE ENCOUNTER
Last seen 8/15/22 and I copied past refills into chart from pdmp web site  Sent to provider for approval      1 JACKADAN DOAN 1975 F [address] [place] [id number] Tip Carpenter [place]    [] 2 JACKADAN DOAN 1975 F [address] [place]   [] 3 JACKADAN DOAN 1975 F [address] [place] [id number] [facility] [place]           Search Criteria    NAME DATE OF BIRTH DATE RANGE   jack doan 78- 12- To 12-     REQUESTER NAME REQUESTED DATE   Nick Hunter Wed Dec 07 2022 08:30:14 GMT-0500 [facility] Standard Time)     Summary  Prescriptions  15  Prescribers  3  Pharmacies  2  View Map  Drug Classes  Benzodiazepines  0  Stimulants  0  Opioids  1  Muscle Relaxants  0  Opioid Dosage  Total MME for Active Prescriptions  0    Average MME  28 07  MME Graph   MME Calculator     • Prescriptions  • Notifications    • Prescribers  • Pharmacies  • MME Graph    [] PA Drug Categories:     [] Opioids      [] Others      Showentries  Search:  PATIENT ID PRESCRIPTION # FILLED WRITTEN DRUG LABEL QTY DAYS STRENGTH MME** PRESCRIBER PHARMACY PAYMENT REFILL #/AUTH STATE DETAIL   1 [id number] 11/04/2022 11/04/2022 Zolpidem Tartrate (Tablet) 30 0 30 10 MG NA IKE) [facility]  Medicaid 0 / 0 [place]    2 [id number] 10/06/2022 10/06/2022 Zolpidem Tartrate (Tablet) 30 0 30 10 MG NA ESDRAS HERRING [facility]  Medicaid 0 / 0 [place]    2 [id number] 09/08/2022 09/08/2022 Zolpidem Tartrate (Tablet) 30 0 30 10 MG NA MARTA(MD) [facility]  Medicaid 0 / 0 [place]    2 [id number] 08/04/2022 08/04/2022 Zolpidem Tartrate (Tablet) 30 0 30 10 MG NA IKE) [facility]  Medicaid 0 / 0 [place]    2 [id number] 07/01/2022 07/01/2022 Zolpidem Tartrate (Tablet) 30 0 30 10 MG NA IKE) [facility]   Medicaid 0 / 0 [place]    2 [id number] 06/02/2022 06/02/2022 Zolpidem Tartrate (Tablet) 30 0 30 10 MG NA MARTA(MD) [facility]  Medicaid 0 / 0 [place]    2 [id number] 05/03/2022 05/03/2022 Zolpidem Tartrate (Tablet) 30 0 30 10 MG NA MARTA(MD) [facility]  Medicaid 00 / 0 [place]    3 [id number] 04/22/2022 04/22/2022 Promethazine Hcl-codeine Phosphate (Syrup) 120 0 5 10 MG/5 ML-6 25 MG/5 36 0 MARTA(MD) [employer] 00 / 00 [place]    3 [id number] 03/18/2022 03/18/2022 Zolpidem Tartrate (Tablet) 30 0 30 10 MG NA MARTA(MD) [facility] 00 / 00 [place]    3 [id number] 02/18/2022 02/18/2022 Zolpidem Tartrate (Tablet) 30 0 30 10 MG NA [facility]ELAM(MD) [facility] [employer] 00 / 00 [place]    Showing 1 to 10 of 15 entries  [id number]     * Per CDC guidance, the conversion factors and associated daily morphine milligram equivalents for drugs prescribed as part of medication-assisted treatment for opioid use disorder should not be used to benchmark against dosage thresholds meant for opioids prescribed for pain  Report Disclaimer:  Information from the [place] [employer] [employer] Program (PDMP) database is protected health information and any information accessed must be treated as confidential  Any person who knowingly or intentionally makes an unauthorized disclosure of information from the [address] [place] [address] will be subject to civil and criminal penalties as set forth in the ABC-MAP Act 2014-191, Act of Oct  27, 2014, P L  2911  The information in the [address] [place] [address] [place] is submitted by pharmacies and may contain errors and omissions  Independent verification of prescription information with pharmacies and prescribers may sometimes be prudent or necessary    Educational content  CDC MME calculation guidelines  [place] Prescribing Guidelines  Letter Regarding the Misapplication of Prescribing Guidelines   Guide for Appropriate Tapering or Discontinuation of Long-Term Opioid Use #[id number]

## 2022-12-23 DIAGNOSIS — J45.909 ASTHMA, UNSPECIFIED ASTHMA SEVERITY, UNSPECIFIED WHETHER COMPLICATED, UNSPECIFIED WHETHER PERSISTENT: ICD-10-CM

## 2022-12-24 DIAGNOSIS — R51.9 NONINTRACTABLE HEADACHE, UNSPECIFIED CHRONICITY PATTERN, UNSPECIFIED HEADACHE TYPE: ICD-10-CM

## 2022-12-24 DIAGNOSIS — J45.21 INTERMITTENT ASTHMA WITH ACUTE EXACERBATION, UNSPECIFIED ASTHMA SEVERITY: ICD-10-CM

## 2022-12-27 RX ORDER — SUMATRIPTAN 50 MG/1
100 TABLET, FILM COATED ORAL ONCE AS NEEDED
Qty: 9 TABLET | Refills: 0 | Status: SHIPPED | OUTPATIENT
Start: 2022-12-27

## 2022-12-27 RX ORDER — IPRATROPIUM/ALBUTEROL SULFATE 20-100 MCG
MIST INHALER (GRAM) INHALATION
Qty: 4 G | Refills: 1 | Status: SHIPPED | OUTPATIENT
Start: 2022-12-27

## 2022-12-27 RX ORDER — ALBUTEROL SULFATE 2.5 MG/3ML
SOLUTION RESPIRATORY (INHALATION)
Qty: 150 ML | Refills: 2 | Status: SHIPPED | OUTPATIENT
Start: 2022-12-27

## 2023-01-04 DIAGNOSIS — J44.1 CHRONIC OBSTRUCTIVE PULMONARY DISEASE WITH ACUTE EXACERBATION (HCC): ICD-10-CM

## 2023-01-04 RX ORDER — ALBUTEROL SULFATE 90 UG/1
AEROSOL, METERED RESPIRATORY (INHALATION)
Qty: 18 G | Refills: 2 | Status: SHIPPED | OUTPATIENT
Start: 2023-01-04

## 2023-01-05 DIAGNOSIS — G47.00 INSOMNIA, UNSPECIFIED TYPE: ICD-10-CM

## 2023-01-05 DIAGNOSIS — R52 PAIN AGGRAVATED BY WALKING: ICD-10-CM

## 2023-01-06 RX ORDER — CYCLOBENZAPRINE HCL 10 MG
TABLET ORAL
Qty: 30 TABLET | Refills: 0 | Status: SHIPPED | OUTPATIENT
Start: 2023-01-06

## 2023-01-06 NOTE — TELEPHONE ENCOUNTER
Pharmacy requesting refill on zolpidem   Last seen 8/15/22     1 JACKADAN CUBA 1975 F 1242 [facility]-[address] ALEXA PALMA   [] 2 JACKADAN CUBA 1975 F [address] MIKE56788 RADHA PALMA   [] 3 JANET BEREKET 1975 F [address] [id number] [facility] [place]           Search Criteria    Name Date of Birth Date Range   Minor Osman 59- 01- To 01-     Requester Name Requested Date   Neeraj Clemons Fri Jan 06 2023 08:10:22 GMT-0500 [facility] Standard Time)     Summary   Prescriptions  12  Prescribers  2  Pharmacies  2  View Map  Drug Classes   Benzodiazepines  0  Stimulants  0  Opioids  0  Muscle Relaxants  0  Opioid Dosage   Total MME for Active Prescriptions  0    Average MME  36 00  MME Graph   MME Calculator     • Prescriptions  • Notifications    • Prescribers  • Pharmacies  • MME Graph    [] PA   Drug Categories:      [] Others       Show  entries  Search:  Patient Id Prescription #  Filled Written Drug Label Qty Days Strength MME** Prescriber Pharmacy Payment REFILL #/Auth State Detail   1  [id number] 12/07/2022 12/07/2022 Zolpidem Tartrate (Tablet)  30 0 30 10 MG NA IKE) [facility]  Medicaid 0 / 0 [place]    1  [id number] 11/04/2022 11/04/2022 Zolpidem Tartrate (Tablet)  30 0 30 10 MG NA IKE) [facility]  Medicaid 0 / 0 [place]    2  [id number] 10/06/2022  10/06/2022 Zolpidem Tartrate (Tablet)  30 0 30 10 MG NA ESDRAS HERRING  [facility]  Medicaid 0 / 0 [place]    2  [id number] 09/08/2022 09/08/2022 Zolpidem Tartrate (Tablet)  30 0 30 10 MG NA IKE) [facility]  Medicaid 0 / 0 [place]    2  [id number] 08/04/2022 08/04/2022 Zolpidem Tartrate (Tablet)  30 0 30 10 MG NA IKE) [facility]    Medicaid 0 / 0 [place]    2  [id number] 07/01/2022 07/01/2022 Zolpidem Tartrate (Tablet)  30 0 30 10 MG NA IKE) [facility]  Medicaid 0 / 0 [place]    2  [id number] 06/02/2022 06/02/2022 Zolpidem Tartrate (Tablet)  30 0 30 10 MG NA WASSIM(MD) [facility]  Medicaid 0 / 0 [place]    2  [id number] 05/03/2022 05/03/2022 Zolpidem Tartrate (Tablet)  30 0 30 10 MG NA WASGIANAM(MD) [facility]    Medicaid 00 / 0 [place]    3  [id number] 04/22/2022 04/22/2022 Promethazine Hcl-codeine Phosphate (Syrup)  120 0 5 10 MG/5 ML-6 25 MG/5 36 0 WASSIM(MD) [facility] 00 / 00 [place]    3  [id number] 03/18/2022 03/18/2022 Zolpidem Tartrate (Tablet)  30 0 30 10 MG NA MARTA(MD) [facility]

## 2023-01-07 RX ORDER — ZOLPIDEM TARTRATE 10 MG/1
TABLET ORAL
Qty: 30 TABLET | Refills: 0 | Status: SHIPPED | OUTPATIENT
Start: 2023-01-07

## 2023-01-20 DIAGNOSIS — R52 PAIN: ICD-10-CM

## 2023-01-20 DIAGNOSIS — R51.9 NONINTRACTABLE HEADACHE, UNSPECIFIED CHRONICITY PATTERN, UNSPECIFIED HEADACHE TYPE: ICD-10-CM

## 2023-01-23 RX ORDER — SUMATRIPTAN 50 MG/1
100 TABLET, FILM COATED ORAL ONCE AS NEEDED
Qty: 9 TABLET | Refills: 0 | Status: SHIPPED | OUTPATIENT
Start: 2023-01-23

## 2023-01-23 RX ORDER — IBUPROFEN 600 MG/1
600 TABLET ORAL EVERY 8 HOURS PRN
Qty: 45 TABLET | Refills: 2 | Status: SHIPPED | OUTPATIENT
Start: 2023-01-23

## 2023-01-23 NOTE — TELEPHONE ENCOUNTER
Pharmacy requesting refill on ibuprofen and sumatriptan   Last seen 8/15/22  Medication sent to pharmacy

## 2023-01-24 DIAGNOSIS — J01.00 ACUTE NON-RECURRENT MAXILLARY SINUSITIS: ICD-10-CM

## 2023-01-24 RX ORDER — FLUTICASONE PROPIONATE 50 MCG
SPRAY, SUSPENSION (ML) NASAL
Qty: 16 ML | Refills: 3 | Status: SHIPPED | OUTPATIENT
Start: 2023-01-24

## 2023-02-03 DIAGNOSIS — R52 PAIN AGGRAVATED BY WALKING: ICD-10-CM

## 2023-02-03 RX ORDER — CYCLOBENZAPRINE HCL 10 MG
TABLET ORAL
Qty: 30 TABLET | Refills: 0 | Status: SHIPPED | OUTPATIENT
Start: 2023-02-03

## 2023-02-05 DIAGNOSIS — G47.00 INSOMNIA, UNSPECIFIED TYPE: ICD-10-CM

## 2023-02-06 NOTE — TELEPHONE ENCOUNTER
Pharmacy requesting refill on Ambien   Last seen 8/15/22  Message sent to pt era to make follow up appt     1 JACKADAN CUBA 1975 F [address] [place] [address],[facility]  [address] [place]   [] 2 JACKADAN CUBA 1975 F [address] [place] [id number] RADHA [place]   [] 3 JACKADAN CUBA 1975 F [address] [place] [facility] [facility] [place]           Search Criteria    Name Date of Birth Date Range   Hola Caban 60- 02- To 02-     Requester Name Requested Date   [facility] Feb 06 2023 09:05:14 GMT-0500 [facility] Standard Time)     Summary   Prescriptions  12  Prescribers  2  Pharmacies  2  View Map  Drug Classes   Benzodiazepines  0  Stimulants  0  Opioids  0  Muscle Relaxants  0  Opioid Dosage   Total MME for Active Prescriptions  0    Average MME  7 20  MME Graph   MME Calculator     • Prescriptions  • Notifications    • Prescribers  • Pharmacies  • MME Graph    [] PA   Drug Categories:      [] Others       Show  entries  Search:  Patient Id Prescription #  Filled Written Drug Label Qty Days Strength MME** Prescriber Pharmacy Payment REFILL #/Auth State Detail   1  [id number] 01/07/2023 01/07/2023 Zolpidem Tartrate (Tablet)  30 0 30 10 MG NA IKE) [facility]  Medicaid 0 / 0 [place]    1  [id number] 12/07/2022 12/07/2022 Zolpidem Tartrate (Tablet)  30 0 30 10 MG NA IKE) [facility]  Medicaid 0 / 0 [place]    1  [id number] 11/04/2022 11/04/2022 Zolpidem Tartrate (Tablet)  30 0 30 10 MG NA ÁNGELAM(MD) [facility]  Medicaid 0 / 0 [place]    2  [id number] 10/06/2022  10/06/2022 Zolpidem Tartrate (Tablet)  30 0 30 10 MG NA ESDRAS HERRING  [facility]  Medicaid 0 / 0 [place]    2  [id number] 09/08/2022 09/08/2022 Zolpidem Tartrate (Tablet)  30 0 30 10 MG NA [facility](MD[facility]    Med[facility] 0 / 0 [place]    2  [id number] 08/04/2022 08/04/2022 Zolpidem Tartrate (Tablet)  30 0 30 10 MG NA IKE) [facility]  Medicaid 0 / 0 [place]    2  [id number] 07/01/2022 07/01/2022 Zolpidem Tartrate (Tablet)  30 0 30 10 MG NA MARTA(MD) [facility]  Medicaid 0 / 0 [place]    2  [id number] 06/02/2022 06/02/2022 Zolpidem Tartrate (Tablet)  30 0 30 10 MG NA MARTA(MD) [facility]  Medicaid 0 / 0 [place]    2  [id number] 05/03/2022 05/03/2022 Zolpidem Tartrate (Tablet)  30 0 30 10 MG NA MARTA(MD) [facility]    Medicaid 00 / 0 [place]    3  [id number] 04/22/2022 04/22/2022 Promethazine Hcl-codeine Phosphate (Syrup)  120 0 5 2 MG/ML / 1 25 MG/ML 7 20 IKE) [facility]

## 2023-02-07 RX ORDER — ZOLPIDEM TARTRATE 10 MG/1
TABLET ORAL
Qty: 30 TABLET | Refills: 0 | Status: SHIPPED | OUTPATIENT
Start: 2023-02-07

## 2023-02-13 DIAGNOSIS — E28.39 ESTROGEN DEFICIENCY: ICD-10-CM

## 2023-02-13 RX ORDER — ESTROGENS, CONJUGATED 0.45 MG/1
TABLET, FILM COATED ORAL
Qty: 21 TABLET | Refills: 1 | Status: SHIPPED | OUTPATIENT
Start: 2023-02-13

## 2023-02-22 DIAGNOSIS — J45.21 INTERMITTENT ASTHMA WITH ACUTE EXACERBATION, UNSPECIFIED ASTHMA SEVERITY: ICD-10-CM

## 2023-02-22 RX ORDER — IPRATROPIUM/ALBUTEROL SULFATE 20-100 MCG
MIST INHALER (GRAM) INHALATION
Qty: 4 G | Refills: 1 | Status: SHIPPED | OUTPATIENT
Start: 2023-02-22

## 2023-02-23 DIAGNOSIS — R51.9 NONINTRACTABLE HEADACHE, UNSPECIFIED CHRONICITY PATTERN, UNSPECIFIED HEADACHE TYPE: ICD-10-CM

## 2023-02-23 RX ORDER — SUMATRIPTAN 50 MG/1
100 TABLET, FILM COATED ORAL ONCE AS NEEDED
Qty: 9 TABLET | Refills: 0 | Status: SHIPPED | OUTPATIENT
Start: 2023-02-23

## 2023-03-02 DIAGNOSIS — Z88.9 MULTIPLE ALLERGIES: ICD-10-CM

## 2023-03-02 RX ORDER — MONTELUKAST SODIUM 10 MG/1
TABLET ORAL
Qty: 90 TABLET | Refills: 1 | Status: SHIPPED | OUTPATIENT
Start: 2023-03-02

## 2023-03-02 NOTE — TELEPHONE ENCOUNTER
Pharmacy requesting refill on Singulair   Last seen 8/15/22  msg sent to pt on mychart to make follow up appt   Medication sent to pharmacy 12-Apr-2021

## 2023-03-09 DIAGNOSIS — R52 PAIN AGGRAVATED BY WALKING: ICD-10-CM

## 2023-03-09 DIAGNOSIS — G47.00 INSOMNIA, UNSPECIFIED TYPE: ICD-10-CM

## 2023-03-09 NOTE — TELEPHONE ENCOUNTER
pharmacy requesting refill on cyclobenzaprine and Ambien   Last seen 8/15/22  Sent msg on pt mychart to make follow up appt       1 JACKADAN CUBA 1975 F [address] [place]   [] 2 JACKADAN CUBA 1975 F [address] MIKE03288 RADHA PALMA   [] 3 JACKADAN CUBA 1975 F [address] [place] [id number] [facility] [place]           Search Criteria    Name Date of Birth Date Range   Torres Urbano 08- 03- To 03-     Requester Name Requested Date   Enio Rubin Mar 09 2023 10:22:41 GMT-0500 [facility] Standard Time)     Summary   Prescriptions  12  Prescribers  2  Pharmacies  2  View Map  Drug Classes   Benzodiazepines  0  Stimulants  0  Opioids  0  Muscle Relaxants  0  Opioid Dosage   Total MME for Active Prescriptions  0    Average MME  7 20  MME Graph   MME Calculator     • Prescriptions  • Notifications    • Prescribers  • Pharmacies  • MME Graph    [] PA   Drug Categories:      [] Others       Show  entries  Search:  Patient Id Prescription #  Filled Written Drug Label Qty Days Strength MME** Prescriber Pharmacy Payment REFILL #/Auth State Detail   1  [id number] 02/07/2023 02/07/2023 Zolpidem Tartrate (Tablet)  30 0 30 10 MG NA ÁNGELAM(MD) [facility]  Medicaid 0 / 0 [place]    1  [id number] 01/07/2023 01/07/2023 Zolpidem Tartrate (Tablet)  30 0 30 10 MG NA IKE) [facility]  Medicaid 0 / 0 [place]    1  [id number] 12/07/2022 12/07/2022 Zolpidem Tartrate (Tablet)  30 0 30 10 MG NA DEMISIM(MD) [facility]  Medicaid 0 / 0 [place]    1  [id number] 11/04/2022 11/04/2022 Zolpidem Tartrate (Tablet)  30 0 30 10 MG NA ÁNGELAM(MD) [facility]  Medicaid 0 / 0 [place]    2  [id number] 10/06/2022  10/06/2022 Zolpidem Tartrate (Tablet)  30 0 30 10 MG NA ESDRAS HERRING  [facility]    Medicaid 0 / 0 [place]    2  [id number] 09/08/2022 09/08/2022 Zolpidem Tartrate (Tablet)  30 0 30 10 MG NA ÁNGELAM(MD) [facility]  Medicaid 0 / 0 [place]    2  [id number] 08/04/2022 08/04/2022 Zolpidem Tartrate (Tablet)  30 0 30 10 MG NA ÁNGELAM(MD) [facility]  Medicaid 0 / 0 [place]    2  [id number] 07/01/2022 07/01/2022 Zolpidem Tartrate (Tablet)  30 0 30 10 MG NA MARTA(MD) [facility]  Medicaid 0 / 0 [place]    2  [id number] 06/02/2022 06/02/2022 Zolpidem Tartrate (Tablet)  30 0 30 10 MG NA MARTA(MD) [facility]    Medicaid 0 / 0 [place]    2  [id number] 05/03/2022 05/03/2022 Zolpidem Tartrate (Tablet)  30 0 30 10 MG NA IKE) [facility]

## 2023-03-10 RX ORDER — ZOLPIDEM TARTRATE 10 MG/1
TABLET ORAL
Qty: 30 TABLET | Refills: 0 | Status: SHIPPED | OUTPATIENT
Start: 2023-03-10

## 2023-03-10 RX ORDER — CYCLOBENZAPRINE HCL 10 MG
TABLET ORAL
Qty: 30 TABLET | Refills: 0 | Status: SHIPPED | OUTPATIENT
Start: 2023-03-10

## 2023-03-20 DIAGNOSIS — R51.9 NONINTRACTABLE HEADACHE, UNSPECIFIED CHRONICITY PATTERN, UNSPECIFIED HEADACHE TYPE: ICD-10-CM

## 2023-03-21 RX ORDER — SUMATRIPTAN 50 MG/1
100 TABLET, FILM COATED ORAL ONCE AS NEEDED
Qty: 9 TABLET | Refills: 0 | Status: SHIPPED | OUTPATIENT
Start: 2023-03-21

## 2023-04-04 ENCOUNTER — OFFICE VISIT (OUTPATIENT)
Dept: FAMILY MEDICINE CLINIC | Facility: CLINIC | Age: 48
End: 2023-04-04

## 2023-04-04 VITALS
BODY MASS INDEX: 35.53 KG/M2 | RESPIRATION RATE: 16 BRPM | HEART RATE: 74 BPM | SYSTOLIC BLOOD PRESSURE: 120 MMHG | WEIGHT: 181 LBS | DIASTOLIC BLOOD PRESSURE: 76 MMHG | TEMPERATURE: 98.2 F | OXYGEN SATURATION: 96 % | HEIGHT: 60 IN

## 2023-04-04 DIAGNOSIS — J45.21 INTERMITTENT ASTHMA WITH ACUTE EXACERBATION, UNSPECIFIED ASTHMA SEVERITY: ICD-10-CM

## 2023-04-04 DIAGNOSIS — E66.01 CLASS 2 SEVERE OBESITY DUE TO EXCESS CALORIES WITH SERIOUS COMORBIDITY AND BODY MASS INDEX (BMI) OF 35.0 TO 35.9 IN ADULT (HCC): ICD-10-CM

## 2023-04-04 DIAGNOSIS — R73.9 HYPERGLYCEMIA: ICD-10-CM

## 2023-04-04 DIAGNOSIS — E78.5 HYPERLIPIDEMIA, UNSPECIFIED HYPERLIPIDEMIA TYPE: Primary | ICD-10-CM

## 2023-04-04 DIAGNOSIS — R11.0 NAUSEA: ICD-10-CM

## 2023-04-04 DIAGNOSIS — J44.1 CHRONIC OBSTRUCTIVE PULMONARY DISEASE WITH ACUTE EXACERBATION (HCC): ICD-10-CM

## 2023-04-04 DIAGNOSIS — G43.919 INTRACTABLE MIGRAINE WITHOUT STATUS MIGRAINOSUS, UNSPECIFIED MIGRAINE TYPE: ICD-10-CM

## 2023-04-04 DIAGNOSIS — L84 CORN OF FOOT: ICD-10-CM

## 2023-04-04 PROBLEM — E66.812 CLASS 2 SEVERE OBESITY DUE TO EXCESS CALORIES WITH SERIOUS COMORBIDITY AND BODY MASS INDEX (BMI) OF 35.0 TO 35.9 IN ADULT (HCC): Status: ACTIVE | Noted: 2023-04-04

## 2023-04-04 RX ORDER — ONDANSETRON 4 MG/1
4 TABLET, FILM COATED ORAL EVERY 8 HOURS PRN
Qty: 20 TABLET | Refills: 0 | Status: SHIPPED | OUTPATIENT
Start: 2023-04-04

## 2023-04-04 RX ORDER — IPRATROPIUM/ALBUTEROL SULFATE 20-100 MCG
1 MIST INHALER (GRAM) INHALATION 4 TIMES DAILY
Qty: 4 G | Refills: 1 | Status: SHIPPED | OUTPATIENT
Start: 2023-04-04

## 2023-04-04 NOTE — PROGRESS NOTES
Name: Karoline Echeverria      : 1975      MRN: [id number]  Encounter Provider: Hillary Alexandre MD  Encounter Date: 2023   Encounter department: [address] [place] [address]  Hyperlipidemia, unspecified hyperlipidemia type  -     CBC and differential; Future  -     Comprehensive metabolic panel; Future  -     Lipid Panel with Direct LDL reflex; Future  -     TSH, 3rd generation with Free T4 reflex; Future    2  Intractable migraine without status migrainosus, unspecified migraine type  Assessment & Plan:  Stable  Continue same  We will continue to monitor  3  Class 2 severe obesity due to excess calories with serious comorbidity and body mass index (BMI) of 35 0 to 35 9 in adult [facility])  Assessment & Plan:  I am going to start her on Wegovy 0 25 mg weekly  Discussed about possible side effects  Come back in 1 month  Orders:  -     Semaglutide-Weight Management (WEGOVY) 0 25 MG/0 5ML; Inject 0 5 mL (0 25 mg total) under the skin once a week for 28 days    4  Corn of foot  Assessment & Plan:  Referral to see podiatrist     Orders:  -     Ambulatory Referral to Podiatry; Future    5  Hyperglycemia  -     Hemoglobin A1C; Future    6  Intermittent asthma with acute exacerbation, unspecified asthma severity  -     ipratropium-albuterol (Combivent Respimat) inhaler; Inhale 1 puff 4 (four) times a day    7  Nausea  -     ondansetron (ZOFRAN) 4 mg tablet; Take 1 tablet (4 mg total) by mouth every 8 (eight) hours as needed for nausea or vomiting    8  Chronic obstructive pulmonary disease with acute exacerbation (HCC)  Assessment & Plan:  Stable  Continue her inhalers  We will continue to monitor  Subjective     She is here today for follow-up multiple medical problems  She has been taking her medications  Denies any side effects  She had a blood work done last August showed elevated LDL but was improving from before    She has been trying to watch her diet   She continues to gain weight  Review of Systems   Constitutional: Negative for chills and fever  HENT: Negative for trouble swallowing  Eyes: Negative for visual disturbance  Respiratory: Negative for cough and shortness of breath  Cardiovascular: Negative for chest pain, palpitations and leg swelling  Gastrointestinal: Negative for abdominal pain, constipation and diarrhea  Endocrine: Negative for cold intolerance and heat intolerance  Genitourinary: Negative for difficulty urinating and dysuria  Musculoskeletal: Negative for gait problem  Skin: Negative for rash  Neurological: Negative for dizziness, tremors, seizures and headaches  Hematological: Negative for adenopathy  Psychiatric/Behavioral: Negative for behavioral problems  Past Medical History:   Diagnosis Date   • Anxiety     h/o 2 panic attacks    • Arthritis     l hip congenital dyspasia   • Asthma     good control   • Bleb     R eye   • Chronic kidney disease     one kidney left side   • Chronic pain     back- DJD in 2 upper 2 lower, buldging disc c-7   • Chronic pain disorder     lower back and hips   • Depression    • Endometriosis    • GERD (gastroesophageal reflux disease)    • Glaucoma     b/l    Bleb in R eye   • Glaucoma    • H/O carpal tunnel syndrome     L   • Headache     migraines   • History of cigarette smoking    • History of transfusion    • Hypercholesterolemia 1/17/2013   • Insomnia    • Migraines    • Osteoarthritis     cervical spondylarthritis   • Polypoid cystitis     POLYP CYSTS ON BUTTOCK AREA   • Renal disorder     Reports single kidney   • Right knee injury     SCRAPED GROWTH PLATE RIGHT KNEE   • Seasonal allergies      Past Surgical History:   Procedure Laterality Date   • BLEPHAROPLASTY Right     BLEB RIGHT EYE   • BUNIONECTOMY Bilateral     2 on R, 3 on left   • CARPAL TUNNEL RELEASE Left    • CT NEEDLE BX ASPIRATION INJECTION LOCALIZATION  3/8/2019   • EYE SURGERY Right     bleb implant   • EYE SURGERY Bilateral     lazer- glaucoma   • FOOT HARDWARE REMOVAL Left 7/2/2018    Procedure: Aundra Marine REMOVAL OF HARDWARE;  Surgeon: Jeanne Sullivan DPM;  Location: [facility] MAIN OR;  Service: Podiatry   • FOOT HARDWARE REMOVAL Left 7/5/2018    Procedure: LEFT CALCANEAL REMOVAL OF HARDWARE;  Surgeon: Jeanne Sullivan DPM;  Location: [facility] MAIN OR;  Service: Podiatry   • FOOT SURGERY Right     BONE REMOVED BOTTOM OF RIGHT FOOT   • HAND SURGERY Right     ring finger tendon severed   • HARDWARE REMOVAL Left 7/5/2018    Procedure: LEFT BIG TOE REMOVAL OF HARDWARE;  Surgeon: Jeanne Sullivan DPM;  Location: [facility] MAIN OR;  Service: Podiatry   • HIP SURGERY Left     reconstruction as child   • HYSTERECTOMY      total abdominal   • HYSTERECTOMY      TOTAL   • JOINT REPLACEMENT      LTHR and reconstruction   • KNEE ARTHROSCOPY      R knee   • LEG SURGERY     • LISFRANC ARTHRODESIS Left 11/3/2017    Procedure: FUSION FIRST MTPJ: Samara Borrego; LATERAL ANKLE STABILIZATION;  Surgeon: Jeanne Sullivan DPM;  Location: [facility];  Service: Podiatry   • OOPHORECTOMY      age 35   • PILONIDAL CYST EXCISION      midline   • MS DCMPRN FASCT LEG ANT&/LAT&PST W/DBRDMT MUS Left 7/5/2018    Procedure: LEFT PERONEAL TENDON DEBRIDMENT ;  Surgeon: Jeanne Sullivan DPM;  Location: [facility] MAIN OR;  Service: Podiatry   • MS OSTEOTOMY CALCANEUS W/WO INTERNAL FIXATION Left 11/3/2017    Procedure: OSTEOTOMY CALCANEUS;  Surgeon: Jeanne Sullivan DPM;  Location: [facility];  Service: Podiatry   • MS RPR TENDON FLXR FOOT [address] W/FREE GRAFT EA TENDON Left 7/2/2018    Procedure: PERONEAL TENDON EXPLORATION;  Surgeon: Jeanne Sullivan DPM;  Location: [facility] MAIN OR;  Service: Podiatry   • REFRACTIVE SURGERY Bilateral     LASER SURGERY BOTH EYES   • TOTAL HIP ARTHROPLASTY  2005   • WRIST SURGERY Left      Family History   Problem Relation Age of Onset   • Cancer Mother    • Hypertension Mother    • Bone cancer Mother 40        COD   • Breast cancer Mother 40   • Hypertension Father • Heart disease Father    • Dementia Father    • Liver cancer Paternal Grandmother 68   • Hypertension Paternal Grandfather    • Stroke Paternal Grandfather    • Lung cancer Sister 40   • Brain cancer Sister 52   • No Known Problems Maternal Grandmother    • No Known Problems Maternal Grandfather    • No Known Problems Sister    • Breast cancer Maternal Aunt    • Breast cancer Maternal Aunt 48     Social History     Socioeconomic History   • Marital status: /Civil Union     Spouse name: None   • Number of children: None   • Years of education: None   • Highest education level: None   Occupational History   • None   Tobacco Use   • Smoking status: Every Day     Packs/day: 0 50     Years: 30 00     Pack years: 15 00     Types: Cigarettes   • Smokeless tobacco: Never   Vaping Use   • Vaping Use: Never used   Substance and Sexual Activity   • Alcohol use: No   • Drug use: No   • Sexual activity: Yes   Other Topics Concern   • None   Social History Narrative    ** Merged History Encounter **          Social Determinants of Health     Financial Resource Strain: Not on file   Food Insecurity: Not on file   Transportation Needs: Not on file   Physical Activity: Not on file   Stress: Not on file   Social Connections: Not on file   Intimate Partner Violence: Not on file   Housing Stability: Not on file     Current Outpatient Medications on File Prior to Visit   Medication Sig   • albuterol (2 5 mg/3 mL) 0 083 % nebulizer solution TAKE 3 ML (2 5 MG) BY NEBULIZATION EVERY 6 (SIX) HOURS AS NEEDED FOR WHEEZING OR SHORTNESS OF BREATH   • albuterol (PROVENTIL HFA,VENTOLIN HFA) 90 mcg/act inhaler INHALE 2 PUFFS BY MOUTH EVERY 6 HOURS AS NEEDED FOR WHEEZING   • cyclobenzaprine (FLEXERIL) 10 mg tablet TAKE 1 TABLET BY MOUTH EVERY DAY AS NEEDED   • fluticasone (FLONASE) 50 mcg/act nasal spray SPRAY 2 SPRAYS INTO EACH NOSTRIL EVERY DAY   • fluticasone-salmeterol (Wixela Inhub) 500-50 mcg/dose inhaler Inhale 1 puff daily Rinse mouth after use  • ibuprofen (MOTRIN) 600 mg tablet TAKE 1 TABLET (600 MG TOTAL) BY MOUTH EVERY 8 (EIGHT) HOURS AS NEEDED FOR MILD PAIN  • montelukast (SINGULAIR) 10 mg tablet TAKE 1 TABLET BY MOUTH EVERY DAY IN THE EVENING   • Premarin 0 45 MG tablet TAKE 1 TABLET (0 45 MG TOTAL) BY MOUTH DAILY TAKE DAILY FOR 21 DAYS THEN DO NOT TAKE FOR 7 DAYS  • SUMAtriptan (IMITREX) 50 mg tablet TAKE 2 TABLETS (100 MG TOTAL) BY MOUTH ONCE AS NEEDED FOR MIGRAINE   • zolpidem (AMBIEN) 10 mg tablet TAKE 1 TABLET BY MOUTH DAILY AT BEDTIME AS NEEDED FOR SLEEP     • [DISCONTINUED] Combivent Respimat inhaler INHALE 1 PUFF BY MOUTH 4 TIMES A DAY   • benzonatate (TESSALON) 200 MG capsule Take 1 capsule (200 mg total) by mouth 3 (three) times a day as needed for cough (Patient not taking: Reported on 4/4/2023)   • methocarbamol (ROBAXIN) 500 mg tablet Take 1 tablet (500 mg total) by mouth 3 (three) times a day (Patient not taking: Reported on 8/30/2022)   • pantoprazole (PROTONIX) 40 mg tablet TAKE 1 TABLET BY MOUTH EVERY DAY BEFORE BREAKFAST (Patient not taking: Reported on 8/30/2022)   • promethazine-codeine (PHENERGAN WITH CODEINE) 6 25-10 mg/5 mL syrup Take 5 mL by mouth every 4 (four) hours as needed for cough (Patient not taking: Reported on 4/4/2023)     Allergies   Allergen Reactions   • Morphine Other (See Comments)     Blood pressure drops   • Morphine And Related Other (See Comments)     Blood pressure drops   • Penicillins Itching and Rash   • Quazepam Itching     Immunization History   Administered Date(s) Administered   • COVID-19 MODERNA VACC 0 5 ML IM 01/11/2021, 02/10/2021   • H1N1, All Formulations 10/31/2009   • INFLUENZA 01/15/2007, 11/13/2007, 09/30/2008, 12/10/2009, 11/28/2011, 09/11/2012, 09/19/2013, 11/29/2017, 11/29/2017, 11/02/2020   • Influenza, seasonal, injectable 10/01/2010   • Td (adult), adsorbed 01/01/2007   • Tdap 09/30/2008       Objective     /76 (BP Location: Left arm, Patient Position: Sitting, Cuff Size: Large)   Pulse 74   Temp 98 2 °F (36 8 °C) (Tympanic)   Resp 16   Ht 5' (1 524 m)   Wt 82 1 kg (181 lb)   SpO2 96%   BMI 35 35 kg/m²     Physical Exam  Vitals and nursing note reviewed  Constitutional:       Appearance: She is well-developed  HENT:      Head: Normocephalic and atraumatic  Eyes:      Pupils: Pupils are equal, round, and reactive to light  Cardiovascular:      Rate and Rhythm: Normal rate and regular rhythm  Heart sounds: Normal heart sounds  Pulmonary:      Effort: Pulmonary effort is normal       Breath sounds: Normal breath sounds  Abdominal:      General: Bowel sounds are normal       Palpations: Abdomen is soft  Musculoskeletal:         General: Normal range of motion  Cervical back: Normal range of motion and neck supple  Lymphadenopathy:      Cervical: No cervical adenopathy  Skin:     General: Skin is warm  Neurological:      Mental Status: She is alert and oriented to person, place, and time  Cranial Nerves: No cranial nerve deficit  Chula Lee MD  BMI Counseling: Body mass index is 35 35 kg/m²  The BMI is above normal  Nutrition recommendations include reducing portion sizes, decreasing overall calorie intake and 3-5 servings of fruits/vegetables daily  Exercise recommendations include moderate aerobic physical activity for 150 minutes/week

## 2023-04-04 NOTE — ASSESSMENT & PLAN NOTE
I am going to start her on Wegovy 0 25 mg weekly  Discussed about possible side effects  Come back in 1 month

## 2023-04-07 ENCOUNTER — TELEPHONE (OUTPATIENT)
Dept: FAMILY MEDICINE CLINIC | Facility: CLINIC | Age: 48
End: 2023-04-07

## 2023-04-26 ENCOUNTER — APPOINTMENT (OUTPATIENT)
Dept: RADIOLOGY | Facility: CLINIC | Age: 48
End: 2023-04-26

## 2023-04-26 ENCOUNTER — OFFICE VISIT (OUTPATIENT)
Dept: PODIATRY | Facility: CLINIC | Age: 48
End: 2023-04-26

## 2023-04-26 VITALS
SYSTOLIC BLOOD PRESSURE: 141 MMHG | HEART RATE: 79 BPM | HEIGHT: 60 IN | BODY MASS INDEX: 34.95 KG/M2 | DIASTOLIC BLOOD PRESSURE: 74 MMHG | WEIGHT: 178 LBS

## 2023-04-26 DIAGNOSIS — Z01.818 PREOP TESTING: ICD-10-CM

## 2023-04-26 DIAGNOSIS — M79.674 TOE PAIN, RIGHT: Primary | ICD-10-CM

## 2023-04-26 DIAGNOSIS — M79.671 RIGHT FOOT PAIN: ICD-10-CM

## 2023-04-26 DIAGNOSIS — L84 CALLUS OF TOE: ICD-10-CM

## 2023-04-26 DIAGNOSIS — M20.41 ACQUIRED HAMMERTOE OF RIGHT FOOT: ICD-10-CM

## 2023-04-26 RX ORDER — CHLORHEXIDINE GLUCONATE 0.12 MG/ML
15 RINSE ORAL ONCE
OUTPATIENT
Start: 2023-04-26 | End: 2023-04-26

## 2023-04-26 NOTE — LETTER
April 27, 2023     Mitchel Chicas MD  [address] [facility] [address]    Patient: Deidre Garner   YOB: 1975   Date of Visit: 4/26/2023       Dear Dr Neris Reyez: Thank you for referring Franc Hall to me for evaluation  Below are my notes for this consultation  If you have questions, please do not hesitate to call me  I look forward to following your patient along with you  Sincerely,        Lillard Fleischer, DPM        CC: No Recipients  Lillard Fleischer, ZACHARY [facility]  4/26/2023  9:07 PM  Signed  Assessment/Plan:    No problem-specific Assessment & Plan notes found for this encounter  Diagnoses and all orders for this visit:    Toe pain, right  -     X-ray foot right 3+ views; Future  -     Case request operating room: ARTHROPLASTY RIGHT 3RD AND 4TH DIGIT WITH POSSIBLE K-WIRES; Standing  -     Case request operating room: ARTHROPLASTY RIGHT 3RD AND 4TH DIGIT WITH POSSIBLE K-WIRES    Acquired hammertoe of right foot  -     Case request operating room: ARTHROPLASTY RIGHT 3RD AND 4TH DIGIT WITH POSSIBLE K-WIRES; Standing  -     Case request operating room: ARTHROPLASTY RIGHT 3RD AND 4TH DIGIT WITH POSSIBLE K-WIRES    Callus of toe  -     Case request operating room: ARTHROPLASTY RIGHT 3RD AND 4TH DIGIT WITH POSSIBLE K-WIRES; Standing  -     Case request operating room: ARTHROPLASTY RIGHT 3RD AND 4TH DIGIT WITH POSSIBLE K-WIRES    Preop testing  -     Comprehensive metabolic panel; Future  -     CBC and differential; Future    Other orders  -     Nursing Communication Warmimg Interventions Implemented; Standing  -     Nursing Communication CHG bath, have staff wash entire body (neck down) per pre-op bathing protocol  Routine, evening prior to, and day of surgery ; Standing  -     Nursing Communication Swab both nares with Povidone-Iodine solution, EXCLUDE if patient has shellfish/Iodine allergy, and replace with nasal alcohol swabstick   Routine, day of surgery, on call to OR; Standing  - chlorhexidine (PERIDEX) 0 12 % oral rinse 15 mL  -     Void on call to OR; Standing  -     Insert peripheral IV; Standing  -     vancomycin (VANCOCIN) 1,500 mg in sodium chloride 0 9 % 500 mL IVPB     Plan:     -  Patient was counseled and educated on the condition and the diagnosis  The diagnosis, treatment options and prognosis were discussed in detail    - Right foot xrays reviewed and I personally interpreted findings with patient which is consistent mild decrease in proximal interphalangeal joint spaces of 3rd and 4th digits consistent with DJD and hammertoe deformity    - Plan for 3rd and 4th digits Arthroplasty with K-wire fixation   - WBAT in surgical shoe for 6-8 weeks with K-wires removal at week 6    - I was very clear at the beginning of the discussion about alternatives to this surgery including benign neglect, bracing, and second surgical opinions  I spent time to discuss with the patient the surgical procedure(s) as listed above, pre-op testing, and post-op course required to properly heal the surgery  I discussed risks as infection, scar, swelling, chronic pain, painful or prominent hardware, possible need to remove hardware, poor healing of incision or bone that could require more surgery, incomplete correction of deformities or recurrence of deformities, change in shape of foot, toe, or walking and function, numbness, neuritis/RSD, blood clots in the leg or lung, and even death from anesthesia complications  No guarantees were given and the possibility of recurrent deformity or incomplete correction were discussed before patient signed the consent form  We also discussed the need for possible anticoagulation  The offloading device necessary after the surgery will be surgical shoe  The surgery, history and physical and PATS will be scheduled  Subjective:     Patient ID: Cookie Smoker is a 51 y o  female      50-year-old female with past medical history as below presents for an evaluation of right third and fourth digit pain secondary to interdigital callus formation  Patient reports she has significant discomfort with wearing shoes and socks due to constant friction between the 2 toes and calluses causing pain  She has tried conservative measure including wearing wider toebox supportive shoe gear, toe sleeves strapping and padding without much relief  Patient reports she is interested in more definitive procedure to fixate the deformity as she has not had much relief with conservative measure  She denies nausea vomiting fever chills shortness of breath  Patient smokes half a pack of cigarettes a day  The following portions of the patient's history were reviewed and updated as appropriate:   She  has a past medical history of Anxiety, Arthritis, Asthma, Bleb, Chronic kidney disease, Chronic pain, Chronic pain disorder, Depression, Endometriosis, GERD (gastroesophageal reflux disease), Glaucoma, Glaucoma, H/O carpal tunnel syndrome, Headache, History of cigarette smoking, History of transfusion, Hypercholesterolemia (1/17/2013), Insomnia, Migraines, Osteoarthritis, Polypoid cystitis, Renal disorder, Right knee injury, and Seasonal allergies    She   Patient Active Problem List    Diagnosis Date Noted   • Corn of foot 04/04/2023   • Hyperglycemia 04/04/2023   • Class 2 severe obesity due to excess calories with serious comorbidity and body mass index (BMI) of 35 0 to 35 9 in adult [facility]) 04/04/2023   • Acute pain of left shoulder 08/15/2022   • Generalized abdominal pain 11/15/2021   • Bloating 11/15/2021   • Estrogen deficiency 11/15/2021   • Tobacco use 03/02/2021   • Neuroma of foot 03/02/2021   • Hallux valgus 03/02/2021   • Hyperlipidemia 03/02/2021   • Encounter for screening mammogram for breast cancer 03/02/2021   • Right ear pain 01/07/2021   • Lumbar strain, initial encounter 09/14/2020   • Diarrhea 03/26/2020   • Pain 12/13/2019   • Lumbar radiculopathy 12/13/2019   • DDD (degenerative disc disease), lumbar 12/13/2019   • DDD (degenerative disc disease), cervical 12/13/2019   • Neck pain 12/13/2019   • Cervical radiculopathy 12/13/2019   • Healthcare maintenance 11/21/2019   • Absolute anemia 11/21/2019   • Chronic low back pain without sciatica 11/21/2019   • Chronic fatigue 11/21/2019   • BMI 32 0-32 9,adult 11/21/2019   • Chronic left shoulder pain 11/21/2019   • Cough 01/22/2019   • Acute non-recurrent maxillary sinusitis 01/22/2019   • Chronic obstructive pulmonary disease ([address] ) 10/15/2018   • Primary insomnia 08/27/2018   • Rash 08/27/2018   • Asthma, persistent controlled 02/16/2016   • Migraines 02/16/2016   • Congenital dysplasia of hip 02/16/2016   • Family history of malignant neoplasm of breast 05/27/2014   • Unilateral congenital absence of kidney 10/23/2013   • Depression with anxiety 05/20/2013   • Hypercholesterolemia 01/17/2013   • History of total hip replacement 09/11/2012     She  has a past surgical history that includes Hip surgery (Left); Hysterectomy; Eye surgery (Right); Eye surgery (Bilateral); PILONIDAL CYST EXCISION; pr osteotomy calcaneus w/wo internal fixation (Left, 11/3/2017); Lisfranc arthrodesis (Left, 11/3/2017); Leg Surgery; Knee arthroscopy; Carpal tunnel release (Left); Bunionectomy (Bilateral); Hand surgery (Right); Joint replacement; pr rpr tendon flxr foot sec w/free graft ea tendon (Left, 7/2/2018); Foot hardware removal (Left, 7/2/2018); pr dcmprn fasct leg ant&/lat&pst w/dbrdmt mus (Left, 7/5/2018); Foot hardware removal (Left, 7/5/2018); Hardware Removal (Left, 7/5/2018); Wrist surgery (Left); Refractive surgery (Bilateral); Foot surgery (Right); Blepharoplasty (Right); Hysterectomy; Total hip arthroplasty (2005); CT needle bx aspiration injection locali (3/8/2019); and Oophorectomy       Review of Systems   Constitutional: Negative for chills  Respiratory: Negative for shortness of breath  Cardiovascular: Negative for leg swelling  Gastrointestinal: Negative for diarrhea, nausea and vomiting  Musculoskeletal: Positive for arthralgias  Skin: Positive for color change  Neurological: Negative for dizziness  Psychiatric/Behavioral: Negative for agitation  Objective:      /74 (BP Location: Right arm, Patient Position: Sitting, Cuff Size: Large)   Pulse 79   Ht 5' (1 524 m)   Wt 80 7 kg (178 lb)   BMI 34 76 kg/m²         Physical Exam  Vitals reviewed  Cardiovascular:      Rate and Rhythm: Normal rate  Pulses: Normal pulses  Dorsalis pedis pulses are 2+ on the right side and 2+ on the left side  Posterior tibial pulses are 2+ on the right side and 2+ on the left side  Musculoskeletal:         General: Tenderness and deformity present  Right foot: Deformity (Flexible third and fourth hammertoe deformity noted at the PIPJ level ) and tenderness present  Comments: There is interdigital callus formation noted on the PIPJ of the third digit laterally as well as PIPJ level of the fourth digit medially  Flexible hammertoe deformity noted at the level of PIPJ both digits pain with range of motion of third and fourth PIPJ  Feet:      Right foot:      Protective Sensation: 10 sites tested  10 sites sensed  Skin integrity: Callus present  Left foot:      Protective Sensation: 10 sites tested  10 sites sensed  Skin:     General: Skin is warm  Capillary Refill: Capillary refill takes less than 2 seconds  Neurological:      General: No focal deficit present  Mental Status: She is alert     Psychiatric:         Mood and Affect: Mood normal

## 2023-04-27 NOTE — PROGRESS NOTES
Assessment/Plan:    No problem-specific Assessment & Plan notes found for this encounter  Diagnoses and all orders for this visit:    Toe pain, right  -     X-ray foot right 3+ views; Future  -     Case request operating room: ARTHROPLASTY RIGHT 3RD AND 4TH DIGIT WITH POSSIBLE K-WIRES; Standing  -     Case request operating room: ARTHROPLASTY RIGHT 3RD AND 4TH DIGIT WITH POSSIBLE K-WIRES    Acquired hammertoe of right foot  -     Case request operating room: ARTHROPLASTY RIGHT 3RD AND 4TH DIGIT WITH POSSIBLE K-WIRES; Standing  -     Case request operating room: ARTHROPLASTY RIGHT 3RD AND 4TH DIGIT WITH POSSIBLE K-WIRES    Callus of toe  -     Case request operating room: ARTHROPLASTY RIGHT 3RD AND 4TH DIGIT WITH POSSIBLE K-WIRES; Standing  -     Case request operating room: ARTHROPLASTY RIGHT 3RD AND 4TH DIGIT WITH POSSIBLE K-WIRES    Preop testing  -     Comprehensive metabolic panel; Future  -     CBC and differential; Future    Other orders  -     Nursing Communication Warmimg Interventions Implemented; Standing  -     Nursing Communication CHG bath, have staff wash entire body (neck down) per pre-op bathing protocol  Routine, evening prior to, and day of surgery ; Standing  -     Nursing Communication Swab both nares with Povidone-Iodine solution, EXCLUDE if patient has shellfish/Iodine allergy, and replace with nasal alcohol swabstick  Routine, day of surgery, on call to OR; Standing  -     chlorhexidine (PERIDEX) 0 12 % oral rinse 15 mL  -     Void on call to OR; Standing  -     Insert peripheral IV; Standing  -     vancomycin (VANCOCIN) 1,500 mg in sodium chloride 0 9 % 500 mL IVPB      Plan:     -  Patient was counseled and educated on the condition and the diagnosis    The diagnosis, treatment options and prognosis were discussed in detail    - Right foot xrays reviewed and I personally interpreted findings with patient which is consistent mild decrease in proximal interphalangeal joint spaces of 3rd and 4th digits consistent with DJD and hammertoe deformity    - Plan for 3rd and 4th digits Arthroplasty with K-wire fixation   - WBAT in surgical shoe for 6-8 weeks with K-wires removal at week 6    - I was very clear at the beginning of the discussion about alternatives to this surgery including benign neglect, bracing, and second surgical opinions  I spent time to discuss with the patient the surgical procedure(s) as listed above, pre-op testing, and post-op course required to properly heal the surgery  I discussed risks as infection, scar, swelling, chronic pain, painful or prominent hardware, possible need to remove hardware, poor healing of incision or bone that could require more surgery, incomplete correction of deformities or recurrence of deformities, change in shape of foot, toe, or walking and function, numbness, neuritis/RSD, blood clots in the leg or lung, and even death from anesthesia complications  No guarantees were given and the possibility of recurrent deformity or incomplete correction were discussed before patient signed the consent form  We also discussed the need for possible anticoagulation  The offloading device necessary after the surgery will be surgical shoe  The surgery, history and physical and PATS will be scheduled  Subjective:      Patient ID: Henrique Duarte is a 51 y o  female  57-year-old female with past medical history as below presents for an evaluation of right third and fourth digit pain secondary to interdigital callus formation  Patient reports she has significant discomfort with wearing shoes and socks due to constant friction between the 2 toes and calluses causing pain  She has tried conservative measure including wearing wider toebox supportive shoe gear, toe sleeves strapping and padding without much relief  Patient reports she is interested in more definitive procedure to fixate the deformity as she has not had much relief with conservative measure    She denies nausea vomiting fever chills shortness of breath  Patient smokes half a pack of cigarettes a day  The following portions of the patient's history were reviewed and updated as appropriate:   She  has a past medical history of Anxiety, Arthritis, Asthma, Bleb, Chronic kidney disease, Chronic pain, Chronic pain disorder, Depression, Endometriosis, GERD (gastroesophageal reflux disease), Glaucoma, Glaucoma, H/O carpal tunnel syndrome, Headache, History of cigarette smoking, History of transfusion, Hypercholesterolemia (1/17/2013), Insomnia, Migraines, Osteoarthritis, Polypoid cystitis, Renal disorder, Right knee injury, and Seasonal allergies    She   Patient Active Problem List    Diagnosis Date Noted   • Corn of foot 04/04/2023   • Hyperglycemia 04/04/2023   • Class 2 severe obesity due to excess calories with serious comorbidity and body mass index (BMI) of 35 0 to 35 9 in adult [facility]) 04/04/2023   • Acute pain of left shoulder 08/15/2022   • Generalized abdominal pain 11/15/2021   • Bloating 11/15/2021   • Estrogen deficiency 11/15/2021   • Tobacco use 03/02/2021   • Neuroma of foot 03/02/2021   • Hallux valgus 03/02/2021   • Hyperlipidemia 03/02/2021   • Encounter for screening mammogram for breast cancer 03/02/2021   • Right ear pain 01/07/2021   • Lumbar strain, initial encounter 09/14/2020   • Diarrhea 03/26/2020   • Pain 12/13/2019   • Lumbar radiculopathy 12/13/2019   • DDD (degenerative disc disease), lumbar 12/13/2019   • DDD (degenerative disc disease), cervical 12/13/2019   • Neck pain 12/13/2019   • Cervical radiculopathy 12/13/2019   • Healthcare maintenance 11/21/2019   • Absolute anemia 11/21/2019   • Chronic low back pain without sciatica 11/21/2019   • Chronic fatigue 11/21/2019   • BMI 32 0-32 9,adult 11/21/2019   • Chronic left shoulder pain 11/21/2019   • Cough 01/22/2019   • Acute non-recurrent maxillary sinusitis 01/22/2019   • Chronic obstructive pulmonary disease ([facility] [address] ) 10/15/2018 • Primary insomnia 08/27/2018   • Rash 08/27/2018   • Asthma, persistent controlled 02/16/2016   • Migraines 02/16/2016   • Congenital dysplasia of hip 02/16/2016   • Family history of malignant neoplasm of breast 05/27/2014   • Unilateral congenital absence of kidney 10/23/2013   • Depression with anxiety 05/20/2013   • Hypercholesterolemia 01/17/2013   • History of total hip replacement 09/11/2012     She  has a past surgical history that includes Hip surgery (Left); Hysterectomy; Eye surgery (Right); Eye surgery (Bilateral); PILONIDAL CYST EXCISION; pr osteotomy calcaneus w/wo internal fixation (Left, 11/3/2017); Lisfranc arthrodesis (Left, 11/3/2017); Leg Surgery; Knee arthroscopy; Carpal tunnel release (Left); Bunionectomy (Bilateral); Hand surgery (Right); Joint replacement; pr rpr tendon flxr foot sec w/free graft ea tendon (Left, 7/2/2018); Foot hardware removal (Left, 7/2/2018); pr dcmprn fasct leg ant&/lat&pst w/dbrdmt mus (Left, 7/5/2018); Foot hardware removal (Left, 7/5/2018); Hardware Removal (Left, 7/5/2018); Wrist surgery (Left); Refractive surgery (Bilateral); Foot surgery (Right); Blepharoplasty (Right); Hysterectomy; Total hip arthroplasty (2005); CT needle bx aspiration injection locali (3/8/2019); and Oophorectomy       Review of Systems   Constitutional: Negative for chills  Respiratory: Negative for shortness of breath  Cardiovascular: Negative for leg swelling  Gastrointestinal: Negative for diarrhea, nausea and vomiting  Musculoskeletal: Positive for arthralgias  Skin: Positive for color change  Neurological: Negative for dizziness  Psychiatric/Behavioral: Negative for agitation  Objective:      /74 (BP Location: Right arm, Patient Position: Sitting, Cuff Size: Large)   Pulse 79   Ht 5' (1 524 m)   Wt 80 7 kg (178 lb)   BMI 34 76 kg/m²          Physical Exam  Vitals reviewed  Cardiovascular:      Rate and Rhythm: Normal rate  Pulses: Normal pulses  Dorsalis pedis pulses are 2+ on the right side and 2+ on the left side  Posterior tibial pulses are 2+ on the right side and 2+ on the left side  Musculoskeletal:         General: Tenderness and deformity present  Right foot: Deformity (Flexible third and fourth hammertoe deformity noted at the PIPJ level ) and tenderness present  Comments: There is interdigital callus formation noted on the PIPJ of the third digit laterally as well as PIPJ level of the fourth digit medially  Flexible hammertoe deformity noted at the level of PIPJ both digits pain with range of motion of third and fourth PIPJ  Feet:      Right foot:      Protective Sensation: 10 sites tested  10 sites sensed  Skin integrity: Callus present  Left foot:      Protective Sensation: 10 sites tested  10 sites sensed  Skin:     General: Skin is warm  Capillary Refill: Capillary refill takes less than 2 seconds  Neurological:      General: No focal deficit present  Mental Status: She is alert     Psychiatric:         Mood and Affect: Mood normal

## 2023-05-01 ENCOUNTER — PREP FOR PROCEDURE (OUTPATIENT)
Dept: OBGYN CLINIC | Facility: CLINIC | Age: 48
End: 2023-05-01

## 2023-05-02 DIAGNOSIS — J44.1 CHRONIC OBSTRUCTIVE PULMONARY DISEASE WITH ACUTE EXACERBATION (HCC): ICD-10-CM

## 2023-05-02 RX ORDER — ALBUTEROL SULFATE 90 UG/1
AEROSOL, METERED RESPIRATORY (INHALATION)
Qty: 18 G | Refills: 0 | Status: SHIPPED | OUTPATIENT
Start: 2023-05-02

## 2023-05-06 DIAGNOSIS — R14.0 BLOATING: ICD-10-CM

## 2023-05-06 DIAGNOSIS — R10.84 GENERALIZED ABDOMINAL PAIN: ICD-10-CM

## 2023-05-08 ENCOUNTER — TELEPHONE (OUTPATIENT)
Dept: FAMILY MEDICINE CLINIC | Facility: CLINIC | Age: 48
End: 2023-05-08

## 2023-05-08 ENCOUNTER — OFFICE VISIT (OUTPATIENT)
Dept: FAMILY MEDICINE CLINIC | Facility: CLINIC | Age: 48
End: 2023-05-08

## 2023-05-08 VITALS
RESPIRATION RATE: 16 BRPM | HEIGHT: 60 IN | OXYGEN SATURATION: 76 % | BODY MASS INDEX: 33.89 KG/M2 | DIASTOLIC BLOOD PRESSURE: 78 MMHG | HEART RATE: 98 BPM | SYSTOLIC BLOOD PRESSURE: 120 MMHG | WEIGHT: 172.6 LBS | TEMPERATURE: 98.8 F

## 2023-05-08 DIAGNOSIS — E78.5 HYPERLIPIDEMIA, UNSPECIFIED HYPERLIPIDEMIA TYPE: ICD-10-CM

## 2023-05-08 DIAGNOSIS — E66.09 CLASS 1 OBESITY DUE TO EXCESS CALORIES WITH SERIOUS COMORBIDITY AND BODY MASS INDEX (BMI) OF 33.0 TO 33.9 IN ADULT: Primary | ICD-10-CM

## 2023-05-08 DIAGNOSIS — R73.9 HYPERGLYCEMIA: ICD-10-CM

## 2023-05-08 PROBLEM — E66.811 CLASS 1 OBESITY DUE TO EXCESS CALORIES WITH SERIOUS COMORBIDITY AND BODY MASS INDEX (BMI) OF 33.0 TO 33.9 IN ADULT: Status: ACTIVE | Noted: 2023-04-04

## 2023-05-08 RX ORDER — PANTOPRAZOLE SODIUM 40 MG/1
TABLET, DELAYED RELEASE ORAL
Qty: 30 TABLET | Refills: 5 | Status: SHIPPED | OUTPATIENT
Start: 2023-05-08

## 2023-05-08 NOTE — ASSESSMENT & PLAN NOTE
8/16/23 lipid panel - Tot chol and LDL elevated but improved  Encourage low fat diet and exercise  Continue to monitor lipid panel

## 2023-05-08 NOTE — ASSESSMENT & PLAN NOTE
Currently on 0 25mg wegovy for weight management   Lost 9lbs since last month  No side effects noted with wegovy  Increase wegovy to 0 5mg  Encourage low carb diet and exercise  Continue to monitor weight

## 2023-05-08 NOTE — TELEPHONE ENCOUNTER
Pt was given script for Wegovy 0 5 mg and it needed a prior auth which I started through [employer]   We are awaiting approval

## 2023-05-08 NOTE — PROGRESS NOTES
Name: Miracle Police      : 1975      MRN: [id number]  Encounter Provider: Shima Castillo MD  Encounter Date: 2023   Encounter department: [address] [place] [address]  Class 1 obesity due to excess calories with serious comorbidity and body mass index (BMI) of 33 0 to 33 9 in adult  Assessment & Plan:  Currently on 0 25mg wegovy for weight management   Lost 9lbs since last month  No side effects noted with wegovy  Increase wegovy to 0 5mg  Encourage low carb diet and exercise  Continue to monitor weight    Orders:  -     Semaglutide-Weight Management (WEGOVY) 0 5 MG/0 5ML; Inject 0 5 mL (0 5 mg total) under the skin once a week for 28 days    2  Hyperglycemia  Assessment & Plan:  23 fasting glucose 105  Encourage low carb diet and exercise  Now on 0 5mg wegovy   Monitor fasting glucose and A1C      3  Hyperlipidemia, unspecified hyperlipidemia type  Assessment & Plan:  23 lipid panel - Tot chol and LDL elevated but improved  Encourage low fat diet and exercise  Continue to monitor lipid panel              Subjective     DAISY S  is a 51 y  o female with history of COPD, HLD, hyperglycemia, and obesity presenting today for 1 month f/u weight management on wegovy  Currently on 0 25mg wegovy with no side effects noted  No complaints/concerns  Lost 9lbs since last visit last month  Review of Systems   Constitutional: Positive for appetite change (lower)  Negative for fatigue and fever  Respiratory: Negative for cough and shortness of breath  Cardiovascular: Negative for chest pain and palpitations  Gastrointestinal: Negative for abdominal distention, abdominal pain, constipation, diarrhea, nausea and vomiting  Neurological: Negative for dizziness and headaches         Past Medical History:   Diagnosis Date   • Anxiety     h/o 2 panic attacks    • Arthritis     l hip congenital dyspasia   • Asthma     good control   • Bleb     R eye   • Chronic kidney disease     one kidney left side   • Chronic pain     back- DJD in 2 upper 2 lower, buldging disc c-7   • Chronic pain disorder     lower back and hips   • Depression    • Endometriosis    • GERD (gastroesophageal reflux disease)    • Glaucoma     b/l    Bleb in R eye   • Glaucoma    • H/O carpal tunnel syndrome     L   • Headache     migraines   • History of cigarette smoking    • History of transfusion    • Hypercholesterolemia 1/17/2013   • Insomnia    • Migraines    • Osteoarthritis     cervical spondylarthritis   • Polypoid cystitis     POLYP CYSTS ON BUTTOCK AREA   • Renal disorder     Reports single kidney   • Right knee injury     SCRAPED GROWTH PLATE RIGHT KNEE   • Seasonal allergies      Past Surgical History:   Procedure Laterality Date   • BLEPHAROPLASTY Right     BLEB RIGHT EYE   • BUNIONECTOMY Bilateral     2 on R, 3 on left   • CARPAL TUNNEL RELEASE Left    • CT NEEDLE BX ASPIRATION INJECTION LOCALIZATION  3/8/2019   • EYE SURGERY Right     bleb implant   • EYE SURGERY Bilateral     lazer- glaucoma   • FOOT HARDWARE REMOVAL Left 7/2/2018    Procedure: CALCANEAL REMOVAL OF HARDWARE;  Surgeon: Landen Marvin DPM;  Location: [facility] MAIN OR;  Service: Podiatry   • FOOT HARDWARE REMOVAL Left 7/5/2018    Procedure: LEFT CALCANEAL REMOVAL OF HARDWARE;  Surgeon: Landen Marvin DPM;  Location: [facility] MAIN OR;  Service: Podiatry   • FOOT SURGERY Right     BONE REMOVED BOTTOM OF RIGHT FOOT   • HAND SURGERY Right     ring finger tendon severed   • HARDWARE REMOVAL Left 7/5/2018    Procedure: LEFT BIG TOE REMOVAL OF HARDWARE;  Surgeon: Landen Marvin DPM;  Location: [facility] MAIN OR;  Service: Podiatry   • HIP SURGERY Left     reconstruction as child   • HYSTERECTOMY      total abdominal   • HYSTERECTOMY      TOTAL   • JOINT REPLACEMENT      LTHR and reconstruction   • KNEE ARTHROSCOPY      R knee   • LEG SURGERY     • LISFRANC ARTHRODESIS Left 11/3/2017    Procedure: FUSION FIRST MTPJ: Phoenix Flatten; LATERAL ANKLE STABILIZATION; Surgeon: Mariel Mortimer, DPM;  Location: [facility] OR;  Service: Podiatry   • OOPHORECTOMY      age 35   • PILONIDAL CYST EXCISION      midline   • OH DCMPRN FASCT LEG ANT&/LAT&PST W/DBRDMT MUS Left 7/5/2018    Procedure: LEFT PERONEAL TENDON DEBRIDMENT ;  Surgeon: Mariel Mortimer, DPM;  Location: [address] [place] [address] OR;  Service: Podiatry   • OH OSTEOTOMY CALCANEUS W/WO INTERNAL FIXATION Left 11/3/2017    Procedure: OSTEOTOMY CALCANEUS;  Surgeon: Mariel Mortimer, DPM;  Location: [facility];  Service: Podiatry   • OH RPR TENDON FLXR FOOT [address] W/FREE GRAFT EA TENDON Left 7/2/2018    Procedure: PERONEAL TENDON EXPLORATION;  Surgeon: Mariel Mortimer, DPM;  Location: [address] [place] [address] MAIN OR;  Service: Podiatry   • REFRACTIVE SURGERY Bilateral     LASER SURGERY BOTH EYES   • TOTAL HIP ARTHROPLASTY  2005   • WRIST SURGERY Left      Family History   Problem Relation Age of Onset   • Cancer Mother    • Hypertension Mother    • Bone cancer Mother 40        COD   • Breast cancer Mother 40   • Hypertension Father    • Heart disease Father    • Dementia Father    • Liver cancer Paternal Grandmother 68   • Hypertension Paternal Grandfather    • Stroke Paternal Grandfather    • Lung cancer Sister 40   • Brain cancer Sister 52   • No Known Problems Maternal Grandmother    • No Known Problems Maternal Grandfather    • No Known Problems Sister    • Breast cancer Maternal Aunt    • Breast cancer Maternal Aunt 48     Social History     Socioeconomic History   • Marital status: /Civil Union     Spouse name: None   • Number of children: None   • Years of education: None   • Highest education level: None   Occupational History   • None   Tobacco Use   • Smoking status: Every Day     Packs/day: 0 50     Years: 30 00     Pack years: 15 00     Types: Cigarettes   • Smokeless tobacco: Never   Vaping Use   • Vaping Use: Never used   Substance and Sexual Activity   • Alcohol use: No   • Drug use: No   • Sexual activity: Yes   Other Topics Concern   • None   Social History Narrative    ** Merged History Encounter **          Social Determinants of Health     Financial Resource Strain: Not on file   Food Insecurity: Not on file   Transportation Needs: Not on file   Physical Activity: Not on file   Stress: Not on file   Social Connections: Not on file   Intimate Partner Violence: Not on file   Housing Stability: Not on file     Current Outpatient Medications on File Prior to Visit   Medication Sig   • albuterol (2 5 mg/3 mL) 0 083 % nebulizer solution TAKE 3 ML (2 5 MG) BY NEBULIZATION EVERY 6 (SIX) HOURS AS NEEDED FOR WHEEZING OR SHORTNESS OF BREATH   • albuterol (PROVENTIL HFA,VENTOLIN HFA) 90 mcg/act inhaler INHALE 2 PUFFS BY MOUTH EVERY 6 HOURS AS NEEDED FOR WHEEZE   • cyclobenzaprine (FLEXERIL) 10 mg tablet TAKE 1 TABLET BY MOUTH EVERY DAY AS NEEDED   • ibuprofen (MOTRIN) 600 mg tablet TAKE 1 TABLET (600 MG TOTAL) BY MOUTH EVERY 8 (EIGHT) HOURS AS NEEDED FOR MILD PAIN  • ipratropium-albuterol (Combivent Respimat) inhaler Inhale 1 puff 4 (four) times a day   • pantoprazole (PROTONIX) 40 mg tablet TAKE 1 TABLET BY MOUTH EVERY DAY BEFORE BREAKFAST   • Premarin 0 45 MG tablet TAKE 1 TABLET (0 45 MG TOTAL) BY MOUTH DAILY TAKE DAILY FOR 21 DAYS THEN DO NOT TAKE FOR 7 DAYS  • SUMAtriptan (IMITREX) 50 mg tablet TAKE 2 TABLETS (100 MG TOTAL) BY MOUTH ONCE AS NEEDED FOR MIGRAINE   • zolpidem (AMBIEN) 10 mg tablet TAKE 1 TABLET BY MOUTH DAILY AT BEDTIME AS NEEDED FOR SLEEP  • benzonatate (TESSALON) 200 MG capsule Take 1 capsule (200 mg total) by mouth 3 (three) times a day as needed for cough   • fluticasone (FLONASE) 50 mcg/act nasal spray SPRAY 2 SPRAYS INTO EACH NOSTRIL EVERY DAY (Patient not taking: Reported on 4/26/2023)   • fluticasone-salmeterol (Wixela Inhub) 500-50 mcg/dose inhaler Inhale 1 puff daily Rinse mouth after use   (Patient not taking: Reported on 4/26/2023)   • methocarbamol (ROBAXIN) 500 mg tablet Take 1 tablet (500 mg total) by mouth 3 (three) times a day (Patient not taking: Reported on 8/30/2022)   • montelukast (SINGULAIR) 10 mg tablet TAKE 1 TABLET BY MOUTH EVERY DAY IN THE EVENING   • ondansetron (ZOFRAN) 4 mg tablet Take 1 tablet (4 mg total) by mouth every 8 (eight) hours as needed for nausea or vomiting (Patient not taking: Reported on 4/26/2023)   • promethazine-codeine (PHENERGAN WITH CODEINE) 6 25-10 mg/5 mL syrup Take 5 mL by mouth every 4 (four) hours as needed for cough (Patient not taking: Reported on 4/4/2023)   • [DISCONTINUED] pantoprazole (PROTONIX) 40 mg tablet TAKE 1 TABLET BY MOUTH EVERY DAY BEFORE BREAKFAST (Patient not taking: Reported on 8/30/2022)     Allergies   Allergen Reactions   • Morphine Other (See Comments)     Blood pressure drops   • Morphine And Related Other (See Comments)     Blood pressure drops   • Penicillins Itching and Rash   • Quazepam Itching     Immunization History   Administered Date(s) Administered   • COVID-19 MODERNA VACC 0 5 ML IM 01/11/2021, 02/10/2021   • H1N1, All Formulations 10/31/2009   • INFLUENZA 01/15/2007, 11/13/2007, 09/30/2008, 12/10/2009, 11/28/2011, 09/11/2012, 09/19/2013, 11/29/2017, 11/29/2017, 11/02/2020   • Influenza, seasonal, injectable 10/01/2010   • Td (adult), adsorbed 01/01/2007   • Tdap 09/30/2008       Objective     /78 (BP Location: Left arm, Patient Position: Sitting, Cuff Size: Large)   Pulse 98   Temp 98 8 °F (37 1 °C) (Tympanic)   Resp 16   Ht 5' (1 524 m)   Wt 78 3 kg (172 lb 9 6 oz)   SpO2 (!) 76%   BMI 33 71 kg/m²     Physical Exam  Vitals and nursing note reviewed  Constitutional:       Appearance: Normal appearance  HENT:      Head: Normocephalic and atraumatic  Eyes:      Extraocular Movements: Extraocular movements intact  Conjunctiva/sclera: Conjunctivae normal    Cardiovascular:      Rate and Rhythm: Normal rate and regular rhythm  Heart sounds: No murmur heard  Pulmonary:      Effort: Pulmonary effort is normal       Breath sounds: Normal breath sounds  Abdominal:      Palpations: Abdomen is soft  Tenderness: There is no abdominal tenderness  Musculoskeletal:         General: No swelling  Right lower leg: No edema  Left lower leg: No edema  Skin:     General: Skin is warm  Findings: No rash  Neurological:      Mental Status: She is alert and oriented to person, place, and time     Psychiatric:         Mood and Affect: Mood normal          Behavior: Behavior normal        Elizabet Mccoy MD

## 2023-05-08 NOTE — ASSESSMENT & PLAN NOTE
8/16/23 fasting glucose 105  Encourage low carb diet and exercise  Now on 0 5mg wegovy   Monitor fasting glucose and A1C

## 2023-05-10 DIAGNOSIS — R52 PAIN: ICD-10-CM

## 2023-05-10 DIAGNOSIS — J45.909 ASTHMA, UNSPECIFIED ASTHMA SEVERITY, UNSPECIFIED WHETHER COMPLICATED, UNSPECIFIED WHETHER PERSISTENT: ICD-10-CM

## 2023-05-10 DIAGNOSIS — J45.21 INTERMITTENT ASTHMA WITH ACUTE EXACERBATION, UNSPECIFIED ASTHMA SEVERITY: ICD-10-CM

## 2023-05-10 DIAGNOSIS — Z88.9 MULTIPLE ALLERGIES: ICD-10-CM

## 2023-05-10 DIAGNOSIS — G47.00 INSOMNIA, UNSPECIFIED TYPE: ICD-10-CM

## 2023-05-10 DIAGNOSIS — R52 PAIN AGGRAVATED BY WALKING: ICD-10-CM

## 2023-05-10 DIAGNOSIS — E28.39 ESTROGEN DEFICIENCY: ICD-10-CM

## 2023-05-10 DIAGNOSIS — J44.1 CHRONIC OBSTRUCTIVE PULMONARY DISEASE WITH ACUTE EXACERBATION (HCC): ICD-10-CM

## 2023-05-10 DIAGNOSIS — R51.9 NONINTRACTABLE HEADACHE, UNSPECIFIED CHRONICITY PATTERN, UNSPECIFIED HEADACHE TYPE: ICD-10-CM

## 2023-05-10 NOTE — TELEPHONE ENCOUNTER
Per [facility] , [place] 0 5mg prior auth approved   Valid 5/8/23-10/7/23  L/m for  Pt and pharmacy

## 2023-05-11 NOTE — TELEPHONE ENCOUNTER
Refills have been requested for the following medications:         albuterol (2 5 mg/3 mL) 0 083 % nebulizer solution [Wassim Abosamra]         montelukast (SINGULAIR) 10 mg tablet [Wassim Abosamra]         ipratropium-albuterol (Combivent Respimat) inhaler [Wassim Abosamra]         cyclobenzaprine (FLEXERIL) 10 mg tablet [Wassim Abosamra]         zolpidem (AMBIEN) 10 mg tablet [Wassim Abosamra]         Premarin 0 45 MG tablet [Wassim Abosamra]         SUMAtriptan (IMITREX) 50 mg tablet [Wassim Abosamra]         ibuprofen (MOTRIN) 600 mg tablet [Wassim Abosamra]         albuterol (PROVENTIL HFA,VENTOLIN HFA) 90 mcg/act inhaler [Wassim Abosamra]     Preferred pharmacy: [address] [place] [address] [id number] - [facility] [address]      Last seen 5/8/23       1 JACKADAN CUBA 1975 F [id number] [facility]-[address] ALEXA PALMA   [] 2 JACKADAN CUBA 1975 F [id number] [facility]-[address] RADHA PALMA           Search Criteria    Name Date of Birth Date Range   Ruthanna Peabody 59- [birth year]- To 05-     Requester Name Requested Date   Ean Quigley Thu May 11 2023 07:04:58 T-0400 [facility] Daylight Time)     Summary   Prescriptions  11  Prescribers  2  Pharmacies  1  View Map  Drug Classes   Benzodiazepines  0  Stimulants  0  Opioids  0  Muscle Relaxants  0  Opioid Dosage   Total MME for Active Prescriptions  0    Average MME  0 00  MME Graph   MME Calculator     • Prescriptions  • Notifications    • Prescribers  • Pharmacies  • MME Graph    [] PA   Drug Categories:      [] Others       Show  entries  Search:  Patient Id Prescription #  Filled Written Drug Label Qty Days Strength MME** Prescriber Pharmacy Payment REFILL #/Auth State Detail   1  [id number] 04/18/2023 04/18/2023 Zolpidem Tartrate (Tablet)  30 0 30 10 MG NA IKE) ABOSAMRGLENN  [facility], [facility]    [place] / 0 [place]    1  [id number] 03/10/2023  03/10/2023 Zolpidem Tartrate (Tablet)  30 0 30 10 MG NA IKE) [facility]  [place] 0 / 0 [place]    1  [id number] 02/07/2023 02/07/2023 Zolpidem Tartrate (Tablet)  30 0 30 10 MG NA WASSIM(MD) [facility]  [place] 0 / 0 [place]    1  [id number] 01/07/2023 01/07/2023 Zolpidem Tartrate (Tablet)  30 0 30 10 MG NA WASSIM(MD) [facility]  [place] / [place]    1  [id number] 12/07/2022 12/07/2022 Zolpidem Tartrate (Tablet)  30 0 30 10 MG NA WASSIM(MD) [facility]  [place]    1  [id number] 11/04/2022 11/04/2022 Zolpidem Tartrate (Tablet)  30 0 30 10 MG NA WASSIM(MD) [facility]  [place] / [place]    2  [id number] 10/06/2022  10/06/2022 Zolpidem Tartrate (Tablet)  30 0 30 10 MG NA ESDRAS HERRING  [facility]  [place] / [place]    2  [id number] 09/08/2022 09/08/2022 Zolpidem Tartrate (Tablet)  30 0 30 10 MG NA ÁNGELAM(MD) [facility]  [place] / [place]    2  [id number] 08/04/2022 08/04/2022 Zolpidem Tartrate (Tablet)  30 0 30 10 MG NA ÁNGELAM(MD) [facility]    [place] 0 / 0 [place]    2  [id number] 07/01/2022 07/01/2022 Zolpidem Tartrate (Tablet)  30 0 30 10 MG NA WASSIM(MD) [facility]

## 2023-05-12 ENCOUNTER — TELEPHONE (OUTPATIENT)
Dept: PODIATRY | Facility: CLINIC | Age: 48
End: 2023-05-12

## 2023-05-12 RX ORDER — IPRATROPIUM/ALBUTEROL SULFATE 20-100 MCG
1 MIST INHALER (GRAM) INHALATION 4 TIMES DAILY
Qty: 4 G | Refills: 0 | Status: SHIPPED | OUTPATIENT
Start: 2023-05-12

## 2023-05-12 RX ORDER — SUMATRIPTAN 50 MG/1
100 TABLET, FILM COATED ORAL ONCE AS NEEDED
Qty: 9 TABLET | Refills: 0 | Status: SHIPPED | OUTPATIENT
Start: 2023-05-12

## 2023-05-12 RX ORDER — ALBUTEROL SULFATE 90 UG/1
2 AEROSOL, METERED RESPIRATORY (INHALATION) EVERY 6 HOURS PRN
Qty: 18 G | Refills: 0 | Status: SHIPPED | OUTPATIENT
Start: 2023-05-12

## 2023-05-12 RX ORDER — MONTELUKAST SODIUM 10 MG/1
10 TABLET ORAL EVERY EVENING
Qty: 90 TABLET | Refills: 0 | Status: SHIPPED | OUTPATIENT
Start: 2023-05-12

## 2023-05-12 RX ORDER — IBUPROFEN 600 MG/1
600 TABLET ORAL EVERY 8 HOURS PRN
Qty: 45 TABLET | Refills: 0 | Status: SHIPPED | OUTPATIENT
Start: 2023-05-12

## 2023-05-12 RX ORDER — ZOLPIDEM TARTRATE 10 MG/1
10 TABLET ORAL
Qty: 30 TABLET | Refills: 0 | Status: SHIPPED | OUTPATIENT
Start: 2023-05-12

## 2023-05-12 RX ORDER — CYCLOBENZAPRINE HCL 10 MG
10 TABLET ORAL DAILY PRN
Qty: 30 TABLET | Refills: 0 | Status: SHIPPED | OUTPATIENT
Start: 2023-05-12

## 2023-05-12 RX ORDER — ALBUTEROL SULFATE 2.5 MG/3ML
2.5 SOLUTION RESPIRATORY (INHALATION) EVERY 6 HOURS PRN
Qty: 150 ML | Refills: 0 | Status: SHIPPED | OUTPATIENT
Start: 2023-05-12

## 2023-05-18 ENCOUNTER — TELEPHONE (OUTPATIENT)
Dept: OBGYN CLINIC | Facility: CLINIC | Age: 48
End: 2023-05-18

## 2023-06-05 ENCOUNTER — OFFICE VISIT (OUTPATIENT)
Dept: FAMILY MEDICINE CLINIC | Facility: CLINIC | Age: 48
End: 2023-06-05
Payer: COMMERCIAL

## 2023-06-05 VITALS
HEIGHT: 60 IN | BODY MASS INDEX: 33.18 KG/M2 | WEIGHT: 169 LBS | RESPIRATION RATE: 16 BRPM | HEART RATE: 65 BPM | DIASTOLIC BLOOD PRESSURE: 72 MMHG | TEMPERATURE: 97.9 F | SYSTOLIC BLOOD PRESSURE: 128 MMHG | OXYGEN SATURATION: 95 %

## 2023-06-05 DIAGNOSIS — E66.09 CLASS 1 OBESITY DUE TO EXCESS CALORIES WITH SERIOUS COMORBIDITY AND BODY MASS INDEX (BMI) OF 33.0 TO 33.9 IN ADULT: ICD-10-CM

## 2023-06-05 DIAGNOSIS — K59.03 DRUG-INDUCED CONSTIPATION: Primary | ICD-10-CM

## 2023-06-05 PROCEDURE — 99213 OFFICE O/P EST LOW 20 MIN: CPT | Performed by: FAMILY MEDICINE

## 2023-06-05 NOTE — ASSESSMENT & PLAN NOTE
Most likely secondary to Dalton Guzman  It was discussed with patient today Metamucil and increase green vegetable and oral hydration

## 2023-06-05 NOTE — ASSESSMENT & PLAN NOTE
Improving  I am going to increase Wegovy to 1 mg weekly  I sent a prescription  Discussed about possible side effect  Discussed about low-carb diet and regular exercise

## 2023-06-05 NOTE — PROGRESS NOTES
Name: Jameson Nj      : 1975      MRN: [id number]  Encounter Provider: Mirian Castellanos MD  Encounter Date: 2023   Encounter department: [address] [place] [address]  Drug-induced constipation  Assessment & Plan:  Most likely secondary to Margarettetoi Fortune  It was discussed with patient today Metamucil and increase green vegetable and oral hydration  2  Class 1 obesity due to excess calories with serious comorbidity and body mass index (BMI) of 33 0 to 33 9 in adult  Assessment & Plan:  Improving  I am going to increase Wegovy to 1 mg weekly  I sent a prescription  Discussed about possible side effect  Discussed about low-carb diet and regular exercise  Orders:  -     Semaglutide-Weight Management (Chuy Moon) 1 MG/0 5ML; Inject 0 5 mL (1 mg total) under the skin once a week           Subjective     She is here today for follow-up for weight management  She has been taking Wegovy 0 5 mg weekly  She continues to have problems with constipation  She stated only mild nausea for 3 days but now she is doing well  She lost 3 pounds since her last visit  Review of Systems   Constitutional: Negative for chills and fever  HENT: Negative for trouble swallowing  Eyes: Negative for visual disturbance  Respiratory: Negative for cough and shortness of breath  Cardiovascular: Negative for chest pain, palpitations and leg swelling  Gastrointestinal: Positive for constipation  Negative for abdominal pain and diarrhea  Endocrine: Negative for cold intolerance and heat intolerance  Genitourinary: Negative for difficulty urinating and dysuria  Musculoskeletal: Negative for gait problem  Skin: Negative for rash  Neurological: Negative for dizziness, tremors, seizures and headaches  Hematological: Negative for adenopathy  Psychiatric/Behavioral: Negative for behavioral problems         Past Medical History:   Diagnosis Date   • Anxiety     h/o 2 panic attacks    • Arthritis     l hip congenital dyspasia   • Asthma     good control   • Bleb     R eye   • Chronic kidney disease     one kidney left side   • Chronic pain     back- DJD in 2 upper 2 lower, buldging disc c-7   • Chronic pain disorder     lower back and hips   • Depression    • Endometriosis    • GERD (gastroesophageal reflux disease)    • Glaucoma     b/l    Bleb in R eye   • Glaucoma    • H/O carpal tunnel syndrome     L   • Headache     migraines   • History of cigarette smoking    • History of transfusion    • Hypercholesterolemia 1/17/2013   • Insomnia    • Migraines    • Osteoarthritis     cervical spondylarthritis   • Polypoid cystitis     POLYP CYSTS ON BUTTOCK AREA   • Renal disorder     Reports single kidney   • Right knee injury     SCRAPED GROWTH PLATE RIGHT KNEE   • Seasonal allergies      Past Surgical History:   Procedure Laterality Date   • BLEPHAROPLASTY Right     BLEB RIGHT EYE   • BUNIONECTOMY Bilateral     2 on R, 3 on left   • CARPAL TUNNEL RELEASE Left    • CT NEEDLE BX ASPIRATION INJECTION LOCALIZATION  3/8/2019   • EYE SURGERY Right     bleb implant   • EYE SURGERY Bilateral     lazer- glaucoma   • FOOT HARDWARE REMOVAL Left 7/2/2018    Procedure: CALCANEAL REMOVAL OF HARDWARE;  Surgeon: Katharine Londono DPM;  Location: [address] [place] [address] OR;  Service: Podiatry   • FOOT HARDWARE REMOVAL Left 7/5/2018    Procedure: LEFT CALCANEAL REMOVAL OF HARDWARE;  Surgeon: Katharine Londono DPM;  Location: [address] [place] [address] OR;  Service: Podiatry   • FOOT SURGERY Right     BONE REMOVED BOTTOM OF RIGHT FOOT   • HAND SURGERY Right     ring finger tendon severed   • HARDWARE REMOVAL Left 7/5/2018    Procedure: LEFT BIG TOE REMOVAL OF HARDWARE;  Surgeon: Katharine Londono DPM;  Location: [address] [place] [address];  Service: Podiatry   • HIP SURGERY Left     reconstruction as child   • HYSTERECTOMY      total abdominal   • HYSTERECTOMY      TOTAL   • JOINT REPLACEMENT      LTHR and reconstruction   • KNEE ARTHROSCOPY      R knee   • LEG SURGERY • LISFRANC ARTHRODESIS Left 11/3/2017    Procedure: FUSION FIRST MTPJ: [address]; LATERAL ANKLE STABILIZATION;  Surgeon: Katharine Londono DPM;  [facility];  Service: Podiatry   • OOPHORECTOMY      age 35   • PILONIDAL CYST EXCISION      midline   • ND DCMPRN FASCT LEG ANT&/LAT&PST W/DBRDMT MUS Left 7/5/2018    Procedure: LEFT PERONEAL TENDON DEBRIDMENT ;  Surgeon: Katharine Londono DPM;  Location: [address] [place] [address] OR;  Service: Podiatry   • ND OSTEOTOMY CALCANEUS W/WO INTERNAL FIXATION Left 11/3/2017    Procedure: OSTEOTOMY CALCANEUS;  Surgeon: Katharine Londono DPM;  [facility];  Service: Podiatry   • ND RPR TENDON FLXR FOOT [address] W/FREE GRAFT EA TENDON Left 7/2/2018    Procedure: PERONEAL TENDON EXPLORATION;  Surgeon: Katharine Londono DPM;  Location: [address] [place] [address] OR;  Service: Podiatry   • REFRACTIVE SURGERY Bilateral     LASER SURGERY BOTH EYES   • TOTAL HIP ARTHROPLASTY  2005   • WRIST SURGERY Left      Family History   Problem Relation Age of Onset   • Cancer Mother    • Hypertension Mother    • Bone cancer Mother 40        COD   • Breast cancer Mother 40   • Hypertension Father    • Heart disease Father    • Dementia Father    • Liver cancer Paternal Grandmother 68   • Hypertension Paternal Grandfather    • Stroke Paternal Grandfather    • Lung cancer Sister 40   • Brain cancer Sister 52   • No Known Problems Maternal Grandmother    • No Known Problems Maternal Grandfather    • No Known Problems Sister    • Breast cancer Maternal Aunt    • Breast cancer Maternal Aunt 48     Social History     Socioeconomic History   • Marital status: /Civil Union     Spouse name: None   • Number of children: None   • Years of education: None   • Highest education level: None   Occupational History   • None   Tobacco Use   • Smoking status: Every Day     Packs/day: 0 50     Years: 30 00     Total pack years: 15 00     Types: Cigarettes   • Smokeless tobacco: Never   Vaping Use   • Vaping Use: Never used   Substance and Sexual Activity   • Alcohol use: No   • Drug use: No   • Sexual activity: Yes   Other Topics Concern   • None   Social History Narrative    ** Merged History Encounter **          Social Determinants of Health     Financial Resource Strain: Not on file   Food Insecurity: Not on file   Transportation Needs: Not on file   Physical Activity: Not on file   Stress: Not on file   Social Connections: Not on file   Intimate Partner Violence: Not on file   Housing Stability: Not on file     Current Outpatient Medications on File Prior to Visit   Medication Sig   • albuterol (2 5 mg/3 mL) 0 083 % nebulizer solution Take 3 mL (2 5 mg total) by nebulization every 6 (six) hours as needed for wheezing or shortness of breath   • albuterol (PROVENTIL HFA,VENTOLIN HFA) 90 mcg/act inhaler Inhale 2 puffs every 6 (six) hours as needed for wheezing   • conjugated estrogens (Premarin) 0 45 mg tablet Take 1 tablet (0 45 mg total) by mouth in the morning Take daily for 21 days   Then do not take for 7 days   • cyclobenzaprine (FLEXERIL) 10 mg tablet Take 1 tablet (10 mg total) by mouth daily as needed for muscle spasms   • ibuprofen (MOTRIN) 600 mg tablet Take 1 tablet (600 mg total) by mouth every 8 (eight) hours as needed for mild pain   • ipratropium-albuterol (Combivent Respimat) inhaler Inhale 1 puff 4 (four) times a day   • montelukast (SINGULAIR) 10 mg tablet Take 1 tablet (10 mg total) by mouth every evening   • ondansetron (ZOFRAN) 4 mg tablet Take 1 tablet (4 mg total) by mouth every 8 (eight) hours as needed for nausea or vomiting   • pantoprazole (PROTONIX) 40 mg tablet TAKE 1 TABLET BY MOUTH EVERY DAY BEFORE BREAKFAST   • SUMAtriptan (IMITREX) 50 mg tablet Take 2 tablets (100 mg total) by mouth once as needed for migraine   • zolpidem (AMBIEN) 10 mg tablet Take 1 tablet (10 mg total) by mouth daily at bedtime as needed for sleep   • [DISCONTINUED] Semaglutide-Weight Management (WEGOVY) 0 5 MG/0 5ML Inject 0 5 mL (0 5 mg total) under the skin once a week for 28 days   • benzonatate (TESSALON) 200 MG capsule Take 1 capsule (200 mg total) by mouth 3 (three) times a day as needed for cough   • fluticasone (FLONASE) 50 mcg/act nasal spray SPRAY 2 SPRAYS INTO EACH NOSTRIL EVERY DAY (Patient not taking: Reported on 4/26/2023)   • fluticasone-salmeterol (Wixela Inhub) 500-50 mcg/dose inhaler Inhale 1 puff daily Rinse mouth after use  (Patient not taking: Reported on 4/26/2023)   • methocarbamol (ROBAXIN) 500 mg tablet Take 1 tablet (500 mg total) by mouth 3 (three) times a day (Patient not taking: Reported on 8/30/2022)   • promethazine-codeine (PHENERGAN WITH CODEINE) 6 25-10 mg/5 mL syrup Take 5 mL by mouth every 4 (four) hours as needed for cough (Patient not taking: Reported on 4/4/2023)     Allergies   Allergen Reactions   • Morphine Other (See Comments)     Blood pressure drops   • Morphine And Related Other (See Comments)     Blood pressure drops   • Penicillins Itching and Rash   • Quazepam Itching     Immunization History   Administered Date(s) Administered   • COVID-19 MODERNA VACC 0 5 ML IM 01/11/2021, 02/10/2021   • H1N1, All Formulations 10/31/2009   • INFLUENZA 01/15/2007, 11/13/2007, 09/30/2008, 12/10/2009, 11/28/2011, 09/11/2012, 09/19/2013, 11/29/2017, 11/29/2017, 11/02/2020   • Influenza, seasonal, injectable 10/01/2010   • Td (adult), adsorbed 01/01/2007   • Tdap 09/30/2008       Objective     /72 (BP Location: Left arm, Patient Position: Sitting, Cuff Size: Large)   Pulse 65   Temp 97 9 °F (36 6 °C) (Tympanic)   Resp 16   Ht 5' (1 524 m)   Wt 76 7 kg (169 lb)   SpO2 95%   BMI 33 01 kg/m²     Physical Exam  Vitals and nursing note reviewed  Constitutional:       Appearance: She is well-developed  HENT:      Head: Normocephalic and atraumatic  Eyes:      Pupils: Pupils are equal, round, and reactive to light  Cardiovascular:      Rate and Rhythm: Normal rate and regular rhythm        Heart sounds: Normal heart sounds  Pulmonary:      Effort: Pulmonary effort is normal       Breath sounds: Normal breath sounds  Abdominal:      General: Bowel sounds are normal       Palpations: Abdomen is soft  Musculoskeletal:         General: Normal range of motion  Cervical back: Normal range of motion and neck supple  Lymphadenopathy:      Cervical: No cervical adenopathy  Skin:     General: Skin is warm  Neurological:      Mental Status: She is alert and oriented to person, place, and time  Cranial Nerves: No cranial nerve deficit         Ted Ratliff MD

## 2023-06-06 ENCOUNTER — TELEPHONE (OUTPATIENT)
Dept: FAMILY MEDICINE CLINIC | Facility: CLINIC | Age: 48
End: 2023-06-06

## 2023-06-06 NOTE — TELEPHONE ENCOUNTER
We received a request that pt wegovy 1 mg/0 5 ml needs a prior auth  I went into cover my meds and started the authorization process and faxed form to insurance plan as directed

## 2023-06-12 DIAGNOSIS — G47.00 INSOMNIA, UNSPECIFIED TYPE: ICD-10-CM

## 2023-06-12 DIAGNOSIS — J44.1 CHRONIC OBSTRUCTIVE PULMONARY DISEASE WITH ACUTE EXACERBATION (HCC): ICD-10-CM

## 2023-06-12 DIAGNOSIS — R11.0 NAUSEA: ICD-10-CM

## 2023-06-12 DIAGNOSIS — J45.21 INTERMITTENT ASTHMA WITH ACUTE EXACERBATION, UNSPECIFIED ASTHMA SEVERITY: ICD-10-CM

## 2023-06-12 DIAGNOSIS — E28.39 ESTROGEN DEFICIENCY: ICD-10-CM

## 2023-06-12 RX ORDER — ONDANSETRON 4 MG/1
4 TABLET, FILM COATED ORAL EVERY 8 HOURS PRN
Qty: 20 TABLET | Refills: 0 | Status: SHIPPED | OUTPATIENT
Start: 2023-06-12

## 2023-06-12 RX ORDER — ALBUTEROL SULFATE 90 UG/1
2 AEROSOL, METERED RESPIRATORY (INHALATION) EVERY 6 HOURS PRN
Qty: 18 G | Refills: 0 | Status: SHIPPED | OUTPATIENT
Start: 2023-06-12

## 2023-06-12 RX ORDER — IPRATROPIUM BROMIDE AND ALBUTEROL 20; 100 UG/1; UG/1
1 SPRAY, METERED RESPIRATORY (INHALATION) 4 TIMES DAILY
Qty: 4 G | Refills: 0 | Status: SHIPPED | OUTPATIENT
Start: 2023-06-12

## 2023-06-12 RX ORDER — ZOLPIDEM TARTRATE 10 MG/1
10 TABLET ORAL
Qty: 30 TABLET | Refills: 0 | Status: SHIPPED | OUTPATIENT
Start: 2023-06-12

## 2023-06-12 NOTE — TELEPHONE ENCOUNTER
Refills have been requested for the following medications:         ondansetron (ZOFRAN) 4 mg tablet [Wassim Abosamra]         ipratropium-albuterol (Combivent Respimat) inhaler [Wassim Abosamra]         zolpidem (AMBIEN) 10 mg tablet [Wassim Abosamra]         conjugated estrogens (Premarin) 0 45 mg tablet [Wassim Abosamra]         albuterol (PROVENTIL HFA,VENTOLIN HFA) 90 mcg/act inhaler [Wassim Abosamra]     Preferred pharmacy: [address] [place] [address] #[id number] - [facility] LOUIE Aguirre [address]    Last seen 6/5/23     1 JACKADAN CUBA 1975 F [id number] [facility] [id number] [place]   2 JACKADAN CUBA 1975 F [address] [place] [id number] [place]   3 [facility] 1975 [facility] [address] [facility] [facility] [place]      Search Criteria  NAME DATE OF BIRTH DATE Danilo Gordon 32- 06- To 06-  REQUESTER NAME REQUESTED DATE  Andriy Meng Mon Jun 12 2023 10:49:09 [place] [id number] [facility] Daylight Time)  Summary  Prescriptions  11  Prescribers  2  Pharmacies  2  Drug Classes  Benzodiazepines  0  Stimulants  0  Opioids  0  Muscle Relaxants  0  Opioid Dosage  Total MME for Active Prescriptions  0    Average MME  0 00         Prescriptions  Notifications    Prescribers  Pharmacies  MME Graph    Show All     [place]   Drug Categories:      Others     Show  10  entries  Search:  PATIENT ID PRESCRIPTION # FILLED WRITTEN DRUG LABEL QTY DAYS STRENGTH MME** PRESCRIBER PHARMACY PAYMENT REFILL #/AUTH STATE DETAIL  1 [id number] 05/13/2023 05/12/2023 Zolpidem Tartrate (Tablet) 30 0 30 10 MG NA IKE) [employer] Commercial [place] 0 / 0 [place]   2 [id number] 04/18/2023 04/18/2023 Zolpidem Tartrate (Tablet) 30 0 30 10 MG NA IKE) [facility], [facility]  [place]icaid 0 / 0 [place]   2 [id number] 03/10/2023 03/10/2023 Zolpidem Tartrate (Tablet) 30 0 30 10 MG NA IKE) [facility]   [place]icaid 0 / 0 [place]   2 [id number] 02/07/2023 02/07/2023 Zolpidem Tartrate (Tablet) 30 0 30 10 MG NA WASSIM(MD) [facility]  [place]icaid 0 / 0 [place]   2 [id number] 01/07/2023 01/07/2023 Zolpidem Tartrate (Tablet) 30 0 30 10 MG NA WASSIM(MD) [facility]  [place] / 0 [place]   2 [id number] 12/07/2022 12/07/2022 Zolpidem Tartrate (Tablet) 30 0 30 10 MG NA WASGIANAM(MD) [facility]  [place]   2 [id number] 11/04/2022 11/04/2022 Zolpidem Tartrate (Tablet) 30 0 30 10 MG NA WASGIANAM(MD) [facility]  [place] / 0 [place]   3 [id number] 10/06/2022 10/06/2022 Zolpidem Tartrate (Tablet) 30 0 30 10 MG NA ESDRAS HERRING [facility], [facility]  [place]icaid 0 / 0 [place]   3 [id number] 09/08/2022 09/08/2022 Zolpidem Tartrate (Tablet) 30 0 30 10 MG NA WASSIM(MD) [facility]   [place] / 0 [place]   3 [id number] 08/04/2022 08/04/2022 Zolpidem Tartrate (Tablet) 30 0 30 10 MG NA WASGIANAM(MD) [facility]

## 2023-06-14 ENCOUNTER — HOSPITAL ENCOUNTER (OUTPATIENT)
Dept: RADIOLOGY | Facility: IMAGING CENTER | Age: 48
Discharge: HOME | End: 2023-06-14
Payer: COMMERCIAL

## 2023-06-14 VITALS — WEIGHT: 172 LBS | BODY MASS INDEX: 33.77 KG/M2 | HEIGHT: 60 IN

## 2023-06-14 DIAGNOSIS — Z12.31 ENCOUNTER FOR SCREENING MAMMOGRAM FOR BREAST CANCER: ICD-10-CM

## 2023-06-14 PROCEDURE — 77067 SCR MAMMO BI INCL CAD: CPT

## 2023-06-14 PROCEDURE — 77063 BREAST TOMOSYNTHESIS BI: CPT

## 2023-06-15 ENCOUNTER — TELEPHONE (OUTPATIENT)
Dept: FAMILY MEDICINE CLINIC | Facility: CLINIC | Age: 48
End: 2023-06-15

## 2023-06-15 DIAGNOSIS — E66.09 CLASS 1 OBESITY DUE TO EXCESS CALORIES WITH SERIOUS COMORBIDITY AND BODY MASS INDEX (BMI) OF 33.0 TO 33.9 IN ADULT: Primary | ICD-10-CM

## 2023-06-15 NOTE — TELEPHONE ENCOUNTER
----- Message from Antonio Garner sent at 6/14/2023  4:38 PM EDT -----  Regarding: ZMXFZK  Contact: [phone]  Dr ELIZABETH I have called pharmacies and wegovy is on back order they have no clue when it will be in   They are expecting with in a month

## 2023-06-16 ENCOUNTER — TELEPHONE (OUTPATIENT)
Dept: FAMILY MEDICINE CLINIC | Facility: CLINIC | Age: 48
End: 2023-06-16

## 2023-06-16 NOTE — TELEPHONE ENCOUNTER
Per [facility] IDGBEF 1 7mg  Approved valid 06/15/23-10/07/23  Pt and [facility] informed

## 2023-06-16 NOTE — TELEPHONE ENCOUNTER
----- Message from Richardo Kanner Sheats sent at 6/16/2023 10:26 AM EDT -----  Regarding: Mammo  Contact: [phone]  I reached out to [facility] services they are requesting a script to be able to make an appointment from DR ELIZABETH , they  to go to prior image [address] [facility] [address] [place] [address] [facility] services

## 2023-07-03 DIAGNOSIS — J45.21 INTERMITTENT ASTHMA WITH ACUTE EXACERBATION, UNSPECIFIED ASTHMA SEVERITY: ICD-10-CM

## 2023-07-03 DIAGNOSIS — J44.1 CHRONIC OBSTRUCTIVE PULMONARY DISEASE WITH ACUTE EXACERBATION (HCC): ICD-10-CM

## 2023-07-03 RX ORDER — IPRATROPIUM BROMIDE AND ALBUTEROL 20; 100 UG/1; UG/1
1 SPRAY, METERED RESPIRATORY (INHALATION) 4 TIMES DAILY
Qty: 4 G | Refills: 0 | OUTPATIENT
Start: 2023-07-03

## 2023-07-03 RX ORDER — IPRATROPIUM BROMIDE AND ALBUTEROL 20; 100 UG/1; UG/1
SPRAY, METERED RESPIRATORY (INHALATION)
Qty: 4 G | Refills: 0 | Status: SHIPPED | OUTPATIENT
Start: 2023-07-03

## 2023-07-03 RX ORDER — ALBUTEROL SULFATE 90 UG/1
AEROSOL, METERED RESPIRATORY (INHALATION)
Qty: 18 G | Refills: 0 | Status: SHIPPED | OUTPATIENT
Start: 2023-07-03

## 2023-07-03 RX ORDER — ALBUTEROL SULFATE 90 UG/1
2 AEROSOL, METERED RESPIRATORY (INHALATION) EVERY 6 HOURS PRN
Qty: 18 G | Refills: 0 | OUTPATIENT
Start: 2023-07-03

## 2023-07-03 NOTE — TELEPHONE ENCOUNTER
Pharmacy requesting refill on albuterol inhaler and combivent inhaler   Last seen 6/5/23  Medication sent to pharmacy

## 2023-07-07 ENCOUNTER — TELEPHONE (OUTPATIENT)
Dept: FAMILY MEDICINE CLINIC | Facility: CLINIC | Age: 48
End: 2023-07-07

## 2023-07-07 NOTE — TELEPHONE ENCOUNTER
Discussed with Dr Hossein Rose. Insurance is not going to cover the lower dose and medications may be on back order. Pt can d/c medication if she is vomiting . L/m for pt , message sent back through my chart.

## 2023-07-07 NOTE — TELEPHONE ENCOUNTER
----- Message from Katelynn Garner sent at 7/7/2023 12:51 PM EDT -----  Regarding: Appt  Contact: [phone]  I finally got the wegovy to it Tuesday after not taking any for 3 weeks due to backorder. Spent the next 24 hours puking even on Zofran. I'm thinking maybe outta of my system to long.   So too strong to quick can we go back down to lower dosage

## 2023-07-12 DIAGNOSIS — E28.39 ESTROGEN DEFICIENCY: ICD-10-CM

## 2023-07-12 DIAGNOSIS — R51.9 NONINTRACTABLE HEADACHE, UNSPECIFIED CHRONICITY PATTERN, UNSPECIFIED HEADACHE TYPE: ICD-10-CM

## 2023-07-12 RX ORDER — SUMATRIPTAN 50 MG/1
100 TABLET, FILM COATED ORAL ONCE AS NEEDED
Qty: 9 TABLET | Refills: 0 | Status: SHIPPED | OUTPATIENT
Start: 2023-07-12

## 2023-07-12 NOTE — TELEPHONE ENCOUNTER
Refills have been requested for the following medications:         SUMAtriptan (IMITREX) 50 mg tablet [Wassim Ameliamra]         conjugated estrogens (Premarin) 0.45 mg tablet [Wassim Harpala]     Preferred pharmacy: [facility]/PHARMACY #[id number]- [address] [place] [address] - [address] [place] [address]    Last seen 6/5/23  Medication sent to pharmacy

## 2023-07-18 ENCOUNTER — OFFICE VISIT (OUTPATIENT)
Dept: FAMILY MEDICINE CLINIC | Facility: CLINIC | Age: 48
End: 2023-07-18
Payer: COMMERCIAL

## 2023-07-18 VITALS
WEIGHT: 164 LBS | HEART RATE: 97 BPM | TEMPERATURE: 97.7 F | HEIGHT: 60 IN | SYSTOLIC BLOOD PRESSURE: 122 MMHG | DIASTOLIC BLOOD PRESSURE: 78 MMHG | BODY MASS INDEX: 32.2 KG/M2 | RESPIRATION RATE: 16 BRPM

## 2023-07-18 DIAGNOSIS — M25.571 ACUTE RIGHT ANKLE PAIN: Primary | ICD-10-CM

## 2023-07-18 DIAGNOSIS — J44.1 CHRONIC OBSTRUCTIVE PULMONARY DISEASE WITH ACUTE EXACERBATION (HCC): ICD-10-CM

## 2023-07-18 DIAGNOSIS — G47.00 INSOMNIA, UNSPECIFIED TYPE: ICD-10-CM

## 2023-07-18 PROCEDURE — 99213 OFFICE O/P EST LOW 20 MIN: CPT | Performed by: NURSE PRACTITIONER

## 2023-07-18 RX ORDER — PREDNISONE 50 MG/1
50 TABLET ORAL DAILY
Qty: 5 TABLET | Refills: 0 | Status: SHIPPED | OUTPATIENT
Start: 2023-07-18 | End: 2023-07-23

## 2023-07-18 NOTE — PROGRESS NOTES
Name: Alli Chong      : 1975      MRN: [id number]  Encounter Provider: CARLA Garcia  Encounter Date: 2023   Encounter department: [address] [place] [address] 15  PRIMARY CARE    Assessment & Plan     1. Acute right ankle pain  Assessment & Plan:  - Prescription sent for prednisone 50 mg daily x 5 days. Advised of side effects. - Apply compression with ACE bandage.   - Apply ice as needed. - Can take Ibuprofen when finished with prednisone. - If no improvement consider xray. Orders:  -     predniSONE 50 mg tablet; Take 1 tablet (50 mg total) by mouth daily for 5 days           Subjective     Patient presents today with complaints of acute onset right ankle pain that started yesterday. She does also complain of some mild swelling. 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 Denies any other concerns or complaints today. Review of Systems   Constitutional: Negative for fatigue and fever. HENT: Negative for trouble swallowing. Eyes: Negative for visual disturbance. Respiratory: Negative for cough and shortness of breath. Cardiovascular: Negative for chest pain and palpitations. Gastrointestinal: Negative for abdominal pain and blood in stool. Endocrine: Negative for cold intolerance and heat intolerance. Genitourinary: Negative for difficulty urinating and dysuria. Musculoskeletal: Positive for arthralgias (right ankle). Negative for gait problem. Skin: Negative for rash. Neurological: Negative for dizziness, syncope and headaches. Hematological: Negative for adenopathy. Psychiatric/Behavioral: Negative for behavioral problems.        Past Medical History:   Diagnosis Date   • Anxiety     h/o 2 panic attacks    • Arthritis     l hip congenital dyspasia   • Asthma     good control   • Bleb     R eye   • Chronic kidney disease     one kidney left side   • Chronic pain     back- DJD in 2 upper 2 lower, buldging disc c-7   • Chronic pain disorder     lower back and hips   • Depression    • Endometriosis    • GERD (gastroesophageal reflux disease)    • Glaucoma     b/l .  Bleb in R eye   • Glaucoma    • H/O carpal tunnel syndrome     L   • Headache     migraines   • History of cigarette smoking    • History of transfusion    • Hypercholesterolemia 1/17/2013   • Insomnia    • Migraines    • Osteoarthritis     cervical spondylarthritis   • Polypoid cystitis     POLYP CYSTS ON BUTTOCK AREA   • Renal disorder     Reports single kidney   • Right knee injury     SCRAPED GROWTH PLATE RIGHT KNEE   • Seasonal allergies      Past Surgical History:   Procedure Laterality Date   • BLEPHAROPLASTY Right     BLEB RIGHT EYE   • BUNIONECTOMY Bilateral     2 on R, 3 on left   • CARPAL TUNNEL RELEASE Left    • CT NEEDLE BX ASPIRATION INJECTION LOCALIZATION  3/8/2019   • EYE SURGERY Right     bleb implant   • EYE SURGERY Bilateral     lazer- glaucoma   • FOOT HARDWARE REMOVAL Left 7/2/2018    Procedure: CALCANEAL REMOVAL OF HARDWARE;  Surgeon: Gianluca Gonzales DPM;  Location: [address] [place] [address] OR;  Service: Podiatry   • FOOT HARDWARE REMOVAL Left 7/5/2018    Procedure: LEFT CALCANEAL REMOVAL OF HARDWARE;  Surgeon: Gianluca Gonzales DPM;  Location: [address] [place] [address] OR;  Service: Podiatry   • FOOT SURGERY Right     BONE REMOVED BOTTOM OF RIGHT FOOT   • HAND SURGERY Right     ring finger tendon severed   • HARDWARE REMOVAL Left 7/5/2018    Procedure: LEFT BIG TOE REMOVAL OF HARDWARE;  Surgeon: Gianluca Gonzales DPM;  Location: [address] [place] [address] OR;  Service: Podiatry   • HIP SURGERY Left     reconstruction as child   • HYSTERECTOMY      total abdominal   • HYSTERECTOMY      TOTAL   • JOINT REPLACEMENT      LTHR and reconstruction   • KNEE ARTHROSCOPY      R knee   • LEG SURGERY     • LISFRANC ARTHRODESIS Left 11/3/2017    Procedure: FUSION FIRST MTPJ: Makayla Li; LATERAL ANKLE STABILIZATION;  Surgeon: Gianluca Gonzales DPM; [facility];  Service: Podiatry   • OOPHORECTOMY      age 35   • PILONIDAL CYST EXCISION      midline   • DE DCMPRN FASCT LEG ANT&/LAT&PST W/DBRDMT MUS Left 7/5/2018    Procedure: LEFT PERONEAL TENDON DEBRIDMENT ;  Surgeon: Mary Jo Whiteside DPM;  Location: [address] [place] [address] MAIN OR;  Service: Podiatry   • DE OSTEOTOMY CALCANEUS W/WO INTERNAL FIXATION Left 11/3/2017    Procedure: OSTEOTOMY CALCANEUS;  Surgeon: Mary Jo Whiteside DPM;  Location[facility];  Service: Podiatry   • DE RPR TENDON FLXR FOOT [address] W/FREE GRAFT EA TENDON Left 7/2/2018    Procedure: PERONEAL TENDON EXPLORATION;  Surgeon: Mary Jo Whiteside DPM;  Location: [address] [place] [address] MAIN OR;  Service: Podiatry   • REFRACTIVE SURGERY Bilateral     LASER SURGERY BOTH EYES   • TOTAL HIP ARTHROPLASTY  2005   • WRIST SURGERY Left      Family History   Problem Relation Age of Onset   • Cancer Mother    • Hypertension Mother    • Bone cancer Mother 40        COD   • Breast cancer Mother 40   • Hypertension Father    • Heart disease Father    • Dementia Father    • Lung cancer Sister 40   • Brain cancer Sister 52   • No Known Problems Sister    • No Known Problems Maternal Grandmother    • No Known Problems Maternal Grandfather    • Liver cancer Paternal Grandmother 68   • Hypertension Paternal Grandfather    • Stroke Paternal Grandfather    • Breast cancer Maternal Aunt    • Breast cancer Maternal Aunt 53   • No Known Problems Paternal Aunt      Social History     Socioeconomic History   • Marital status: /Civil Union     Spouse name: None   • Number of children: None   • Years of education: None   • Highest education level: None   Occupational History   • None   Tobacco Use   • Smoking status: Every Day     Packs/day: 0.50     Years: 30.00     Total pack years: 15.00     Types: Cigarettes   • Smokeless tobacco: Never   Vaping Use   • Vaping Use: Never used   Substance and Sexual Activity   • Alcohol use: No   • Drug use: No   • Sexual activity: Yes   Other Topics Concern   • None Social History Narrative    ** Merged History Encounter **          Social Determinants of Health     Financial Resource Strain: Not on file   Food Insecurity: Not on file   Transportation Needs: Not on file   Physical Activity: Not on file   Stress: Not on file   Social Connections: Not on file   Intimate Partner Violence: Not on file   Housing Stability: Not on file     Current Outpatient Medications on File Prior to Visit   Medication Sig   • albuterol (2.5 mg/3 mL) 0.083 % nebulizer solution Take 3 mL (2.5 mg total) by nebulization every 6 (six) hours as needed for wheezing or shortness of breath   • albuterol (PROVENTIL HFA,VENTOLIN HFA) 90 mcg/act inhaler inhale 2 puffs by mouth and INTO THE LUNGS every 6 hours if needed for wheezing   • benzonatate (TESSALON) 200 MG capsule Take 1 capsule (200 mg total) by mouth 3 (three) times a day as needed for cough   • Combivent Respimat inhaler inhale 1 puff by mouth and INTO THE LUNGS four times a day   • conjugated estrogens (Premarin) 0.45 mg tablet Take 1 tablet (0.45 mg total) by mouth in the morning Take daily for 21 days.  Then do not take for 7 days   • cyclobenzaprine (FLEXERIL) 10 mg tablet Take 1 tablet (10 mg total) by mouth daily as needed for muscle spasms   • ibuprofen (MOTRIN) 600 mg tablet Take 1 tablet (600 mg total) by mouth every 8 (eight) hours as needed for mild pain   • montelukast (SINGULAIR) 10 mg tablet Take 1 tablet (10 mg total) by mouth every evening   • ondansetron (ZOFRAN) 4 mg tablet Take 1 tablet (4 mg total) by mouth every 8 (eight) hours as needed for nausea or vomiting   • pantoprazole (PROTONIX) 40 mg tablet TAKE 1 TABLET BY MOUTH EVERY DAY BEFORE BREAKFAST   • Semaglutide-Weight Management (WEGOVY) 1.7 MG/0.75ML Inject 0.75 mL (1.7 mg total) under the skin once a week   • SUMAtriptan (IMITREX) 50 mg tablet Take 2 tablets (100 mg total) by mouth once as needed for migraine   • zolpidem (AMBIEN) 10 mg tablet Take 1 tablet (10 mg total) by mouth daily at bedtime as needed for sleep   • fluticasone (FLONASE) 50 mcg/act nasal spray SPRAY 2 SPRAYS INTO EACH NOSTRIL EVERY DAY (Patient not taking: Reported on 4/26/2023)   • fluticasone-salmeterol (Wixela Inhub) 500-50 mcg/dose inhaler Inhale 1 puff daily Rinse mouth after use. (Patient not taking: Reported on 4/26/2023)   • methocarbamol (ROBAXIN) 500 mg tablet Take 1 tablet (500 mg total) by mouth 3 (three) times a day (Patient not taking: Reported on 8/30/2022)   • promethazine-codeine (PHENERGAN WITH CODEINE) 6.25-10 mg/5 mL syrup Take 5 mL by mouth every 4 (four) hours as needed for cough (Patient not taking: Reported on 4/4/2023)     Allergies   Allergen Reactions   • Morphine Other (See Comments)     Blood pressure drops   • Morphine And Related Other (See Comments)     Blood pressure drops   • Penicillins Itching and Rash   • Quazepam Itching     Immunization History   Administered Date(s) Administered   • COVID-19 MODERNA VACC 0.5 ML IM 01/11/2021, 02/10/2021   • H1N1, All Formulations 10/31/2009   • INFLUENZA 01/15/2007, 11/13/2007, 09/30/2008, 12/10/2009, 11/28/2011, 09/11/2012, 09/19/2013, 11/29/2017, 11/29/2017, 11/02/2020   • Influenza, seasonal, injectable 10/01/2010   • Td (adult), adsorbed 01/01/2007   • Tdap 09/30/2008       Objective     /78 (BP Location: Left arm, Patient Position: Sitting, Cuff Size: Large)   Pulse 97   Temp 97.7 °F (36.5 °C) (Tympanic)   Resp 16   Ht 5' (1.524 m)   Wt 74.4 kg (164 lb)   BMI 32.03 kg/m²     Physical Exam  Vitals and nursing note reviewed. Constitutional:       Appearance: Normal appearance. HENT:      Head: Normocephalic and atraumatic. Right Ear: External ear normal.      Left Ear: External ear normal.   Eyes:      Conjunctiva/sclera: Conjunctivae normal.   Cardiovascular:      Rate and Rhythm: Normal rate and regular rhythm. Heart sounds: Normal heart sounds.    Pulmonary:      Effort: Pulmonary effort is normal. Breath sounds: Normal breath sounds. Musculoskeletal:         General: Normal range of motion. Cervical back: Normal range of motion. Right ankle: Swelling present. Tenderness present over the lateral malleolus. Comments: Pain with lateral rotation of right foot   Skin:     General: Skin is warm and dry. Neurological:      Mental Status: She is alert and oriented to person, place, and time. Cranial Nerves: No cranial nerve deficit.    Psychiatric:         Mood and Affect: Mood normal.         Behavior: Behavior normal.       CARLA Hsieh

## 2023-07-19 PROBLEM — M25.571 ACUTE RIGHT ANKLE PAIN: Status: ACTIVE | Noted: 2023-07-19

## 2023-07-19 NOTE — TELEPHONE ENCOUNTER
Refills have been requested for the following medications:         zolpidem (AMBIEN) 10 mg tablet [Wassim Abosamra]         albuterol (PROVENTIL HFA,VENTOLIN HFA) 90 mcg/act inhaler [Wassim Abosamra]     Preferred pharmacy: [facility] #[id number]- [place], [place] - [address] seen 7/18/23     [facility] 1975 [facility] [id number] [id number] [facility] [place]   [facility] 1975 [facility] [id number] [facility]   [facility] 1975 F [id number] RADHA PALMA      Search Criteria  NAME DATE OF BIRTH DATE Chelsea Mojica 31- 07- To 07-  REQUESTER NAME REQUESTED DATE  Lutricia Meter Wed Jul 19 2023 07:57:38 GMT-0400 Reynaldo Parker Daylight Time)  Summary  Prescriptions  11  Prescribers  2  Pharmacies  2  Drug Classes  Benzodiazepines  0  Stimulants  0  Opioids  0  Muscle Relaxants  0  Opioid Dosage  Total MME for Active Prescriptions  0    Average MME  0.00         Prescriptions  Notifications    Prescribers  Pharmacies  MME Graph    Show All     [place]   Drug Categories:      Others     Show  10  entries  Search:  PATIENT ID PRESCRIPTION # FILLED WRITTEN DRUG LABEL QTY DAYS STRENGTH MME** PRESCRIBER PHARMACY PAYMENT REFILL #/AUTH STATE DETAIL  1 [id number] 06/13/2023 06/12/2023 Zolpidem Tartrate (Tablet) 30.0 30 10 MG NA IKE) [employer] 0 / 0 [place]   1 [id number] 05/13/2023 05/12/2023 Zolpidem Tartrate (Tablet) 30.0 30 10 MG NA IKE) [employer] [place] / 0 [place]   2 [id number] 04/18/2023 04/18/2023 Zolpidem Tartrate (Tablet) 30.0 30 10 MG NA IKE) [facility] [place] 0 / 0 [place]   2 [id number] 03/10/2023 03/10/2023 Zolpidem Tartrate (Tablet) 30.0 30 10 MG NA IKE) [facility] [place] / 0 [place]   2 [id number] 02/07/2023 02/07/2023 Zolpidem Tartrate (Tablet) 30.0 30 10 MG NA IKE) [facility] [place] 0 / 0 [place]   2 [id number] 01/07/2023 01/07/2023 Zolpidem Tartrate (Tablet) 30.0 30 10 MG NA IKE) [facility] 0 [place]   2 [id number] 12/07/2022 12/07/2022 Zolpidem Tartrate (Tablet) 30.0 30 10 MG NA IKE) [facility] [place] 0 [place]   2 [id number] 11/04/2022 11/04/2022 Zolpidem Tartrate (Tablet) 30.0 30 10 MG NA IKE) [facility] [place] 0 [place]   3 [id number] 10/06/2022 10/06/2022 Zolpidem Tartrate (Tablet) 30.0 30 10 MG NA ESDRAS HRERING [facility]  [place]icaid 0 / 0 [place]   3 [id number] 09/08/2022 09/08/2022 Zolpidem Tartrate (Tablet) 30.0 30 10 MG NA IKE) [facility]

## 2023-07-19 NOTE — ASSESSMENT & PLAN NOTE
- Prescription sent for prednisone 50 mg daily x 5 days. Advised of side effects. - Apply compression with ACE bandage.   - Apply ice as needed. - Can take Ibuprofen when finished with prednisone. - If no improvement consider xray. Patient continues to have abdominal pain/cramping, declining Oxy for pain control at this time. Stopped TPN, tolerating soft diet. Continues on IV antibiotics, PICC in place. Increasing Pancrealipase, starting Klonopin as needed for anxiety. Will continue to monitor labs. Ambulating independently in room. Problem: Patient Centered Care  Goal: Patient preferences are identified and integrated in the patient's plan of care  Description: Interventions:  - What would you like us to know as we care for you?  From home with partner and kids  - Provide timely, complete, and accurate information to patient/family  - Incorporate patient and family knowledge, values, beliefs, and cultural backgrounds into the planning and delivery of care  - Encourage patient/family to participate in care and decision-making at the level they choose  - Honor patient and family perspectives and choices  Outcome: Progressing     Problem: Patient/Family Goals  Goal: Patient/Family Long Term Goal  Description: Patient's Long Term Goal: To manage condition, get better, and go home    Interventions:  -Monitor labs, results, and vitals  -Administer medication as prescribed and prn  -Pain management  -Prevent infection  -Nausea management  -Heparin gtt > Eliquis transition  -TPN  -IV abx  -I&Os  -Safety, diet, hygiene, act astrid  -Consults  -Imaging/tests/procedures  -Follow plan of care  -Discharge planning  -Monitor for worsening condition or new onset of symptoms  - See additional Care Plan goals for specific interventions  Outcome: Progressing  Goal: Patient/Family Short Term Goal  Description: Patient's Short Term Goal: Pain management    Interventions:   -Assess pain level  -Encourage pt to notify of increasing pain level  -Administer pain medication as prescribed and prn  -Provide non-pharmacological intervention as needed  -Follow plan of care  - See additional Care Plan goals for specific interventions  Outcome: Progressing     Problem: PAIN - ADULT  Goal: Verbalizes/displays adequate comfort level or patient's stated pain goal  Description: INTERVENTIONS:  - Encourage pt to monitor pain and request assistance  - Assess pain using appropriate pain scale  - Administer analgesics based on type and severity of pain and evaluate response  - Implement non-pharmacological measures as appropriate and evaluate response  - Consider cultural and social influences on pain and pain management  - Manage/alleviate anxiety  - Utilize distraction and/or relaxation techniques  - Monitor for opioid side effects  - Notify MD/LIP if interventions unsuccessful or patient reports new pain  - Anticipate increased pain with activity and pre-medicate as appropriate  Outcome: Progressing     Problem: DISCHARGE PLANNING  Goal: Discharge to home or other facility with appropriate resources  Description: INTERVENTIONS:  - Identify barriers to discharge w/pt and caregiver  - Include patient/family/discharge partner in discharge planning  - Arrange for needed discharge resources and transportation as appropriate  - Identify discharge learning needs (meds, wound care, etc)  - Arrange for interpreters to assist at discharge as needed  - Consider post-discharge preferences of patient/family/discharge partner  - Complete POLST form as appropriate  - Assess patient's ability to be responsible for managing their own health  - Refer to Case Management Department for coordinating discharge planning if the patient needs post-hospital services based on physician/LIP order or complex needs related to functional status, cognitive ability or social support system  Outcome: Progressing     Problem: SAFETY ADULT - FALL  Goal: Free from fall injury  Description: INTERVENTIONS:  - Assess pt frequently for physical needs  - Identify cognitive and physical deficits and behaviors that affect risk of falls.   - Alexis fall precautions as indicated by assessment.  - Educate pt/family on patient safety including physical limitations  - Instruct pt to call for assistance with activity based on assessment  - Modify environment to reduce risk of injury  - Provide assistive devices as appropriate  - Consider OT/PT consult to assist with strengthening/mobility  - Encourage toileting schedule  Outcome: Progressing     Problem: GASTROINTESTINAL - ADULT  Goal: Minimal or absence of nausea and vomiting  Description: INTERVENTIONS:  - Maintain adequate hydration with IV or PO as ordered and tolerated  - Nasogastric tube to low intermittent suction as ordered  - Evaluate effectiveness of ordered antiemetic medications  - Provide nonpharmacologic comfort measures as appropriate  - Advance diet as tolerated, if ordered  - Obtain nutritional consult as needed  - Evaluate fluid balance  Outcome: Progressing  Goal: Maintains or returns to baseline bowel function  Description: INTERVENTIONS:  - Assess bowel function  - Maintain adequate hydration with IV or PO as ordered and tolerated  - Evaluate effectiveness of GI medications  - Encourage mobilization and activity  - Obtain nutritional consult as needed  - Establish a toileting routine/schedule  - Consider collaborating with pharmacy to review patient's medication profile  Outcome: Progressing  Goal: Maintains adequate nutritional intake (undernourished)  Description: INTERVENTIONS:  - Monitor percentage of each meal consumed  - Identify factors contributing to decreased intake, treat as appropriate  - Assist with meals as needed  - Monitor I&O, WT and lab values  - Obtain nutritional consult as needed  - Optimize oral hygiene and moisture  - Encourage food from home; allow for food preferences  - Enhance eating environment  Outcome: Progressing     Problem: METABOLIC/FLUID AND ELECTROLYTES - ADULT  Goal: Electrolytes maintained within normal limits  Description: INTERVENTIONS:  - Monitor labs and rhythm and assess patient for signs and symptoms of electrolyte imbalances  - Administer electrolyte replacement as ordered  - Monitor response to electrolyte replacements, including rhythm and repeat lab results as appropriate  - Fluid restriction as ordered  - Instruct patient on fluid and nutrition restrictions as appropriate  Outcome: Progressing     Problem: SKIN/TISSUE INTEGRITY - ADULT  Goal: Skin integrity remains intact  Description: INTERVENTIONS  - Assess and document risk factors for pressure ulcer development  - Assess and document skin integrity  - Monitor for areas of redness and/or skin breakdown  - Initiate interventions, skin care algorithm/standards of care as needed  Outcome: Progressing  Goal: Incision(s), wounds(s) or drain site(s) healing without S/S of infection  Description: INTERVENTIONS:  - Assess and document risk factors for pressure ulcer development  - Assess and document skin integrity  - Assess and document dressing/incision, wound bed, drain sites and surrounding tissue  - Implement wound care per orders  - Initiate isolation precautions as appropriate  - Initiate Pressure Ulcer prevention bundle as indicated  Outcome: Progressing     Problem: HEMATOLOGIC - ADULT  Goal: Maintains hematologic stability  Description: INTERVENTIONS  - Assess for signs and symptoms of bleeding or hemorrhage  - Monitor labs and vital signs for trends  - Administer supportive blood products/factors, fluids and medications as ordered and appropriate  - Administer supportive blood products/factors as ordered and appropriate  Outcome: Progressing  Goal: Free from bleeding injury  Description: (Example usage: patient with low platelets)  INTERVENTIONS:  - Avoid intramuscular injections, enemas and rectal medication administration  - Ensure safe mobilization of patient  - Hold pressure on venipuncture sites to achieve adequate hemostasis  - Assess for signs and symptoms of internal bleeding  - Monitor lab trends  - Patient is to report abnormal signs of bleeding to staff  - Avoid use of toothpicks and dental floss  - Use electric shaver for shaving  - Use soft bristle tooth brush  - Limit straining and forceful nose blowing  Outcome: Progressing

## 2023-07-20 RX ORDER — ALBUTEROL SULFATE 90 UG/1
2 AEROSOL, METERED RESPIRATORY (INHALATION) EVERY 6 HOURS PRN
Qty: 18 G | Refills: 0 | Status: SHIPPED | OUTPATIENT
Start: 2023-07-20

## 2023-07-20 RX ORDER — ZOLPIDEM TARTRATE 10 MG/1
10 TABLET ORAL
Qty: 30 TABLET | Refills: 0 | Status: SHIPPED | OUTPATIENT
Start: 2023-07-20

## 2023-07-21 DIAGNOSIS — R11.0 NAUSEA: ICD-10-CM

## 2023-07-21 DIAGNOSIS — R51.9 NONINTRACTABLE HEADACHE, UNSPECIFIED CHRONICITY PATTERN, UNSPECIFIED HEADACHE TYPE: ICD-10-CM

## 2023-07-21 DIAGNOSIS — J45.21 INTERMITTENT ASTHMA WITH ACUTE EXACERBATION, UNSPECIFIED ASTHMA SEVERITY: ICD-10-CM

## 2023-07-24 RX ORDER — IPRATROPIUM BROMIDE AND ALBUTEROL 20; 100 UG/1; UG/1
1 SPRAY, METERED RESPIRATORY (INHALATION) 4 TIMES DAILY
Qty: 18 G | Refills: 0 | Status: SHIPPED | OUTPATIENT
Start: 2023-07-24

## 2023-07-24 RX ORDER — ONDANSETRON 4 MG/1
4 TABLET, FILM COATED ORAL EVERY 8 HOURS PRN
Qty: 20 TABLET | Refills: 0 | Status: SHIPPED | OUTPATIENT
Start: 2023-07-24

## 2023-07-24 RX ORDER — SUMATRIPTAN 50 MG/1
100 TABLET, FILM COATED ORAL ONCE AS NEEDED
Qty: 9 TABLET | Refills: 0 | Status: SHIPPED | OUTPATIENT
Start: 2023-07-24

## 2023-07-26 ENCOUNTER — TELEPHONE (OUTPATIENT)
Dept: FAMILY MEDICINE CLINIC | Facility: CLINIC | Age: 48
End: 2023-07-26

## 2023-07-26 NOTE — TELEPHONE ENCOUNTER
----- Message from [address] sent at 7/26/2023 12:58 PM EDT -----  Imaging showed benign findings.  Recommend follow up with routine screening mammogram.

## 2023-07-31 ENCOUNTER — VBI (OUTPATIENT)
Dept: ADMINISTRATIVE | Facility: OTHER | Age: 48
End: 2023-07-31

## 2023-07-31 NOTE — TELEPHONE ENCOUNTER
07/31/23 11:50 AM     VB [employer] SmartForm used to document caregap status.     [facility]

## 2023-08-09 ENCOUNTER — OFFICE VISIT (OUTPATIENT)
Dept: FAMILY MEDICINE CLINIC | Facility: CLINIC | Age: 48
End: 2023-08-09
Payer: COMMERCIAL

## 2023-08-09 VITALS
BODY MASS INDEX: 30.9 KG/M2 | SYSTOLIC BLOOD PRESSURE: 108 MMHG | HEART RATE: 76 BPM | OXYGEN SATURATION: 97 % | TEMPERATURE: 97.8 F | WEIGHT: 157.4 LBS | RESPIRATION RATE: 16 BRPM | DIASTOLIC BLOOD PRESSURE: 80 MMHG | HEIGHT: 60 IN

## 2023-08-09 DIAGNOSIS — G47.00 INSOMNIA, UNSPECIFIED TYPE: ICD-10-CM

## 2023-08-09 DIAGNOSIS — M25.571 CHRONIC PAIN OF RIGHT ANKLE: ICD-10-CM

## 2023-08-09 DIAGNOSIS — Z00.01 ABNORMAL WELLNESS EXAM: ICD-10-CM

## 2023-08-09 DIAGNOSIS — E28.39 ESTROGEN DEFICIENCY: ICD-10-CM

## 2023-08-09 DIAGNOSIS — G89.29 CHRONIC PAIN OF RIGHT ANKLE: ICD-10-CM

## 2023-08-09 DIAGNOSIS — R73.9 HYPERGLYCEMIA: ICD-10-CM

## 2023-08-09 DIAGNOSIS — E66.09 CLASS 1 OBESITY DUE TO EXCESS CALORIES WITH SERIOUS COMORBIDITY AND BODY MASS INDEX (BMI) OF 33.0 TO 33.9 IN ADULT: ICD-10-CM

## 2023-08-09 DIAGNOSIS — R52 PAIN: ICD-10-CM

## 2023-08-09 DIAGNOSIS — E78.49 OTHER HYPERLIPIDEMIA: Primary | ICD-10-CM

## 2023-08-09 DIAGNOSIS — J44.1 CHRONIC OBSTRUCTIVE PULMONARY DISEASE WITH ACUTE EXACERBATION (HCC): ICD-10-CM

## 2023-08-09 DIAGNOSIS — E66.09 CLASS 1 OBESITY DUE TO EXCESS CALORIES WITH SERIOUS COMORBIDITY AND BODY MASS INDEX (BMI) OF 30.0 TO 30.9 IN ADULT: ICD-10-CM

## 2023-08-09 DIAGNOSIS — R51.9 NONINTRACTABLE HEADACHE, UNSPECIFIED CHRONICITY PATTERN, UNSPECIFIED HEADACHE TYPE: ICD-10-CM

## 2023-08-09 PROCEDURE — 99396 PREV VISIT EST AGE 40-64: CPT | Performed by: FAMILY MEDICINE

## 2023-08-09 PROCEDURE — 99214 OFFICE O/P EST MOD 30 MIN: CPT | Performed by: FAMILY MEDICINE

## 2023-08-09 NOTE — PROGRESS NOTES
Name: Colby Castaneda      : 1975      MRN: [id number]  Encounter Provider: Phyllis Irizarry MD  Encounter Date: 2023   Encounter department: [facility]     1. Other hyperlipidemia  Assessment & Plan:  Not well-controlled. It was discussed about low-fat diet and regular exercise. Continue to monitor fasting lipid profile. Orders:  -     CBC and differential; Future  -     Comprehensive metabolic panel; Future  -     Lipid Panel with Direct LDL reflex; Future  -     TSH, 3rd generation with Free T4 reflex; Future    2. Class 1 obesity due to excess calories with serious comorbidity and body mass index (BMI) of 33.0 to 33.9 in adult  Assessment & Plan:  She lost 7 pounds since her last visit. Discussed about low-carb diet and regular exercise. Continue on Wegovy 1.7 mg weekly due to side effects. Will continue to monitor. Come back in 3 months. Orders:  -     Semaglutide-Weight Management (WEGOVY) 1.7 MG/0.75ML; Inject 0.75 mL (1.7 mg total) under the skin once a week    3. Hyperglycemia  Assessment & Plan:  Discussed about low-carb diet. Was told Wegovy will help. I am going to repeat her fasting glucose and A1c. Orders:  -     Hemoglobin A1C; Future    4. Chronic pain of right ankle  Assessment & Plan:  Advised to check uric acid level. I will consider referral to see podiatrist.    Orders:  -     Uric acid; Future    5. Class 1 obesity due to excess calories with serious comorbidity and body mass index (BMI) of 30.0 to 30.9 in adult  Assessment & Plan:  She lost 7 pounds since her last visit. Discussed about low-carb diet and regular exercise. Continue on Wegovy 1.7 mg weekly due to side effects. Will continue to monitor. Come back in 3 months. 6. Abnormal wellness exam  Assessment & Plan: It was discussed about immunizations, diet, exercise and safety measures.              Subjective     She is here today for follow-up multiple medical problems. Has been taking her medications. She has been having a problem with the swelling in her right ankle. She was given the prednisone before and that seems to help with the pain but the swelling continues. She denies any recent history of injury or trauma. She continues on Wegovy 1.7 mg weekly for weight loss. She lost 7 pounds since her last visit. She desires to stay on the same dose due to side effects. Review of Systems   Constitutional: Negative for chills and fever. HENT: Negative for trouble swallowing. Eyes: Negative for visual disturbance. Respiratory: Negative for cough and shortness of breath. Cardiovascular: Negative for chest pain, palpitations and leg swelling. Gastrointestinal: Negative for abdominal pain, constipation and diarrhea. Endocrine: Negative for cold intolerance and heat intolerance. Genitourinary: Negative for difficulty urinating and dysuria. Musculoskeletal: Positive for joint swelling. Negative for gait problem. Skin: Negative for rash. Neurological: Negative for dizziness, tremors, seizures and headaches. Hematological: Negative for adenopathy. Psychiatric/Behavioral: Negative for behavioral problems. Past Medical History:   Diagnosis Date   • Anxiety     h/o 2 panic attacks    • Arthritis     l hip congenital dyspasia   • Asthma     good control   • Bleb     R eye   • Chronic kidney disease     one kidney left side   • Chronic pain     back- DJD in 2 upper 2 lower, buldging disc c-7   • Chronic pain disorder     lower back and hips   • Depression    • Endometriosis    • GERD (gastroesophageal reflux disease)    • Glaucoma     b/l .  Bleb in R eye   • Glaucoma    • H/O carpal tunnel syndrome     L   • Headache     migraines   • History of cigarette smoking    • History of transfusion    • Hypercholesterolemia 1/17/2013   • Insomnia    • Migraines    • Osteoarthritis     cervical spondylarthritis   • Polypoid cystitis     POLYP CYSTS ON BUTTOCK AREA   • Renal disorder     Reports single kidney   • Right knee injury     SCRAPED GROWTH PLATE RIGHT KNEE   • Seasonal allergies      Past Surgical History:   Procedure Laterality Date   • BLEPHAROPLASTY Right     BLEB RIGHT EYE   • BUNIONECTOMY Bilateral     2 on R, 3 on left   • CARPAL TUNNEL RELEASE Left    • CT NEEDLE BX ASPIRATION INJECTION LOCALIZATION  3/8/2019   • EYE SURGERY Right     bleb implant   • EYE SURGERY Bilateral     lazer- glaucoma   • FOOT HARDWARE REMOVAL Left 7/2/2018    Procedure: CALCANEAL REMOVAL OF HARDWARE;  Surgeon: Alvino Calhoun DPM;  Location: [address] [place] [address] MAIN OR;  Service: Podiatry   • FOOT HARDWARE REMOVAL Left 7/5/2018    Procedure: LEFT CALCANEAL REMOVAL OF HARDWARE;  Surgeon: Alvino Calhoun DPM;  Location: [address] [place] [address] OR;  Service: Podiatry   • FOOT SURGERY Right     BONE REMOVED BOTTOM OF RIGHT FOOT   • HAND SURGERY Right     ring finger tendon severed   • HARDWARE REMOVAL Left 7/5/2018    Procedure: LEFT BIG TOE REMOVAL OF HARDWARE;  Surgeon: Alvino Calhoun DPM;  Location: [address] [place] [address] OR;  Service: Podiatry   • HIP SURGERY Left     reconstruction as child   • HYSTERECTOMY      total abdominal   • HYSTERECTOMY      TOTAL   • JOINT REPLACEMENT      LTHR and reconstruction   • KNEE ARTHROSCOPY      R knee   • LEG SURGERY     • LISFRANC ARTHRODESIS Left 11/3/2017    Procedure: FUSION FIRST MTPJ: Carlos Mcguire; LATERAL ANKLE STABILIZATION;  Surgeon: Alvino Calhoun DPM;  [facility];  Service: Podiatry   • OOPHORECTOMY      age 35   • PILONIDAL CYST EXCISION      midline   • WA DCMPRN FASCT LEG ANT&/LAT&PST W/DBRDMT MUS Left 7/5/2018    Procedure: LEFT PERONEAL TENDON DEBRIDMENT ;  Surgeon: Alvino Calhoun DPM;  Location: [address] [place] [address] OR;  Service: Podiatry   • WA OSTEOTOMY CALCANEUS W/WO INTERNAL FIXATION Left 11/3/2017    Procedure: OSTEOTOMY CALCANEUS;  Surgeon: Alvino Calhoun DPM;  Location[facility];  Service: Podiatry   • WA RPR TENDON FLXR FOOT [address] W/FREE GRAFT EA TENDON Left 7/2/2018    Procedure: PERONEAL TENDON EXPLORATION;  Surgeon: Cricket Skaggs DPM;  Location: [address] [place] [address] MAIN OR;  Service: Podiatry   • REFRACTIVE SURGERY Bilateral     LASER SURGERY BOTH EYES   • TOTAL HIP ARTHROPLASTY  2005   • WRIST SURGERY Left      Family History   Problem Relation Age of Onset   • Cancer Mother    • Hypertension Mother    • Bone cancer Mother 40        COD   • Breast cancer Mother 40   • Hypertension Father    • Heart disease Father    • Dementia Father    • Lung cancer Sister 40   • Brain cancer Sister 52   • No Known Problems Sister    • No Known Problems Maternal Grandmother    • No Known Problems Maternal Grandfather    • Liver cancer Paternal Grandmother 68   • Hypertension Paternal Grandfather    • Stroke Paternal Grandfather    • Breast cancer Maternal Aunt    • Breast cancer Maternal Aunt 48   • No Known Problems Paternal Aunt      Social History     Socioeconomic History   • Marital status: /Civil Union     Spouse name: None   • Number of children: None   • Years of education: None   • Highest education level: None   Occupational History   • None   Tobacco Use   • Smoking status: Every Day     Packs/day: 0.50     Years: 30.00     Total pack years: 15.00     Types: Cigarettes   • Smokeless tobacco: Never   Vaping Use   • Vaping Use: Never used   Substance and Sexual Activity   • Alcohol use: No   • Drug use: No   • Sexual activity: Yes   Other Topics Concern   • None   Social History Narrative    ** Merged History Encounter **          Social Determinants of Health     Financial Resource Strain: Not on file   Food Insecurity: Not on file   Transportation Needs: Not on file   Physical Activity: Not on file   Stress: Not on file   Social Connections: Not on file   Intimate Partner Violence: Not on file   Housing Stability: Not on file     Current Outpatient Medications on File Prior to Visit   Medication Sig   • albuterol (2.5 mg/3 mL) 0.083 % nebulizer solution Take 3 mL (2.5 mg total) by nebulization every 6 (six) hours as needed for wheezing or shortness of breath   • albuterol (PROVENTIL HFA,VENTOLIN HFA) 90 mcg/act inhaler Inhale 2 puffs every 6 (six) hours as needed for wheezing   • benzonatate (TESSALON) 200 MG capsule Take 1 capsule (200 mg total) by mouth 3 (three) times a day as needed for cough   • conjugated estrogens (Premarin) 0.45 mg tablet Take 1 tablet (0.45 mg total) by mouth in the morning Take daily for 21 days. Then do not take for 7 days   • cyclobenzaprine (FLEXERIL) 10 mg tablet Take 1 tablet (10 mg total) by mouth daily as needed for muscle spasms   • fluticasone (FLONASE) 50 mcg/act nasal spray SPRAY 2 SPRAYS INTO EACH NOSTRIL EVERY DAY (Patient not taking: Reported on 4/26/2023)   • fluticasone-salmeterol (Wixela Inhub) 500-50 mcg/dose inhaler Inhale 1 puff daily Rinse mouth after use.  (Patient not taking: Reported on 4/26/2023)   • ibuprofen (MOTRIN) 600 mg tablet Take 1 tablet (600 mg total) by mouth every 8 (eight) hours as needed for mild pain   • ipratropium-albuterol (Combivent Respimat) inhaler Inhale 1 puff 4 (four) times a day   • methocarbamol (ROBAXIN) 500 mg tablet Take 1 tablet (500 mg total) by mouth 3 (three) times a day (Patient not taking: Reported on 8/30/2022)   • montelukast (SINGULAIR) 10 mg tablet Take 1 tablet (10 mg total) by mouth every evening   • ondansetron (ZOFRAN) 4 mg tablet Take 1 tablet (4 mg total) by mouth every 8 (eight) hours as needed for nausea or vomiting   • pantoprazole (PROTONIX) 40 mg tablet TAKE 1 TABLET BY MOUTH EVERY DAY BEFORE BREAKFAST   • promethazine-codeine (PHENERGAN WITH CODEINE) 6.25-10 mg/5 mL syrup Take 5 mL by mouth every 4 (four) hours as needed for cough (Patient not taking: Reported on 4/4/2023)   • SUMAtriptan (IMITREX) 50 mg tablet Take 2 tablets (100 mg total) by mouth once as needed for migraine   • zolpidem (AMBIEN) 10 mg tablet Take 1 tablet (10 mg total) by mouth daily at bedtime as needed for sleep   • [DISCONTINUED] Semaglutide-Weight Management (WEGOVY) 1.7 MG/0.75ML Inject 0.75 mL (1.7 mg total) under the skin once a week     Allergies   Allergen Reactions   • Morphine Other (See Comments)     Blood pressure drops   • Morphine And Related Other (See Comments)     Blood pressure drops   • Penicillins Itching and Rash   • Quazepam Itching     Immunization History   Administered Date(s) Administered   • COVID-19 MODERNA VACC 0.5 ML IM 01/11/2021, 02/10/2021   • H1N1, All Formulations 10/31/2009   • INFLUENZA 01/15/2007, 11/13/2007, 09/30/2008, 12/10/2009, 11/28/2011, 09/11/2012, 09/19/2013, 11/29/2017, 11/29/2017, 11/02/2020   • Influenza, seasonal, injectable 10/01/2010   • Td (adult), adsorbed 01/01/2007   • Tdap 09/30/2008       Objective     /80 (BP Location: Left arm, Patient Position: Sitting, Cuff Size: Standard)   Pulse 76   Temp 97.8 °F (36.6 °C) (Tympanic)   Resp 16   Ht 5' (1.524 m)   Wt 71.4 kg (157 lb 6.4 oz)   SpO2 97%   BMI 30.74 kg/m²     Physical Exam  Vitals and nursing note reviewed. Constitutional:       Appearance: She is well-developed. HENT:      Head: Normocephalic and atraumatic. Eyes:      Pupils: Pupils are equal, round, and reactive to light. Cardiovascular:      Rate and Rhythm: Normal rate and regular rhythm. Heart sounds: Normal heart sounds. Pulmonary:      Effort: Pulmonary effort is normal.      Breath sounds: Normal breath sounds. Abdominal:      General: Bowel sounds are normal.      Palpations: Abdomen is soft. Musculoskeletal:      Cervical back: Normal range of motion and neck supple. Right ankle: Swelling present. Tenderness present. Lymphadenopathy:      Cervical: No cervical adenopathy. Skin:     General: Skin is warm. Neurological:      Mental Status: She is alert and oriented to person, place, and time. Cranial Nerves: No cranial nerve deficit.        Ivy Hogue MD

## 2023-08-09 NOTE — PROGRESS NOTES
Name: Luis Skelton      : 1975      MRN: [id number]  Encounter Provider: Pauly Colvin MD  Encounter Date: 2023   Encounter department: [facility]     1. Other hyperlipidemia  Assessment & Plan:  Not well-controlled. It was discussed about low-fat diet and regular exercise. Continue to monitor fasting lipid profile. Orders:  -     CBC and differential; Future  -     Comprehensive metabolic panel; Future  -     Lipid Panel with Direct LDL reflex; Future  -     TSH, 3rd generation with Free T4 reflex; Future    2. Class 1 obesity due to excess calories with serious comorbidity and body mass index (BMI) of 33.0 to 33.9 in adult  Assessment & Plan:  She lost 7 pounds since her last visit. Discussed about low-carb diet and regular exercise. Continue on Wegovy 1.7 mg weekly due to side effects. Will continue to monitor. Come back in 3 months. Orders:  -     Semaglutide-Weight Management (WEGOVY) 1.7 MG/0.75ML; Inject 0.75 mL (1.7 mg total) under the skin once a week    3. Hyperglycemia  Assessment & Plan:  Discussed about low-carb diet. Was told Wegovy will help. I am going to repeat her fasting glucose and A1c. Orders:  -     Hemoglobin A1C; Future    4. Chronic pain of right ankle  Assessment & Plan:  Advised to check uric acid level. I will consider referral to see podiatrist.    Orders:  -     Uric acid; Future    5. Class 1 obesity due to excess calories with serious comorbidity and body mass index (BMI) of 30.0 to 30.9 in adult  Assessment & Plan:  She lost 7 pounds since her last visit. Discussed about low-carb diet and regular exercise. Continue on Wegovy 1.7 mg weekly due to side effects. Will continue to monitor. Come back in 3 months. 6. Abnormal wellness exam  Assessment & Plan: It was discussed about immunizations, diet, exercise and safety measures.              Subjective     She is here today for follow-up multiple medical problems. Has been taking her medications. She has been having a problem with the swelling in her right ankle. She was given the prednisone before and that seems to help with the pain but the swelling continues. She denies any recent history of injury or trauma. She continues on Wegovy 1.7 mg weekly for weight loss. She lost 7 pounds since her last visit. She desires to stay on the same dose due to side effects. Review of Systems   Constitutional: Negative for chills and fever. HENT: Negative for trouble swallowing. Eyes: Negative for visual disturbance. Respiratory: Negative for cough and shortness of breath. Cardiovascular: Negative for chest pain, palpitations and leg swelling. Gastrointestinal: Negative for abdominal pain, constipation and diarrhea. Endocrine: Negative for cold intolerance and heat intolerance. Genitourinary: Negative for difficulty urinating and dysuria. Musculoskeletal: Positive for joint swelling. Negative for gait problem. Skin: Negative for rash. Neurological: Negative for dizziness, tremors, seizures and headaches. Hematological: Negative for adenopathy. Psychiatric/Behavioral: Negative for behavioral problems. Past Medical History:   Diagnosis Date   • Anxiety     h/o 2 panic attacks    • Arthritis     l hip congenital dyspasia   • Asthma     good control   • Bleb     R eye   • Chronic kidney disease     one kidney left side   • Chronic pain     back- DJD in 2 upper 2 lower, buldging disc c-7   • Chronic pain disorder     lower back and hips   • Depression    • Endometriosis    • GERD (gastroesophageal reflux disease)    • Glaucoma     b/l .  Bleb in R eye   • Glaucoma    • H/O carpal tunnel syndrome     L   • Headache     migraines   • History of cigarette smoking    • History of transfusion    • Hypercholesterolemia 1/17/2013   • Insomnia    • Migraines    • Osteoarthritis     cervical spondylarthritis   • Polypoid cystitis     POLYP CYSTS ON BUTTOCK AREA   • Renal disorder     Reports single kidney   • Right knee injury     SCRAPED GROWTH PLATE RIGHT KNEE   • Seasonal allergies      Past Surgical History:   Procedure Laterality Date   • BLEPHAROPLASTY Right     BLEB RIGHT EYE   • BUNIONECTOMY Bilateral     2 on R, 3 on left   • CARPAL TUNNEL RELEASE Left    • CT NEEDLE BX ASPIRATION INJECTION LOCALIZATION  3/8/2019   • EYE SURGERY Right     bleb implant   • EYE SURGERY Bilateral     lazer- glaucoma   • FOOT HARDWARE REMOVAL Left 7/2/2018    Procedure: CALCANEAL REMOVAL OF HARDWARE;  Surgeon: Cricket Skaggs DPM;  Location: [address] [place] [address] OR;  Service: Podiatry   • FOOT HARDWARE REMOVAL Left 7/5/2018    Procedure: LEFT CALCANEAL REMOVAL OF HARDWARE;  Surgeon: Cricket Skaggs DPM;  Location: [address] [place] [address] OR;  Service: Podiatry   • FOOT SURGERY Right     BONE REMOVED BOTTOM OF RIGHT FOOT   • HAND SURGERY Right     ring finger tendon severed   • HARDWARE REMOVAL Left 7/5/2018    Procedure: LEFT BIG TOE REMOVAL OF HARDWARE;  Surgeon: Cricket Skaggs DPM;  Location: [address] [place] [address] OR;  Service: Podiatry   • HIP SURGERY Left     reconstruction as child   • HYSTERECTOMY      total abdominal   • HYSTERECTOMY      TOTAL   • JOINT REPLACEMENT      LTHR and reconstruction   • KNEE ARTHROSCOPY      R knee   • LEG SURGERY     • LISFRANC ARTHRODESIS Left 11/3/2017    Procedure: FUSION FIRST MTPJ: Iliana Collins; LATERAL ANKLE STABILIZATION;  Surgeon: Cricket Skaggs DPM;  [facility];  Service: Podiatry   • OOPHORECTOMY      age 35   • PILONIDAL CYST EXCISION      midline   • CO DCMPRN FASCT LEG ANT&/LAT&PST W/DBRDMT MUS Left 7/5/2018    Procedure: LEFT PERONEAL TENDON DEBRIDMENT ;  Surgeon: Cricket Skaggs DPM;  Location: [address] [place] [address] MAIN OR;  Service: Podiatry   • CO OSTEOTOMY CALCANEUS W/WO INTERNAL FIXATION Left 11/3/2017    Procedure: OSTEOTOMY CALCANEUS;  Surgeon: Cricket Skaggs DPM;  [facility];  Service: Podiatry   • CO RPR TENDON FLXR FOOT [address] W/FREE GRAFT EA TENDON Left 7/2/2018    Procedure: PERONEAL TENDON EXPLORATION;  Surgeon: Hemant Ledezma DPM;  Location: [address] [place] [address] MAIN OR;  Service: Podiatry   • REFRACTIVE SURGERY Bilateral     LASER SURGERY BOTH EYES   • TOTAL HIP ARTHROPLASTY  2005   • WRIST SURGERY Left      Family History   Problem Relation Age of Onset   • Cancer Mother    • Hypertension Mother    • Bone cancer Mother 40        COD   • Breast cancer Mother 40   • Hypertension Father    • Heart disease Father    • Dementia Father    • Lung cancer Sister 40   • Brain cancer Sister 52   • No Known Problems Sister    • No Known Problems Maternal Grandmother    • No Known Problems Maternal Grandfather    • Liver cancer Paternal Grandmother 68   • Hypertension Paternal Grandfather    • Stroke Paternal Grandfather    • Breast cancer Maternal Aunt    • Breast cancer Maternal Aunt 48   • No Known Problems Paternal Aunt      Social History     Socioeconomic History   • Marital status: /Civil Union     Spouse name: None   • Number of children: None   • Years of education: None   • Highest education level: None   Occupational History   • None   Tobacco Use   • Smoking status: Every Day     Packs/day: 0.50     Years: 30.00     Total pack years: 15.00     Types: Cigarettes   • Smokeless tobacco: Never   Vaping Use   • Vaping Use: Never used   Substance and Sexual Activity   • Alcohol use: No   • Drug use: No   • Sexual activity: Yes   Other Topics Concern   • None   Social History Narrative    ** Merged History Encounter **          Social Determinants of Health     Financial Resource Strain: Not on file   Food Insecurity: Not on file   Transportation Needs: Not on file   Physical Activity: Not on file   Stress: Not on file   Social Connections: Not on file   Intimate Partner Violence: Not on file   Housing Stability: Not on file     Current Outpatient Medications on File Prior to Visit   Medication Sig   • albuterol (2.5 mg/3 mL) 0.083 % nebulizer solution Take 3 mL (2.5 mg total) by nebulization every 6 (six) hours as needed for wheezing or shortness of breath   • albuterol (PROVENTIL HFA,VENTOLIN HFA) 90 mcg/act inhaler Inhale 2 puffs every 6 (six) hours as needed for wheezing   • benzonatate (TESSALON) 200 MG capsule Take 1 capsule (200 mg total) by mouth 3 (three) times a day as needed for cough   • conjugated estrogens (Premarin) 0.45 mg tablet Take 1 tablet (0.45 mg total) by mouth in the morning Take daily for 21 days. Then do not take for 7 days   • cyclobenzaprine (FLEXERIL) 10 mg tablet Take 1 tablet (10 mg total) by mouth daily as needed for muscle spasms   • fluticasone (FLONASE) 50 mcg/act nasal spray SPRAY 2 SPRAYS INTO EACH NOSTRIL EVERY DAY (Patient not taking: Reported on 4/26/2023)   • fluticasone-salmeterol (Wixela Inhub) 500-50 mcg/dose inhaler Inhale 1 puff daily Rinse mouth after use.  (Patient not taking: Reported on 4/26/2023)   • ibuprofen (MOTRIN) 600 mg tablet Take 1 tablet (600 mg total) by mouth every 8 (eight) hours as needed for mild pain   • ipratropium-albuterol (Combivent Respimat) inhaler Inhale 1 puff 4 (four) times a day   • methocarbamol (ROBAXIN) 500 mg tablet Take 1 tablet (500 mg total) by mouth 3 (three) times a day (Patient not taking: Reported on 8/30/2022)   • montelukast (SINGULAIR) 10 mg tablet Take 1 tablet (10 mg total) by mouth every evening   • ondansetron (ZOFRAN) 4 mg tablet Take 1 tablet (4 mg total) by mouth every 8 (eight) hours as needed for nausea or vomiting   • pantoprazole (PROTONIX) 40 mg tablet TAKE 1 TABLET BY MOUTH EVERY DAY BEFORE BREAKFAST   • promethazine-codeine (PHENERGAN WITH CODEINE) 6.25-10 mg/5 mL syrup Take 5 mL by mouth every 4 (four) hours as needed for cough (Patient not taking: Reported on 4/4/2023)   • SUMAtriptan (IMITREX) 50 mg tablet Take 2 tablets (100 mg total) by mouth once as needed for migraine   • zolpidem (AMBIEN) 10 mg tablet Take 1 tablet (10 mg total) by mouth daily at bedtime as needed for sleep   • [DISCONTINUED] Semaglutide-Weight Management (WEGOVY) 1.7 MG/0.75ML Inject 0.75 mL (1.7 mg total) under the skin once a week     Allergies   Allergen Reactions   • Morphine Other (See Comments)     Blood pressure drops   • Morphine And Related Other (See Comments)     Blood pressure drops   • Penicillins Itching and Rash   • Quazepam Itching     Immunization History   Administered Date(s) Administered   • COVID-19 MODERNA VACC 0.5 ML IM 01/11/2021, 02/10/2021   • H1N1, All Formulations 10/31/2009   • INFLUENZA 01/15/2007, 11/13/2007, 09/30/2008, 12/10/2009, 11/28/2011, 09/11/2012, 09/19/2013, 11/29/2017, 11/29/2017, 11/02/2020   • Influenza, seasonal, injectable 10/01/2010   • Td (adult), adsorbed 01/01/2007   • Tdap 09/30/2008       Objective     /80 (BP Location: Left arm, Patient Position: Sitting, Cuff Size: Standard)   Pulse 76   Temp 97.8 °F (36.6 °C) (Tympanic)   Resp 16   Ht 5' (1.524 m)   Wt 71.4 kg (157 lb 6.4 oz)   SpO2 97%   BMI 30.74 kg/m²     Physical Exam  Vitals and nursing note reviewed. Constitutional:       Appearance: She is well-developed. HENT:      Head: Normocephalic and atraumatic. Eyes:      Pupils: Pupils are equal, round, and reactive to light. Cardiovascular:      Rate and Rhythm: Normal rate and regular rhythm. Heart sounds: Normal heart sounds. Pulmonary:      Effort: Pulmonary effort is normal.      Breath sounds: Normal breath sounds. Abdominal:      General: Bowel sounds are normal.      Palpations: Abdomen is soft. Musculoskeletal:      Cervical back: Normal range of motion and neck supple. Right ankle: Swelling present. Tenderness present. Lymphadenopathy:      Cervical: No cervical adenopathy. Skin:     General: Skin is warm. Neurological:      Mental Status: She is alert and oriented to person, place, and time. Cranial Nerves: No cranial nerve deficit.        Shakira Aguayo MD

## 2023-08-09 NOTE — ASSESSMENT & PLAN NOTE
She lost 7 pounds since her last visit. Discussed about low-carb diet and regular exercise. Continue on Wegovy 1.7 mg weekly due to side effects. Will continue to monitor. Come back in 3 months.

## 2023-08-09 NOTE — ASSESSMENT & PLAN NOTE
Not well-controlled. It was discussed about low-fat diet and regular exercise. Continue to monitor fasting lipid profile.

## 2023-08-09 NOTE — TELEPHONE ENCOUNTER
Pt last seen 23, has appt 23. Patients      SELECT PATIENT ID NAME  [address]   [] 1 JACK CUBA [birth year]- F 1325 [facility]   [] 2 JACK CUBA 1975 F [address] [place] [id number] GHISLAINE PALMA   [x] 3 JACK CUBA [birth year]- F [address] [place]           Search Criteria    NAME DATE OF BIRTH DATE Nathan Foster [birth year]- To 2023     REQUESTER NAME REQUESTED DATE   Crystal Taylor Wed Aug 09 2023 13:36:13 GMT-0400 Rondel Harsh Daylight Time)     Summary  Prescriptions  6  Prescribers  1  Pharmacies  1  View Map  Drug Classes  Benzodiazepines  0  Stimulants  0  Opioids  0  Muscle Relaxants  0  Opioid Dosage  Total MME for Active Prescriptions  0    Average MME  0.00  MME Graph   MME Calculator     • Prescriptions  • Notifications    • Prescribers  • Pharmacies  • MME Graph    [] [place] Drug Categories:     [] Others      Showentries  Search:  PATIENT ID PRESCRIPTION # FILLED WRITTEN DRUG LABEL QTY DAYS STRENGTH MME** PRESCRIBER PHARMACY PAYMENT REFILL #/AUTH STATE DETAIL   3 [id number] 2023 Zolpidem Tartrate (Tablet) 30.0 30 10 MG LAMIN RAMIRES) [facility] [place] / 0 [place]    3 [id number] 03/10/2023 03/10/2023 Zolpidem Tartrate (Tablet) 30.0 30 10 MG NA IKE) [facility] [place] 0 [place]    3 [id number] 2023 Zolpidem Tartrate (Tablet) 30.0 30 10 MG NA IKE) [facility] [place]    3 [id number] 2023 Zolpidem Tartrate (Tablet) 30.0 30 10 MG NA IKE) [facility] [place]    3 [id number] 2022 Zolpidem Tartrate (Tablet) 30.0 30 10 MG NA [facility] [place]    3 [id number] 2022 Zolpidem Tartrate (Tablet) 30.0 30 10 MG NA MARTA(MD) [facility]  [place] / 0 [place]

## 2023-08-09 NOTE — ASSESSMENT & PLAN NOTE
Discussed about low-carb diet. Was told Wegovy will help. I am going to repeat her fasting glucose and A1c.

## 2023-08-10 ENCOUNTER — TELEPHONE (OUTPATIENT)
Dept: FAMILY MEDICINE CLINIC | Facility: CLINIC | Age: 48
End: 2023-08-10

## 2023-08-10 ENCOUNTER — APPOINTMENT (OUTPATIENT)
Dept: LAB | Facility: IMAGING CENTER | Age: 48
End: 2023-08-10
Payer: COMMERCIAL

## 2023-08-10 DIAGNOSIS — R73.9 HYPERGLYCEMIA: ICD-10-CM

## 2023-08-10 DIAGNOSIS — E78.49 OTHER HYPERLIPIDEMIA: ICD-10-CM

## 2023-08-10 DIAGNOSIS — M25.571 CHRONIC PAIN OF RIGHT ANKLE: ICD-10-CM

## 2023-08-10 DIAGNOSIS — G89.29 CHRONIC PAIN OF RIGHT ANKLE: ICD-10-CM

## 2023-08-10 LAB
ALBUMIN SERPL BCP-MCNC: 3.9 G/DL (ref 3.5–5)
ALP SERPL-CCNC: 85 U/L (ref 46–116)
ALT SERPL W P-5'-P-CCNC: 17 U/L (ref 12–78)
ANION GAP SERPL CALCULATED.3IONS-SCNC: 1 MMOL/L
AST SERPL W P-5'-P-CCNC: 10 U/L (ref 5–45)
BASOPHILS # BLD AUTO: 0.03 THOUSANDS/ÂΜL (ref 0–0.1)
BASOPHILS NFR BLD AUTO: 0 % (ref 0–1)
BILIRUB SERPL-MCNC: 0.41 MG/DL (ref 0.2–1)
BUN SERPL-MCNC: 17 MG/DL (ref 5–25)
CALCIUM SERPL-MCNC: 9.9 MG/DL (ref 8.3–10.1)
CHLORIDE SERPL-SCNC: 110 MMOL/L (ref 96–108)
CHOLEST SERPL-MCNC: 210 MG/DL
CO2 SERPL-SCNC: 28 MMOL/L (ref 21–32)
CREAT SERPL-MCNC: 1.1 MG/DL (ref 0.6–1.3)
EOSINOPHIL # BLD AUTO: 0.25 THOUSAND/ÂΜL (ref 0–0.61)
EOSINOPHIL NFR BLD AUTO: 3 % (ref 0–6)
ERYTHROCYTE [DISTWIDTH] IN BLOOD BY AUTOMATED COUNT: 13.1 % (ref 11.6–15.1)
GFR SERPL CREATININE-BSD FRML MDRD: 59 ML/MIN/1.73SQ M
GLUCOSE P FAST SERPL-MCNC: 90 MG/DL (ref 65–99)
HCT VFR BLD AUTO: 43.1 % (ref 34.8–46.1)
HDLC SERPL-MCNC: 52 MG/DL
HGB BLD-MCNC: 13.9 G/DL (ref 11.5–15.4)
IMM GRANULOCYTES # BLD AUTO: 0.02 THOUSAND/UL (ref 0–0.2)
IMM GRANULOCYTES NFR BLD AUTO: 0 % (ref 0–2)
LDLC SERPL CALC-MCNC: 139 MG/DL (ref 0–100)
LYMPHOCYTES # BLD AUTO: 2.72 THOUSANDS/ÂΜL (ref 0.6–4.47)
LYMPHOCYTES NFR BLD AUTO: 37 % (ref 14–44)
MCH RBC QN AUTO: 30.2 PG (ref 26.8–34.3)
MCHC RBC AUTO-ENTMCNC: 32.3 G/DL (ref 31.4–37.4)
MCV RBC AUTO: 94 FL (ref 82–98)
MONOCYTES # BLD AUTO: 0.59 THOUSAND/ÂΜL (ref 0.17–1.22)
MONOCYTES NFR BLD AUTO: 8 % (ref 4–12)
NEUTROPHILS # BLD AUTO: 3.79 THOUSANDS/ÂΜL (ref 1.85–7.62)
NEUTS SEG NFR BLD AUTO: 52 % (ref 43–75)
NRBC BLD AUTO-RTO: 0 /100 WBCS
PLATELET # BLD AUTO: 283 THOUSANDS/UL (ref 149–390)
PMV BLD AUTO: 11.4 FL (ref 8.9–12.7)
POTASSIUM SERPL-SCNC: 4.9 MMOL/L (ref 3.5–5.3)
PROT SERPL-MCNC: 7.4 G/DL (ref 6.4–8.4)
RBC # BLD AUTO: 4.6 MILLION/UL (ref 3.81–5.12)
SODIUM SERPL-SCNC: 139 MMOL/L (ref 135–147)
TRIGL SERPL-MCNC: 97 MG/DL
TSH SERPL DL<=0.05 MIU/L-ACNC: 1.57 UIU/ML (ref 0.45–4.5)
URATE SERPL-MCNC: 4.8 MG/DL (ref 2–7.5)
WBC # BLD AUTO: 7.4 THOUSAND/UL (ref 4.31–10.16)

## 2023-08-10 PROCEDURE — 36415 COLL VENOUS BLD VENIPUNCTURE: CPT

## 2023-08-10 PROCEDURE — 83036 HEMOGLOBIN GLYCOSYLATED A1C: CPT

## 2023-08-10 PROCEDURE — 80053 COMPREHEN METABOLIC PANEL: CPT

## 2023-08-10 PROCEDURE — 85025 COMPLETE CBC W/AUTO DIFF WBC: CPT

## 2023-08-10 PROCEDURE — 80061 LIPID PANEL: CPT

## 2023-08-10 PROCEDURE — 84443 ASSAY THYROID STIM HORMONE: CPT

## 2023-08-10 PROCEDURE — 84550 ASSAY OF BLOOD/URIC ACID: CPT

## 2023-08-10 NOTE — TELEPHONE ENCOUNTER
----- Message from Marily Garner sent at 8/10/2023 12:44 PM EDT -----  Regarding:  Ankle   Contact: [phone]  I see my results are in and doesn't look too be indicative of gout let's schedule the fluid testing

## 2023-08-11 LAB
EST. AVERAGE GLUCOSE BLD GHB EST-MCNC: 114 MG/DL
HBA1C MFR BLD: 5.6 %

## 2023-08-11 RX ORDER — ZOLPIDEM TARTRATE 10 MG/1
10 TABLET ORAL
Qty: 30 TABLET | Refills: 0 | Status: SHIPPED | OUTPATIENT
Start: 2023-08-11

## 2023-08-11 RX ORDER — IBUPROFEN 600 MG/1
600 TABLET ORAL EVERY 8 HOURS PRN
Qty: 45 TABLET | Refills: 0 | Status: SHIPPED | OUTPATIENT
Start: 2023-08-11

## 2023-08-11 RX ORDER — SUMATRIPTAN 50 MG/1
100 TABLET, FILM COATED ORAL ONCE AS NEEDED
Qty: 9 TABLET | Refills: 0 | Status: SHIPPED | OUTPATIENT
Start: 2023-08-11

## 2023-08-11 RX ORDER — ALBUTEROL SULFATE 90 UG/1
2 AEROSOL, METERED RESPIRATORY (INHALATION) EVERY 6 HOURS PRN
Qty: 18 G | Refills: 0 | Status: SHIPPED | OUTPATIENT
Start: 2023-08-11

## 2023-08-24 ENCOUNTER — APPOINTMENT (EMERGENCY)
Dept: RADIOLOGY | Facility: HOSPITAL | Age: 48
End: 2023-08-24
Payer: OTHER MISCELLANEOUS

## 2023-08-24 ENCOUNTER — HOSPITAL ENCOUNTER (EMERGENCY)
Facility: HOSPITAL | Age: 48
Discharge: HOME | End: 2023-08-25
Attending: EMERGENCY MEDICINE | Admitting: EMERGENCY MEDICINE
Payer: OTHER MISCELLANEOUS

## 2023-08-24 VITALS
DIASTOLIC BLOOD PRESSURE: 86 MMHG | RESPIRATION RATE: 18 BRPM | OXYGEN SATURATION: 96 % | BODY MASS INDEX: 30.43 KG/M2 | HEART RATE: 83 BPM | TEMPERATURE: 96.4 F | HEIGHT: 60 IN | SYSTOLIC BLOOD PRESSURE: 124 MMHG | WEIGHT: 155 LBS

## 2023-08-24 DIAGNOSIS — S46.002A INJURY OF LEFT ROTATOR CUFF, INITIAL ENCOUNTER: Primary | ICD-10-CM

## 2023-08-24 PROCEDURE — 99283 EMERGENCY DEPT VISIT LOW MDM: CPT

## 2023-08-24 PROCEDURE — 99285 EMERGENCY DEPT VISIT HI MDM: CPT | Performed by: EMERGENCY MEDICINE

## 2023-08-24 PROCEDURE — 73610 X-RAY EXAM OF ANKLE: CPT

## 2023-08-24 PROCEDURE — 73030 X-RAY EXAM OF SHOULDER: CPT

## 2023-08-24 RX ORDER — METHOCARBAMOL 500 MG/1
500 TABLET, FILM COATED ORAL 3 TIMES DAILY PRN
Qty: 20 TABLET | Refills: 0 | Status: SHIPPED | OUTPATIENT
Start: 2023-08-24

## 2023-08-24 RX ORDER — OXYCODONE HYDROCHLORIDE 5 MG/1
5 TABLET ORAL ONCE
Status: COMPLETED | OUTPATIENT
Start: 2023-08-24 | End: 2023-08-24

## 2023-08-24 RX ADMIN — OXYCODONE HYDROCHLORIDE 5 MG: 5 TABLET ORAL at 22:45

## 2023-08-25 NOTE — ED PROVIDER NOTES
History  Chief Complaint   Patient presents with   • Fall     Pt. States tripped and fell, striking left shoulder onto cobble stones. Denies LOC. Denies blood thinners. States unable to raise left arm by self. Patient is a 79-year-old female presents for evaluation of a left shoulder injury. Patient says she was walking when she tripped on a curb, causing her to fall on her left shoulder. She denies hitting her head or losing consciousness. She is not on any blood thinners. She denies headache, [place], nausea, vomit, neck pain, back pain. Her only complaint is left shoulder and left ankle pain. She has been able to put weight on the left ankle. Patient says she is not able to move her shoulder secondary to pain. She denies any numbness or tingling in the left upper extremity. Prior to Admission Medications   Prescriptions Last Dose Informant Patient Reported? Taking? SUMAtriptan (IMITREX) 50 mg tablet Past Week  No Yes   Sig: Take 2 tablets (100 mg total) by mouth once as needed for migraine   Semaglutide-Weight Management (WEGOVY) 1.7 MG/0.75ML Past Week  No Yes   Sig: Inject 0.75 mL (1.7 mg total) under the skin once a week   albuterol (2.5 mg/3 mL) 0.083 % nebulizer solution More than a month  No No   Sig: Take 3 mL (2.5 mg total) by nebulization every 6 (six) hours as needed for wheezing or shortness of breath   albuterol (PROVENTIL HFA,VENTOLIN HFA) 90 mcg/act inhaler More than a month  No No   Sig: Inhale 2 puffs every 6 (six) hours as needed for wheezing   conjugated estrogens (Premarin) 0.45 mg tablet 8/23/2023  No Yes   Sig: Take 1 tablet (0.45 mg total) by mouth in the morning Take daily for 21 days.  Then do not take for 7 days   cyclobenzaprine (FLEXERIL) 10 mg tablet More than a month  No No   Sig: Take 1 tablet (10 mg total) by mouth daily as needed for muscle spasms   fluticasone (FLONASE) 50 mcg/act nasal spray Past Week  No Yes   Sig: SPRAY 2 1100 Respicardia NOSTRIL EVERY DAY   fluticasone-salmeterol (Wixela Inhub) 500-50 mcg/dose inhaler More than a month  No No   Sig: Inhale 1 puff daily Rinse mouth after use. Patient not taking: Reported on 4/26/2023   ibuprofen (MOTRIN) 600 mg tablet More than a month  No No   Sig: Take 1 tablet (600 mg total) by mouth every 8 (eight) hours as needed for mild pain   ipratropium-albuterol (Combivent Respimat) inhaler 8/23/2023  No Yes   Sig: Inhale 1 puff 4 (four) times a day   methocarbamol (ROBAXIN) 500 mg tablet Not Taking  No No   Sig: Take 1 tablet (500 mg total) by mouth 3 (three) times a day   Patient not taking: Reported on 8/30/2022   montelukast (SINGULAIR) 10 mg tablet 8/23/2023  No Yes   Sig: Take 1 tablet (10 mg total) by mouth every evening   ondansetron (ZOFRAN) 4 mg tablet More than a month  No No   Sig: Take 1 tablet (4 mg total) by mouth every 8 (eight) hours as needed for nausea or vomiting   pantoprazole (PROTONIX) 40 mg tablet Not Taking  No No   Sig: TAKE 1 TABLET BY MOUTH EVERY DAY BEFORE BREAKFAST   Patient not taking: Reported on 8/24/2023   promethazine-codeine (PHENERGAN WITH CODEINE) 6.25-10 mg/5 mL syrup More than a month  No No   Sig: Take 5 mL by mouth every 4 (four) hours as needed for cough   Patient not taking: Reported on 4/4/2023   zolpidem (AMBIEN) 10 mg tablet 8/23/2023  No Yes   Sig: Take 1 tablet (10 mg total) by mouth daily at bedtime as needed for sleep      Facility-Administered Medications: None       Past Medical History:   Diagnosis Date   • Anxiety     h/o 2 panic attacks    • Arthritis     l hip congenital dyspasia   • Asthma     good control   • Bleb     R eye   • Chronic kidney disease     one kidney left side   • Chronic pain     back- DJD in 2 upper 2 lower, buldging disc c-7   • Chronic pain disorder     lower back and hips   • Depression    • Endometriosis    • GERD (gastroesophageal reflux disease)    • Glaucoma     b/l .  Bleb in R eye   • Glaucoma    • H/O carpal tunnel syndrome     L   • Headache     migraines   • History of cigarette smoking    • History of transfusion    • Hypercholesterolemia 1/17/2013   • Insomnia    • Migraines    • Osteoarthritis     cervical spondylarthritis   • Polypoid cystitis     POLYP CYSTS ON BUTTOCK AREA   • Renal disorder     Reports single kidney   • Right knee injury     SCRAPED GROWTH PLATE RIGHT KNEE   • Seasonal allergies        Past Surgical History:   Procedure Laterality Date   • BLEPHAROPLASTY Right     BLEB RIGHT EYE   • BUNIONECTOMY Bilateral     2 on R, 3 on left   • CARPAL TUNNEL RELEASE Left    • CT NEEDLE BX ASPIRATION INJECTION LOCALIZATION  3/8/2019   • EYE SURGERY Right     bleb implant   • EYE SURGERY Bilateral     lazer- glaucoma   • FOOT HARDWARE REMOVAL Left 7/2/2018    Procedure: CALCANEAL REMOVAL OF HARDWARE;  Surgeon: Tiff Joy DPM;  Location: [address] [place] [address] MAIN OR;  Service: Podiatry   • FOOT HARDWARE REMOVAL Left 7/5/2018    Procedure: LEFT CALCANEAL REMOVAL OF HARDWARE;  Surgeon: Tiff Joy DPM;  Location: [address] [place] [address] MAIN OR;  Service: Podiatry   • FOOT SURGERY Right     BONE REMOVED BOTTOM OF RIGHT FOOT   • HAND SURGERY Right     ring finger tendon severed   • HARDWARE REMOVAL Left 7/5/2018    Procedure: LEFT BIG TOE REMOVAL OF HARDWARE;  Surgeon: Tiff Joy DPM;  Location: [address] [place] [address] MAIN OR;  Service: Podiatry   • HIP SURGERY Left     reconstruction as child   • HYSTERECTOMY      total abdominal   • HYSTERECTOMY      TOTAL   • JOINT REPLACEMENT      LTHR and reconstruction   • KNEE ARTHROSCOPY      R knee   • LEG SURGERY     • LISFRANC ARTHRODESIS Left 11/3/2017    Procedure: FUSION FIRST MTPJ: Naresh Huff; LATERAL ANKLE STABILIZATION;  Surgeon: Tiff Joy DPM;  [facility];  Service: Podiatry   • OOPHORECTOMY      age 35   • PILONIDAL CYST EXCISION      midline   • RI DCMPRN FASCT LEG ANT&/LAT&PST W/DBRDMT MUS Left 7/5/2018    Procedure: LEFT PERONEAL TENDON DEBRIDMENT ;  Surgeon: Tiff Joy DPM;  Location: [address] [place] [address] MAIN OR; Service: Podiatry   • MO OSTEOTOMY CALCANEUS W/WO INTERNAL FIXATION Left 11/3/2017    Procedure: OSTEOTOMY CALCANEUS;  Surgeon: Fernando Loredo DPM;  [facility];  Service: Podiatry   • MO RPR TENDON FLXR FOOT [address] W/FREE GRAFT EA TENDON Left 7/2/2018    Procedure: PERONEAL TENDON EXPLORATION;  Surgeon: Fernando Loredo DPM;  Location: [address] [place] [address] MAIN OR;  Service: Podiatry   • REFRACTIVE SURGERY Bilateral     LASER SURGERY BOTH EYES   • TOTAL HIP ARTHROPLASTY  2005   • WRIST SURGERY Left        Family History   Problem Relation Age of Onset   • Cancer Mother    • Hypertension Mother    • Bone cancer Mother 40        COD   • Breast cancer Mother 40   • Hypertension Father    • Heart disease Father    • Dementia Father    • Lung cancer Sister 40   • Brain cancer Sister 52   • No Known Problems Sister    • No Known Problems Maternal Grandmother    • No Known Problems Maternal Grandfather    • Liver cancer Paternal Grandmother 68   • Hypertension Paternal Grandfather    • Stroke Paternal Grandfather    • Breast cancer Maternal Aunt    • Breast cancer Maternal Aunt 48   • No Known Problems Paternal Aunt      I have reviewed and agree with the history as documented. E-Cigarette/Vaping   • E-Cigarette Use Never User      E-Cigarette/Vaping Substances   • Nicotine No    • THC No    • CBD No    • Flavoring No      Social History     Tobacco Use   • Smoking status: Every Day     Packs/day: 0.50     Years: 30.00     Total pack years: 15.00     Types: Cigarettes   • Smokeless tobacco: Never   Vaping Use   • Vaping Use: Never used   Substance Use Topics   • Alcohol use: No   • Drug use: No       Review of Systems   Constitutional: Negative for fever and unexpected weight change. HENT: Negative for congestion, ear pain, sore throat and trouble swallowing. Eyes: Negative for pain and redness. Respiratory: Negative for cough, chest tightness and shortness of breath. Cardiovascular: Negative for chest pain and leg swelling. Gastrointestinal: Negative for abdominal distention, abdominal pain, diarrhea and vomiting. Endocrine: Negative for polyuria. Genitourinary: Negative for dysuria, hematuria, pelvic pain and vaginal bleeding. Musculoskeletal: Positive for arthralgias (left shoulder, left ankle). Negative for back pain and myalgias. Skin: Negative for color change and rash. Neurological: Negative for dizziness, syncope, weakness, light-headedness and headaches. Physical Exam  Physical Exam  Vitals and nursing note reviewed. Constitutional:       General: She is not in acute distress. Appearance: She is well-developed. HENT:      Head: Normocephalic and atraumatic. Right Ear: External ear normal.      Left Ear: External ear normal.      Nose: Nose normal.      Mouth/Throat:      Mouth: Mucous membranes are moist.      Pharynx: No oropharyngeal exudate. Eyes:      Conjunctiva/sclera: Conjunctivae normal.      Pupils: Pupils are equal, round, and reactive to light. Cardiovascular:      Rate and Rhythm: Normal rate and regular rhythm. Heart sounds: Normal heart sounds. No murmur heard. No friction rub. No gallop. Pulmonary:      Effort: Pulmonary effort is normal. No respiratory distress. Breath sounds: Normal breath sounds. No wheezing or rales. Abdominal:      General: There is no distension. Palpations: Abdomen is soft. Tenderness: There is no abdominal tenderness. There is no guarding. Musculoskeletal:         General: Tenderness present. No swelling or deformity. Left shoulder: Tenderness and bony tenderness present. No effusion. Normal range of motion. Cervical back: Normal range of motion and neck supple. Feet:       Comments: Able to passively range left shoulder  LUE neurovascularly intact    Lymphadenopathy:      Cervical: No cervical adenopathy. Skin:     General: Skin is warm and dry. Neurological:      General: No focal deficit present. Mental Status: She is alert and oriented to person, place, and time. Mental status is at baseline. Cranial Nerves: No cranial nerve deficit. Sensory: No sensory deficit. Motor: No weakness or abnormal muscle tone. Coordination: Coordination normal.         Vital Signs  ED Triage Vitals [08/24/23 2226]   Temperature Pulse Respirations Blood Pressure SpO2   (!) 96.4 °F (35.8 °C) 83 18 124/86 96 %      Temp Source Heart Rate Source Patient Position - Orthostatic VS BP Location FiO2 (%)   Tympanic -- Sitting Right arm --      Pain Score       8           Vitals:    08/24/23 2226   BP: 124/86   Pulse: 83   Patient Position - Orthostatic VS: Sitting         Visual Acuity      ED Medications  Medications   oxyCODONE (ROXICODONE) IR tablet 5 mg (5 mg Oral Given 8/24/23 2245)       Diagnostic Studies  Results Reviewed     None                 XR ankle 3+ views LEFT   ED Interpretation by Mónica Kong DO (08/24 2311)   No acute osseous abnormality       XR shoulder 2+ views LEFT   ED Interpretation by Mónica Kong DO (08/24 2332)   No acute osseous abnormality                 Procedures  Procedures         ED Course                               SBIRT 22yo+    Flowsheet Row Most Recent Value   Initial Alcohol Screen: US AUDIT-C     1. How often do you have a drink containing alcohol? 1 Filed at: 08/24/2023 2229   2. How many drinks containing alcohol do you have on a typical day you are drinking? 1 Filed at: 08/24/2023 2229   3b. FEMALE Any Age, or MALE 65+: How often do you have 4 or more drinks on one occassion? 0 Filed at: 08/24/2023 2229   Audit-C Score 2 Filed at: 08/24/2023 2229   RUBIO: How many times in the past year have you. .. Used an illegal drug or used a prescription medication for non-medical reasons? Never Filed at: 08/24/2023 [address] Making  26-year-old female presenting for left shoulder pain.   Tripped over curb in the parking lot, landing on left shoulder. Denies head or lose conscious. No thinners. No headaches. Complain of left shoulder tenderness, left ankle tenderness. I made a passively range shoulder, left upper extremity neurovascular intact  Differential includes rotator cuff injury, shoulder contusion, fracture, dislocation  We will obtain x-ray of left shoulder, left ankle. Will give 1 dose of oxycodone here. X-ray my interpretation shows no acute osseous abnormality. Suspicious for rotator cuff injury. Patient was placed in a sling. Given orthopedic referral.  Given for muscle relaxer for home. Injury of left rotator cuff, initial encounter: acute illness or injury  Amount and/or Complexity of Data Reviewed  Radiology: ordered and independent interpretation performed. Risk  Prescription drug management. Disposition  Final diagnoses:   Injury of left rotator cuff, initial encounter     Time reflects when diagnosis was documented in both MDM as applicable and the Disposition within this note     Time User Action Codes Description Comment    8/24/2023 11:34 PM Lizeth Spikes Add [F98.031E] Rotator cuff (capsule) sprain     8/24/2023 11:34 PM Lizeth Spikes Add [M24.512] Contracture, left shoulder     8/24/2023 11:35 PM Lizeth Spikes Modify [W52.096] Contracture, left shoulder     8/24/2023 11:35 PM Lizeth Spikes Remove [R75.844K] Rotator cuff (capsule) sprain     8/24/2023 11:35 PM Lizeth Spikes Add [B85.378J] Injury of left rotator cuff, initial encounter     8/24/2023 11:37 PM Lizeth Spikes Modify [W76.809R] Injury of left rotator cuff, initial encounter     8/24/2023 11:37 PM Leatha Hoops [N25.999] Contracture, left shoulder       ED Disposition     ED Disposition   Discharge    Condition   Stable    Date/Time   Thu Aug 24, 2023 11:33 PM    Comment   Roger Garner discharge to home/self care.                Follow-up Information     Follow up With Specialties Details Why Contact Info Additional Information     [address] [facility] [place] [facility] Schedule an appointment as soon as possible for a visit  For follow up of left shoulder injury [address] [facility] [address] [place] [id number]  [facility] [place], [address] [place], [place], [address] [place] [address]          Patient's Medications   Discharge Prescriptions    METHOCARBAMOL (ROBAXIN) 500 MG TABLET    Take 1 tablet (500 mg total) by mouth 3 (three) times a day as needed for muscle spasms       Start Date: 8/24/2023 End Date: --       Order Dose: 500 mg       Quantity: 20 tablet    Refills: 0           PDMP Review       Value Time User    PDMP Reviewed  Yes 2/18/2022  6:41 AM Dahiana Negron MD          ED Provider  Electronically Signed by           Taurus Forde DO  08/25/23 0019

## 2023-08-29 ENCOUNTER — OFFICE VISIT (OUTPATIENT)
Dept: OBGYN CLINIC | Facility: CLINIC | Age: 48
End: 2023-08-29
Payer: OTHER MISCELLANEOUS

## 2023-08-29 VITALS
HEART RATE: 76 BPM | DIASTOLIC BLOOD PRESSURE: 79 MMHG | BODY MASS INDEX: 30.43 KG/M2 | SYSTOLIC BLOOD PRESSURE: 123 MMHG | WEIGHT: 155 LBS | HEIGHT: 60 IN

## 2023-08-29 DIAGNOSIS — S46.012A TRAUMATIC TEAR OF LEFT ROTATOR CUFF, UNSPECIFIED TEAR EXTENT, INITIAL ENCOUNTER: ICD-10-CM

## 2023-08-29 PROCEDURE — 99213 OFFICE O/P EST LOW 20 MIN: CPT | Performed by: ORTHOPAEDIC SURGERY

## 2023-08-29 NOTE — PROGRESS NOTES
ASSESSMENT/PLAN:    Diagnoses and all orders for this visit:    Traumatic tear of left rotator cuff, unspecified tear extent, initial encounter  -     Ambulatory Referral to Orthopedic Surgery  -     MRI shoulder left wo contrast; Future        X-rays of the patient's left shoulder are negative for any fractures or dislocations. I am concerned, however, that the patient has a tear of her rotator cuff. We will order an MRI to rule this out. The patient was instructed to perform gentle range of motion of the shoulder including pendulums and wall climbs. She will follow-up with our office once the MRI is complete. She is acceptable to this plan. Return for MRI results. There is a possibility the patient has a tear of her rotator cuff to her to her lack of active range of motion. There is a positive drop arm test.  Would recommend obtaining an MRI of her left shoulder. Return back once MRI is complete      _____________________________________________________  CHIEF COMPLAINT:  Chief Complaint   Patient presents with   • Left Shoulder - Pain         SUBJECTIVE:  Valerie Tee is a 51 y.o. female who presents to our office complaining of left shoulder pain with decreased range of motion and strength. The patient states she tripped and fell on 8/24/2023. She landed on her left shoulder and had immediate pain with decreased range of motion. She went to the emergency department where x-rays were performed which were negative for fracture. She was then given a sling. Today, she still complains of decreased range of motion and strength with significant left shoulder pain. She denies any numbness or tingling. She denies any fever or chills.     The following portions of the patient's history were reviewed and updated as appropriate: allergies, current medications, past family history, past medical history, past social history, past surgical history and problem list.    PAST MEDICAL HISTORY:  Past Medical History:   Diagnosis Date   • Anxiety     h/o 2 panic attacks    • Arthritis     l hip congenital dyspasia   • Asthma     good control   • Bleb     R eye   • Chronic kidney disease     one kidney left side   • Chronic pain     back- DJD in 2 upper 2 lower, buldging disc c-7   • Chronic pain disorder     lower back and hips   • Depression    • Endometriosis    • GERD (gastroesophageal reflux disease)    • Glaucoma     b/l .  Bleb in R eye   • Glaucoma    • H/O carpal tunnel syndrome     L   • Headache     migraines   • History of cigarette smoking    • History of transfusion    • Hypercholesterolemia 1/17/2013   • Insomnia    • Migraines    • Osteoarthritis     cervical spondylarthritis   • Polypoid cystitis     POLYP CYSTS ON BUTTOCK AREA   • Renal disorder     Reports single kidney   • Right knee injury     SCRAPED GROWTH PLATE RIGHT KNEE   • Seasonal allergies        PAST SURGICAL HISTORY:  Past Surgical History:   Procedure Laterality Date   • BLEPHAROPLASTY Right     BLEB RIGHT EYE   • BUNIONECTOMY Bilateral     2 on R, 3 on left   • CARPAL TUNNEL RELEASE Left    • CT NEEDLE BX ASPIRATION INJECTION LOCALIZATION  3/8/2019   • EYE SURGERY Right     bleb implant   • EYE SURGERY Bilateral     lazer- glaucoma   • FOOT HARDWARE REMOVAL Left 7/2/2018    Procedure: CALCANEAL REMOVAL OF HARDWARE;  Surgeon: Lucho Saab DPM;  Location: [address] [place] [address] OR;  Service: Podiatry   • FOOT HARDWARE REMOVAL Left 7/5/2018    Procedure: LEFT CALCANEAL REMOVAL OF HARDWARE;  Surgeon: Lucho Saab DPM;  Location: [address] [place] [address] OR;  Service: Podiatry   • FOOT SURGERY Right     BONE REMOVED BOTTOM OF RIGHT FOOT   • HAND SURGERY Right     ring finger tendon severed   • HARDWARE REMOVAL Left 7/5/2018    Procedure: LEFT BIG TOE REMOVAL OF HARDWARE;  Surgeon: Lucho Saab DPM;  Location: [address] [place] [address];  Service: Podiatry   • HIP SURGERY Left     reconstruction as child   • HYSTERECTOMY      total abdominal   • HYSTERECTOMY      TOTAL   • JOINT REPLACEMENT LTHR and reconstruction   • KNEE ARTHROSCOPY      R knee   • LEG SURGERY     • LISFRANC ARTHRODESIS Left 11/3/2017    Procedure: FUSION FIRST MTPJ: Radha Carmichael; LATERAL ANKLE STABILIZATION;  Surgeon: Cuate Sotelo DPM;  [facility];  Service: Podiatry   • OOPHORECTOMY      age 35   • PILONIDAL CYST EXCISION      midline   • IA DCMPRN FASCT LEG ANT&/LAT&PST W/DBRDMT MUS Left 7/5/2018    Procedure: LEFT PERONEAL TENDON DEBRIDMENT ;  Surgeon: Cuate Sotelo DPM;  Location: [address] [place] [address] OR;  Service: Podiatry   • IA OSTEOTOMY CALCANEUS W/WO INTERNAL FIXATION Left 11/3/2017    Procedure: OSTEOTOMY CALCANEUS;  Surgeon: Cuate Sotelo DPM;  Location[facility];  Service: Podiatry   • IA RPR TENDON FLXR FOOT [address] W/FREE GRAFT EA TENDON Left 7/2/2018    Procedure: PERONEAL TENDON EXPLORATION;  Surgeon: Cuate Sotelo DPM;  Location: [address] [place] [address];  Service: Podiatry   • REFRACTIVE SURGERY Bilateral     LASER SURGERY BOTH EYES   • TOTAL HIP ARTHROPLASTY  2005   • WRIST SURGERY Left        FAMILY HISTORY:  Family History   Problem Relation Age of Onset   • Cancer Mother    • Hypertension Mother    • Bone cancer Mother 40        COD   • Breast cancer Mother 40   • Hypertension Father    • Heart disease Father    • Dementia Father    • Lung cancer Sister 40   • Brain cancer Sister 52   • No Known Problems Sister    • No Known Problems Maternal Grandmother    • No Known Problems Maternal Grandfather    • Liver cancer Paternal Grandmother 68   • Hypertension Paternal Grandfather    • Stroke Paternal Grandfather    • Breast cancer Maternal Aunt    • Breast cancer Maternal Aunt 48   • No Known Problems Paternal Aunt        SOCIAL HISTORY:  Social History     Tobacco Use   • Smoking status: Every Day     Packs/day: 0.50     Years: 30.00     Total pack years: 15.00     Types: Cigarettes   • Smokeless tobacco: Never   Vaping Use   • Vaping Use: Never used   Substance Use Topics   • Alcohol use: No   • Drug use:  No MEDICATIONS:    Current Outpatient Medications:   •  albuterol (2.5 mg/3 mL) 0.083 % nebulizer solution, Take 3 mL (2.5 mg total) by nebulization every 6 (six) hours as needed for wheezing or shortness of breath, Disp: 150 mL, Rfl: 0  •  albuterol (PROVENTIL HFA,VENTOLIN HFA) 90 mcg/act inhaler, Inhale 2 puffs every 6 (six) hours as needed for wheezing, Disp: 18 g, Rfl: 0  •  conjugated estrogens (Premarin) 0.45 mg tablet, Take 1 tablet (0.45 mg total) by mouth in the morning Take daily for 21 days.  Then do not take for 7 days, Disp: 21 tablet, Rfl: 0  •  cyclobenzaprine (FLEXERIL) 10 mg tablet, Take 1 tablet (10 mg total) by mouth daily as needed for muscle spasms, Disp: 30 tablet, Rfl: 0  •  fluticasone (FLONASE) 50 mcg/act nasal spray, SPRAY 2 SPRAYS INTO EACH NOSTRIL EVERY DAY, Disp: 16 mL, Rfl: 3  •  ibuprofen (MOTRIN) 600 mg tablet, Take 1 tablet (600 mg total) by mouth every 8 (eight) hours as needed for mild pain, Disp: 45 tablet, Rfl: 0  •  ipratropium-albuterol (Combivent Respimat) inhaler, Inhale 1 puff 4 (four) times a day, Disp: 18 g, Rfl: 0  •  methocarbamol (ROBAXIN) 500 mg tablet, Take 1 tablet (500 mg total) by mouth 3 (three) times a day as needed for muscle spasms, Disp: 20 tablet, Rfl: 0  •  montelukast (SINGULAIR) 10 mg tablet, Take 1 tablet (10 mg total) by mouth every evening, Disp: 90 tablet, Rfl: 0  •  ondansetron (ZOFRAN) 4 mg tablet, Take 1 tablet (4 mg total) by mouth every 8 (eight) hours as needed for nausea or vomiting, Disp: 20 tablet, Rfl: 0  •  Semaglutide-Weight Management (WEGOVY) 1.7 MG/0.75ML, Inject 0.75 mL (1.7 mg total) under the skin once a week, Disp: 3 mL, Rfl: 2  •  SUMAtriptan (IMITREX) 50 mg tablet, Take 2 tablets (100 mg total) by mouth once as needed for migraine, Disp: 9 tablet, Rfl: 0  •  zolpidem (AMBIEN) 10 mg tablet, Take 1 tablet (10 mg total) by mouth daily at bedtime as needed for sleep, Disp: 30 tablet, Rfl: 0  •  fluticasone-salmeterol (Juan Carlos Carne) 500-50 mcg/dose inhaler, Inhale 1 puff daily Rinse mouth after use. (Patient not taking: Reported on 4/26/2023), Disp: 60 blister, Rfl: 0  •  methocarbamol (ROBAXIN) 500 mg tablet, Take 1 tablet (500 mg total) by mouth 3 (three) times a day (Patient not taking: Reported on 8/30/2022), Disp: 60 tablet, Rfl: 0  •  pantoprazole (PROTONIX) 40 mg tablet, TAKE 1 TABLET BY MOUTH EVERY DAY BEFORE BREAKFAST (Patient not taking: Reported on 8/24/2023), Disp: 30 tablet, Rfl: 5  •  promethazine-codeine (PHENERGAN WITH CODEINE) 6.25-10 mg/5 mL syrup, Take 5 mL by mouth every 4 (four) hours as needed for cough (Patient not taking: Reported on 4/4/2023), Disp: 120 mL, Rfl: 0    ALLERGIES:  Allergies   Allergen Reactions   • Morphine Other (See Comments)     Blood pressure drops   • Morphine And Related Other (See Comments)     Blood pressure drops   • Penicillins Itching and Rash   • Quazepam Itching       ROS:  Review of Systems     Constitutional: Negative for fatigue, fever or loss of appetite. HENT: Negative. Respiratory: Negative for shortness of breath, dyspnea. Cardiovascular: Negative for chest pain/tightness. Gastrointestinal: Negative for abdominal pain, N/V. Endocrine: Negative for cold/heat intolerance, unexplained weight loss/gain. Genitourinary: Negative for flank pain, dysuria, hematuria. Musculoskeletal: Positive for arthralgia   Skin: Negative for rash. Neurological: Negative for numbness or tingling  Psychiatric/Behavioral: Negative for agitation. _____________________________________________________  PHYSICAL EXAMINATION:    Blood pressure 123/79, pulse 76, height 5' (1.524 m), weight 70.3 kg (155 lb). Constitutional: Oriented to person, place, and time. Appears well-developed and well-nourished. No distress. HENT:   Head: Normocephalic. Eyes: Conjunctivae are normal. Right eye exhibits no discharge. Left eye exhibits no discharge. No scleral icterus. Cardiovascular: Normal rate. Pulmonary/Chest: Effort normal.   Neurological: Alert and oriented to person, place, and time. Skin: Skin is warm and dry. No rash noted. Not diaphoretic. No erythema. No pallor. Psychiatric: Normal mood and affect. Behavior is normal. Judgment and thought content normal.      MUSCULOSKELETAL EXAMINATION:   Physical Exam  Ortho Exam    Left upper extremity is neurovascularly intact  Fingers are pink and mobile  Compartments are soft  Range of motion of the shoulder is from 0 to 45 degrees of forward flexion and abduction  Rotator cuff strength 3 and half out of 5  Positive drop arm  Brisk cap refill  Sensation intact  Objective:  BP Readings from Last 1 Encounters:   08/29/23 123/79      Wt Readings from Last 1 Encounters:   08/29/23 70.3 kg (155 lb)        BMI:   Estimated body mass index is 30.27 kg/m² as calculated from the following:    Height as of this encounter: 5' (1.524 m). Weight as of this encounter: 70.3 kg (155 lb).         Scribe Attestation    I,:  Rosalie Mariano PA-C am acting as a scribe while in the presence of the attending physician.:       I,:  Shane Rajan, DO personally performed the services described in this documentation    as scribed in my presence.:

## 2023-08-31 DIAGNOSIS — R11.0 NAUSEA: ICD-10-CM

## 2023-08-31 DIAGNOSIS — R52 PAIN: ICD-10-CM

## 2023-08-31 DIAGNOSIS — J45.21 INTERMITTENT ASTHMA WITH ACUTE EXACERBATION, UNSPECIFIED ASTHMA SEVERITY: ICD-10-CM

## 2023-08-31 RX ORDER — IPRATROPIUM BROMIDE AND ALBUTEROL 20; 100 UG/1; UG/1
1 SPRAY, METERED RESPIRATORY (INHALATION) 4 TIMES DAILY
Qty: 4 G | Refills: 0 | Status: SHIPPED | OUTPATIENT
Start: 2023-08-31

## 2023-08-31 RX ORDER — IBUPROFEN 600 MG/1
600 TABLET ORAL EVERY 8 HOURS PRN
Qty: 45 TABLET | Refills: 0 | Status: SHIPPED | OUTPATIENT
Start: 2023-08-31

## 2023-08-31 RX ORDER — ONDANSETRON 4 MG/1
TABLET, FILM COATED ORAL
Qty: 20 TABLET | Refills: 0 | Status: SHIPPED | OUTPATIENT
Start: 2023-08-31

## 2023-09-03 ENCOUNTER — HOSPITAL ENCOUNTER (OUTPATIENT)
Dept: MRI IMAGING | Facility: HOSPITAL | Age: 48
Discharge: HOME | End: 2023-09-03
Attending: PHYSICIAN ASSISTANT
Payer: OTHER MISCELLANEOUS

## 2023-09-03 DIAGNOSIS — S46.012A TRAUMATIC TEAR OF LEFT ROTATOR CUFF, UNSPECIFIED TEAR EXTENT, INITIAL ENCOUNTER: ICD-10-CM

## 2023-09-03 PROCEDURE — 73221 MRI JOINT UPR EXTREM W/O DYE: CPT

## 2023-09-03 PROCEDURE — G1004 CDSM NDSC: HCPCS

## 2023-09-06 DIAGNOSIS — J44.1 CHRONIC OBSTRUCTIVE PULMONARY DISEASE WITH ACUTE EXACERBATION (HCC): ICD-10-CM

## 2023-09-06 RX ORDER — ALBUTEROL SULFATE 90 UG/1
AEROSOL, METERED RESPIRATORY (INHALATION)
Qty: 18 G | Refills: 0 | Status: SHIPPED | OUTPATIENT
Start: 2023-09-06

## 2023-09-11 ENCOUNTER — OFFICE VISIT (OUTPATIENT)
Dept: OBGYN CLINIC | Facility: CLINIC | Age: 48
End: 2023-09-11
Payer: OTHER MISCELLANEOUS

## 2023-09-11 VITALS — BODY MASS INDEX: 30.43 KG/M2 | HEIGHT: 60 IN | WEIGHT: 155 LBS

## 2023-09-11 DIAGNOSIS — R51.9 NONINTRACTABLE HEADACHE, UNSPECIFIED CHRONICITY PATTERN, UNSPECIFIED HEADACHE TYPE: ICD-10-CM

## 2023-09-11 DIAGNOSIS — S46.012A TRAUMATIC TEAR OF LEFT ROTATOR CUFF, UNSPECIFIED TEAR EXTENT, INITIAL ENCOUNTER: Primary | ICD-10-CM

## 2023-09-11 DIAGNOSIS — M75.122 COMPLETE TEAR OF LEFT ROTATOR CUFF, UNSPECIFIED WHETHER TRAUMATIC: Primary | ICD-10-CM

## 2023-09-11 PROCEDURE — 99213 OFFICE O/P EST LOW 20 MIN: CPT | Performed by: ORTHOPAEDIC SURGERY

## 2023-09-11 RX ORDER — SUMATRIPTAN 50 MG/1
100 TABLET, FILM COATED ORAL ONCE AS NEEDED
Qty: 9 TABLET | Refills: 0 | Status: SHIPPED | OUTPATIENT
Start: 2023-09-11

## 2023-09-11 NOTE — PROGRESS NOTES
Assessment/Plan:    No problem-specific Assessment & Plan notes found for this encounter. Diagnoses and all orders for this visit:    Complete tear of left rotator cuff, unspecified whether traumatic          The patient has a full-thickness rotator cuff tear of her left shoulder. Treatment options were discussed. She is opted for elective arthroscopy of her left shoulder with possible rotator cuff repair. All risks, complications, benefits were discussed with the patient great detail including bleeding, infection, blood clots, pain, stiffness, neurovascular damage, fractures, dislocations, the possibility loss of life and surgery, heart attack, stroke, rerupture of tendon, inability to repair the tendon, etc.  Her surgery is scheduled for November 6, 2023    Subjective:      Patient ID: Norah Khanna is a 51 y.o. female. HPI     The patient presents for MRI report of her left shoulder. It was significant for a tear of the rotator cuff with some retraction present. She states her motion is slightly improved but still has marked weakness along her shoulder. The pain does affect her lifestyle. She also has nocturnal issues    The following portions of the patient's history were reviewed and updated as appropriate: allergies, current medications, past family history, past medical history, past social history, past surgical history and problem list.    Review of Systems   Constitutional: Negative for chills, fever and unexpected weight change. HENT: Negative for hearing loss, nosebleeds and sore throat. Eyes: Negative for pain, redness and visual disturbance. Respiratory: Negative for cough, shortness of breath and wheezing. Cardiovascular: Negative for chest pain, palpitations and leg swelling. Gastrointestinal: Negative for abdominal pain, nausea and vomiting. Endocrine: Negative for polydipsia and polyuria. Genitourinary: Negative for dysuria and hematuria.    Musculoskeletal: Positive for arthralgias and myalgias. Negative for back pain, gait problem, joint swelling, neck pain and neck stiffness. As noted in HPI   Skin: Negative for rash and wound. Neurological: Negative for dizziness, numbness and headaches. Psychiatric/Behavioral: Negative for decreased concentration and suicidal ideas. The patient is not nervous/anxious. Objective:      Ht 5' (1.524 m)   Wt 70.3 kg (155 lb)   BMI 30.27 kg/m²          Physical Exam      Left upper extremity was neuro vas intact. Fingers are pink and mobile. Compartments are soft. Active flexion abducted to approximately 45 degrees. There is a positive drop arm test.  Mild signs of impingement. No gross instability.   Marked weakness along the left shoulder    MRI was consistent with a tear of the rotator cuff with retraction

## 2023-09-12 ENCOUNTER — PREP FOR PROCEDURE (OUTPATIENT)
Dept: OBGYN CLINIC | Facility: CLINIC | Age: 48
End: 2023-09-12

## 2023-09-12 DIAGNOSIS — M75.122 COMPLETE TEAR OF LEFT ROTATOR CUFF, UNSPECIFIED WHETHER TRAUMATIC: Primary | ICD-10-CM

## 2023-09-13 ENCOUNTER — TELEPHONE (OUTPATIENT)
Age: 48
End: 2023-09-13

## 2023-09-13 NOTE — TELEPHONE ENCOUNTER
Caller: Abilio Gonzalez from [facility]    Doctor/Office: Dr. Abiola Gipson    #:       What needs to be faxed: Meenu Cruz for 9/11/23 visit.     ATTN Jazmine Gonzalez    Fax#: [phone]      Documents were successfully e-faxed

## 2023-09-20 DIAGNOSIS — E28.39 ESTROGEN DEFICIENCY: ICD-10-CM

## 2023-09-20 DIAGNOSIS — G47.00 INSOMNIA, UNSPECIFIED TYPE: ICD-10-CM

## 2023-09-20 RX ORDER — ZOLPIDEM TARTRATE 10 MG/1
10 TABLET ORAL
Qty: 30 TABLET | Refills: 0 | Status: SHIPPED | OUTPATIENT
Start: 2023-09-20

## 2023-09-20 RX ORDER — ESTROGENS, CONJUGATED 0.45 MG/1
TABLET, FILM COATED ORAL
Qty: 21 TABLET | Refills: 0 | Status: SHIPPED | OUTPATIENT
Start: 2023-09-20

## 2023-09-20 NOTE — TELEPHONE ENCOUNTER
Pharmacy requesting refill on Premarin and Ambien   Last seen 8/9/23     1 JACKADAN FLANAGANRICKY 1975 F [id number] [place]   2 JACK FLANAGANRICKY 1975 F [id number] [place]   3 JACKADAN CUBA 1975 F [address] [place] [facility] [facility] [place]      Search Criteria  NAME DATE OF BIRTH DATE Miranda Perkins 29- 09- To 09-  REQUESTER NAME REQUESTED DATE  Dahiana Negron Wed Sep 20 2023 15:07:05 GMT-0400 Sofía Stairs Daylight Time)  Summary  Prescriptions  11  Prescribers  2  Pharmacies  2  Drug Classes  Benzodiazepines  0  Stimulants  0  Opioids  0  Muscle Relaxants  0  Opioid Dosage  Total MME for Active Prescriptions  0    Average MME  0.00         Prescriptions  Notifications    Prescribers  Pharmacies  MME Graph    Show All     [place]   Drug Categories:      Others     Show  10  entries  Search:  PATIENT ID PRESCRIPTION # FILLED WRITTEN DRUG LABEL QTY DAYS STRENGTH MME** PRESCRIBER PHARMACY PAYMENT REFILL #/AUTH STATE DETAIL  1 [id number] 08/17/2023 08/11/2023 Zolpidem Tartrate (Tablet) 30.0 30 10 MG NA MARTA(MD) [facility] [place] 0 / 0 [place]   2 [id number] 07/20/2023 07/20/2023 Zolpidem Tartrate (Tablet) 30.0 30 10 MG NA MARTA(MD) [facility] [place] 0 / 0 [place]   3 [id number] 06/13/2023 06/12/2023 Zolpidem Tartrate (Tablet) 30.0 30 10 MG NA WASSIM(MD) [facility] [employer] 0 / 0 [place]   3 [id number] 05/13/2023 05/12/2023 Zolpidem Tartrate (Tablet) 30.0 30 10 MG NA WASSIM(MD) [facility] [employer] 0 / 0 [place]   1 [id number] 04/18/2023 04/18/2023 Zolpidem Tartrate (Tablet) 30.0 30 10 MG NA ÁNGELAM(MD) [facility] Medicaid 0 / 0 [place]   1 [id number] 03/10/2023 03/10/2023 Zolpidem Tartrate (Tablet) 30.0 30 10 MG NA IKE) [facility]  Medicaid 0 / 0 [place]   1 [id number] 02/07/2023 02/07/2023 Zolpidem Tartrate (Tablet) 30.0 30 10 MG NA MARTA(MD) [facility] [place] 0 / 0 [place]   1 [id number] 01/07/2023 01/07/2023 Zolpidem Tartrate (Tablet) 30.0 30 10 MG NA MARTA(MD) [facility] [place] 0 / 0 [place]   1 [id number] 12/07/2022 12/07/2022 Zolpidem Tartrate (Tablet) 30.0 30 10 MG NA MARTA(MD) [facility]  Medicaid 0 / 0 [place]   1 [id number] 11/04/2022 11/04/2022 Zolpidem Tartrate (Tablet) 30.0 30 10 MG NA IKE) [facility]

## 2023-09-25 DIAGNOSIS — J45.21 INTERMITTENT ASTHMA WITH ACUTE EXACERBATION, UNSPECIFIED ASTHMA SEVERITY: ICD-10-CM

## 2023-09-26 RX ORDER — IPRATROPIUM BROMIDE AND ALBUTEROL 20; 100 UG/1; UG/1
1 SPRAY, METERED RESPIRATORY (INHALATION) 4 TIMES DAILY
Qty: 4 G | Refills: 2 | Status: SHIPPED | OUTPATIENT
Start: 2023-09-26

## 2023-10-13 DIAGNOSIS — J45.21 INTERMITTENT ASTHMA WITH ACUTE EXACERBATION, UNSPECIFIED ASTHMA SEVERITY: ICD-10-CM

## 2023-10-13 DIAGNOSIS — J44.1 CHRONIC OBSTRUCTIVE PULMONARY DISEASE WITH ACUTE EXACERBATION (HCC): ICD-10-CM

## 2023-10-13 RX ORDER — IPRATROPIUM BROMIDE AND ALBUTEROL 20; 100 UG/1; UG/1
1 SPRAY, METERED RESPIRATORY (INHALATION) 4 TIMES DAILY
Qty: 4 G | Refills: 2 | Status: SHIPPED | OUTPATIENT
Start: 2023-10-13

## 2023-10-13 RX ORDER — ALBUTEROL SULFATE 90 UG/1
AEROSOL, METERED RESPIRATORY (INHALATION)
Qty: 18 G | Refills: 0 | Status: SHIPPED | OUTPATIENT
Start: 2023-10-13

## 2023-10-18 DIAGNOSIS — R51.9 NONINTRACTABLE HEADACHE, UNSPECIFIED CHRONICITY PATTERN, UNSPECIFIED HEADACHE TYPE: ICD-10-CM

## 2023-10-18 RX ORDER — SUMATRIPTAN 50 MG/1
100 TABLET, FILM COATED ORAL ONCE AS NEEDED
Qty: 9 TABLET | Refills: 0 | Status: SHIPPED | OUTPATIENT
Start: 2023-10-18

## 2023-10-23 DIAGNOSIS — J45.21 INTERMITTENT ASTHMA WITH ACUTE EXACERBATION, UNSPECIFIED ASTHMA SEVERITY: ICD-10-CM

## 2023-10-23 DIAGNOSIS — G47.00 INSOMNIA, UNSPECIFIED TYPE: ICD-10-CM

## 2023-10-23 DIAGNOSIS — R52 PAIN: ICD-10-CM

## 2023-10-23 RX ORDER — IPRATROPIUM BROMIDE AND ALBUTEROL 20; 100 UG/1; UG/1
1 SPRAY, METERED RESPIRATORY (INHALATION) 4 TIMES DAILY
Refills: 2 | OUTPATIENT
Start: 2023-10-23

## 2023-10-23 RX ORDER — IBUPROFEN 600 MG/1
600 TABLET ORAL EVERY 8 HOURS PRN
Qty: 45 TABLET | Refills: 0 | Status: SHIPPED | OUTPATIENT
Start: 2023-10-23

## 2023-10-23 RX ORDER — ZOLPIDEM TARTRATE 10 MG/1
10 TABLET ORAL
Qty: 30 TABLET | Refills: 0 | Status: SHIPPED | OUTPATIENT
Start: 2023-10-23

## 2023-10-23 NOTE — TELEPHONE ENCOUNTER
Pt last seen 23. Patients  SELECT PATIENT ID NAME  [address]   1 JACK DOAN 20- F [address]   2 JACK DOAN 55- F [address] [facility]   3 JACK DOAN 66- F [address] [facility] [place]      Search Criteria  NAME DATE OF BIRTH DATE RANGE  jack doan 61- 10- To 10-  REQUESTER NAME REQUESTED DATE  Kyle [facility] Oct 23 2023 14:22:25 GMT-0400 Elex Landau Daylight Time)  Summary  Prescriptions  8  Prescribers  1  Pharmacies  1  Drug Classes  Benzodiazepines  0  Stimulants  0  Opioids  0  Muscle Relaxants  0  Opioid Dosage  Total MME for Active Prescriptions  0    Average MME  0.00         Prescriptions  Notifications    Prescribers  Pharmacies  MME Graph    Show All     [place]   Drug Categories:      Others     Show  10  entries  Search:  PATIENT ID PRESCRIPTION # FILLED WRITTEN DRUG LABEL QTY DAYS STRENGTH MME** PRESCRIBER PHARMACY PAYMENT REFILL #/AUTH STATE DETAIL  1 [id number] 2023 Zolpidem Tartrate (Tablet) 30.0 30 10 MG NA IKE) [facility] Private Pay 0 / 0 [place]   1 [id number] 2023 Zolpidem Tartrate (Tablet) 30.0 30 10 MG NA IKE) [facility] [place] 0 / 0 [place]   1 [id number] 2023 Zolpidem Tartrate (Tablet) 30.0 30 10 MG NA IKE) [facility] [place] 0 / 0 [place]   1 [id number] 03/10/2023 03/10/2023 Zolpidem Tartrate (Tablet) 30.0 30 10 MG NA IKE) [facility] [place] 0 / 0 [place]   1 [id number] 2023 Zolpidem Tartrate (Tablet) 30.0 30 10 MG NA IKE) [facility] [place] 0 / 0 [place]   1 [id number] 2023 Zolpidem Tartrate (Tablet) 30.0 30 10 MG LAMIN RAMIRES) [facility]  Medicaid 0 / 0 [place]   1 [id number] 12/07/2022 12/07/2022 Zolpidem Tartrate (Tablet) 30.0 30 10 MG NA MARTA(MD) [facility] Medicaid 0 / 0 [place]   1 [id number] 11/04/2022 11/04/2022 Zolpidem Tartrate (Tablet) 30.0 30 10 MG NA IKE) [facility]  Medicaid 0 / 0 [place]

## 2023-10-24 ENCOUNTER — ANESTHESIA EVENT (OUTPATIENT)
Dept: PERIOP | Facility: HOSPITAL | Age: 48
End: 2023-10-24
Payer: OTHER MISCELLANEOUS

## 2023-10-26 NOTE — PRE-PROCEDURE INSTRUCTIONS
Pre-Surgery Instructions:   Medication Instructions    albuterol (2.5 mg/3 mL) 0.083 % nebulizer solution Uses PRN- OK to take day of surgery    albuterol (PROVENTIL HFA,VENTOLIN HFA) 90 mcg/act inhaler Uses PRN- OK to take day of surgery    cyclobenzaprine (FLEXERIL) 10 mg tablet Uses PRN- OK to take day of surgery    fluticasone (FLONASE) 50 mcg/act nasal spray Uses PRN- OK to take day of surgery    fluticasone-salmeterol (Wixela Inhub) 500-50 mcg/dose inhaler Uses PRN- OK to take day of surgery    ibuprofen (MOTRIN) 600 mg tablet Stop taking 3 days prior to surgery. ipratropium-albuterol (Combivent Respimat) inhaler Take night before surgery    montelukast (SINGULAIR) 10 mg tablet Take night before surgery    ondansetron (ZOFRAN) 4 mg tablet Uses PRN- OK to take day of surgery    Premarin 0.45 MG tablet Take night before surgery    promethazine-codeine (PHENERGAN WITH CODEINE) 6.25-10 mg/5 mL syrup Uses PRN- DO NOT take day of surgery    SUMAtriptan (IMITREX) 50 mg tablet Uses PRN- OK to take day of surgery    zolpidem (AMBIEN) 10 mg tablet Take night before surgery    Medication instructions for day surgery reviewed. Please use only a sip of water to take your instructed medications. Avoid all over the counter vitamins, supplements and NSAIDS for one week prior to surgery per anesthesia guidelines. Tylenol is ok to take as needed. You will receive a call one business day prior to surgery with an arrival time and hospital directions. If your surgery is scheduled on a Monday, the hospital will be calling you on the Friday prior to your surgery. If you have not heard from anyone by 8pm, please call the hospital supervisor through the hospital  at [phone]. Ashley Thompson [phone]). Do not eat or drink anything after midnight the night before your surgery, including candy, mints, lifesavers, or chewing gum. Do not drink alcohol 24hrs before your surgery.  Try not to smoke at least 24hrs before your surgery. Follow the pre surgery showering instructions as listed in the [facility] Surgical Experience Booklet” or otherwise provided by your surgeon's office. Do not use a blade to shave the surgical area 1 week before surgery. It is okay to use a clean electric clippers up to 24 hours before surgery. Do not apply any lotions, creams, including makeup, cologne, deodorant, or perfumes after showering on the day of your surgery. Do not use dry shampoo, hair spray, hair gel, or any type of hair products. No contact lenses, eye make-up, or artificial eyelashes. Remove nail polish, including gel polish, and any artificial, gel, or acrylic nails if possible. Remove all jewelry including rings and body piercing jewelry. Wear causal clothing that is easy to take on and off. Consider your type of surgery. Keep any valuables, jewelry, piercings at home. Please bring any specially ordered equipment (sling, braces) if indicated. Arrange for a responsible person to drive you to and from the hospital on the day of your surgery. Visitor Guidelines discussed. Call the surgeon's office with any new illnesses, exposures, or additional questions prior to surgery. Please reference your [facility] Surgical Experience Booklet” for additional information to prepare for your upcoming surgery.      Instructed to bring immobilizer DOS

## 2023-11-01 DIAGNOSIS — J44.1 CHRONIC OBSTRUCTIVE PULMONARY DISEASE WITH ACUTE EXACERBATION (HCC): ICD-10-CM

## 2023-11-01 RX ORDER — ALBUTEROL SULFATE 90 UG/1
2 AEROSOL, METERED RESPIRATORY (INHALATION) EVERY 6 HOURS PRN
Qty: 18 G | Refills: 0 | Status: SHIPPED | OUTPATIENT
Start: 2023-11-01

## 2023-11-03 PROCEDURE — NC001 PR NO CHARGE: Performed by: ORTHOPAEDIC SURGERY

## 2023-11-03 NOTE — H&P
H&P Exam - Orthopedics   Virginia Garner 51 y.o. female MRN: [id number]  Unit/Bed#:  Encounter: [id number]    Assessment/Plan     Assessment:  Rotator cuff tear of left shoulder  Plan:  Elective left shoulder arthroscopy with possible rotator cuff repair    History of Present Illness   HPI:  Demetris Perkins is a 51 y.o. female who presented to our office complaining of left shoulder pain with decreased range of motion and strength. The patient stated that her symptoms were continuing to worsen and starting to affect her quality of life. An MRI was performed which consistent with a rotator cuff tear of her left shoulder. After exhausting conservative treatment, the risks and benefits of surgery were discussed with the patient. She decided on an elective left shoulder arthroscopy with possible rotator cuff repair. Review of Systems   Constitutional:  Negative for chills, fever and unexpected weight change. HENT:  Negative for nosebleeds and sore throat. Eyes:  Negative for pain, redness and visual disturbance. Respiratory:  Negative for cough, shortness of breath and wheezing. Cardiovascular:  Negative for chest pain, palpitations and leg swelling. Gastrointestinal:  Negative for abdominal pain, nausea and vomiting. Endocrine: Negative for polydipsia and polyuria. Genitourinary:  Negative for dysuria and hematuria. Musculoskeletal:  Positive for arthralgias and myalgias. As noted in HPI   Skin:  Negative for rash and wound. Neurological:  Negative for dizziness, numbness and headaches. Psychiatric/Behavioral:  Negative for decreased concentration and suicidal ideas. The patient is not nervous/anxious.         Historical Information   Past Medical History:   Diagnosis Date    Anxiety     h/o 2 panic attacks     Arthritis     l hip congenital dyspasia    Asthma     good control    Bleb     R eye    Chronic kidney disease     one kidney left side    Chronic pain     back- DJD in 2 upper 2 lower, buldging disc c-7    Chronic pain disorder     lower back and hips    Depression     Endometriosis     GERD (gastroesophageal reflux disease)     Glaucoma     b/l .  Bleb in R eye    Glaucoma     H/O carpal tunnel syndrome     L    Headache     migraines    History of cigarette smoking     History of transfusion     Hypercholesterolemia 1/17/2013    Insomnia     Migraines     Osteoarthritis     cervical spondylarthritis    Polypoid cystitis     POLYP CYSTS ON BUTTOCK AREA    Renal disorder     Reports single kidney    Right knee injury     SCRAPED GROWTH PLATE RIGHT KNEE    Seasonal allergies      Past Surgical History:   Procedure Laterality Date    BLEPHAROPLASTY Right     BLEB RIGHT EYE    BUNIONECTOMY Bilateral     2 on R, 3 on left    CARPAL TUNNEL RELEASE Left     CT NEEDLE BX ASPIRATION INJECTION LOCALIZATION  3/8/2019    EYE SURGERY Right     bleb implant    EYE SURGERY Bilateral     lazer- glaucoma    FOOT HARDWARE REMOVAL Left 7/2/2018    Procedure: CALCANEAL REMOVAL OF HARDWARE;  Surgeon: Cuate Sotelo DPM;  Location: [address] [place] [address] OR;  Service: Podiatry    FOOT HARDWARE REMOVAL Left 7/5/2018    Procedure: LEFT CALCANEAL REMOVAL OF HARDWARE;  Surgeon: Cuate Sotelo DPM;  Location: [address] [place] [address] OR;  Service: Podiatry    FOOT SURGERY Right     BONE REMOVED BOTTOM OF RIGHT FOOT    HAND SURGERY Right     ring finger tendon severed    HARDWARE REMOVAL Left 7/5/2018    Procedure: LEFT BIG TOE REMOVAL OF HARDWARE;  Surgeon: Cuate Sotelo DPM;  Location: [address] [place] [address] OR;  Service: Podiatry    HIP SURGERY Left     reconstruction as child    HYSTERECTOMY      total abdominal    HYSTERECTOMY      TOTAL    JOINT REPLACEMENT      LTHR and reconstruction    KNEE ARTHROSCOPY      R knee    LEG SURGERY      LISFRANC ARTHRODESIS Left 11/3/2017    Procedure: FUSION FIRST MTPJ: Radha Carmichael; LATERAL ANKLE STABILIZATION;  Surgeon: Cuate Sotelo DPM;  [facility];  Service: Podiatry    OOPHORECTOMY      age 35    PILONIDAL CYST EXCISION      midline    FL DCMPRN FASCT LEG ANT&/LAT&PST W/DBRDMT MUS Left 7/5/2018    Procedure: LEFT PERONEAL TENDON DEBRIDMENT ;  Surgeon: Lucho Saab DPM;  Location: [address] [place] [address] OR;  Service: Podiatry    FL OSTEOTOMY CALCANEUS W/WO INTERNAL FIXATION Left 11/3/2017    Procedure: OSTEOTOMY CALCANEUS;  Surgeon: Lucho Saab DPM;  Location: [facility];  Service: Podiatry    FL RPR TENDON FLXR FOOT [address] W/FREE GRAFT EA TENDON Left 7/2/2018    Procedure: PERONEAL TENDON EXPLORATION;  Surgeon: Lucho Saab DPM;  Location: [address] [place] [address] OR;  Service: Podiatry    REFRACTIVE SURGERY Bilateral     LASER SURGERY BOTH EYES    TOTAL HIP ARTHROPLASTY  2005    WRIST SURGERY Left      Social History   Social History     Substance and Sexual Activity   Alcohol Use No     Social History     Substance and Sexual Activity   Drug Use Never     Social History     Tobacco Use   Smoking Status Every Day    Packs/day: 0.25    Years: 30.00    Total pack years: 7.50    Types: Cigarettes   Smokeless Tobacco Never     Family History: non-contributory    Meds/Allergies   all medications and allergies reviewed  Allergies   Allergen Reactions    Morphine Other (See Comments)     Blood pressure drops    Morphine And Related Other (See Comments)     Blood pressure drops    Penicillins Itching and Rash    Quazepam Itching       Objective   Vitals: Height 5' (1.524 m), weight 70.3 kg (155 lb). ,Body mass index is 30.27 kg/m². No intake or output data in the 24 hours ending 11/03/23 1138    No intake/output data recorded.     Invasive Devices       None                   Physical Exam  Ortho Exam  Left upper extremity is neurovascularly intact  Fingers are pink and mobile  Compartments are soft  Range of motion of the shoulder from 0 to 45 degrees of forward flexion and abduction  Positive drop arm  Brisk cap refill  Sensation intact  Rotator cuff strength 3 out of 5  Cardiovascular: Normal rate    Pulmonary: Effort normal  Abdomen: Soft, nontender, nondistended   Lab Results: I have personally reviewed pertinent lab results. CBC: No results found for: "WBC", "HGB", "HCT", "MCV", "PLT", "ADJUSTEDWBC", "RBC", "MCH", "MCHC", "RDW", "MPV", "NRBC"  CMP: No results found for: "SODIUM", "CL", "CO2", "ANIONGAP", "BUN", "CREATININE", "GLUCOSE", "CALCIUM", "AST", "ALT", "ALKPHOS", "PROT", "BILITOT", "EGFR"  Imaging: I have personally reviewed pertinent films in PACS  EKG, Pathology, and Other Studies: I have personally reviewed pertinent films in PACS    Code Status: No Order  Advance Directive and Living Will:      Power of :    POLST:      Counseling / Coordination of Care  Total floor / unit time spent today 30 minutes. Greater than 50% of total time was spent with the patient and / or family counseling and / or coordination of care.

## 2023-11-06 ENCOUNTER — ANESTHESIA (OUTPATIENT)
Dept: PERIOP | Facility: HOSPITAL | Age: 48
End: 2023-11-06
Payer: OTHER MISCELLANEOUS

## 2023-11-06 ENCOUNTER — HOSPITAL ENCOUNTER (OUTPATIENT)
Facility: HOSPITAL | Age: 48
Setting detail: OUTPATIENT SURGERY
Discharge: HOME | End: 2023-11-06
Attending: ORTHOPAEDIC SURGERY | Admitting: ORTHOPAEDIC SURGERY
Payer: OTHER MISCELLANEOUS

## 2023-11-06 ENCOUNTER — TELEPHONE (OUTPATIENT)
Age: 48
End: 2023-11-06

## 2023-11-06 VITALS
BODY MASS INDEX: 30.43 KG/M2 | RESPIRATION RATE: 18 BRPM | HEART RATE: 68 BPM | WEIGHT: 155 LBS | SYSTOLIC BLOOD PRESSURE: 147 MMHG | HEIGHT: 60 IN | TEMPERATURE: 97.4 F | DIASTOLIC BLOOD PRESSURE: 65 MMHG | OXYGEN SATURATION: 96 %

## 2023-11-06 DIAGNOSIS — M75.122 COMPLETE TEAR OF LEFT ROTATOR CUFF, UNSPECIFIED WHETHER TRAUMATIC: ICD-10-CM

## 2023-11-06 PROCEDURE — C1713 ANCHOR/SCREW BN/BN,TIS/BN: HCPCS | Performed by: ORTHOPAEDIC SURGERY

## 2023-11-06 PROCEDURE — 29827 SHO ARTHRS SRG RT8TR CUF RPR: CPT | Performed by: PHYSICIAN ASSISTANT

## 2023-11-06 PROCEDURE — 29826 SHO ARTHRS SRG DECOMPRESSION: CPT | Performed by: PHYSICIAN ASSISTANT

## 2023-11-06 PROCEDURE — C9290 INJ, BUPIVACAINE LIPOSOME: HCPCS | Performed by: ANESTHESIOLOGY

## 2023-11-06 PROCEDURE — 29827 SHO ARTHRS SRG RT8TR CUF RPR: CPT | Performed by: ORTHOPAEDIC SURGERY

## 2023-11-06 PROCEDURE — 88304 TISSUE EXAM BY PATHOLOGIST: CPT | Performed by: PATHOLOGY

## 2023-11-06 PROCEDURE — 29826 SHO ARTHRS SRG DECOMPRESSION: CPT | Performed by: ORTHOPAEDIC SURGERY

## 2023-11-06 DEVICE — 4.75MM BC KNOTLESS SWIVELOCK
Type: IMPLANTABLE DEVICE | Site: SHOULDER | Status: FUNCTIONAL
Brand: ARTHREX®

## 2023-11-06 RX ORDER — EPHEDRINE SULFATE 50 MG/ML
INJECTION INTRAVENOUS AS NEEDED
Status: DISCONTINUED | OUTPATIENT
Start: 2023-11-06 | End: 2023-11-06

## 2023-11-06 RX ORDER — CEFAZOLIN SODIUM 1 G/3ML
INJECTION, POWDER, FOR SOLUTION INTRAMUSCULAR; INTRAVENOUS AS NEEDED
Status: DISCONTINUED | OUTPATIENT
Start: 2023-11-06 | End: 2023-11-06

## 2023-11-06 RX ORDER — PROPOFOL 10 MG/ML
INJECTION, EMULSION INTRAVENOUS AS NEEDED
Status: DISCONTINUED | OUTPATIENT
Start: 2023-11-06 | End: 2023-11-06

## 2023-11-06 RX ORDER — DEXAMETHASONE SODIUM PHOSPHATE 10 MG/ML
INJECTION, SOLUTION INTRAMUSCULAR; INTRAVENOUS AS NEEDED
Status: DISCONTINUED | OUTPATIENT
Start: 2023-11-06 | End: 2023-11-06

## 2023-11-06 RX ORDER — MAGNESIUM HYDROXIDE 1200 MG/15ML
LIQUID ORAL AS NEEDED
Status: DISCONTINUED | OUTPATIENT
Start: 2023-11-06 | End: 2023-11-06 | Stop reason: HOSPADM

## 2023-11-06 RX ORDER — FENTANYL CITRATE/PF 50 MCG/ML
25 SYRINGE (ML) INJECTION
Status: DISCONTINUED | OUTPATIENT
Start: 2023-11-06 | End: 2023-11-06 | Stop reason: HOSPADM

## 2023-11-06 RX ORDER — MIDAZOLAM HYDROCHLORIDE 2 MG/2ML
INJECTION, SOLUTION INTRAMUSCULAR; INTRAVENOUS AS NEEDED
Status: DISCONTINUED | OUTPATIENT
Start: 2023-11-06 | End: 2023-11-06

## 2023-11-06 RX ORDER — FENTANYL CITRATE 50 UG/ML
INJECTION, SOLUTION INTRAMUSCULAR; INTRAVENOUS AS NEEDED
Status: DISCONTINUED | OUTPATIENT
Start: 2023-11-06 | End: 2023-11-06

## 2023-11-06 RX ORDER — ONDANSETRON 2 MG/ML
4 INJECTION INTRAMUSCULAR; INTRAVENOUS EVERY 6 HOURS PRN
Status: CANCELLED | OUTPATIENT
Start: 2023-11-06

## 2023-11-06 RX ORDER — OXYCODONE HYDROCHLORIDE AND ACETAMINOPHEN 5; 325 MG/1; MG/1
1 TABLET ORAL EVERY 4 HOURS PRN
Qty: 21 TABLET | Refills: 0 | Status: SHIPPED | OUTPATIENT
Start: 2023-11-06 | End: 2023-11-13 | Stop reason: SDUPTHER

## 2023-11-06 RX ORDER — GLYCOPYRROLATE 0.2 MG/ML
INJECTION INTRAMUSCULAR; INTRAVENOUS AS NEEDED
Status: DISCONTINUED | OUTPATIENT
Start: 2023-11-06 | End: 2023-11-06

## 2023-11-06 RX ORDER — LIDOCAINE 50 MG/G
1 PATCH TOPICAL DAILY
Status: DISCONTINUED | OUTPATIENT
Start: 2023-11-06 | End: 2023-11-06 | Stop reason: HOSPADM

## 2023-11-06 RX ORDER — ONDANSETRON 2 MG/ML
4 INJECTION INTRAMUSCULAR; INTRAVENOUS ONCE AS NEEDED
Status: DISCONTINUED | OUTPATIENT
Start: 2023-11-06 | End: 2023-11-06 | Stop reason: HOSPADM

## 2023-11-06 RX ORDER — LIDOCAINE HYDROCHLORIDE 10 MG/ML
INJECTION, SOLUTION EPIDURAL; INFILTRATION; INTRACAUDAL; PERINEURAL AS NEEDED
Status: DISCONTINUED | OUTPATIENT
Start: 2023-11-06 | End: 2023-11-06

## 2023-11-06 RX ORDER — BUPIVACAINE HYDROCHLORIDE 5 MG/ML
INJECTION, SOLUTION EPIDURAL; INTRACAUDAL AS NEEDED
Status: DISCONTINUED | OUTPATIENT
Start: 2023-11-06 | End: 2023-11-06

## 2023-11-06 RX ORDER — ROCURONIUM BROMIDE 10 MG/ML
INJECTION, SOLUTION INTRAVENOUS AS NEEDED
Status: DISCONTINUED | OUTPATIENT
Start: 2023-11-06 | End: 2023-11-06

## 2023-11-06 RX ORDER — SODIUM CHLORIDE, SODIUM LACTATE, POTASSIUM CHLORIDE, CALCIUM CHLORIDE 600; 310; 30; 20 MG/100ML; MG/100ML; MG/100ML; MG/100ML
50 INJECTION, SOLUTION INTRAVENOUS CONTINUOUS
Status: DISCONTINUED | OUTPATIENT
Start: 2023-11-06 | End: 2023-11-06 | Stop reason: HOSPADM

## 2023-11-06 RX ORDER — POVIDONE-IODINE 10 MG/G
OINTMENT TOPICAL AS NEEDED
Status: DISCONTINUED | OUTPATIENT
Start: 2023-11-06 | End: 2023-11-06 | Stop reason: HOSPADM

## 2023-11-06 RX ORDER — OXYCODONE HYDROCHLORIDE AND ACETAMINOPHEN 5; 325 MG/1; MG/1
1 TABLET ORAL EVERY 4 HOURS PRN
Status: DISCONTINUED | OUTPATIENT
Start: 2023-11-06 | End: 2023-11-06 | Stop reason: HOSPADM

## 2023-11-06 RX ORDER — CHLORHEXIDINE GLUCONATE ORAL RINSE 1.2 MG/ML
15 SOLUTION DENTAL EVERY 12 HOURS SCHEDULED
Status: DISCONTINUED | OUTPATIENT
Start: 2023-11-06 | End: 2023-11-06 | Stop reason: HOSPADM

## 2023-11-06 RX ORDER — BUPIVACAINE HYDROCHLORIDE AND EPINEPHRINE 5; 5 MG/ML; UG/ML
INJECTION, SOLUTION PERINEURAL AS NEEDED
Status: DISCONTINUED | OUTPATIENT
Start: 2023-11-06 | End: 2023-11-06 | Stop reason: HOSPADM

## 2023-11-06 RX ORDER — MELOXICAM 15 MG/1
15 TABLET ORAL DAILY
Qty: 30 TABLET | Refills: 0 | Status: SHIPPED | OUTPATIENT
Start: 2023-11-06

## 2023-11-06 RX ORDER — ONDANSETRON 2 MG/ML
INJECTION INTRAMUSCULAR; INTRAVENOUS AS NEEDED
Status: DISCONTINUED | OUTPATIENT
Start: 2023-11-06 | End: 2023-11-06

## 2023-11-06 RX ORDER — LIDOCAINE HYDROCHLORIDE 10 MG/ML
0.5 INJECTION, SOLUTION EPIDURAL; INFILTRATION; INTRACAUDAL; PERINEURAL ONCE AS NEEDED
Status: DISCONTINUED | OUTPATIENT
Start: 2023-11-06 | End: 2023-11-06 | Stop reason: HOSPADM

## 2023-11-06 RX ADMIN — EPHEDRINE SULFATE 10 MG: 50 INJECTION, SOLUTION INTRAVENOUS at 10:24

## 2023-11-06 RX ADMIN — FENTANYL CITRATE 25 MCG: 50 INJECTION INTRAMUSCULAR; INTRAVENOUS at 12:27

## 2023-11-06 RX ADMIN — LIDOCAINE HYDROCHLORIDE 100 MG: 10 INJECTION, SOLUTION EPIDURAL; INFILTRATION; INTRACAUDAL; PERINEURAL at 09:53

## 2023-11-06 RX ADMIN — BUPIVACAINE 20 ML: 13.3 INJECTION, SUSPENSION, LIPOSOMAL INFILTRATION at 09:36

## 2023-11-06 RX ADMIN — MIDAZOLAM HYDROCHLORIDE 2 MG: 1 INJECTION, SOLUTION INTRAMUSCULAR; INTRAVENOUS at 09:30

## 2023-11-06 RX ADMIN — BUPIVACAINE HYDROCHLORIDE 10 ML: 5 INJECTION, SOLUTION EPIDURAL; INTRACAUDAL; PERINEURAL at 09:36

## 2023-11-06 RX ADMIN — FENTANYL CITRATE 100 MCG: 50 INJECTION, SOLUTION INTRAMUSCULAR; INTRAVENOUS at 09:30

## 2023-11-06 RX ADMIN — EPHEDRINE SULFATE 5 MG: 50 INJECTION, SOLUTION INTRAVENOUS at 09:59

## 2023-11-06 RX ADMIN — ROCURONIUM BROMIDE 50 MG: 10 INJECTION, SOLUTION INTRAVENOUS at 09:53

## 2023-11-06 RX ADMIN — PROPOFOL 200 MG: 10 INJECTION, EMULSION INTRAVENOUS at 09:53

## 2023-11-06 RX ADMIN — PHENYLEPHRINE HYDROCHLORIDE 50 MCG/MIN: 10 INJECTION INTRAVENOUS at 10:04

## 2023-11-06 RX ADMIN — SUGAMMADEX 200 MG: 100 INJECTION, SOLUTION INTRAVENOUS at 10:38

## 2023-11-06 RX ADMIN — SODIUM CHLORIDE, SODIUM LACTATE, POTASSIUM CHLORIDE, AND CALCIUM CHLORIDE 50 ML/HR: .6; .31; .03; .02 INJECTION, SOLUTION INTRAVENOUS at 08:53

## 2023-11-06 RX ADMIN — ONDANSETRON 4 MG: 2 INJECTION INTRAMUSCULAR; INTRAVENOUS at 10:21

## 2023-11-06 RX ADMIN — OXYCODONE HYDROCHLORIDE AND ACETAMINOPHEN 1 TABLET: 5; 325 TABLET ORAL at 13:20

## 2023-11-06 RX ADMIN — LIDOCAINE 1 PATCH: 50 PATCH TOPICAL at 13:31

## 2023-11-06 RX ADMIN — FENTANYL CITRATE 25 MCG: 50 INJECTION INTRAMUSCULAR; INTRAVENOUS at 12:34

## 2023-11-06 RX ADMIN — DEXAMETHASONE SODIUM PHOSPHATE 10 MG: 10 INJECTION, SOLUTION INTRAMUSCULAR; INTRAVENOUS at 10:08

## 2023-11-06 RX ADMIN — CHLORHEXIDINE GLUCONATE 15 ML: 1.2 RINSE ORAL at 08:53

## 2023-11-06 RX ADMIN — CEFAZOLIN 2000 MG: 1 INJECTION, POWDER, FOR SOLUTION INTRAMUSCULAR; INTRAVENOUS at 10:19

## 2023-11-06 RX ADMIN — GLYCOPYRROLATE 0.2 MG: 0.2 INJECTION INTRAMUSCULAR; INTRAVENOUS at 10:10

## 2023-11-06 RX ADMIN — PHENYLEPHRINE HYDROCHLORIDE 20 MCG: 10 INJECTION INTRAVENOUS at 10:00

## 2023-11-06 NOTE — DISCHARGE INSTR - AVS FIRST PAGE
POST-OPERATIVE INFORMATION  ELBOW/SHOULDER SURGERY    Keep your arm in the sling as much as possible for the 1st 48 hours to minimize swelling. Keep your arm in the sling and wear brace at all times until seen in the office for follow-up. You may apply ice to your elbow or shoulder as instructed to control pain and swelling. You can and should exercise your fingers and wrist multiple times throughout the day. You can do this while remaining in the sling or brace. If you are in a shoulder immobilizer, you could remove the wrist strap to exercise your elbow. Do not lift your arm unless instructed by her physician. Your dressings may be changed after 2 days or if they become wet or dirty. You may remove the bandage after 3 days and cover the incisions with Band-Aids. You may shower after 2 days, but keep your incisions dry. If you are wearing a shoulder mobilizer remember to keep the operated arm against your body. You may gently slide your hand into wash and dry thoroughly. If you develop a rash in this underarm area, gently apply baby powder with your opposite hand. You should call the office if you have increasing pain not controlled by your medication, develop a fever, or experience increased drainage or redness around the incision site. If a post op appointment is not arranged for you prior to leaving the hospital, you should call the office the day after your surgery to make this appointment. You should start therapy in about 2 days after surgery or as previously scheduled.

## 2023-11-06 NOTE — OP NOTE
OPERATIVE REPORT  PATIENT NAME: Luciana Garner    :  1975  MRN: [id number]  Pt Location: [facility] ROOM 02    SURGERY DATE: 2023    Surgeon(s) and Role:     * Vandana Munson DO - Primary     * Fausto Kwok PA-C - Assisting    Preop Diagnosis:  Complete tear of left rotator cuff, unspecified whether traumatic [M75.122]    Post-Op Diagnosis Codes:     * Complete tear of left rotator cuff, unspecified whether traumatic [M75.122]  Synovitis  Tear of glenoid labrum  Impingement    Procedure(s):  Left - ARTHROSCOPY SHOULDER  Synovectomy  Debridement  Arthroscopic rotator cuff repair  Acromioplasty  Post arthroscopic injections of intra-articular joint space and peripheral portals    Specimen(s):  ID Type Source Tests Collected by Time Destination   1 : shavings Tissue Joint, Shoulder TISSUE EXAM Vandana Munson DO 2023 0916    shavings    Estimated Blood Loss:   Minimal    Drains:  none    Anesthesia Type:   General w/ Interscalene Block    Operative Indications:  Complete tear of left rotator cuff, unspecified whether traumatic [M75.122]      Operative Findings:  TT: 0    Complications:   None    Procedure and Technique:    The patient was properly identified and brought into the operating room suite. After successful induction of the general anesthetic, the patient was placed in the [place] chair” position. All areas of possible skin pressurization were well padded to avoid the above. The left upper extremity neck and torso region were then prepped and draped in the usual sterile fashion. It was medically necessary that the physician assistant be in the room to aid in positioning and placing the appropriate amount of retraction on the patient. The physician assistants roles are integral to the success of this case.   He assisted on positioning, draping, retraction of soft tissue, dynamically maneuvering the arthroscope, assisted with implantation of hardware, closure of the wound, and placement of the immobilizer     The surgical landmarks were outlined with a skin marker. All the portals were infiltrated with 0.5% Marcaine with epinephrine. Using a # 11. Scalpel blade a posterior portal was made approximately 1-1/2 cm medial and inferior to the posterior lateral corner of the acromion. The  arthroscope was then introduced into the glenohumeral joint space. There was a tremendous amount of synovial tissue present. Using a “outside in” technique an accessory anterior inferior portal was made and a synovectomy was performed. There was a degenerative tear along the entire labrum which was debrided with a shaver. There was some grade 2 arthritic changes along the humeral head. The biceps tendon was probed and found to be intact. The subscapularis tendon was found to be intact. There was a large tear of the supraspinatus and infraspinatus tendon. This would be further evaluated from the superior viewing portal. At this point, the arthroscope was then withdrawn from the glenohumeral joint space and introduced into the subacromial joint space. An accessory lateral portal was made. A bursectomy was performed. Once this occurred, there was a large rotator cuff tear present. It was confirmed with a grasper that there was adequate tissue quality. Using the Arthrex suture anchor system,  2 suture anchors were placed in a horizontal mattress fashion anatomic reducing the rotator cuff. At this point there was good tension on the cuff which was visualized using multiple arthroscopic portals. The soft tissue on the undersurface of the acromion was then removed. The coracoacromial ligament was then divided. At this point there was a large subacromial spur present. Using a motorized bur, an acromioplasty 1st occur with the bur in the lateral portal and then switched to posterior portal to move any spur that was remaining.   At this point, there was adequate decompression of the subacromial joint space. The shoulder was then copiously irrigated with sterile arthroscopic solution. All the excess fluid was removed. The portals were then closed with 3-0 nylon. The subacromial joint space was injected with 0.5% Marcaine with epinephrine and dressed with ointment, Xeroform, 4x4s, ABDs, and tape. A shoulder immobilizer with an abductor pillow was placed. There was no complications during the procedure. She was successfully woken, transferred to [facility], and went to recovery room in stable condition. I was present for the entire procedure., A qualified resident physician was not available. , and A physician assistant was required during the procedure for retraction, tissue handling, dissection and suturing.     Patient Disposition:  PACU         SIGNATURE: Eva Collins DO  DATE: November 6, 2023  TIME: 9:50 AM

## 2023-11-06 NOTE — ANESTHESIA PREPROCEDURE EVALUATION
Procedure:  ARTHROSCOPY SHOULDER POSSIBLY ROTATOR CUFF REPAIR (Left: Shoulder)    Relevant Problems   CARDIO   (+) Hypercholesterolemia   (+) Hyperlipidemia   (+) Migraines      /RENAL   (+) Unilateral congenital absence of kidney      HEMATOLOGY   (+) Absolute anemia      MUSCULOSKELETAL   (+) Chronic low back pain without sciatica   (+) DDD (degenerative disc disease), cervical   (+) DDD (degenerative disc disease), lumbar   (+) Lumbar strain, initial encounter      NEURO/PSYCH   (+) Chronic left shoulder pain   (+) Chronic low back pain without sciatica   (+) Chronic pain of right ankle   (+) Depression with anxiety   (+) Migraines      PULMONARY   (+) Asthma, persistent controlled   (+) Chronic obstructive pulmonary disease (HCC)      +smoking    Physical Exam    Airway    Mallampati score: II  TM Distance: >3 FB  Neck ROM: full     Dental        Cardiovascular  Cardiovascular exam normal    Pulmonary  Pulmonary exam normal     Other Findings      Anesthesia Plan  ASA Score- 3     Anesthesia Type- general with ASA Monitors. Additional Monitors:     Airway Plan:     Comment: + PNB. Plan Factors-    Chart reviewed. EKG reviewed. Existing labs reviewed. Patient summary reviewed. Induction- intravenous. Postoperative Plan-     Informed Consent- Anesthetic plan and risks discussed with patient. I personally reviewed this patient with the CRNA. Discussed and agreed on the Anesthesia Plan with the CRNA. Jose Fofana

## 2023-11-06 NOTE — TELEPHONE ENCOUNTER
Caller: Carrie trinidad     Doctor: Neris Dietz    Reason for call: Calling to ask if any DME orders were put in today.  I advised no DME orders from today, the last one was 9/11, which she has already    Call back#: n/a

## 2023-11-06 NOTE — ANESTHESIA PROCEDURE NOTES
Peripheral Block    Patient location during procedure: holding area  Start time: 11/6/2023 9:36 AM  Reason for block: at surgeon's request and post-op pain management  Staffing  Performed by: Mauro Corbin MD  Authorized by: Mauro Corbin MD    Preanesthetic Checklist  Completed: patient identified, IV checked, site marked, risks and benefits discussed, surgical consent, monitors and equipment checked, pre-op evaluation and timeout performed  Peripheral Block  Prep: ChloraPrep  Patient monitoring: frequent blood pressure checks, continuous pulse oximetry and heart rate  Block type: Interscalene  Laterality: left  Injection technique: single-shot  Procedures: ultrasound guided, Ultrasound guidance required for the procedure to increase accuracy and safety of medication placement and decrease risk of complications.   Ultrasound permanent image saved  Needle  Needle type: Stimuplex   Needle gauge: 20 G  Needle length: 4 in  Needle localization: anatomical landmarks and ultrasound guidance  Assessment  Injection assessment: incremental injection, frequent aspiration, injected with ease, negative aspiration, negative for heart rate change, no paresthesia on injection, no symptoms of intraneural/intravenous injection and needle tip visualized at all times  Paresthesia pain: none  Post-procedure:  site cleaned  patient tolerated the procedure well with no immediate complications

## 2023-11-06 NOTE — ANESTHESIA POSTPROCEDURE EVALUATION
Post-Op Assessment Note    CV Status:  Stable  Pain Score: 0    Pain management: adequate  Multimodal analgesia used between 6 hours prior to anesthesia start to PACU discharge    Mental Status:  Alert and awake   Hydration Status:  Euvolemic   PONV Controlled:  Controlled   Airway Patency:  Patent  Airway: intubated   Two or more mitigation strategies used for obstructive sleep apnea   Post Op Vitals Reviewed: Yes      Staff: CRNA         No notable events documented.     BP   106/63   Temp  96.3   Pulse  70   Resp   16   SpO2   97

## 2023-11-08 NOTE — TELEPHONE ENCOUNTER
Dr Virginia Negron from [facility]    Asking for any updated DME orders for home use.      CB # [phone]  Fax # [phone]

## 2023-11-10 ENCOUNTER — TELEPHONE (OUTPATIENT)
Age: 48
End: 2023-11-10

## 2023-11-10 DIAGNOSIS — M75.122 COMPLETE TEAR OF LEFT ROTATOR CUFF, UNSPECIFIED WHETHER TRAUMATIC: ICD-10-CM

## 2023-11-10 DIAGNOSIS — J45.21 INTERMITTENT ASTHMA WITH ACUTE EXACERBATION, UNSPECIFIED ASTHMA SEVERITY: ICD-10-CM

## 2023-11-10 DIAGNOSIS — G47.00 INSOMNIA, UNSPECIFIED TYPE: ICD-10-CM

## 2023-11-10 DIAGNOSIS — R51.9 NONINTRACTABLE HEADACHE, UNSPECIFIED CHRONICITY PATTERN, UNSPECIFIED HEADACHE TYPE: ICD-10-CM

## 2023-11-10 RX ORDER — SUMATRIPTAN 50 MG/1
100 TABLET, FILM COATED ORAL ONCE AS NEEDED
Qty: 9 TABLET | Refills: 0 | Status: SHIPPED | OUTPATIENT
Start: 2023-11-10

## 2023-11-10 RX ORDER — IPRATROPIUM BROMIDE AND ALBUTEROL 20; 100 UG/1; UG/1
1 SPRAY, METERED RESPIRATORY (INHALATION) 4 TIMES DAILY
Qty: 4 G | Refills: 0 | Status: SHIPPED | OUTPATIENT
Start: 2023-11-10

## 2023-11-10 RX ORDER — OXYCODONE HYDROCHLORIDE AND ACETAMINOPHEN 5; 325 MG/1; MG/1
1 TABLET ORAL EVERY 4 HOURS PRN
Qty: 21 TABLET | Refills: 0 | Status: CANCELLED | OUTPATIENT
Start: 2023-11-10 | End: 2023-11-20

## 2023-11-10 RX ORDER — MELOXICAM 15 MG/1
15 TABLET ORAL DAILY
Qty: 30 TABLET | Refills: 0 | Status: CANCELLED | OUTPATIENT
Start: 2023-11-10

## 2023-11-10 RX ORDER — ZOLPIDEM TARTRATE 10 MG/1
10 TABLET ORAL
Qty: 30 TABLET | Refills: 0 | Status: SHIPPED | OUTPATIENT
Start: 2023-11-10

## 2023-11-10 NOTE — TELEPHONE ENCOUNTER
Pt contacted Call Center requested refill of their medication. Medication Name: Oxycodone      Dosage of Med: 1 tablet      Frequency of Med: 4 hours as needed       Remaining Medication: Weekend till Sunday evening       Pharmacy and Location: [address]         Pt. Preferred Callback Phone Number: [phone]       Thank you. PLEASE ADVISE PATIENTS:    REFILL REQUESTS WILL BE PROCESSED WITHIN 24-48 HOURS.

## 2023-11-10 NOTE — TELEPHONE ENCOUNTER
Last seen 23 and I copied past refills into chart from pdmp web site and sent for provider approval    SELECT PATIENT ID NAME  [address]   [] 1 JACK DOAN 35- F [facility]   [] 2 JACK DOAN [id number] F [address] [place]   [] 3 JACK DONA 80- F [address] [place]           Search Criteria    NAME DATE OF BIRTH DATE RANGE   jack doan [birth year]- To 10-     REQUESTER NAME REQUESTED DATE   [facility] Nov 10 2023 11:58:24 GMT-0500 Es[place] Duglas Standard Time)     Summary  Prescriptions  12  Prescribers  2  Pharmacies  2  View Map  Drug Classes  Benzodiazepines  0  Stimulants  0  Opioids  1  Muscle Relaxants  0  Opioid Dosage  Total MME for Active Prescriptions  0    Average MME  39.38  MME Graph   MME Calculator     Prescriptions  Notifications    Prescribers  Pharmacies  MME Graph    [] [place] Drug Categories:     [] Opioids      [] Others      Showentries  Search:  PATIENT ID PRESCRIPTION # FILLED WRITTEN DRUG LABEL QTY DAYS STRENGTH MME** PRESCRIBER PHARMACY PAYMENT REFILL #/AUTH STATE DETAIL   1 [id number] 2023 ACETAMINOPHEN 325 MG / oxyCODONE HYDROCHLORIDE 5 MG ORAL TABLET (Tablet) 21.0 4 5.0 MG/325.0 MG 39.38 [employer] [employer] 0 / 0 [place]    2 [id number] 10/23/2023 10/23/2023 Zolpidem Tartrate (Tablet) 30.0 30 10 MG LAMIN RAMIRES) [facility] [employer] 0 / 0 [place]    2 [id number] 2023 Zolpidem Tartrate (Tablet) 30.0 30 10 MG LAMIN RAMIRES) [facility] Private Pay 0 / 0 [place]    2 [id number] 2023 Zolpidem Tartrate (Tablet) 30.0 30 10 MG LAMIN RAMIRES) [facility] Medicaid 0 / 0 [place]    3 [id number] 2023 Zolpidem Tartrate (Tablet) 30.0 30 10 MG NA IKE) [facility] [place] 0 / 0 [place]    1 [id number] 06/13/2023 06/12/2023 Zolpidem Tartrate (Tablet) 30.0 30 10 MG NA WASSIM(MD) [employer] 0 / 0 [place]    1 [id number] 05/13/2023 05/12/2023 Zolpidem Tartrate (Tablet) 30.0 30 10 MG NA WASSIM(MD) [employer] 0 / 0 [place]    2 [id number] 04/18/2023 04/18/2023 Zolpidem Tartrate (Tablet) 30.0 30 10 MG NA WASSIM(MD) [facility] Medicaid 0 / 0 [place]    2 [id number] 03/10/2023 03/10/2023 Zolpidem Tartrate (Tablet) 30.0 30 10 MG NA WASSIM(MD) [facility] [place] 0 / 0 [place]    2 [id number] 02/07/2023 02/07/2023 Zolpidem Tartrate (Tablet) 30.0 30 10 MG NA WASSIM(MD) [facility] Medicaid 0 / 0 [place]    Showing 1 to 10 of 12 entries  [id number]     * Per CDC guidance, the conversion factors and associated daily morphine milligram equivalents for drugs prescribed as part of medication-assisted treatment for opioid use disorder should not be used to benchmark against dosage thresholds meant for opioids prescribed for pain. Report Disclaimer:  Information from the [address] [place] [address] Dr Program (PDMP) database is protected health information and any information accessed must be treated as confidential. Any person who knowingly or intentionally makes an unauthorized disclosure of information from the [employer] will be subject to civil and criminal penalties as set forth in the ABC-MAP Act 2014-191, Act of Oct. 27, 2014, P.L. 2911. The information in the [employer] is submitted by pharmacies and may contain errors and omissions. Independent verification of prescription information with pharmacies and prescribers may sometimes be prudent or necessary.   Educational content  [facility] MME calculation guidelines  [place] Prescribing Guidelines  Letter Regarding the Misapplication of Prescribing Guidelines   Guide for Appropriate Tapering or Discontinuation of Long-Term Opioid Use   #[id number]

## 2023-11-13 DIAGNOSIS — M75.122 COMPLETE TEAR OF LEFT ROTATOR CUFF, UNSPECIFIED WHETHER TRAUMATIC: ICD-10-CM

## 2023-11-13 DIAGNOSIS — Z88.9 MULTIPLE ALLERGIES: ICD-10-CM

## 2023-11-13 DIAGNOSIS — E28.39 ESTROGEN DEFICIENCY: ICD-10-CM

## 2023-11-13 PROCEDURE — 88304 TISSUE EXAM BY PATHOLOGIST: CPT | Performed by: PATHOLOGY

## 2023-11-13 RX ORDER — OXYCODONE HYDROCHLORIDE AND ACETAMINOPHEN 5; 325 MG/1; MG/1
1 TABLET ORAL EVERY 6 HOURS PRN
Qty: 28 TABLET | Refills: 0 | Status: SHIPPED | OUTPATIENT
Start: 2023-11-13 | End: 2023-11-23

## 2023-11-14 RX ORDER — MONTELUKAST SODIUM 10 MG/1
10 TABLET ORAL EVERY EVENING
Qty: 90 TABLET | Refills: 0 | Status: SHIPPED | OUTPATIENT
Start: 2023-11-14

## 2023-11-14 RX ORDER — ESTROGENS, CONJUGATED 0.45 MG/1
TABLET, FILM COATED ORAL
Qty: 21 TABLET | Refills: 0 | Status: SHIPPED | OUTPATIENT
Start: 2023-11-14

## 2023-11-17 ENCOUNTER — OFFICE VISIT (OUTPATIENT)
Dept: OBGYN CLINIC | Facility: CLINIC | Age: 48
End: 2023-11-17

## 2023-11-17 VITALS
DIASTOLIC BLOOD PRESSURE: 90 MMHG | HEIGHT: 60 IN | HEART RATE: 80 BPM | SYSTOLIC BLOOD PRESSURE: 149 MMHG | WEIGHT: 155 LBS | BODY MASS INDEX: 30.43 KG/M2

## 2023-11-17 DIAGNOSIS — M75.122 COMPLETE TEAR OF LEFT ROTATOR CUFF, UNSPECIFIED WHETHER TRAUMATIC: Primary | ICD-10-CM

## 2023-11-17 PROCEDURE — 99024 POSTOP FOLLOW-UP VISIT: CPT | Performed by: ORTHOPAEDIC SURGERY

## 2023-11-17 NOTE — PROGRESS NOTES
Assessment/Plan:   Diagnoses and all orders for this visit:    Complete tear of left rotator cuff, unspecified whether traumatic  -     XR shoulder 2+ vw left; Future         Reviewed physical exam and imaging with patient at time of visit. Patient is 2 weeks s/p left shoulder arthroscopy with rotator cuff repair of her left shoulder. Radiographic findings demonstrate adequate decompression of the subacromial space. Patient is progressing as expected post-operatively. Continue formal physical therapy as per protocol. She may begin elbow range of motion. Passive range of motion only. Sutures were removed at time of visit, steri strips were applied. Patient can expose incision for hygiene purposes, but has been advised to avoid submersion. A work note was provided, she is not cleared for work at this time. She will be seen in 4 weeks for strength and range of motion check. Patient expresses understanding and is in agreement with treatment plan. The patient is doing quite well in regards to her left shoulder rotator cuff repair. Continue immobilizer. Passive range of motion program for now. No work. Follow-up in 1 month      Subjective:   Patient ID: Zelda Garner  1975     HPI  Patient is a 51 y.o. female who presents for post-operative evaluation of her left shoulder. She is approximately 2 weeks s/p left shoulder arthroscopy with a rotator cuff repair, completed on 11/6/23. The patient states that her pain is well-controlled. She has been compliant with the sling and the range of motion limitations. She states that she experiences mild post-operative soreness. She denies numbness, tingling, weakness, feelings of instability, fever, chills, or malaise.       The following portions of the patient's history were reviewed and updated as appropriate:  Past medical history, past surgical history, Family history, social history, current medications and allergies    Past Medical History:   Diagnosis Date Anxiety     h/o 2 panic attacks     Arthritis     l hip congenital dyspasia    Asthma     good control    Bleb     R eye    Chronic kidney disease     one kidney left side    Chronic pain     back- DJD in 2 upper 2 lower, buldging disc c-7    Chronic pain disorder     lower back and hips    Depression     Endometriosis     GERD (gastroesophageal reflux disease)     Glaucoma     b/l .  Bleb in R eye    Glaucoma     H/O carpal tunnel syndrome     L    Headache     migraines    History of cigarette smoking     History of transfusion     Hypercholesterolemia 1/17/2013    Insomnia     Migraines     Osteoarthritis     cervical spondylarthritis    Polypoid cystitis     POLYP CYSTS ON BUTTOCK AREA    Renal disorder     Reports single kidney    Right knee injury     SCRAPED GROWTH PLATE RIGHT KNEE    Seasonal allergies        Past Surgical History:   Procedure Laterality Date    BLEPHAROPLASTY Right     BLEB RIGHT EYE    BUNIONECTOMY Bilateral     2 on R, 3 on left    CARPAL TUNNEL RELEASE Left     CT NEEDLE BX ASPIRATION INJECTION LOCALIZATION  3/8/2019    EYE SURGERY Right     bleb implant    EYE SURGERY Bilateral     lazer- glaucoma    FOOT HARDWARE REMOVAL Left 7/2/2018    Procedure: CALCANEAL REMOVAL OF HARDWARE;  Surgeon: Jason Navas DPM;  Location: [address] [place] [address] OR;  Service: Podiatry    FOOT HARDWARE REMOVAL Left 7/5/2018    Procedure: LEFT CALCANEAL REMOVAL OF HARDWARE;  Surgeon: Jason Navas DPM;  Location: [address] [place] [address] OR;  Service: Podiatry    FOOT SURGERY Right     BONE REMOVED BOTTOM OF RIGHT FOOT    HAND SURGERY Right     ring finger tendon severed    HARDWARE REMOVAL Left 7/5/2018    Procedure: LEFT BIG TOE REMOVAL OF HARDWARE;  Surgeon: Jason Navas DPM;  Location: [address] [place] [address] OR;  Service: Podiatry    HIP SURGERY Left     reconstruction as child    HYSTERECTOMY      total abdominal    HYSTERECTOMY      TOTAL    JOINT REPLACEMENT      LTHR and reconstruction    KNEE ARTHROSCOPY      R knee    LEG SURGERY      LISFRANC ARTHRODESIS Left 11/3/2017    Procedure: FUSION FIRST MTPJ: BROSTRUM; LATERAL ANKLE STABILIZATION;  Surgeon: Aissatou Lilly DPM;  Location: [facility];  Service: Podiatry    OOPHORECTOMY      age 35    PILONIDAL CYST EXCISION      midline    CA DCMPRN FASCT LEG ANT&/LAT&PST W/DBRDMT MUS Left 7/5/2018    Procedure: LEFT PERONEAL TENDON DEBRIDMENT ;  Surgeon: Aissatou Lilly DPM;  Location: [facility] OR;  Service: Podiatry    CA OSTEOTOMY CALCANEUS W/WO INTERNAL FIXATION Left 11/3/2017    Procedure: OSTEOTOMY CALCANEUS;  Surgeon: Aissatou Lilly DPM;  Location: [facility];  Service: Podiatry    CA RPR TENDON FLXR FOOT [address] W/FREE GRAFT EA TENDON Left 7/2/2018    Procedure: PERONEAL TENDON EXPLORATION;  Surgeon: Aissatou Lilly DPM;  Location: [facility] OR;  Service: Podiatry    CA SURGICAL ARTHROSCOPY SHOULDER W/ROTATOR CUFF RPR Left 11/6/2023    Procedure: Left - ARTHROSCOPY SHOULDER Synovectomy Debridement Arthroscopic rotator cuff repair Acromioplasty Post arthroscopic injections of intra-articular joint space and peripheral portals;  Surgeon: Samantha Cody DO;  Location: [facility];  Service: Orthopedics    REFRACTIVE SURGERY Bilateral     LASER SURGERY BOTH EYES    TOTAL HIP ARTHROPLASTY  2005    WRIST SURGERY Left        Family History   Problem Relation Age of Onset    Cancer Mother     Hypertension Mother     Bone cancer Mother 40        COD    Breast cancer Mother 40    Hypertension Father     Heart disease Father     Dementia Father     Lung cancer Sister 40    Brain cancer Sister 52    No Known Problems Sister     No Known Problems Maternal Grandmother     No Known Problems Maternal Grandfather     Liver cancer Paternal Grandmother 68    Hypertension Paternal Grandfather     Stroke Paternal Grandfather     Breast cancer Maternal Aunt     Breast cancer Maternal Aunt 48    No Known Problems Paternal Aunt        Social History     Socioeconomic History    Marital status: /Civil Union     Spouse name: None Number of children: None    Years of education: None    Highest education level: None   Occupational History    None   Tobacco Use    Smoking status: Every Day     Packs/day: 0.25     Years: 30.00     Total pack years: 7.50     Types: Cigarettes    Smokeless tobacco: Never   Vaping Use    Vaping Use: Never used   Substance and Sexual Activity    Alcohol use: No    Drug use: Never    Sexual activity: Yes   Other Topics Concern    None   Social History Narrative    ** Merged History Encounter **          Social Determinants of Health     Financial Resource Strain: Not on file   Food Insecurity: Not on file   Transportation Needs: Not on file   Physical Activity: Not on file   Stress: Not on file   Social Connections: Not on file   Intimate Partner Violence: Not on file   Housing Stability: Not on file         Current Outpatient Medications:     albuterol (2.5 mg/3 mL) 0.083 % nebulizer solution, Take 3 mL (2.5 mg total) by nebulization every 6 (six) hours as needed for wheezing or shortness of breath, Disp: 150 mL, Rfl: 0    albuterol (PROVENTIL HFA,VENTOLIN HFA) 90 mcg/act inhaler, Inhale 2 puffs every 6 (six) hours as needed for wheezing, Disp: 18 g, Rfl: 0    cyclobenzaprine (FLEXERIL) 10 mg tablet, Take 1 tablet (10 mg total) by mouth daily as needed for muscle spasms, Disp: 30 tablet, Rfl: 0    fluticasone (FLONASE) 50 mcg/act nasal spray, SPRAY 2 SPRAYS INTO EACH NOSTRIL EVERY DAY (Patient taking differently: 2 sprays into each nostril if needed for allergies), Disp: 16 mL, Rfl: 3    fluticasone-salmeterol (Wixela Inhub) 500-50 mcg/dose inhaler, Inhale 1 puff daily Rinse mouth after use.  (Patient taking differently: Inhale 1 puff if needed (Wheezing) Rinse mouth after use.), Disp: 60 blister, Rfl: 0    ipratropium-albuterol (Combivent Respimat) inhaler, Inhale 1 puff 4 (four) times a day, Disp: 4 g, Rfl: 0    meloxicam (MOBIC) 15 mg tablet, Take 1 tablet (15 mg total) by mouth daily, Disp: 30 tablet, Rfl: 0 montelukast (SINGULAIR) 10 mg tablet, Take 1 tablet (10 mg total) by mouth every evening, Disp: 90 tablet, Rfl: 0    ondansetron (ZOFRAN) 4 mg tablet, TAKE 1 TABLET BY MOUTH EVERY 8 HOURS AS NEEDED FOR NAUSEA AND VOMITING (Patient taking differently: Take 4 mg by mouth every 8 (eight) hours as needed for nausea), Disp: 20 tablet, Rfl: 0    oxyCODONE-acetaminophen (Percocet) 5-325 mg per tablet, Take 1 tablet by mouth every 6 (six) hours as needed for moderate pain for up to 10 days Max Daily Amount: 4 tablets, Disp: 28 tablet, Rfl: 0    pantoprazole (PROTONIX) 40 mg tablet, TAKE 1 TABLET BY MOUTH EVERY DAY BEFORE BREAKFAST, Disp: 30 tablet, Rfl: 5    Premarin 0.45 MG tablet, TAKE 1 TABLET (0.45 MG TOTAL) BY MOUTH IN THE MORNING TAKE DAILY FOR 21 DAYS. THEN DO NOT TAKE FOR 7 DAYS, Disp: 21 tablet, Rfl: 0    promethazine-codeine (PHENERGAN WITH CODEINE) 6.25-10 mg/5 mL syrup, Take 5 mL by mouth every 4 (four) hours as needed for cough, Disp: 120 mL, Rfl: 0    SUMAtriptan (IMITREX) 50 mg tablet, TAKE 2 TABLETS (100 MG TOTAL) BY MOUTH ONCE AS NEEDED FOR MIGRAINE, Disp: 9 tablet, Rfl: 0    zolpidem (AMBIEN) 10 mg tablet, Take 1 tablet (10 mg total) by mouth daily at bedtime as needed for sleep, Disp: 30 tablet, Rfl: 0    Semaglutide-Weight Management (WEGOVY) 1.7 MG/0.75ML, Inject 0.75 mL (1.7 mg total) under the skin once a week (Patient not taking: Reported on 10/26/2023), Disp: 3 mL, Rfl: 2    Allergies   Allergen Reactions    Morphine Other (See Comments)     Blood pressure drops    Morphine And Related Other (See Comments)     Blood pressure drops    Penicillins Itching and Rash    Quazepam Itching       Review of Systems   Constitutional:  Negative for chills, fever and unexpected weight change. HENT:  Negative for hearing loss, nosebleeds and sore throat. Eyes:  Negative for pain, redness and visual disturbance. Respiratory:  Negative for cough, shortness of breath and wheezing.     Cardiovascular:  Negative for chest pain, palpitations and leg swelling. Gastrointestinal:  Negative for abdominal pain, nausea and vomiting. Endocrine: Negative for polydipsia and polyuria. Genitourinary:  Negative for dysuria and hematuria. Skin:  Negative for rash and wound. Neurological:  Negative for dizziness, numbness and headaches. Psychiatric/Behavioral:  Negative for decreased concentration and suicidal ideas. The patient is not nervous/anxious. All other systems reviewed and are negative. Objective:  /90   Pulse 80   Ht 5' (1.524 m)   Wt 70.3 kg (155 lb)   BMI 30.27 kg/m²     Ortho Exam  left Shoulder -   No anatomical deformity  Skin is warm and dry to touch with no signs of erythema, ecchymosis, or infection  No soft tissue swelling or effusion noted  Demonstrates normal elbow, wrist, and finger motion  2+  distal radial pulse with brisk capillary refill to the fingers  Radial, median, ulnar and motor  and sensory distribution intact  Sensation to light touch intact distally      Physical Exam  HENT:      Head: Normocephalic and atraumatic. Nose: Nose normal.   Eyes:      Conjunctiva/sclera: Conjunctivae normal.   Cardiovascular:      Rate and Rhythm: Normal rate. Pulmonary:      Effort: Pulmonary effort is normal.   Musculoskeletal:      Cervical back: Neck supple. Skin:     General: Skin is warm and dry. Capillary Refill: Capillary refill takes less than 2 seconds. Neurological:      Mental Status: She is alert and oriented to person, place, and time. Psychiatric:         Mood and Affect: Mood normal.         Behavior: Behavior normal.          Diagnostic Test Review: The attending physician has personally reviewed the pertinent films in PACS and the interpretation is as follows:    X-Ray of left shoulder taken on 11/17/23 were reviewed and showed adequate decompression of the subacromial space. Procedures   None performed.        Scribe Attestation      I,:  EchoStar Flaco am acting as a scribe while in the presence of the attending physician.:       I,:  Julio Cesar Arechiga, DO personally performed the services described in this documentation    as scribed in my presence.:

## 2023-11-17 NOTE — LETTER
November 17, 2023     Patient: Beto Garner  YOB: 1975  Date of Visit: 11/17/2023      To Whom it May Concern:    Betsy Pineda is under my professional care. Kristian Hall was seen in my office on 11/17/2023. Kristian [place] is not cleared for work at this time. If you have any questions or concerns, please don't hesitate to call.          Sincerely,          Chuy Michael,         CC: No Recipients

## 2023-11-26 DIAGNOSIS — J44.1 CHRONIC OBSTRUCTIVE PULMONARY DISEASE WITH ACUTE EXACERBATION (HCC): ICD-10-CM

## 2023-11-27 RX ORDER — ALBUTEROL SULFATE 90 UG/1
AEROSOL, METERED RESPIRATORY (INHALATION)
Qty: 18 G | Refills: 0 | Status: SHIPPED | OUTPATIENT
Start: 2023-11-27

## 2023-11-30 ENCOUNTER — TELEPHONE (OUTPATIENT)
Age: 48
End: 2023-11-30

## 2023-11-30 NOTE — TELEPHONE ENCOUNTER
Patients wc nurse called on behalf of patient and contacted Call Center requested refill of their medication. Medication was not listed on the current medications tab,      Medication Name: oxyCODONE-acetaminophen (Percocet)    Dosage of Med: 5/325mg      Frequency of Med: Take 1 tablet by mouth every 6 (six) hours as needed for moderate pain for up to 10       Remaining Medication: zero      Pharmacy and Location:   [facility]/pharmacy #[id number]- [address] [place] [address], [address] [place] [id number]           Pt.  Preferred Callback Phone Number: [phone]

## 2023-12-01 NOTE — TELEPHONE ENCOUNTER
Called and spoke with pt and relayed Dr Vazquez Ngo, pt wanted to know what she could take for pain during PT. I stated she can try tylenol and ibuprofen as along as she's able to take them. She can take both and alternate between the two. She asked which was better. I stated tylenol helps with pain while advil helps with inflammation. I stated she can try to taking one or the other before therapy and then take the other one after to see if that helps with her pain. No further questions.

## 2023-12-05 DIAGNOSIS — R51.9 NONINTRACTABLE HEADACHE, UNSPECIFIED CHRONICITY PATTERN, UNSPECIFIED HEADACHE TYPE: ICD-10-CM

## 2023-12-05 RX ORDER — SUMATRIPTAN 50 MG/1
100 TABLET, FILM COATED ORAL ONCE AS NEEDED
Qty: 9 TABLET | Refills: 0 | Status: SHIPPED | OUTPATIENT
Start: 2023-12-05

## 2023-12-11 ENCOUNTER — OFFICE VISIT (OUTPATIENT)
Dept: OBGYN CLINIC | Facility: CLINIC | Age: 48
End: 2023-12-11

## 2023-12-11 VITALS
DIASTOLIC BLOOD PRESSURE: 85 MMHG | WEIGHT: 155 LBS | HEART RATE: 76 BPM | BODY MASS INDEX: 30.43 KG/M2 | SYSTOLIC BLOOD PRESSURE: 135 MMHG | HEIGHT: 60 IN

## 2023-12-11 DIAGNOSIS — Z98.890 S/P LEFT ROTATOR CUFF REPAIR: Primary | ICD-10-CM

## 2023-12-11 PROCEDURE — 99024 POSTOP FOLLOW-UP VISIT: CPT | Performed by: ORTHOPAEDIC SURGERY

## 2023-12-11 NOTE — PROGRESS NOTES
ASSESSMENT/PLAN:    Diagnoses and all orders for this visit:    S/P left rotator cuff repair        The patient is continuing to progress in surgery. She may now participate in active range of motion of her shoulder. She should continue physical therapy to work on strengthening, stretching and range of motion. She will follow-up with our office in 6 weeks for strength and motion check. The patient is acceptable to this plan. Return in about 6 weeks (around 1/22/2024). The patient doing quite well in regards to her rotator cuff repair of her left shoulder. Flexion to nearly 90 degrees. Abductor strength 4 out of 5. She can just continue the sling and start an active range of motion. Follow-up 6 weeks for evaluation. If her condition changes, she would not hesitate to let us know      _____________________________________________________  CHIEF COMPLAINT:  Chief Complaint   Patient presents with    Left Shoulder - Post-op, Follow-up         SUBJECTIVE:  Jacqueline Zaragoza is a 51 y.o. female who presents to our office for a postop visit. The patient is status post left shoulder arthroscopy with rotator cuff repair from 11/6/2023. She states has been participating in physical therapy and has been using the shoulder immobilizer. She denies any significant shoulder pain. She denies any numbness or tingling. She denies any fever or chills.     The following portions of the patient's history were reviewed and updated as appropriate: allergies, current medications, past family history, past medical history, past social history, past surgical history and problem list.    PAST MEDICAL HISTORY:  Past Medical History:   Diagnosis Date    Anxiety     h/o 2 panic attacks     Arthritis     l hip congenital dyspasia    Asthma     good control    Bleb     R eye    Chronic kidney disease     one kidney left side    Chronic pain     back- DJD in 2 upper 2 lower, buldging disc c-7    Chronic pain disorder     lower back and hips    Depression     Endometriosis     GERD (gastroesophageal reflux disease)     Glaucoma     b/l .  Bleb in R eye    Glaucoma     H/O carpal tunnel syndrome     L    Headache     migraines    History of cigarette smoking     History of transfusion     Hypercholesterolemia 1/17/2013    Insomnia     Migraines     Osteoarthritis     cervical spondylarthritis    Polypoid cystitis     POLYP CYSTS ON BUTTOCK AREA    Renal disorder     Reports single kidney    Right knee injury     SCRAPED GROWTH PLATE RIGHT KNEE    Seasonal allergies        PAST SURGICAL HISTORY:  Past Surgical History:   Procedure Laterality Date    BLEPHAROPLASTY Right     BLEB RIGHT EYE    BUNIONECTOMY Bilateral     2 on R, 3 on left    CARPAL TUNNEL RELEASE Left     CT NEEDLE BX ASPIRATION INJECTION LOCALIZATION  3/8/2019    EYE SURGERY Right     bleb implant    EYE SURGERY Bilateral     lazer- glaucoma    FOOT HARDWARE REMOVAL Left 7/2/2018    Procedure: CALCANEAL REMOVAL OF HARDWARE;  Surgeon: Danielle Saba DPM;  Location: [address] [place] [address] OR;  Service: Podiatry    FOOT HARDWARE REMOVAL Left 7/5/2018    Procedure: LEFT CALCANEAL REMOVAL OF HARDWARE;  Surgeon: Danielle Saba DPM;  Location: [address] [place] [address] OR;  Service: Podiatry    FOOT SURGERY Right     BONE REMOVED BOTTOM OF RIGHT FOOT    HAND SURGERY Right     ring finger tendon severed    HARDWARE REMOVAL Left 7/5/2018    Procedure: LEFT BIG TOE REMOVAL OF HARDWARE;  Surgeon: Danielle Saba DPM;  Location: [address] [place] [address] OR;  Service: Podiatry    HIP SURGERY Left     reconstruction as child    HYSTERECTOMY      total abdominal    HYSTERECTOMY      TOTAL    JOINT REPLACEMENT      LTHR and reconstruction    KNEE ARTHROSCOPY      R knee    LEG SURGERY      LISFRANC ARTHRODESIS Left 11/3/2017    Procedure: FUSION FIRST MTPJ: Adithya Srivastava; LATERAL ANKLE STABILIZATION;  Surgeon: Danielle Saba DPM;  Location[facility];  Service: Podiatry    OOPHORECTOMY      age 35    PILONIDAL CYST EXCISION      midline    NH DCMPRN FASCT LEG ANT&/LAT&PST W/DBRDMT MUS Left 7/5/2018    Procedure: LEFT PERONEAL TENDON DEBRIDMENT ;  Surgeon: Donnie Mina DPM;  Location: [address] [place] [address] MAIN OR;  Service: Podiatry    TX OSTEOTOMY CALCANEUS W/WO INTERNAL FIXATION Left 11/3/2017    Procedure: OSTEOTOMY CALCANEUS;  Surgeon: Donnie Mina DPM;  Location: [facility];  Service: Podiatry    TX RPR TENDON FLXR FOOT [address] W/FREE GRAFT EA TENDON Left 7/2/2018    Procedure: PERONEAL TENDON EXPLORATION;  Surgeon: Donnie Mina DPM;  Location: [address] [place] [address] MAIN OR;  Service: Podiatry    TX SURGICAL ARTHROSCOPY SHOULDER W/ROTATOR CUFF RPR Left 11/6/2023    Procedure: Left - ARTHROSCOPY SHOULDER Synovectomy Debridement Arthroscopic rotator cuff repair Acromioplasty Post arthroscopic injections of intra-articular joint space and peripheral portals;  Surgeon: Ana Ragland DO;  Location: [facility];  Service: Orthopedics    REFRACTIVE SURGERY Bilateral     LASER SURGERY BOTH EYES    TOTAL HIP ARTHROPLASTY  2005    WRIST SURGERY Left        FAMILY HISTORY:  Family History   Problem Relation Age of Onset    Cancer Mother     Hypertension Mother     Bone cancer Mother 40        COD    Breast cancer Mother 40    Hypertension Father     Heart disease Father     Dementia Father     Lung cancer Sister 40    Brain cancer Sister 52    No Known Problems Sister     No Known Problems Maternal Grandmother     No Known Problems Maternal Grandfather     Liver cancer Paternal Grandmother 68    Hypertension Paternal Grandfather     Stroke Paternal Grandfather     Breast cancer Maternal Aunt     Breast cancer Maternal Aunt 48    No Known Problems Paternal Aunt        SOCIAL HISTORY:  Social History     Tobacco Use    Smoking status: Every Day     Packs/day: 0.25     Years: 30.00     Total pack years: 7.50     Types: Cigarettes    Smokeless tobacco: Never   Vaping Use    Vaping Use: Never used   Substance Use Topics    Alcohol use: No    Drug use: Never       MEDICATIONS:    Current Outpatient Medications: albuterol (2.5 mg/3 mL) 0.083 % nebulizer solution, Take 3 mL (2.5 mg total) by nebulization every 6 (six) hours as needed for wheezing or shortness of breath, Disp: 150 mL, Rfl: 0    albuterol (PROVENTIL HFA,VENTOLIN HFA) 90 mcg/act inhaler, INHALE 2 PUFFS EVERY 6 HOURS AS NEEDED FOR WHEEZING, Disp: 18 g, Rfl: 0    cyclobenzaprine (FLEXERIL) 10 mg tablet, Take 1 tablet (10 mg total) by mouth daily as needed for muscle spasms, Disp: 30 tablet, Rfl: 0    fluticasone (FLONASE) 50 mcg/act nasal spray, SPRAY 2 SPRAYS INTO EACH NOSTRIL EVERY DAY (Patient taking differently: 2 sprays into each nostril if needed for allergies), Disp: 16 mL, Rfl: 3    fluticasone-salmeterol (Wixela Inhub) 500-50 mcg/dose inhaler, Inhale 1 puff daily Rinse mouth after use. (Patient taking differently: Inhale 1 puff if needed (Wheezing) Rinse mouth after use.), Disp: 60 blister, Rfl: 0    ipratropium-albuterol (Combivent Respimat) inhaler, Inhale 1 puff 4 (four) times a day, Disp: 4 g, Rfl: 0    meloxicam (MOBIC) 15 mg tablet, Take 1 tablet (15 mg total) by mouth daily, Disp: 30 tablet, Rfl: 0    montelukast (SINGULAIR) 10 mg tablet, Take 1 tablet (10 mg total) by mouth every evening, Disp: 90 tablet, Rfl: 0    ondansetron (ZOFRAN) 4 mg tablet, TAKE 1 TABLET BY MOUTH EVERY 8 HOURS AS NEEDED FOR NAUSEA AND VOMITING (Patient taking differently: Take 4 mg by mouth every 8 (eight) hours as needed for nausea), Disp: 20 tablet, Rfl: 0    pantoprazole (PROTONIX) 40 mg tablet, TAKE 1 TABLET BY MOUTH EVERY DAY BEFORE BREAKFAST, Disp: 30 tablet, Rfl: 5    Premarin 0.45 MG tablet, TAKE 1 TABLET (0.45 MG TOTAL) BY MOUTH IN THE MORNING TAKE DAILY FOR 21 DAYS.  THEN DO NOT TAKE FOR 7 DAYS, Disp: 21 tablet, Rfl: 0    promethazine-codeine (PHENERGAN WITH CODEINE) 6.25-10 mg/5 mL syrup, Take 5 mL by mouth every 4 (four) hours as needed for cough, Disp: 120 mL, Rfl: 0    SUMAtriptan (IMITREX) 50 mg tablet, TAKE 2 TABLETS (100 MG TOTAL) BY MOUTH ONCE AS NEEDED FOR MIGRAINE, Disp: 9 tablet, Rfl: 0    zolpidem (AMBIEN) 10 mg tablet, Take 1 tablet (10 mg total) by mouth daily at bedtime as needed for sleep, Disp: 30 tablet, Rfl: 0    Semaglutide-Weight Management (WEGOVY) 1.7 MG/0.75ML, Inject 0.75 mL (1.7 mg total) under the skin once a week (Patient not taking: Reported on 12/11/2023), Disp: 3 mL, Rfl: 2    ALLERGIES:  Allergies   Allergen Reactions    Morphine Other (See Comments)     Blood pressure drops    Morphine And Related Other (See Comments)     Blood pressure drops    Penicillins Itching and Rash    Quazepam Itching       ROS:  Review of Systems     Constitutional: Negative for fatigue, fever or loss of appetite. HENT: Negative. Respiratory: Negative for shortness of breath, dyspnea. Cardiovascular: Negative for chest pain/tightness. Gastrointestinal: Negative for abdominal pain, N/V. Endocrine: Negative for cold/heat intolerance, unexplained weight loss/gain. Genitourinary: Negative for flank pain, dysuria, hematuria. Musculoskeletal: Negative for arthralgia   Skin: Negative for rash. Neurological: Negative for numbness or tingling  Psychiatric/Behavioral: Negative for agitation. _____________________________________________________  PHYSICAL EXAMINATION:    Blood pressure 135/85, pulse 76, height 5' (1.524 m), weight 70.3 kg (155 lb). Constitutional: Oriented to person, place, and time. Appears well-developed and well-nourished. No distress. HENT:   Head: Normocephalic. Eyes: Conjunctivae are normal. Right eye exhibits no discharge. Left eye exhibits no discharge. No scleral icterus. Cardiovascular: Normal rate. Pulmonary/Chest: Effort normal.   Neurological: Alert and oriented to person, place, and time. Skin: Skin is warm and dry. No rash noted. Not diaphoretic. No erythema. No pallor. Psychiatric: Normal mood and affect.  Behavior is normal. Judgment and thought content normal.      MUSCULOSKELETAL EXAMINATION: Physical Exam  Ortho Exam    Left upper extremity is neurovascularly intact  Fingers are pink and mobile  Compartments are soft  Range of motion of the shoulder is from 0 to 50 degrees of forward flexion and abduction  Incisions are well-healed  Brisk cap refill  Sensation intact  Objective:  BP Readings from Last 1 Encounters:   12/11/23 135/85      Wt Readings from Last 1 Encounters:   12/11/23 70.3 kg (155 lb)        BMI:   Estimated body mass index is 30.27 kg/m² as calculated from the following:    Height as of this encounter: 5' (1.524 m). Weight as of this encounter: 70.3 kg (155 lb).     f      Scribe Attestation      I,:  Andrea Jack PA-C am acting as a scribe while in the presence of the attending physician.:       I,:  Chuy Michael DO personally performed the services described in this documentation    as scribed in my presence.:

## 2023-12-11 NOTE — LETTER
December 11, 2023     Patient: Romayne Stairs Sheats  YOB: 1975  Date of Visit: 12/11/2023      To Whom it May Concern:    Irvin Calero is under my professional care. Janet Abdi was seen in my office on 12/11/2023. Janet Abdi may return to work with limitations which include sedentary only, no use of left upper extremity, 5 pound weight restriction, no ladders . If you have any questions or concerns, please don't hesitate to call.          Sincerely,          Josette Iglesias, DO        CC: No Recipients

## 2023-12-14 DIAGNOSIS — E28.39 ESTROGEN DEFICIENCY: ICD-10-CM

## 2023-12-15 RX ORDER — ESTROGENS, CONJUGATED 0.45 MG/1
TABLET, FILM COATED ORAL
Qty: 21 TABLET | Refills: 0 | Status: SHIPPED | OUTPATIENT
Start: 2023-12-15

## 2023-12-22 DIAGNOSIS — J44.1 CHRONIC OBSTRUCTIVE PULMONARY DISEASE WITH ACUTE EXACERBATION (HCC): ICD-10-CM

## 2023-12-22 RX ORDER — ALBUTEROL SULFATE 90 UG/1
AEROSOL, METERED RESPIRATORY (INHALATION)
Qty: 18 G | Refills: 0 | Status: SHIPPED | OUTPATIENT
Start: 2023-12-22

## 2023-12-27 DIAGNOSIS — J45.21 INTERMITTENT ASTHMA WITH ACUTE EXACERBATION, UNSPECIFIED ASTHMA SEVERITY: ICD-10-CM

## 2023-12-27 DIAGNOSIS — G47.00 INSOMNIA, UNSPECIFIED TYPE: ICD-10-CM

## 2023-12-27 RX ORDER — IPRATROPIUM BROMIDE AND ALBUTEROL 20; 100 UG/1; UG/1
1 SPRAY, METERED RESPIRATORY (INHALATION) 4 TIMES DAILY
Qty: 4 G | Refills: 0 | Status: SHIPPED | OUTPATIENT
Start: 2023-12-27

## 2023-12-27 RX ORDER — ZOLPIDEM TARTRATE 10 MG/1
10 TABLET ORAL
Qty: 30 TABLET | Refills: 0 | Status: SHIPPED | OUTPATIENT
Start: 2023-12-27

## 2023-12-27 NOTE — TELEPHONE ENCOUNTER
Refills have been requested for the following medications:         zolpidem (AMBIEN) 10 mg tablet [Wasnima Pacheco]      Patient Comment: [facility]     Preferred pharmacy: [facility]/PHARMACY #[id number] - [place] - [address]    Last seen 8/9/23       1 GERALDINEJEET GARNER 1975 F [address] [id number] GHISLAINE [place]   2 GERALDINEJEET GARNER 1975 F [address] [id number] [facility]   3 GERALDINEJEET GARNER 1975 F [address] [id number] EDMIGUEL ANGEL PALMA      Search Criteria  NAME DATE OF BIRTH DATE RANGE  Geraldinejeet Garner 1975 12- To 12-  REQUESTER NAME REQUESTED DATE  WASNIMA JACKLYNA Wed Dec 27 2023 09:49:11 GMT-0500 (Eastern Standard Time)  Summary  Prescriptions  13  Prescribers  3  Pharmacies  2  Drug Classes  Benzodiazepines  0  Stimulants  0  Opioids  2  Muscle Relaxants  0  Opioid Dosage  Total MME for Active Prescriptions  0    Average MME  33.41         Prescriptions  Notifications    Prescribers  Pharmacies  MME Graph    Show All     PA   Drug Categories:      Opioids       Others     Show  10  entries  Search:  PATIENT ID PRESCRIPTION # FILLED WRITTEN DRUG LABEL QTY DAYS STRENGTH MME** PRESCRIBER PHARMACY PAYMENT REFILL #/AUTH STATE DETAIL  1 [id number] 11/13/2023 11/13/2023 ACETAMINOPHEN 325 MG / oxyCODONE HYDROCHLORIDE 5 MG ORAL TABLET (Tablet) 28.0 7 5.0 MG/325.0 MG 30.0 RADHA BLACKWELL [employer] Insurance 0 / 0 PA   1 [id number] 11/11/2023 11/10/2023 Zolpidem Tartrate (Tablet) 30.0 30 10 MG NA IKE) [facility] [employer] [facility] 0 / 0 PA   1 [id number] 11/06/2023 11/06/2023 ACETAMINOPHEN 325 MG / oxyCODONE HYDROCHLORIDE 5 MG ORAL TABLET (Tablet) 21.0 4 5.0 MG/325.0 MG 39.38 LOPEZ WILLIS  [employer] Commercial Insurance 0 / 0 PA   2 [id number] 10/23/2023 10/23/2023 Zolpidem Tartrate (Tablet) 30.0 30 10 MG LAMIN RAMIRES) [facility] Commercial Insurance 0 / 0 PA   2 [id number] 09/20/2023 09/20/2023 Zolpidem Tartrate  (Tablet) 30.0 30 10 MG NA WASGIANAM(MD) [facility] Private Pay 0 / 0 PA   2 [id number] 08/17/2023 08/11/2023 Zolpidem Tartrate (Tablet) 30.0 30 10 MG NA WASGIANAM(MD) [facility] Medicaid 0 / 0 [place]   3 [id number] 07/20/2023 07/20/2023 Zolpidem Tartrate (Tablet) 30.0 30 10 MG NA WASGIANAM(MD) [facility] Medicaid 0 / 0 [place]   1 [id number] 06/13/2023 06/12/2023 Zolpidem Tartrate (Tablet) 30.0 30 10 MG NA IKE) [facility] 0 / 0 [place]   1 [id number] 05/13/2023 05/12/2023 Zolpidem Tartrate (Tablet) 30.0 30 10 MG NA IKE) [facility] 0 / 0 [place]   2 [id number] 04/18/2023 04/18/2023 Zolpidem Tartrate (Tablet) 30.0 30 10 MG

## 2024-01-05 DIAGNOSIS — R51.9 NONINTRACTABLE HEADACHE, UNSPECIFIED CHRONICITY PATTERN, UNSPECIFIED HEADACHE TYPE: ICD-10-CM

## 2024-01-05 RX ORDER — SUMATRIPTAN 50 MG/1
100 TABLET, FILM COATED ORAL ONCE AS NEEDED
Qty: 9 TABLET | Refills: 0 | Status: SHIPPED | OUTPATIENT
Start: 2024-01-05

## 2024-01-08 ENCOUNTER — TELEPHONE (OUTPATIENT)
Dept: FAMILY MEDICINE CLINIC | Facility: CLINIC | Age: 49
End: 2024-01-08

## 2024-01-08 ENCOUNTER — PATIENT MESSAGE (OUTPATIENT)
Dept: FAMILY MEDICINE CLINIC | Facility: CLINIC | Age: 49
End: 2024-01-08

## 2024-01-08 DIAGNOSIS — J45.21 INTERMITTENT ASTHMA WITH ACUTE EXACERBATION, UNSPECIFIED ASTHMA SEVERITY: ICD-10-CM

## 2024-01-08 RX ORDER — IPRATROPIUM BROMIDE AND ALBUTEROL 20; 100 UG/1; UG/1
1 SPRAY, METERED RESPIRATORY (INHALATION) 4 TIMES DAILY
Qty: 4 G | Refills: 0 | Status: SHIPPED | OUTPATIENT
Start: 2024-01-08 | End: 2024-01-09

## 2024-01-08 NOTE — TELEPHONE ENCOUNTER
----- Message from Geraldine Garner sent at 1/8/2024 11:20 AM EST -----  Regarding: Combvent  Contact: [phone]  This was sent to [facility] in [place] please resend to [facility] in [place] please

## 2024-01-08 NOTE — TELEPHONE ENCOUNTER
----- Message from Geraldine Garner sent at 1/8/2024  2:53 PM EST -----  Regarding: Combvent  Contact: [phone]  Thank you I went to [facility] with my insurance it was 456 so I didn't get it.  Can he prescribe something comparable

## 2024-01-09 DIAGNOSIS — J44.1 CHRONIC OBSTRUCTIVE PULMONARY DISEASE WITH ACUTE EXACERBATION (HCC): Primary | ICD-10-CM

## 2024-01-09 RX ORDER — IPRATROPIUM BROMIDE 17 UG/1
2 AEROSOL, METERED RESPIRATORY (INHALATION) EVERY 6 HOURS
Qty: 38.7 G | Refills: 3 | Status: SHIPPED | OUTPATIENT
Start: 2024-01-09

## 2024-01-13 DIAGNOSIS — J44.1 CHRONIC OBSTRUCTIVE PULMONARY DISEASE WITH ACUTE EXACERBATION (HCC): ICD-10-CM

## 2024-01-13 DIAGNOSIS — E28.39 ESTROGEN DEFICIENCY: ICD-10-CM

## 2024-01-15 RX ORDER — ESTROGENS, CONJUGATED 0.45 MG/1
TABLET, FILM COATED ORAL
Qty: 21 TABLET | Refills: 0 | Status: SHIPPED | OUTPATIENT
Start: 2024-01-15

## 2024-01-15 RX ORDER — ALBUTEROL SULFATE 90 UG/1
AEROSOL, METERED RESPIRATORY (INHALATION)
Qty: 8 G | Refills: 3 | Status: SHIPPED | OUTPATIENT
Start: 2024-01-15

## 2024-01-19 ENCOUNTER — TELEPHONE (OUTPATIENT)
Age: 49
End: 2024-01-19

## 2024-01-19 NOTE — TELEPHONE ENCOUNTER
Caller: DIONNA parish      Doctor: shilpa     Reason for call: asking about a device that was recommended to the patient, asked for a call back     Call back#: [phone]  Fax- [phone]    Maida lopez   [phone]

## 2024-01-26 DIAGNOSIS — R51.9 NONINTRACTABLE HEADACHE, UNSPECIFIED CHRONICITY PATTERN, UNSPECIFIED HEADACHE TYPE: ICD-10-CM

## 2024-01-26 RX ORDER — SUMATRIPTAN 50 MG/1
100 TABLET, FILM COATED ORAL ONCE AS NEEDED
Qty: 9 TABLET | Refills: 5 | Status: SHIPPED | OUTPATIENT
Start: 2024-01-26

## 2024-01-29 ENCOUNTER — OFFICE VISIT (OUTPATIENT)
Dept: OBGYN CLINIC | Facility: CLINIC | Age: 49
End: 2024-01-29

## 2024-01-29 VITALS
DIASTOLIC BLOOD PRESSURE: 86 MMHG | HEIGHT: 60 IN | SYSTOLIC BLOOD PRESSURE: 127 MMHG | WEIGHT: 155 LBS | HEART RATE: 80 BPM | BODY MASS INDEX: 30.43 KG/M2

## 2024-01-29 DIAGNOSIS — Z98.890 S/P LEFT ROTATOR CUFF REPAIR: Primary | ICD-10-CM

## 2024-01-29 PROCEDURE — 99024 POSTOP FOLLOW-UP VISIT: CPT | Performed by: ORTHOPAEDIC SURGERY

## 2024-01-29 NOTE — LETTER
January 29, 2024     Patient: Geraldine Garner  YOB: 1975  Date of Visit: 1/29/2024      To Whom it May Concern:    Geraldine Garner is under my professional care. Geraldine was seen in my office on 1/29/2024. Geraldine may return to work with limitations 5 pound lifting capacity to left upper extremity.  Continue physical therapy 3 times a week .    If you have any questions or concerns, please don't hesitate to call.         Sincerely,          Sudeep Watson,         CC: No Recipients

## 2024-01-29 NOTE — PROGRESS NOTES
Assessment/Plan:    No problem-specific Assessment & Plan notes found for this encounter.       Diagnoses and all orders for this visit:    S/P left rotator cuff repair          Think, she is doing much better.  Continue therapy for terminal range of motion and strength exercises.  She was given a 5 pound weight restriction along the left.  Return back 6 weeks for strength and motion check.  The importance of home exercise program was also discussed    Subjective:      Patient ID: Geraldine Garner is a 48 y.o. female.    HPI    The patient is status post rotator cuff repair of her left shoulder from November 6.  Her pain is starting to decrease.  Her motion is increasing.  Her strength is increasing as well.  She is pleased with her results    The following portions of the patient's history were reviewed and updated as appropriate: allergies, current medications, past family history, past medical history, past social history, past surgical history, and problem list.    Review of Systems   Constitutional:  Negative for chills, fever and unexpected weight change.   HENT:  Negative for hearing loss, nosebleeds and sore throat.    Eyes:  Negative for pain, redness and visual disturbance.   Respiratory:  Negative for cough, shortness of breath and wheezing.    Cardiovascular:  Negative for chest pain, palpitations and leg swelling.   Gastrointestinal:  Negative for abdominal pain, nausea and vomiting.   Endocrine: Negative for polydipsia and polyuria.   Genitourinary:  Negative for dysuria and hematuria.   Musculoskeletal:  Positive for arthralgias and myalgias. Negative for back pain, gait problem, joint swelling, neck pain and neck stiffness.        As noted in HPI   Skin:  Negative for rash and wound.   Neurological:  Negative for dizziness, numbness and headaches.   Psychiatric/Behavioral:  Negative for decreased concentration and suicidal ideas. The patient is not nervous/anxious.          Objective:      /86    Pulse 80   Ht 5' (1.524 m)   Wt 70.3 kg (155 lb)   BMI 30.27 kg/m²          Physical Exam      Neck was soft and supple.  There is a negative axial compression test in her neck.  Left upper extremity is neurovascularly intact.  Fingers are pink and mobile.  Compartments are soft.  She can actively forward flex and abduct past 135 degrees.  Internal rotation to just above the sacrum.  No impingement.  No instability.  Rotator cuff strength testing was 4-4-1/2 out of 5.  Arc of motion is improved

## 2024-01-30 ENCOUNTER — TELEPHONE (OUTPATIENT)
Dept: OBGYN CLINIC | Facility: HOSPITAL | Age: 49
End: 2024-01-30

## 2024-01-30 NOTE — TELEPHONE ENCOUNTER
Caller: [facility]    Doctor: Oni White     Reason for call: PT orders   Faxed to [phone]    Call back#:

## 2024-01-31 ENCOUNTER — TELEPHONE (OUTPATIENT)
Age: 49
End: 2024-01-31

## 2024-01-31 ENCOUNTER — TELEPHONE (OUTPATIENT)
Dept: OBGYN CLINIC | Facility: HOSPITAL | Age: 49
End: 2024-01-31

## 2024-01-31 DIAGNOSIS — G47.00 INSOMNIA, UNSPECIFIED TYPE: ICD-10-CM

## 2024-01-31 DIAGNOSIS — Z88.9 MULTIPLE ALLERGIES: ICD-10-CM

## 2024-01-31 NOTE — TELEPHONE ENCOUNTER
Caller: Vickie GALLARDO CM @ [facility]    Doctor: Shirley    Reason for call: [facility] would like an updated PT order from  on 1/29/24.  Please fax to:    FAX#:  [phone]    Call back#: [phone]

## 2024-01-31 NOTE — TELEPHONE ENCOUNTER
[facility]    Doctor: Shirley    Reason for call: Please send a new PT script for left shoulder rotator cuff.  Please fax to: [phone]  Also fax to PAULINA Johnston: [phone]    Call back#: [phone]

## 2024-01-31 NOTE — TELEPHONE ENCOUNTER
Caller: PAULINA    Doctor: Shirley    Reason for call: patient currently at [facility] for  PT, please fax script to [phone] attn: Teresa.    I attempted to send but can't verify it will go through correctly.

## 2024-02-01 RX ORDER — ZOLPIDEM TARTRATE 10 MG/1
10 TABLET ORAL
Qty: 30 TABLET | Refills: 0 | Status: SHIPPED | OUTPATIENT
Start: 2024-02-01

## 2024-02-01 RX ORDER — MONTELUKAST SODIUM 10 MG/1
10 TABLET ORAL EVERY EVENING
Qty: 90 TABLET | Refills: 1 | Status: SHIPPED | OUTPATIENT
Start: 2024-02-01

## 2024-02-01 NOTE — TELEPHONE ENCOUNTER
Request forward to provider.  Pmed:    Patients      SELECT PATIENT ID NAME  [facility]   [] 1 JACKADAN CUBA 1975 F [address] [id number] RADHA PALMA   [] 2  BEREKET 1975 F [address] [facility] [id number] GHISLAINE [place]   [] 3  BEREKET 1975 F [address] [id number] [facility]           Search Criteria    NAME DATE OF BIRTH DATE RANGE   JACKADAN CUBA 1975 02- To 2024     REQUESTER NAME REQUESTED DATE   MARTA MARTIR u 2024 08:56:13 GMT-0500 (Eastern Standard Time)     Summary  Prescriptions  13  Prescribers  3  Pharmacies  2  View Map  Drug Classes  Benzodiazepines  0  Stimulants  0  Opioids  2  Muscle Relaxants  0  Opioid Dosage  Total MME for Active Prescriptions  0    Average MME  33.41  MME Graph   MME Calculator     Prescriptions  Notifications    Prescribers  Pharmacies  MME Graph    [] [place] Drug Categories:     [] Opioids      [] Others      Showentries  Search:  PATIENT ID PRESCRIPTION # FILLED WRITTEN DRUG LABEL QTY DAYS STRENGTH MME** PRESCRIBER PHARMACY PAYMENT REFILL #/AUTH STATE DETAIL   1 [id number] 2023 Zolpidem Tartrate (Tablet) 30.0 30 10 MG LAMIN RAMIRES) [facility] Commercial Insurance 0 / 0 [place]    2 [id number] 2023 ACETAMINOPHEN 325 MG / oxyCODONE HYDROCHLORIDE 5 MG ORAL TABLET (Tablet) 28.0 7 5.0 MG/325.0 MG 30.0 [facility] [employer] Commercial Insurance 0 / 0 [place]    2 [id number] 2023 11/10/2023 Zolpidem Tartrate (Tablet) 30.0 30 10 MG LAMIN RAMIRES) [facility] [employer] Commercial Insurance 0 / 0 PA    2 [id number] 2023 ACETAMINOPHEN 325 MG / oxyCODONE HYDROCHLORIDE 5 MG ORAL TABLET (Tablet) 21.0 4 5.0 MG/325.0 MG 39.38 LOPEZ WILLIS JR [employer] Commercial Insurance 0 / 0 PA    3 [id number] 10/23/2023 10/23/2023 Zolpidem Tartrate (Tablet)

## 2024-02-01 NOTE — TELEPHONE ENCOUNTER
Caller: Vickie GALLARDO @ [facility]    Doctor: Shirley    Reason for call: Vickie stated [facility] received the 11/6/23 PT referral, but is requesting an updated PT referral to include the # of visits requested for the patient.     Please fax to   [facility]: [phone]  [facility]: [phone]    Thank you    Call back#:   [phone]

## 2024-02-02 DIAGNOSIS — Z98.890 S/P LEFT ROTATOR CUFF REPAIR: Primary | ICD-10-CM

## 2024-02-02 NOTE — TELEPHONE ENCOUNTER
Caller: Teresa from [facility]    Doctor: Dr. Watson    Reason for call: Please fax new order for PT placed by Dr. Watson today.  Patient will be coming in at 11AM for an appt today for PT.      Attn: Teresa  Fax: [phone]    Call back#: [phone]

## 2024-02-09 ENCOUNTER — TELEPHONE (OUTPATIENT)
Dept: OBGYN CLINIC | Facility: MEDICAL CENTER | Age: 49
End: 2024-02-09

## 2024-02-09 NOTE — TELEPHONE ENCOUNTER
Caller: Vickie GALLARDO Nurse      Doctor: Dr Watson     Reason for call:  Vickie is calling and asking if the patient's PT referral may please be changed to say January 30 it is now dated 2/2/ Thank you    Please fax to [facility]  [phone]        Call back#:[phone]

## 2024-02-12 DIAGNOSIS — Z98.890 S/P LEFT ROTATOR CUFF REPAIR: Primary | ICD-10-CM

## 2024-02-14 DIAGNOSIS — E28.39 ESTROGEN DEFICIENCY: ICD-10-CM

## 2024-02-15 RX ORDER — ESTROGENS, CONJUGATED 0.45 MG/1
TABLET, FILM COATED ORAL
Qty: 21 TABLET | Refills: 0 | Status: SHIPPED | OUTPATIENT
Start: 2024-02-15

## 2024-02-21 PROBLEM — J01.00 ACUTE NON-RECURRENT MAXILLARY SINUSITIS: Status: RESOLVED | Noted: 2019-01-22 | Resolved: 2024-02-21

## 2024-02-21 PROBLEM — Z00.00 HEALTHCARE MAINTENANCE: Status: RESOLVED | Noted: 2019-11-21 | Resolved: 2024-02-21

## 2024-02-21 PROBLEM — R05.9 COUGH: Status: RESOLVED | Noted: 2019-01-22 | Resolved: 2024-02-21

## 2024-03-01 DIAGNOSIS — G47.00 INSOMNIA, UNSPECIFIED TYPE: ICD-10-CM

## 2024-03-04 DIAGNOSIS — E66.09 CLASS 1 OBESITY DUE TO EXCESS CALORIES WITH SERIOUS COMORBIDITY AND BODY MASS INDEX (BMI) OF 33.0 TO 33.9 IN ADULT: ICD-10-CM

## 2024-03-04 RX ORDER — ZOLPIDEM TARTRATE 10 MG/1
10 TABLET ORAL
Qty: 30 TABLET | Refills: 0 | Status: SHIPPED | OUTPATIENT
Start: 2024-03-04

## 2024-03-04 NOTE — TELEPHONE ENCOUNTER
Pharmacy requesting refill on Ambien   Last seen 8/9/23         1 DYLAN DOAN 1975 F [id number] [id number] RADHA [place]   2 DYLAN DOAN 1975 F [id number] [id number] GHISLAINE PA   3 DYLAN DOAN 1975 F [id number] [id number] ALEXA PALMA      Search Criteria  NAME DATE OF BIRTH DATE RANGE  dylan doan 1975 03- To 03-  REQUESTER NAME REQUESTED DATE  MARTA FRANCISCOGLENN Mon Mar 04 2024 09:09:46 GMT-0500 (Eastern Standard Time)  Summary  Prescriptions  13  Prescribers  3  Pharmacies  2  Drug Classes  Benzodiazepines  0  Stimulants  0  Opioids  2  Muscle Relaxants  0  Opioid Dosage  Total MME for Active Prescriptions  0    Average MME  33.41         Prescriptions  Notifications    Prescribers  Pharmacies  MME Graph    Show All     PA   Drug Categories:      Opioids       Others     Show  10  entries  Search:  PATIENT ID PRESCRIPTION # FILLED WRITTEN DRUG LABEL QTY DAYS STRENGTH MME** PRESCRIBER PHARMACY PAYMENT REFILL #/AUTH STATE DETAIL  1 [id number] 02/01/2024 02/01/2024 Zolpidem Tartrate (Tablet) 30.0 30 10 MG LAMIN RAMIRES) [facility] Commercial Insurance 0 / 0 [place]   1 [id number] 12/27/2023 12/27/2023 Zolpidem Tartrate (Tablet) 30.0 30 10 MG NA IKE) [facility] Commercial Insurance 0 / 0 [place]   2 [id number] 11/13/2023 11/13/2023 ACETAMINOPHEN 325 MG / oxyCODONE HYDROCHLORIDE 5 MG ORAL TABLET (Tablet) 28.0 7 5.0 MG/325.0 MG 30.0 RADHA BLACKWELL [employer] Commercial Insurance 0 / 0 [place]   2 [id number] 11/11/2023 11/10/2023 Zolpidem Tartrate (Tablet) 30.0 30 10 MG NA IKE) [facility] 0 / 0 [place]   2 [id number] 11/06/2023 11/06/2023 ACETAMINOPHEN 325 MG / oxyCODONE HYDROCHLORIDE 5 MG ORAL TABLET (Tablet) 21.0 4 5.0 MG/325.0 MG 39.38 LOPEZ ODIERNO JR [employer] Commercial Insurance 0 / 0 [place]   3 [id number] 10/23/2023 10/23/2023 Zolpidem Tartrate (Tablet) 30.0 30 10  MG NA IKE) [facility] Commercial Insurance 0 / 0 [place]   3 [id number] 09/20/2023 09/20/2023 Zolpidem Tartrate (Tablet) 30.0 30 10 MG NA IKE) [facility] Private Pay 0 / 0 [place]   3 [id number] 08/17/2023 08/11/2023 Zolpidem Tartrate (Tablet) 30.0 30 10 MG NA IKE) [facility] Medicaid 0 / 0 [place]   1 [id number] 07/20/2023 07/20/2023 Zolpidem Tartrate (Tablet) 30.0 30 10 MG NA IKE) [facility] Medicaid 0 / 0 [place]   2 [id number] 06/13/2023 06/12/2023 Zolpidem Tartrate (Tablet) 30.0 30 10 MG

## 2024-03-05 NOTE — TELEPHONE ENCOUNTER
Pt last visit was 8/9/23 refill sent to provider. I sent message through pt my chart that she is due for visit.

## 2024-03-07 ENCOUNTER — OFFICE VISIT (OUTPATIENT)
Dept: FAMILY MEDICINE CLINIC | Facility: CLINIC | Age: 49
End: 2024-03-07
Payer: COMMERCIAL

## 2024-03-07 VITALS
WEIGHT: 174 LBS | OXYGEN SATURATION: 99 % | HEART RATE: 92 BPM | RESPIRATION RATE: 16 BRPM | HEIGHT: 60 IN | SYSTOLIC BLOOD PRESSURE: 120 MMHG | DIASTOLIC BLOOD PRESSURE: 70 MMHG | TEMPERATURE: 98.3 F | BODY MASS INDEX: 34.16 KG/M2

## 2024-03-07 DIAGNOSIS — G43.919 INTRACTABLE MIGRAINE WITHOUT STATUS MIGRAINOSUS, UNSPECIFIED MIGRAINE TYPE: ICD-10-CM

## 2024-03-07 DIAGNOSIS — J44.1 CHRONIC OBSTRUCTIVE PULMONARY DISEASE WITH ACUTE EXACERBATION (HCC): Primary | ICD-10-CM

## 2024-03-07 DIAGNOSIS — E66.09 CLASS 1 OBESITY DUE TO EXCESS CALORIES WITH SERIOUS COMORBIDITY AND BODY MASS INDEX (BMI) OF 33.0 TO 33.9 IN ADULT: ICD-10-CM

## 2024-03-07 DIAGNOSIS — S46.012D TRAUMATIC COMPLETE TEAR OF LEFT ROTATOR CUFF, SUBSEQUENT ENCOUNTER: ICD-10-CM

## 2024-03-07 DIAGNOSIS — E78.5 HYPERLIPIDEMIA, UNSPECIFIED HYPERLIPIDEMIA TYPE: ICD-10-CM

## 2024-03-07 PROBLEM — E66.811 CLASS 1 OBESITY DUE TO EXCESS CALORIES WITH SERIOUS COMORBIDITY AND BODY MASS INDEX (BMI) OF 33.0 TO 33.9 IN ADULT: Status: ACTIVE | Noted: 2024-03-07

## 2024-03-07 PROCEDURE — 99214 OFFICE O/P EST MOD 30 MIN: CPT | Performed by: NURSE PRACTITIONER

## 2024-03-07 RX ORDER — IBUPROFEN 600 MG/1
600 TABLET ORAL EVERY 6 HOURS PRN
Qty: 30 TABLET | Refills: 3 | Status: SHIPPED | OUTPATIENT
Start: 2024-03-07

## 2024-03-07 NOTE — ASSESSMENT & PLAN NOTE
- She was given a sample of Ozempic. Take 0.25 mg weekly for 4 weeks then 0.5 mg weekly for 2 weeks. Will then transition to Wegovy 1 mg weekly. Discussed side effects.  - Encourage healthy diet and regular exercise.  - Recommend follow up in 6 weeks.

## 2024-03-07 NOTE — ASSESSMENT & PLAN NOTE
Component      Latest Ref Rng 8/16/2022 8/10/2023   Cholesterol      See Comment mg/dL 231 (H)  210 (H)    Triglycerides      See Comment mg/dL 69  97    HDL      >=50 mg/dL 62  52    LDL Calculated      0 - 100 mg/dL 155 (H)  139 (H)      - Not well controlled.  - Encourage healthy diet and regular exercise.  - Will continue to monitor fasting lipid panel.

## 2024-03-07 NOTE — PROGRESS NOTES
Name: Geraldine Garner      : 1975      MRN: [id number]  Encounter Provider: CARLA James  Encounter Date: 3/7/2024   Encounter department: [facility]    Assessment & Plan     1. Chronic obstructive pulmonary disease with acute exacerbation (HCC)  Assessment & Plan:  - Well controlled on current inhalers. Continue same.   - Will continue to monitor.       2. Hyperlipidemia, unspecified hyperlipidemia type  Assessment & Plan:  Component      Latest Ref Rng 2022 8/10/2023   Cholesterol      See Comment mg/dL 231 (H)  210 (H)    Triglycerides      See Comment mg/dL 69  97    HDL      >=50 mg/dL 62  52    LDL Calculated      0 - 100 mg/dL 155 (H)  139 (H)      - Not well controlled.  - Encourage healthy diet and regular exercise.  - Will continue to monitor fasting lipid panel.       3. Class 1 obesity due to excess calories with serious comorbidity and body mass index (BMI) of 33.0 to 33.9 in adult  Assessment & Plan:  - She was given a sample of Ozempic. Take 0.25 mg weekly for 4 weeks then 0.5 mg weekly for 2 weeks. Will then transition to Wegovy 1 mg weekly. Discussed side effects.  - Encourage healthy diet and regular exercise.  - Recommend follow up in 6 weeks.     Orders:  -     Semaglutide-Weight Management (WEGOVY) 1 MG/0.5ML; Inject 0.5 mL (1 mg total) under the skin once a week for 4 doses    4. Intractable migraine without status migrainosus, unspecified migraine type  Assessment & Plan:  - Well controlled with use of Imitrex as needed. Continue same.   - Will continue to monitor.      5. Traumatic complete tear of left rotator cuff, subsequent encounter  Assessment & Plan:  - Continue PT and routine follow up with Ortho.    Orders:  -     ibuprofen (MOTRIN) 600 mg tablet; Take 1 tablet (600 mg total) by mouth every 6 (six) hours as needed for mild pain           Subjective     Patient with PMH of asthma, COPD, HLD, hyperglycemia, anxiety, and depression  presents to office today for follow up. She is taking her prescribed medication and reports no side effects. Last year she had a traumatic tear of the left rotator cuff that was repaired in December. She has been going to PT. Since then she had gained weight. She was previously on Wegovy and lost about 45 pounds in 4 months. She has now gained most of that weight back but she would like to go back on the wegovy. She did lose her state insurance but looked into coupons where she would pay about $200/month which she is willing to do. She denies any other concerns or complaints today.          Review of Systems   Constitutional:  Negative for fatigue and fever.   HENT:  Negative for trouble swallowing.    Eyes:  Negative for visual disturbance.   Respiratory:  Negative for cough and shortness of breath.    Cardiovascular:  Negative for chest pain and palpitations.   Gastrointestinal:  Negative for abdominal pain and blood in stool.   Endocrine: Negative for cold intolerance and heat intolerance.   Genitourinary:  Negative for difficulty urinating and dysuria.   Musculoskeletal:  Negative for gait problem.   Skin:  Negative for rash.   Neurological:  Negative for dizziness, syncope and headaches.   Hematological:  Negative for adenopathy.   Psychiatric/Behavioral:  Negative for behavioral problems.        Past Medical History:   Diagnosis Date   • Anxiety     h/o 2 panic attacks    • Arthritis     l hip congenital dyspasia   • Asthma     good control   • Bleb     R eye   • Chronic kidney disease     one kidney left side   • Chronic pain     back- DJD in 2 upper 2 lower, buldging disc c-7   • Chronic pain disorder     lower back and hips   • Depression    • Endometriosis    • GERD (gastroesophageal reflux disease)    • Glaucoma     b/l . Bleb in R eye   • Glaucoma    • H/O carpal tunnel syndrome     L   • Headache     migraines   • History of cigarette smoking    • History of transfusion    • Hypercholesterolemia  1/17/2013   • Insomnia    • Migraines    • Osteoarthritis     cervical spondylarthritis   • Polypoid cystitis     POLYP CYSTS ON BUTTOCK AREA   • Renal disorder     Reports single kidney   • Right knee injury     SCRAPED GROWTH PLATE RIGHT KNEE   • Seasonal allergies      Past Surgical History:   Procedure Laterality Date   • BLEPHAROPLASTY Right     BLEB RIGHT EYE   • BUNIONECTOMY Bilateral     2 on R, 3 on left   • CARPAL TUNNEL RELEASE Left    • CT NEEDLE BX ASPIRATION INJECTION LOCALIZATION  3/8/2019   • EYE SURGERY Right     bleb implant   • EYE SURGERY Bilateral     lazer- glaucoma   • FOOT HARDWARE REMOVAL Left 7/2/2018    Procedure: CALCANEAL REMOVAL OF HARDWARE;  Surgeon: Trell Betancourt DPM;  Location:  MAIN OR;  Service: Podiatry   • FOOT HARDWARE REMOVAL Left 7/5/2018    Procedure: LEFT CALCANEAL REMOVAL OF HARDWARE;  Surgeon: Trell Betancourt DPM;  Location:  MAIN OR;  Service: Podiatry   • FOOT SURGERY Right     BONE REMOVED BOTTOM OF RIGHT FOOT   • HAND SURGERY Right     ring finger tendon severed   • HARDWARE REMOVAL Left 7/5/2018    Procedure: LEFT BIG TOE REMOVAL OF HARDWARE;  Surgeon: Trell Betancourt DPM;  Location:  MAIN OR;  Service: Podiatry   • HIP SURGERY Left     reconstruction as child   • HYSTERECTOMY      total abdominal   • HYSTERECTOMY      TOTAL   • JOINT REPLACEMENT      LTHR and reconstruction   • KNEE ARTHROSCOPY      R knee   • LEG SURGERY     • LISFRANC ARTHRODESIS Left 11/3/2017    Procedure: FUSION FIRST MTPJ: BROSTRUM; LATERAL ANKLE STABILIZATION;  Surgeon: Trell Betancourt DPM;  Location: [facility];  Service: Podiatry   • OOPHORECTOMY      age 39   • PILONIDAL CYST EXCISION      midline   • AR DCMPRN FASCT LEG ANT&/LAT&PST W/DBRDMT MUS Left 7/5/2018    Procedure: LEFT PERONEAL TENDON DEBRIDMENT ;  Surgeon: Trell Betancourt DPM;  Location:  MAIN OR;  Service: Podiatry   • AR OSTEOTOMY CALCANEUS W/WO INTERNAL FIXATION Left 11/3/2017    Procedure: OSTEOTOMY CALCANEUS;  Surgeon:  Trell Betancourt DPM;  Location: [facility];  Service: Podiatry   • GA RPR TENDON FLXR FOOT SEC W/FREE GRAFT EA TENDON Left 7/2/2018    Procedure: PERONEAL TENDON EXPLORATION;  Surgeon: Trell Betancourt DPM;  Location:  MAIN OR;  Service: Podiatry   • GA SURGICAL ARTHROSCOPY SHOULDER W/ROTATOR CUFF RPR Left 11/6/2023    Procedure: Left - ARTHROSCOPY SHOULDER Synovectomy Debridement Arthroscopic rotator cuff repair Acromioplasty Post arthroscopic injections of intra-articular joint space and peripheral portals;  Surgeon: Sudeep Watson DO;  Location: [facility];  Service: Orthopedics   • REFRACTIVE SURGERY Bilateral     LASER SURGERY BOTH EYES   • TOTAL HIP ARTHROPLASTY  2005   • WRIST SURGERY Left      Family History   Problem Relation Age of Onset   • Cancer Mother    • Hypertension Mother    • Bone cancer Mother 44        COD   • Breast cancer Mother 44   • Hypertension Father    • Heart disease Father    • Dementia Father    • Lung cancer Sister 44   • Brain cancer Sister 49   • No Known Problems Sister    • No Known Problems Maternal Grandmother    • No Known Problems Maternal Grandfather    • Liver cancer Paternal Grandmother 73   • Hypertension Paternal Grandfather    • Stroke Paternal Grandfather    • Breast cancer Maternal Aunt    • Breast cancer Maternal Aunt 53   • No Known Problems Paternal Aunt      Social History     Socioeconomic History   • Marital status: /Civil Union     Spouse name: None   • Number of children: None   • Years of education: None   • Highest education level: None   Occupational History   • None   Tobacco Use   • Smoking status: Every Day     Current packs/day: 0.25     Average packs/day: 0.3 packs/day for 30.0 years (7.5 ttl pk-yrs)     Types: Cigarettes   • Smokeless tobacco: Never   Vaping Use   • Vaping status: Never Used   Substance and Sexual Activity   • Alcohol use: No   • Drug use: Never   • Sexual activity: Yes   Other Topics Concern   • None   Social History  Narrative    ** Merged History Encounter **          Social Determinants of Health     Financial Resource Strain: Not on file   Food Insecurity: Not on file   Transportation Needs: Not on file   Physical Activity: Not on file   Stress: Not on file   Social Connections: Not on file   Intimate Partner Violence: Not on file   Housing Stability: Not on file     Current Outpatient Medications on File Prior to Visit   Medication Sig   • albuterol (2.5 mg/3 mL) 0.083 % nebulizer solution Take 3 mL (2.5 mg total) by nebulization every 6 (six) hours as needed for wheezing or shortness of breath   • albuterol (PROVENTIL HFA,VENTOLIN HFA) 90 mcg/act inhaler INHALE 2 PUFFS BY MOUTH EVERY 6 HOURS AS NEEDED FOR WHEEZE   • cyclobenzaprine (FLEXERIL) 10 mg tablet Take 1 tablet (10 mg total) by mouth daily as needed for muscle spasms   • fluticasone (FLONASE) 50 mcg/act nasal spray SPRAY 2 SPRAYS INTO EACH NOSTRIL EVERY DAY (Patient taking differently: 2 sprays into each nostril if needed for allergies)   • fluticasone-salmeterol (Wixela Inhub) 500-50 mcg/dose inhaler Inhale 1 puff daily Rinse mouth after use. (Patient taking differently: Inhale 1 puff if needed (Wheezing) Rinse mouth after use.)   • ipratropium (Atrovent HFA) 17 mcg/act inhaler Inhale 2 puffs every 6 (six) hours   • meloxicam (MOBIC) 15 mg tablet Take 1 tablet (15 mg total) by mouth daily   • montelukast (SINGULAIR) 10 mg tablet TAKE 1 TABLET BY MOUTH EVERY DAY IN THE EVENING   • pantoprazole (PROTONIX) 40 mg tablet TAKE 1 TABLET BY MOUTH EVERY DAY BEFORE BREAKFAST   • Premarin 0.45 MG tablet TAKE 1 TABLET IN MORNING FOR 21 DAYS THEN DO NOT TAKE FOR 7 DAYS   • promethazine-codeine (PHENERGAN WITH CODEINE) 6.25-10 mg/5 mL syrup Take 5 mL by mouth every 4 (four) hours as needed for cough   • SUMAtriptan (IMITREX) 50 mg tablet TAKE 2 TABLETS (100 MG TOTAL) BY MOUTH ONCE AS NEEDED FOR MIGRAINE   • zolpidem (AMBIEN) 10 mg tablet TAKE 1 TABLET BY MOUTH DAILY AT BEDTIME  AS NEEDED FOR SLEEP.   • ondansetron (ZOFRAN) 4 mg tablet TAKE 1 TABLET BY MOUTH EVERY 8 HOURS AS NEEDED FOR NAUSEA AND VOMITING (Patient taking differently: Take 4 mg by mouth every 8 (eight) hours as needed for nausea)   • [DISCONTINUED] Semaglutide-Weight Management (WEGOVY) 1.7 MG/0.75ML Inject 0.75 mL (1.7 mg total) under the skin once a week     Allergies   Allergen Reactions   • Morphine Other (See Comments)     Blood pressure drops   • Morphine And Related Other (See Comments)     Blood pressure drops   • Penicillins Itching and Rash   • Quazepam Itching     Immunization History   Administered Date(s) Administered   • COVID-19 MODERNA VACC 0.5 ML IM 01/11/2021, 02/10/2021   • H1N1, All Formulations 10/31/2009   • INFLUENZA 01/15/2007, 11/13/2007, 09/30/2008, 12/10/2009, 11/28/2011, 09/11/2012, 09/19/2013, 11/29/2017, 11/29/2017, 11/02/2020   • Influenza, seasonal, injectable 10/01/2010   • Td (adult), adsorbed 01/01/2007   • Tdap 09/30/2008       Objective     /70 (BP Location: Left arm, Patient Position: Sitting, Cuff Size: Large)   Pulse 92   Temp 98.3 °F (36.8 °C) (Tympanic)   Resp 16   Ht 5' (1.524 m)   Wt 78.9 kg (174 lb)   SpO2 99%   BMI 33.98 kg/m²     Physical Exam  Vitals and nursing note reviewed.   Constitutional:       General: She is not in acute distress.     Appearance: Normal appearance. She is not ill-appearing.   HENT:      Head: Normocephalic and atraumatic.      Right Ear: External ear normal.      Left Ear: External ear normal.   Eyes:      Conjunctiva/sclera: Conjunctivae normal.   Cardiovascular:      Rate and Rhythm: Normal rate and regular rhythm.      Heart sounds: Normal heart sounds.   Pulmonary:      Effort: Pulmonary effort is normal.      Breath sounds: Normal breath sounds.   Musculoskeletal:         General: Normal range of motion.      Cervical back: Normal range of motion.   Skin:     General: Skin is warm and dry.   Neurological:      Mental Status: She is  alert and oriented to person, place, and time.   Psychiatric:         Mood and Affect: Mood normal.         Behavior: Behavior normal.       CARLA James

## 2024-03-08 ENCOUNTER — OFFICE VISIT (OUTPATIENT)
Dept: OBGYN CLINIC | Facility: CLINIC | Age: 49
End: 2024-03-08
Payer: OTHER MISCELLANEOUS

## 2024-03-08 VITALS — BODY MASS INDEX: 34.16 KG/M2 | WEIGHT: 174 LBS | HEIGHT: 60 IN

## 2024-03-08 DIAGNOSIS — Z98.890 S/P LEFT ROTATOR CUFF REPAIR: Primary | ICD-10-CM

## 2024-03-08 PROCEDURE — 99213 OFFICE O/P EST LOW 20 MIN: CPT | Performed by: ORTHOPAEDIC SURGERY

## 2024-03-08 NOTE — PROGRESS NOTES
ASSESSMENT/PLAN:    Diagnoses and all orders for this visit:    S/P left rotator cuff repair  -     Ambulatory Referral to Physical Therapy; Future        The patient was seen and examined.  She is continuing to progress in surgery.  She was given a new prescription for physical therapy to work on strengthening, stretching and range of motion 3 times per week for the next 6 weeks.  She will follow-up with our office in 6 weeks.  She is acceptable to this plan.    Return in about 6 weeks (around 4/19/2024).    The patient is doing much better from a rotator cuff repair.  She states she is lacking some terminal external rotation.  Strength is fairly intact.  Would recommend continuation of home exercise program and therapy till she plateaus.  She does not need any devices to help with this.  This will get better over time.  She was given a 30 pound weight restriction.  Follow-up in 2 months for final strength and motion check at that point, she will be released to go back to work any full and unlimited duty capacity  _____________________________________________________  CHIEF COMPLAINT:  Chief Complaint   Patient presents with    Left Shoulder - Follow-up         SUBJECTIVE:  Geraldine Garner is a 48 y.o. female who presents to our office for a postop visit.  The patient is status post left shoulder arthroscopy with rotator cuff repair from 11/6/2023.  She she is pleased with the results so far from surgery.  Her strength and range of motion have been improving.  She denies any numbness or tingling.  She denies any fever or chills.    The following portions of the patient's history were reviewed and updated as appropriate: allergies, current medications, past family history, past medical history, past social history, past surgical history and problem list.    PAST MEDICAL HISTORY:  Past Medical History:   Diagnosis Date    Anxiety     h/o 2 panic attacks     Arthritis     l hip congenital dyspasia    Asthma     good  control    Bleb     R eye    Chronic kidney disease     one kidney left side    Chronic pain     back- DJD in 2 upper 2 lower, buldging disc c-7    Chronic pain disorder     lower back and hips    Depression     Endometriosis     GERD (gastroesophageal reflux disease)     Glaucoma     b/l . Bleb in R eye    Glaucoma     H/O carpal tunnel syndrome     L    Headache     migraines    History of cigarette smoking     History of transfusion     Hypercholesterolemia 1/17/2013    Insomnia     Migraines     Osteoarthritis     cervical spondylarthritis    Polypoid cystitis     POLYP CYSTS ON BUTTOCK AREA    Renal disorder     Reports single kidney    Right knee injury     SCRAPED GROWTH PLATE RIGHT KNEE    Seasonal allergies        PAST SURGICAL HISTORY:  Past Surgical History:   Procedure Laterality Date    BLEPHAROPLASTY Right     BLEB RIGHT EYE    BUNIONECTOMY Bilateral     2 on R, 3 on left    CARPAL TUNNEL RELEASE Left     CT NEEDLE BX ASPIRATION INJECTION LOCALIZATION  3/8/2019    EYE SURGERY Right     bleb implant    EYE SURGERY Bilateral     lazer- glaucoma    FOOT HARDWARE REMOVAL Left 7/2/2018    Procedure: CALCANEAL REMOVAL OF HARDWARE;  Surgeon: Trell Betancourt DPM;  Location:  MAIN OR;  Service: Podiatry    FOOT HARDWARE REMOVAL Left 7/5/2018    Procedure: LEFT CALCANEAL REMOVAL OF HARDWARE;  Surgeon: Trell Betancourt DPM;  Location:  MAIN OR;  Service: Podiatry    FOOT SURGERY Right     BONE REMOVED BOTTOM OF RIGHT FOOT    HAND SURGERY Right     ring finger tendon severed    HARDWARE REMOVAL Left 7/5/2018    Procedure: LEFT BIG TOE REMOVAL OF HARDWARE;  Surgeon: Trell Betancourt DPM;  Location:  MAIN OR;  Service: Podiatry    HIP SURGERY Left     reconstruction as child    HYSTERECTOMY      total abdominal    HYSTERECTOMY      TOTAL    JOINT REPLACEMENT      LTHR and reconstruction    KNEE ARTHROSCOPY      R knee    LEG SURGERY      LISFRANC ARTHRODESIS Left 11/3/2017    Procedure: FUSION FIRST MTPJ: BROSTRUM;  LATERAL ANKLE STABILIZATION;  Surgeon: Trell Betancourt DPM;  Location: [facility];  Service: Podiatry    OOPHORECTOMY      age 39    PILONIDAL CYST EXCISION      midline    TN DCMPRN FASCT LEG ANT&/LAT&PST W/DBRDMT MUS Left 7/5/2018    Procedure: LEFT PERONEAL TENDON DEBRIDMENT ;  Surgeon: Trell Betancourt DPM;  Location:  MAIN OR;  Service: Podiatry    TN OSTEOTOMY CALCANEUS W/WO INTERNAL FIXATION Left 11/3/2017    Procedure: OSTEOTOMY CALCANEUS;  Surgeon: Trell Betancourt DPM;  Location: [facility];  Service: Podiatry    TN RPR TENDON FLXR FOOT SEC W/FREE GRAFT EA TENDON Left 7/2/2018    Procedure: PERONEAL TENDON EXPLORATION;  Surgeon: Trell Betancourt DPM;  Location:  MAIN OR;  Service: Podiatry    TN SURGICAL ARTHROSCOPY SHOULDER W/ROTATOR CUFF RPR Left 11/6/2023    Procedure: Left - ARTHROSCOPY SHOULDER Synovectomy Debridement Arthroscopic rotator cuff repair Acromioplasty Post arthroscopic injections of intra-articular joint space and peripheral portals;  Surgeon: Sudeep Watson DO;  Location: [facility];  Service: Orthopedics    REFRACTIVE SURGERY Bilateral     LASER SURGERY BOTH EYES    TOTAL HIP ARTHROPLASTY  2005    WRIST SURGERY Left        FAMILY HISTORY:  Family History   Problem Relation Age of Onset    Cancer Mother     Hypertension Mother     Bone cancer Mother 44        COD    Breast cancer Mother 44    Hypertension Father     Heart disease Father     Dementia Father     Lung cancer Sister 44    Brain cancer Sister 49    No Known Problems Sister     No Known Problems Maternal Grandmother     No Known Problems Maternal Grandfather     Liver cancer Paternal Grandmother 73    Hypertension Paternal Grandfather     Stroke Paternal Grandfather     Breast cancer Maternal Aunt     Breast cancer Maternal Aunt 53    No Known Problems Paternal Aunt        SOCIAL HISTORY:  Social History     Tobacco Use    Smoking status: Every Day     Current packs/day: 0.25     Average packs/day: 0.3 packs/day for 30.0 years  (7.5 ttl pk-yrs)     Types: Cigarettes    Smokeless tobacco: Never   Vaping Use    Vaping status: Never Used   Substance Use Topics    Alcohol use: No    Drug use: Never       MEDICATIONS:    Current Outpatient Medications:     albuterol (2.5 mg/3 mL) 0.083 % nebulizer solution, Take 3 mL (2.5 mg total) by nebulization every 6 (six) hours as needed for wheezing or shortness of breath, Disp: 150 mL, Rfl: 0    albuterol (PROVENTIL HFA,VENTOLIN HFA) 90 mcg/act inhaler, INHALE 2 PUFFS BY MOUTH EVERY 6 HOURS AS NEEDED FOR WHEEZE, Disp: 8 g, Rfl: 3    cyclobenzaprine (FLEXERIL) 10 mg tablet, Take 1 tablet (10 mg total) by mouth daily as needed for muscle spasms, Disp: 30 tablet, Rfl: 0    fluticasone (FLONASE) 50 mcg/act nasal spray, SPRAY 2 SPRAYS INTO EACH NOSTRIL EVERY DAY (Patient taking differently: 2 sprays into each nostril if needed for allergies), Disp: 16 mL, Rfl: 3    fluticasone-salmeterol (Wixela Inhub) 500-50 mcg/dose inhaler, Inhale 1 puff daily Rinse mouth after use. (Patient taking differently: Inhale 1 puff if needed (Wheezing) Rinse mouth after use.), Disp: 60 blister, Rfl: 0    ibuprofen (MOTRIN) 600 mg tablet, Take 1 tablet (600 mg total) by mouth every 6 (six) hours as needed for mild pain, Disp: 30 tablet, Rfl: 3    ipratropium (Atrovent HFA) 17 mcg/act inhaler, Inhale 2 puffs every 6 (six) hours, Disp: 38.7 g, Rfl: 3    meloxicam (MOBIC) 15 mg tablet, Take 1 tablet (15 mg total) by mouth daily, Disp: 30 tablet, Rfl: 0    montelukast (SINGULAIR) 10 mg tablet, TAKE 1 TABLET BY MOUTH EVERY DAY IN THE EVENING, Disp: 90 tablet, Rfl: 1    ondansetron (ZOFRAN) 4 mg tablet, TAKE 1 TABLET BY MOUTH EVERY 8 HOURS AS NEEDED FOR NAUSEA AND VOMITING (Patient taking differently: Take 4 mg by mouth every 8 (eight) hours as needed for nausea), Disp: 20 tablet, Rfl: 0    pantoprazole (PROTONIX) 40 mg tablet, TAKE 1 TABLET BY MOUTH EVERY DAY BEFORE BREAKFAST, Disp: 30 tablet, Rfl: 5    Premarin 0.45 MG tablet, TAKE 1  TABLET IN MORNING FOR 21 DAYS THEN DO NOT TAKE FOR 7 DAYS, Disp: 21 tablet, Rfl: 0    promethazine-codeine (PHENERGAN WITH CODEINE) 6.25-10 mg/5 mL syrup, Take 5 mL by mouth every 4 (four) hours as needed for cough, Disp: 120 mL, Rfl: 0    Semaglutide-Weight Management (WEGOVY) 1 MG/0.5ML, Inject 0.5 mL (1 mg total) under the skin once a week for 4 doses, Disp: 2 mL, Rfl: 0    SUMAtriptan (IMITREX) 50 mg tablet, TAKE 2 TABLETS (100 MG TOTAL) BY MOUTH ONCE AS NEEDED FOR MIGRAINE, Disp: 9 tablet, Rfl: 5    zolpidem (AMBIEN) 10 mg tablet, TAKE 1 TABLET BY MOUTH DAILY AT BEDTIME AS NEEDED FOR SLEEP., Disp: 30 tablet, Rfl: 0    ALLERGIES:  Allergies   Allergen Reactions    Morphine Other (See Comments)     Blood pressure drops    Morphine And Related Other (See Comments)     Blood pressure drops    Penicillins Itching and Rash    Quazepam Itching       ROS:  Review of Systems     Constitutional: Negative for fatigue, fever or loss of appetite.   HENT: Negative.    Respiratory: Negative for shortness of breath, dyspnea.    Cardiovascular: Negative for chest pain/tightness.   Gastrointestinal: Negative for abdominal pain, N/V.   Endocrine: Negative for cold/heat intolerance, unexplained weight loss/gain.   Genitourinary: Negative for flank pain, dysuria, hematuria.   Musculoskeletal: Negative for arthralgia   Skin: Negative for rash.    Neurological: Negative for numbness or tingling  Psychiatric/Behavioral: Negative for agitation.  _____________________________________________________  PHYSICAL EXAMINATION:    Height 5' (1.524 m), weight 78.9 kg (174 lb).    Constitutional: Oriented to person, place, and time. Appears well-developed and well-nourished. No distress.   HENT:   Head: Normocephalic.   Eyes: Conjunctivae are normal. Right eye exhibits no discharge. Left eye exhibits no discharge. No scleral icterus.   Cardiovascular: Normal rate.    Pulmonary/Chest: Effort normal.   Neurological: Alert and oriented to person,  place, and time.   Skin: Skin is warm and dry. No rash noted. Not diaphoretic. No erythema. No pallor.   Psychiatric: Normal mood and affect. Behavior is normal. Judgment and thought content normal.      MUSCULOSKELETAL EXAMINATION:   Physical Exam  Ortho Exam    Left upper extremity is neurovascularly intact  Fingers are pink and mobile  Compartments are soft  Range of motion of the shoulder is from 0 to 140 degrees forward flexion abduction  Rotator cuff strength improved  Incisions well-healed  Brisk cap refill  Sensation intact  Objective:  BP Readings from Last 1 Encounters:   03/07/24 120/70      Wt Readings from Last 1 Encounters:   03/08/24 78.9 kg (174 lb)        BMI:   Estimated body mass index is 33.98 kg/m² as calculated from the following:    Height as of this encounter: 5' (1.524 m).    Weight as of this encounter: 78.9 kg (174 lb).        Scribe Attestation      I,:  Oni White PA-C am acting as a scribe while in the presence of the attending physician.:       I,:  Sudeep Watson DO personally performed the services described in this documentation    as scribed in my presence.:

## 2024-03-08 NOTE — LETTER
March 8, 2024     Patient: Geraldine Garner  YOB: 1975  Date of Visit: 3/8/2024      To Whom it May Concern:    Geraldine Garner is under my professional care. Geraldine was seen in my office on 3/8/2024. 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  She currently has a 30 pound weight restriction along her left upper extremity.    If you have any questions or concerns, please don't hesitate to call.         Sincerely,          Sudeep Watson,         CC: No Recipients

## 2024-03-19 DIAGNOSIS — E28.39 ESTROGEN DEFICIENCY: ICD-10-CM

## 2024-03-19 RX ORDER — ESTROGENS, CONJUGATED 0.45 MG/1
TABLET, FILM COATED ORAL
Qty: 21 TABLET | Refills: 5 | Status: SHIPPED | OUTPATIENT
Start: 2024-03-19

## 2024-04-01 DIAGNOSIS — G47.00 INSOMNIA, UNSPECIFIED TYPE: ICD-10-CM

## 2024-04-01 RX ORDER — ZOLPIDEM TARTRATE 10 MG/1
10 TABLET ORAL
Qty: 30 TABLET | Refills: 0 | Status: SHIPPED | OUTPATIENT
Start: 2024-04-01

## 2024-04-01 NOTE — TELEPHONE ENCOUNTER
Pharmacy requesting refill on Ambien   Last seen 3/7/24  Has appt 4/25/24     1 GERALDINEADAN GARNER 1975 F [id number] [id number] RADHA [place]   2 GERALDINEADAN GARNER 1975 F [address] [facility] [id number] GHISLAINE [place]   3 GERALDINEADAN GARNER 1975 F [id number] [id number] [facility]      Search Criteria  NAME DATE OF BIRTH DATE RANGE  Geraldine Garner 1975 04- To 04-  REQUESTER NAME REQUESTED DATE  MARTA [facility] 04- 19:37:44 ([facility])  Summary  Prescriptions  13  Prescribers  3  Pharmacies  2  Drug Classes  Benzodiazepines  0  Stimulants  0  Opioids  2  Muscle Relaxants  0  Opioid Dosage  Total MME for Active Prescriptions  0    Average MME  33.13         Prescriptions  Notifications    Prescribers  Pharmacies  MME Graph    Show All     PA   Drug Categories:      Opioids       Others     Show  10  entries  Search:  PATIENT ID PRESCRIPTION # FILLED WRITTEN DRUG LABEL QTY DAYS STRENGTH MME** PRESCRIBER PHARMACY PAYMENT REFILL #/AUTH STATE DETAIL  1 [id number] 03/04/2024 03/04/2024 Zolpidem Tartrate (Tablet) 30.0 30 10 MG LAMIN RAMIRES) [facility] Commercial Insurance 0 / 0 [place]   1 [id number] 02/01/2024 02/01/2024 Zolpidem Tartrate (Tablet) 30.0 30 10 MG LAMIN RAMIRES) [facility] Commercial Insurance 0 / 0 [place]   1 [id number] 12/27/2023 12/27/2023 Zolpidem Tartrate (Tablet) 30.0 30 10 MG LAMIN RAMIRES) [facility] Commercial Insurance 0 / 0 [place]   2 [id number] 11/13/2023 11/13/2023 ACETAMINOPHEN 325 MG / oxyCODONE HYDROCHLORIDE 5 MG ORAL TABLET (Tablet) 28.0 7 5.0 MG/325.0 MG 30.0 [employer] Commercial Insurance 0 / 0 [place]   2 [id number] 11/11/2023 11/10/2023 Zolpidem Tartrate (Tablet) 30.0 30 10 MG LAMIN RAMIRES) [facility] Commercial Insurance 0 / 0 [place]   2 [id number] 11/06/2023 11/06/2023 ACETAMINOPHEN 325 MG / oxyCODONE HYDROCHLORIDE 5 MG ORAL TABLET (Tablet) 21.0 4 5.0 MG/325.0 MG 39.38 LOPEZ  LAZARO  [employer] Commercial Insurance 0 / 0 [place]   3 [id number] 10/23/2023 10/23/2023 Zolpidem Tartrate (Tablet) 30.0 30 10 MG LAMIN RAMIRES) [facility] Commercial Insurance 0 / 0 [place]   3 [id number] 09/20/2023 09/20/2023 Zolpidem Tartrate (Tablet) 30.0 30 10 MG LAMIN RAMIRES) [facility] Private Pay 0 / 0 [place]   3 [id number] 08/17/2023 08/11/2023 Zolpidem Tartrate (Tablet) 30.0 30 10 MG LAMIN RAMIRES) [facility] Medicaid 0 / 0 [place]   1 [id number] 07/20/2023 07/20/2023 Zolpidem Tartrate (Tablet) 30.0 30 10 MG

## 2024-04-18 ENCOUNTER — TELEPHONE (OUTPATIENT)
Age: 49
End: 2024-04-18

## 2024-04-18 NOTE — TELEPHONE ENCOUNTER
Caller: Dereck     Doctor: Shirley    Reason for call: she was calling to verify pt's appt     Call back#: [phone]

## 2024-04-19 ENCOUNTER — OFFICE VISIT (OUTPATIENT)
Dept: OBGYN CLINIC | Facility: CLINIC | Age: 49
End: 2024-04-19
Payer: OTHER MISCELLANEOUS

## 2024-04-19 VITALS
DIASTOLIC BLOOD PRESSURE: 80 MMHG | SYSTOLIC BLOOD PRESSURE: 140 MMHG | HEART RATE: 71 BPM | BODY MASS INDEX: 34.16 KG/M2 | HEIGHT: 60 IN | WEIGHT: 174 LBS

## 2024-04-19 DIAGNOSIS — Z98.890 S/P LEFT ROTATOR CUFF REPAIR: Primary | ICD-10-CM

## 2024-04-19 DIAGNOSIS — J44.1 CHRONIC OBSTRUCTIVE PULMONARY DISEASE WITH ACUTE EXACERBATION (HCC): ICD-10-CM

## 2024-04-19 PROCEDURE — 99213 OFFICE O/P EST LOW 20 MIN: CPT | Performed by: ORTHOPAEDIC SURGERY

## 2024-04-19 RX ORDER — ALBUTEROL SULFATE 90 UG/1
2 AEROSOL, METERED RESPIRATORY (INHALATION) EVERY 6 HOURS PRN
Qty: 18 G | Refills: 5 | Status: SHIPPED | OUTPATIENT
Start: 2024-04-19

## 2024-04-19 NOTE — PROGRESS NOTES
ASSESSMENT/PLAN:    Diagnoses and all orders for this visit:    S/P left rotator cuff repair      The patient was seen and examined.  She is doing extremely well since surgery.  She is now cleared from an orthopedic perspective.  She may go back to work full duty.  She will follow-up with our office as needed.  She is acceptable to this plan.  Return if symptoms worsen or fail to improve.    The patient has full strength and full motion along her left shoulder.  Rotator cuff strength testing intact.  No instability.  She may stop physical therapy.  She was released to go back to work full duty.  Continue home exercise program.  Follow-up on an as-needed basis      _____________________________________________________  CHIEF COMPLAINT:  Chief Complaint   Patient presents with    Left Shoulder - Follow-up         SUBJECTIVE:  Geraldine Garner is a 49 y.o. female who presents to our office for postop visit.  The patient is status post left shoulder arthroscopy with rotator cuff repair from 11/6/2023.  She is very pleased with the results of surgery.  She has very good strength and range of motion.  She denies any significant left shoulder pain.  She denies any numbness or tingling.  She denies any fever or chills.    The following portions of the patient's history were reviewed and updated as appropriate: allergies, current medications, past family history, past medical history, past social history, past surgical history and problem list.    PAST MEDICAL HISTORY:  Past Medical History:   Diagnosis Date    Anxiety     h/o 2 panic attacks     Arthritis     l hip congenital dyspasia    Asthma     good control    Bleb     R eye    Chronic kidney disease     one kidney left side    Chronic pain     back- DJD in 2 upper 2 lower, buldging disc c-7    Chronic pain disorder     lower back and hips    Depression     Endometriosis     GERD (gastroesophageal reflux disease)     Glaucoma     b/l . Bleb in R eye    Glaucoma     H/O  carpal tunnel syndrome     L    Headache     migraines    History of cigarette smoking     History of transfusion     Hypercholesterolemia 1/17/2013    Insomnia     Migraines     Osteoarthritis     cervical spondylarthritis    Polypoid cystitis     POLYP CYSTS ON BUTTOCK AREA    Renal disorder     Reports single kidney    Right knee injury     SCRAPED GROWTH PLATE RIGHT KNEE    Seasonal allergies        PAST SURGICAL HISTORY:  Past Surgical History:   Procedure Laterality Date    BLEPHAROPLASTY Right     BLEB RIGHT EYE    BUNIONECTOMY Bilateral     2 on R, 3 on left    CARPAL TUNNEL RELEASE Left     CT NEEDLE BX ASPIRATION INJECTION LOCALIZATION  3/8/2019    EYE SURGERY Right     bleb implant    EYE SURGERY Bilateral     lazer- glaucoma    FOOT HARDWARE REMOVAL Left 7/2/2018    Procedure: CALCANEAL REMOVAL OF HARDWARE;  Surgeon: Trell Betancourt DPM;  Location:  MAIN OR;  Service: Podiatry    FOOT HARDWARE REMOVAL Left 7/5/2018    Procedure: LEFT CALCANEAL REMOVAL OF HARDWARE;  Surgeon: Trell Betancourt DPM;  Location:  MAIN OR;  Service: Podiatry    FOOT SURGERY Right     BONE REMOVED BOTTOM OF RIGHT FOOT    HAND SURGERY Right     ring finger tendon severed    HARDWARE REMOVAL Left 7/5/2018    Procedure: LEFT BIG TOE REMOVAL OF HARDWARE;  Surgeon: Trell Betancourt DPM;  Location:  MAIN OR;  Service: Podiatry    HIP SURGERY Left     reconstruction as child    HYSTERECTOMY      total abdominal    HYSTERECTOMY      TOTAL    JOINT REPLACEMENT      LTHR and reconstruction    KNEE ARTHROSCOPY      R knee    LEG SURGERY      LISFRANC ARTHRODESIS Left 11/3/2017    Procedure: FUSION FIRST MTPJ: BROSTRUM; LATERAL ANKLE STABILIZATION;  Surgeon: Trell Betancourt DPM;  Location: [facility];  Service: Podiatry    OOPHORECTOMY      age 39    PILONIDAL CYST EXCISION      midline    OH DCMPRN FASCT LEG ANT&/LAT&PST W/DBRDMT MUS Left 7/5/2018    Procedure: LEFT PERONEAL TENDON DEBRIDMENT ;  Surgeon: Trell Betancourt DPM;  Location:  MAIN  OR;  Service: Podiatry    TX OSTEOTOMY CALCANEUS W/WO INTERNAL FIXATION Left 11/3/2017    Procedure: OSTEOTOMY CALCANEUS;  Surgeon: Trell Betancourt DPM;  Location: [facility];  Service: Podiatry    TX RPR TENDON FLXR FOOT SEC W/FREE GRAFT EA TENDON Left 7/2/2018    Procedure: PERONEAL TENDON EXPLORATION;  Surgeon: Trell Betancourt DPM;  Location: [facility];  Service: Podiatry    TX SURGICAL ARTHROSCOPY SHOULDER W/ROTATOR CUFF RPR Left 11/6/2023    Procedure: Left - ARTHROSCOPY SHOULDER Synovectomy Debridement Arthroscopic rotator cuff repair Acromioplasty Post arthroscopic injections of intra-articular joint space and peripheral portals;  Surgeon: Sudeep Watson DO;  Location: [facility];  Service: Orthopedics    REFRACTIVE SURGERY Bilateral     LASER SURGERY BOTH EYES    TOTAL HIP ARTHROPLASTY  2005    WRIST SURGERY Left        FAMILY HISTORY:  Family History   Problem Relation Age of Onset    Cancer Mother     Hypertension Mother     Bone cancer Mother 44        COD    Breast cancer Mother 44    Hypertension Father     Heart disease Father     Dementia Father     Lung cancer Sister 44    Brain cancer Sister 49    No Known Problems Sister     No Known Problems Maternal Grandmother     No Known Problems Maternal Grandfather     Liver cancer Paternal Grandmother 73    Hypertension Paternal Grandfather     Stroke Paternal Grandfather     Breast cancer Maternal Aunt     Breast cancer Maternal Aunt 53    No Known Problems Paternal Aunt        SOCIAL HISTORY:  Social History     Tobacco Use    Smoking status: Every Day     Current packs/day: 0.25     Average packs/day: 0.3 packs/day for 30.0 years (7.5 ttl pk-yrs)     Types: Cigarettes    Smokeless tobacco: Never   Vaping Use    Vaping status: Never Used   Substance Use Topics    Alcohol use: No    Drug use: Never       MEDICATIONS:    Current Outpatient Medications:     albuterol (2.5 mg/3 mL) 0.083 % nebulizer solution, Take 3 mL (2.5 mg total) by nebulization every 6  (six) hours as needed for wheezing or shortness of breath, Disp: 150 mL, Rfl: 0    albuterol (PROVENTIL HFA,VENTOLIN HFA) 90 mcg/act inhaler, INHALE 2 PUFFS BY MOUTH EVERY 6 HOURS AS NEEDED FOR WHEEZING, Disp: 18 g, Rfl: 5    conjugated estrogens (Premarin) 0.45 mg tablet, TAKE 1 TABLET IN MORNING FOR 21 DAYS THEN DO NOT TAKE FOR 7 DAYS, Disp: 21 tablet, Rfl: 5    cyclobenzaprine (FLEXERIL) 10 mg tablet, Take 1 tablet (10 mg total) by mouth daily as needed for muscle spasms, Disp: 30 tablet, Rfl: 0    fluticasone (FLONASE) 50 mcg/act nasal spray, SPRAY 2 SPRAYS INTO EACH NOSTRIL EVERY DAY (Patient taking differently: 2 sprays into each nostril if needed for allergies), Disp: 16 mL, Rfl: 3    fluticasone-salmeterol (Wixela Inhub) 500-50 mcg/dose inhaler, Inhale 1 puff daily Rinse mouth after use. (Patient taking differently: Inhale 1 puff if needed (Wheezing) Rinse mouth after use.), Disp: 60 blister, Rfl: 0    ibuprofen (MOTRIN) 600 mg tablet, Take 1 tablet (600 mg total) by mouth every 6 (six) hours as needed for mild pain, Disp: 30 tablet, Rfl: 3    ipratropium (Atrovent HFA) 17 mcg/act inhaler, Inhale 2 puffs every 6 (six) hours, Disp: 38.7 g, Rfl: 3    meloxicam (MOBIC) 15 mg tablet, Take 1 tablet (15 mg total) by mouth daily, Disp: 30 tablet, Rfl: 0    montelukast (SINGULAIR) 10 mg tablet, TAKE 1 TABLET BY MOUTH EVERY DAY IN THE EVENING, Disp: 90 tablet, Rfl: 1    ondansetron (ZOFRAN) 4 mg tablet, TAKE 1 TABLET BY MOUTH EVERY 8 HOURS AS NEEDED FOR NAUSEA AND VOMITING (Patient taking differently: Take 4 mg by mouth every 8 (eight) hours as needed for nausea), Disp: 20 tablet, Rfl: 0    pantoprazole (PROTONIX) 40 mg tablet, TAKE 1 TABLET BY MOUTH EVERY DAY BEFORE BREAKFAST, Disp: 30 tablet, Rfl: 5    promethazine-codeine (PHENERGAN WITH CODEINE) 6.25-10 mg/5 mL syrup, Take 5 mL by mouth every 4 (four) hours as needed for cough, Disp: 120 mL, Rfl: 0    SUMAtriptan (IMITREX) 50 mg tablet, TAKE 2 TABLETS (100 MG  TOTAL) BY MOUTH ONCE AS NEEDED FOR MIGRAINE, Disp: 9 tablet, Rfl: 5    zolpidem (AMBIEN) 10 mg tablet, TAKE 1 TABLET BY MOUTH DAILY AT BEDTIME AS NEEDED FOR SLEEP., Disp: 30 tablet, Rfl: 0    ALLERGIES:  Allergies   Allergen Reactions    Morphine Other (See Comments)     Blood pressure drops    Morphine And Related Other (See Comments)     Blood pressure drops    Penicillins Itching and Rash    Quazepam Itching       ROS:  Review of Systems     Constitutional: Negative for fatigue, fever or loss of appetite.   HENT: Negative.    Respiratory: Negative for shortness of breath, dyspnea.    Cardiovascular: Negative for chest pain/tightness.   Gastrointestinal: Negative for abdominal pain, N/V.   Endocrine: Negative for cold/heat intolerance, unexplained weight loss/gain.   Genitourinary: Negative for flank pain, dysuria, hematuria.   Musculoskeletal: Positive for arthralgia   Skin: Negative for rash.    Neurological: Negative for numbness or tingling  Psychiatric/Behavioral: Negative for agitation.  _____________________________________________________  PHYSICAL EXAMINATION:    Blood pressure 140/80, pulse 71, height 5' (1.524 m), weight 78.9 kg (174 lb).    Constitutional: Oriented to person, place, and time. Appears well-developed and well-nourished. No distress.   HENT:   Head: Normocephalic.   Eyes: Conjunctivae are normal. Right eye exhibits no discharge. Left eye exhibits no discharge. No scleral icterus.   Cardiovascular: Normal rate.    Pulmonary/Chest: Effort normal.   Neurological: Alert and oriented to person, place, and time.   Skin: Skin is warm and dry. No rash noted. Not diaphoretic. No erythema. No pallor.   Psychiatric: Normal mood and affect. Behavior is normal. Judgment and thought content normal.      MUSCULOSKELETAL EXAMINATION:   Physical Exam  Ortho Exam    Left upper extremity is neurovascularly intact  Fingers are pink and mobile  Compartments are soft  Range of motion of the shoulder is from 0  to 160 degrees  Rotator cuff strength 5 out of 5  Brisk cap refill and sensation intact  Incisions are well-healed  Objective:  BP Readings from Last 1 Encounters:   04/19/24 140/80      Wt Readings from Last 1 Encounters:   04/19/24 78.9 kg (174 lb)        BMI:   Estimated body mass index is 33.98 kg/m² as calculated from the following:    Height as of this encounter: 5' (1.524 m).    Weight as of this encounter: 78.9 kg (174 lb).        Scribe Attestation      I,:  Oni White PA-C am acting as a scribe while in the presence of the attending physician.:       I,:  Sudeep Watson, DO personally performed the services described in this documentation    as scribed in my presence.:

## 2024-04-19 NOTE — LETTER
April 19, 2024     Patient: Geraldine Garner  YOB: 1975  Date of Visit: 4/19/2024      To Whom it May Concern:    Geraldine Garner is under my professional care. Geraldine was seen in my office on 4/19/2024. Geraldine may return to work on 4/19/2024 without restrictions .    If you have any questions or concerns, please don't hesitate to call.         Sincerely,          Sudeep Watson,         CC: No Recipients

## 2024-05-01 DIAGNOSIS — G47.00 INSOMNIA, UNSPECIFIED TYPE: ICD-10-CM

## 2024-05-02 RX ORDER — ZOLPIDEM TARTRATE 10 MG/1
10 TABLET ORAL
Qty: 30 TABLET | Refills: 0 | Status: SHIPPED | OUTPATIENT
Start: 2024-05-02

## 2024-05-02 NOTE — TELEPHONE ENCOUNTER
Pt last seen 3/7/24 and [facility] and sent for provider approval    ELECT PATIENT ID NAME  [facility]   [] 1 JACKJEET DOAN 1975 F [id number] -[id number] [place]   [] 2  BEREKET 1975 F [address] [facility] -[address] [place]   [] 3 JACKJEET DOAN 1975 F [address] [place]           Search Criteria    NAME DATE OF BIRTH DATE RANGE   jackjeet doan 1975 05- To 2024     REQUESTER NAME REQUESTED DATE   [facility]     Summary  Prescriptions  13  Prescribers  3  Pharmacies  2  View Map  Drug Classes  Benzodiazepines  0  Stimulants  0  Opioids  2  Muscle Relaxants  0  Opioid Dosage  Total MME for Active Prescriptions  0    Average MME  33.13  MME Graph   MME Calculator     Prescriptions  Notifications    Prescribers  Pharmacies  MME Graph    [] [place] Drug Categories:     [] Opioids      [] Others      Showentries  Search:  PATIENT ID PRESCRIPTION # FILLED WRITTEN DRUG LABEL QTY DAYS STRENGTH MME** PRESCRIBER PHARMACY PAYMENT REFILL #/AUTH STATE DETAIL   1 [id number] 2024 Zolpidem Tartrate (Tablet) 30.0 30 10 MG LAMIN RAMIRES) [facility] [place] 0 / 0 [place]    1 [id number] 2024 Zolpidem Tartrate (Tablet) 30.0 30 10 MG LAMIN RAMIRES) [facility] [place] / 0 [place]    1 [id number] 2024 Zolpidem Tartrate (Tablet) 30.0 30 10 MG LAMIN RAMIRES) [facility] [place]    1 [id number] 2023 Zolpidem Tartrate (Tablet) 30.0 30 10 MG LAMIN RAMIRES) [facility] [place]    2 [id number] 2023 oxyCODONE HYDROCHLORIDE 5 MG ORAL TABLET/ACETAMINOPHEN 325 MG (Tablet) 28.0 7 5.0 MG/325.0 MG 30.0 RADHA RISHI [facility] Commercial Insurance 0 /  0 [place]    2 [id number] 11/11/2023 11/10/2023 Zolpidem Tartrate (Tablet) 30.0 30 10 MG NA [facility]ARTA(MD[facility] [place] / 0 [place]    2 [id number] 11/06/2023 11/06/2023 oxyCODONE HYDROCHLORIDE 5 MG ORAL TABLET/ACETAMINOPHEN 325 MG (Tablet) 21.0 4 5.0 MG/325.0 MG 39.38 LOPEZ WILLIS  [employer] [place] 0 / 0 [place]    3 [id number] 10/23/2023 10/23/2023 Zolpidem Tartrate (Tablet) 30.0 30 10 MG NA MARTA(MD) [facility] [place] / 0 [place]    3 [id number] 09/20/2023 09/20/2023 Zolpidem Tartrate (Tablet) 30.0 30 10 MG NA MARTA(MD) [facility] [place] Pay 0 / 0 [place]    3 [id number] 08/17/2023 08/11/2023 Zolpidem Tartrate (Tablet) 30.0 30 10 MG NA MARTA(MD) [facility] Medicaid 0 / 0 [place]    Showing 1 to 10 of 13 entries  [id number]     * Per CDC guidance, the conversion factors and associated daily morphine milligram equivalents for drugs prescribed as part of medication-assisted treatment for opioid use disorder should not be used to benchmark against dosage thresholds meant for opioids prescribed for pain.  Report Disclaimer:  Information from the [place] Prescription Drug Monitoring Program (PDMP) database is protected health information and any information accessed must be treated as confidential. Any person who knowingly or intentionally makes an unauthorized disclosure of information from the PDMP database will be subject to civil and criminal penalties as set forth in the ABC-MAP Act 191 of 2014; Act of Oct. 27, 2014, P.L. 2911, No. 191.    The information in the PDMP database is submitted by pharmacies and may contain errors and omissions. Additionally, pharmacies may submit extraneous non-controlled substance dispensations to the PDMP database; if a non-controlled substance is present on one patient’s Prescription Report, it should not be assumed that this same medication  will be reported on another patient’s Prescription Report. Independent verification of prescription information with pharmacies and prescribers may sometimes be prudent or necessary.  Educational content  [facility] MME calculation guidelines  [place] Prescribing Guidelines  Letter Regarding the Misapplication of Prescribing Guidelines   Guide for Appropriate Tapering or Discontinuation of Long-Term Opioid Use   #[id number]

## 2024-06-03 DIAGNOSIS — G47.00 INSOMNIA, UNSPECIFIED TYPE: ICD-10-CM

## 2024-06-04 RX ORDER — ZOLPIDEM TARTRATE 10 MG/1
10 TABLET ORAL
Qty: 30 TABLET | Refills: 0 | Status: SHIPPED | OUTPATIENT
Start: 2024-06-04

## 2024-06-04 NOTE — TELEPHONE ENCOUNTER
Refills have been requested for the following medications:         zolpidem (AMBIEN) 10 mg tablet [Marta Pacheco MD]      Patient Comment: Please send one to [facility] at earliest convenience been waiting     Preferred pharmacy: [facility] #[id number] - [place] - [address]    Last seen 3/7/24     1 GERALDINEJEET GARNER 1975 F [address] [place] [id number] [place]   2 GERALDINEJEET GARNER 1975 F [address] [facility] [id number] [place]   3 GERALDINEJEET GARNER 1975 F [address] [place] [id number] [place]      Search Criteria  NAME DATE OF BIRTH DATE RANGE  Geraldinejeet Garner 1975 06- To 06-  REQUESTER NAME REQUESTED DATE  MARTA PACHECO 06- 10:52:42 ([facility])  Summary  Prescriptions  13  Prescribers  3  Pharmacies  2  Drug Classes  Benzodiazepines  0  Stimulants  0  Opioids  2  Muscle Relaxants  0  Opioid Dosage  Total MME for Active Prescriptions  0    Average MME  33.13         Prescriptions  Notifications    Prescribers  Pharmacies  MME Graph    Show All     [place]   Drug Categories:      Opioids       Others     Show  10  entries  Search:  PATIENT ID PRESCRIPTION # FILLED WRITTEN DRUG LABEL QTY DAYS STRENGTH MME** PRESCRIBER PHARMACY PAYMENT REFILL #/AUTH STATE DETAIL  1 [id number] 05/02/2024 05/02/2024 Zolpidem Tartrate (Tablet) 30.0 30 10 MG LAMIN RAMIRES) [facility] [place] 0 / 0 [place]   1 [id number] 04/01/2024 04/01/2024 Zolpidem Tartrate (Tablet) 30.0 30 10 MG LAMIN RAMIRES) [facility] [place] 0 / 0 [place]   1 [id number] 03/04/2024 03/04/2024 Zolpidem Tartrate (Tablet) 30.0 30 10 MG LAMIN RAMIRES) [facility] [place] / 0 [place]   1 [id number] 02/01/2024 02/01/2024 Zolpidem Tartrate (Tablet) 30.0 30 10 MG LAMIN RAMIRES) [facility] [place] / 0 [place]   1 [id number] 12/27/2023 12/27/2023 Zolpidem Tartrate (Tablet) 30.0 30 10 MG NA IKE)  [facility] [place] 0 / 0 [place]   2 [id number] 11/13/2023 11/13/2023 oxyCODONE HYDROCHLORIDE 5 MG ORAL TABLET/ACETAMINOPHEN 325 MG (Tablet) 28.0 7 5.0 MG/325.0 MG 30.0 RADHA [facility] [employer] Commercial Insurance 0 / 0 [place]   2 [id number] 11/11/2023 11/10/2023 Zolpidem Tartrate (Tablet) 30.0 30 10 MG [facility]) [facility] Commercial [place] 0 / 0 [place]   2 [id number] 11/06/2023 11/06/2023 oxyCODONE HYDROCHLORIDE 5 MG ORAL TABLET/ACETAMINOPHEN 325 MG (Tablet) 21.0 4 5.0 MG/325.0 MG 39.38 LOPEZ WILLIS  [employer] Commercial Insurance 0 / 0 [place]   3 [id number] 10/23/2023 10/23/2023 Zolpidem Tartrate (Tablet) 30.0 30 10 MG LAMIN RAMIRES) [facility] [place] 0 / 0 [place]   3 [id number] 09/20/2023 09/20/2023 Zolpidem Tartrate (Tablet) 30.0 30 10 MG

## 2024-07-04 DIAGNOSIS — S46.012D TRAUMATIC COMPLETE TEAR OF LEFT ROTATOR CUFF, SUBSEQUENT ENCOUNTER: ICD-10-CM

## 2024-07-05 RX ORDER — IBUPROFEN 600 MG/1
600 TABLET ORAL EVERY 6 HOURS PRN
Qty: 100 TABLET | Refills: 1 | Status: SHIPPED | OUTPATIENT
Start: 2024-07-05

## 2024-07-10 DIAGNOSIS — G47.00 INSOMNIA, UNSPECIFIED TYPE: ICD-10-CM

## 2024-07-11 RX ORDER — ZOLPIDEM TARTRATE 10 MG/1
10 TABLET ORAL
Qty: 30 TABLET | Refills: 0 | Status: SHIPPED | OUTPATIENT
Start: 2024-07-11

## 2024-07-11 NOTE — TELEPHONE ENCOUNTER
Refills have been requested for the following medications:         zolpidem (AMBIEN) 10 mg tablet [Marta Pacheco MD]     Preferred pharmacy: [facility][id number] - [place] - [address]    Last seen 3/7/24     1 GERALDINEADAN GARNER 1975 F [address] [id number] [place]   2 GERALDINEADAN GARNER 1975 F [address] [facility] [id number] [facility] [place]   3 GERALDINEADAN GARNER 1975 F [address] [place] [id number] [place]      Search Criteria  NAME DATE OF BIRTH DATE RANGE  Geraldine Garner 1975 07- To 07-  REQUESTER NAME REQUESTED DATE  MARTA PACHECO 07- 12:26:21 ([facility])  Summary  Prescriptions  13  Prescribers  3  Pharmacies  2  Drug Classes  Benzodiazepines  0  Stimulants  0  Opioids  2  Muscle Relaxants  0  Opioid Dosage  Total MME for Active Prescriptions  0    Average MME  33.13         Prescriptions  Notifications    Prescribers  Pharmacies  MME Graph    Show All     [place]   Drug Categories:      Opioids       Others     Show  10  entries  Search:  PATIENT ID PRESCRIPTION # FILLED WRITTEN DRUG LABEL QTY DAYS STRENGTH MME** PRESCRIBER PHARMACY PAYMENT REFILL #/AUTH STATE DETAIL  1 [id number] 06/04/2024 06/04/2024 Zolpidem Tartrate (Tablet) 30.0 30 10 MG LAMIN RAMIRES) [facility] [place] [facility] [place] 0 / 0 [place]   1 [id number] 05/02/2024 05/02/2024 Zolpidem Tartrate (Tablet) 30.0 30 10 MG LAMIN RAMIRES) [facility] [place] [facility] [place] 0 / 0 [place]   1 [id number] 04/01/2024 04/01/2024 Zolpidem Tartrate (Tablet) 30.0 30 10 MG LAMIN RAMIRES) [facility] [place] [facility] [place] 0 / 0 [place]   1 [id number] 03/04/2024 03/04/2024 Zolpidem Tartrate (Tablet) 30.0 30 10 MG LAMIN RAMIRES) [facility] [place] [facility] [place] 0 / 0 [place]   1 [id number] 02/01/2024 02/01/2024 Zolpidem Tartrate (Tablet) 30.0 30 10 MG NA MARTA(MD) [facility] [place] 0 /  0 [place]   1 [id number] 12/27/2023 12/27/2023 Zolpidem Tartrate (Tablet) 30.0 30 10 MG NA IKE) [facility] [place] 0 / 0 [place]   2 [id number] 11/13/2023 11/13/2023 oxyCODONE HYDROCHLORIDE 5 MG ORAL TABLET/ACETAMINOPHEN 325 MG (Tablet) 28.0 7 325 MG-5 MG 30.0 RADHA BLACKWELL [employer] Commercial [place] 0 / 0 [place]   2 [id number] 11/11/2023 11/10/2023 Zolpidem Tartrate (Tablet) 30.0 30 10 MG NA [facility]KE) [facility] [place] 0 / 0 [place]   2 [id number] 11/06/2023 11/06/2023 oxyCODONE HYDROCHLORIDE 5 MG ORAL TABLET/ACETAMINOPHEN 325 MG (Tablet) 21.0 4 325 MG-5 MG 39.38 LOPEZ WILLIS JR [employer] Commercial Insurance 0 / 0 [place]   3 [id number] 10/23/2023 10/23/2023 Zolpidem Tartrate (Tablet) 30.0 30 10 MG

## 2024-07-23 DIAGNOSIS — Z88.9 MULTIPLE ALLERGIES: ICD-10-CM

## 2024-07-23 RX ORDER — MONTELUKAST SODIUM 10 MG/1
10 TABLET ORAL EVERY EVENING
Qty: 100 TABLET | Refills: 1 | Status: SHIPPED | OUTPATIENT
Start: 2024-07-23

## 2024-08-07 DIAGNOSIS — G47.00 INSOMNIA, UNSPECIFIED TYPE: ICD-10-CM

## 2024-08-08 RX ORDER — ZOLPIDEM TARTRATE 10 MG/1
10 TABLET ORAL
Qty: 30 TABLET | Refills: 0 | Status: SHIPPED | OUTPATIENT
Start: 2024-08-08

## 2024-08-08 NOTE — TELEPHONE ENCOUNTER
Pharmacy requesting refill on Ambien  Last seen 3/7/24     1 GERALDINEADAN GARNER 1975 F [id number] [id number] RADHA PA   2 GERALDINEADAN GARNER 1975 F [address] [facility] [id number] GHISLAINE PA   3 GERALDINEADAN GARNER 1975 F [address] [place] [id number] ALEXA PALMA      Search Criteria  Name Date of Birth Date Range  Geraldine Garner 1975 08- To 08-  Requester Name Requested Date  MARTA [facility] 08- 11:19:13 ([facility])  Summary  Prescriptions  13  Prescribers  3  Pharmacies  2  Drug Classes  Benzodiazepines  0  Stimulants  0  Opioids  2  Muscle Relaxants  0  Opioid Dosage  Total MME for Active Prescriptions  0    Average MME  33.13         Prescriptions  Notifications    Prescribers  Pharmacies  MME Graph    Show All     PA   Drug Categories:      Opioids       Others     Show  10  entries  Search:  Patient Id Prescription # Filled Written Drug Label Qty Days Strength MME** Prescriber Pharmacy Payment REFILL #/Auth State Detail  1 [id number] 07/11/2024 07/11/2024 Zolpidem Tartrate (Tablet) 30.0 30 10 MG LAMIN RAMIRES) [facility] Commercial Insurance 0 / 0 PA   1 [id number] 06/04/2024 06/04/2024 Zolpidem Tartrate (Tablet) 30.0 30 10 MG LAMIN RAMIRES) [facility] Commercial Insurance 0 / 0 PA   1 [id number] 05/02/2024 05/02/2024 Zolpidem Tartrate (Tablet) 30.0 30 10 MG NA IKE) [facility] Commercial Insurance 0 / 0 PA   1 [id number] 04/01/2024 04/01/2024 Zolpidem Tartrate (Tablet) 30.0 30 10 MG NA IKE) [facility] Commercial Insurance 0 / 0 PA   1 [id number] 03/04/2024 03/04/2024 Zolpidem Tartrate (Tablet) 30.0 30 10 MG LAMIN RAMIRES) [facility] Commercial Insurance 0 / 0 PA   1 [id number] 02/01/2024 02/01/2024 Zolpidem Tartrate (Tablet) 30.0 30 10 MG NA IKE) [facility] Commercial Insurance 0 /  0 PA   1 [id number] 12/27/2023 12/27/2023 Zolpidem Tartrate (Tablet) 30.0 30 10 MG NA IKE) [facility] Commercial Insurance 0 / 0 PA   2 [id number] 11/13/2023 11/13/2023 oxyCODONE HYDROCHLORIDE 5 MG ORAL TABLET/ACETAMINOPHEN 325 MG (Tablet) 28.0 7 325 MG-5 MG 30.0 RADHA BLACKWELL [employer] Commercial Insurance 0 / 0 PA   2 [id number] 11/11/2023 11/10/2023 Zolpidem Tartrate (Tablet) 30.0 30 10 MG NA IKE) [facility] 0 / 0 PA   2 [id number] 11/06/2023 11/06/2023 oxyCODONE HYDROCHLORIDE 5 MG ORAL TABLET/ACETAMINOPHEN 325 MG (Tablet) 21.0 4 325 MG-5 M

## 2024-08-17 DIAGNOSIS — R51.9 NONINTRACTABLE HEADACHE, UNSPECIFIED CHRONICITY PATTERN, UNSPECIFIED HEADACHE TYPE: ICD-10-CM

## 2024-08-18 RX ORDER — SUMATRIPTAN 50 MG/1
100 TABLET, FILM COATED ORAL ONCE AS NEEDED
Qty: 9 TABLET | Refills: 5 | Status: SHIPPED | OUTPATIENT
Start: 2024-08-18

## 2024-09-05 DIAGNOSIS — J44.1 CHRONIC OBSTRUCTIVE PULMONARY DISEASE WITH ACUTE EXACERBATION (HCC): ICD-10-CM

## 2024-09-05 DIAGNOSIS — Z00.6 ENCOUNTER FOR EXAMINATION FOR NORMAL COMPARISON OR CONTROL IN CLINICAL RESEARCH PROGRAM: ICD-10-CM

## 2024-09-05 DIAGNOSIS — G47.00 INSOMNIA, UNSPECIFIED TYPE: ICD-10-CM

## 2024-09-05 DIAGNOSIS — E28.39 ESTROGEN DEFICIENCY: ICD-10-CM

## 2024-09-06 RX ORDER — IPRATROPIUM BROMIDE 17 UG/1
2 AEROSOL, METERED RESPIRATORY (INHALATION) EVERY 6 HOURS
Qty: 38.7 G | Refills: 0 | Status: CANCELLED | OUTPATIENT
Start: 2024-09-06

## 2024-09-06 RX ORDER — ALBUTEROL SULFATE 90 UG/1
2 AEROSOL, METERED RESPIRATORY (INHALATION) EVERY 6 HOURS PRN
Qty: 18 G | Refills: 0 | Status: CANCELLED | OUTPATIENT
Start: 2024-09-06

## 2024-09-06 RX ORDER — ZOLPIDEM TARTRATE 10 MG/1
10 TABLET ORAL
Qty: 30 TABLET | Refills: 0 | Status: CANCELLED | OUTPATIENT
Start: 2024-09-06

## 2024-09-06 NOTE — TELEPHONE ENCOUNTER
"Pt called  - disregard request to send medications to pharmacy on file**  patient is requesting a \"hand written\" script for ALL medications.   They are cheaper on [employer], needs a hand written script to provide to [employer].  Kindly call patient once \"handwritten\" scripts are ready for .    Current Outpatient Medications on File Prior to Visit   Medication Sig Dispense Refill    albuterol (2.5 mg/3 mL) 0.083 % nebulizer solution Take 3 mL (2.5 mg total) by nebulization every 6 (six) hours as needed for wheezing or shortness of breath 150 mL 0    albuterol (PROVENTIL HFA,VENTOLIN HFA) 90 mcg/act inhaler INHALE 2 PUFFS BY MOUTH EVERY 6 HOURS AS NEEDED FOR WHEEZING 18 g 5    conjugated estrogens (Premarin) 0.45 mg tablet TAKE 1 TABLET IN MORNING FOR 21 DAYS THEN DO NOT TAKE FOR 7 DAYS 21 tablet 5    cyclobenzaprine (FLEXERIL) 10 mg tablet Take 1 tablet (10 mg total) by mouth daily as needed for muscle spasms 30 tablet 0    fluticasone (FLONASE) 50 mcg/act nasal spray SPRAY 2 SPRAYS INTO EACH NOSTRIL EVERY DAY (Patient taking differently: 2 sprays into each nostril if needed for allergies) 16 mL 3    fluticasone-salmeterol (Wixela Inhub) 500-50 mcg/dose inhaler Inhale 1 puff daily Rinse mouth after use. (Patient taking differently: Inhale 1 puff if needed (Wheezing) Rinse mouth after use.) 60 blister 0    ibuprofen (MOTRIN) 600 mg tablet TAKE 1 TABLET (600 MG TOTAL) BY MOUTH EVERY 6 (SIX) HOURS AS NEEDED FOR MILD PAIN 100 tablet 1    ipratropium (Atrovent HFA) 17 mcg/act inhaler Inhale 2 puffs every 6 (six) hours 38.7 g 3    meloxicam (MOBIC) 15 mg tablet Take 1 tablet (15 mg total) by mouth daily 30 tablet 0    montelukast (SINGULAIR) 10 mg tablet TAKE 1 TABLET BY MOUTH EVERY DAY IN THE EVENING 100 tablet 1    ondansetron (ZOFRAN) 4 mg tablet TAKE 1 TABLET BY MOUTH EVERY 8 HOURS AS NEEDED FOR NAUSEA AND VOMITING (Patient taking differently: Take 4 mg by mouth every 8 (eight) hours as needed for nausea) 20 tablet 0 "    pantoprazole (PROTONIX) 40 mg tablet TAKE 1 TABLET BY MOUTH EVERY DAY BEFORE BREAKFAST 30 tablet 5    promethazine-codeine (PHENERGAN WITH CODEINE) 6.25-10 mg/5 mL syrup Take 5 mL by mouth every 4 (four) hours as needed for cough 120 mL 0    SUMAtriptan (IMITREX) 50 mg tablet TAKE 2 TABLETS (100 MG TOTAL) BY MOUTH ONCE AS NEEDED FOR MIGRAINE 9 tablet 5    zolpidem (AMBIEN) 10 mg tablet TAKE 1 TABLET BY MOUTH DAILY AT BEDTIME AS NEEDED FOR SLEEP 30 tablet 0     No current facility-administered medications on file prior to visit.

## 2024-09-06 NOTE — TELEPHONE ENCOUNTER
Can you please print Ipratropium, Premarin, albuterol inhaler, and Ambien.   Pt needs the scripts written because she is going to use amaz[employer].

## 2024-09-10 ENCOUNTER — OFFICE VISIT (OUTPATIENT)
Dept: URGENT CARE | Facility: CLINIC | Age: 49
End: 2024-09-10
Payer: COMMERCIAL

## 2024-09-10 VITALS
SYSTOLIC BLOOD PRESSURE: 124 MMHG | DIASTOLIC BLOOD PRESSURE: 68 MMHG | HEART RATE: 71 BPM | RESPIRATION RATE: 18 BRPM | WEIGHT: 168 LBS | BODY MASS INDEX: 32.98 KG/M2 | HEIGHT: 60 IN | OXYGEN SATURATION: 99 % | TEMPERATURE: 97.9 F

## 2024-09-10 DIAGNOSIS — T23.321A: Primary | ICD-10-CM

## 2024-09-10 PROCEDURE — 99213 OFFICE O/P EST LOW 20 MIN: CPT | Performed by: STUDENT IN AN ORGANIZED HEALTH CARE EDUCATION/TRAINING PROGRAM

## 2024-09-10 PROCEDURE — S9088 SERVICES PROVIDED IN URGENT: HCPCS | Performed by: STUDENT IN AN ORGANIZED HEALTH CARE EDUCATION/TRAINING PROGRAM

## 2024-09-10 RX ORDER — GINSENG 100 MG
1 CAPSULE ORAL 2 TIMES DAILY
Qty: 14 G | Refills: 0 | Status: SHIPPED | OUTPATIENT
Start: 2024-09-10

## 2024-09-10 NOTE — PROGRESS NOTES
[facility]        NAME: Geraldine Garner is a 49 y.o. female  : 1975    MRN: [id number]  DATE: September 10, 2024  TIME: 2:12 PM    Assessment and Orders   Full thickness burn of one finger of right hand, initial encounter [T23.321A]  1. Full thickness burn of one finger of right hand, initial encounter  bacitracin topical ointment 500 units/g topical ointment            Plan and Discussion      Blister on left hand is intact and roof should not be debrided.  Open wound on the right hand should be kept clean and covered and I have prescribed bacitracin to be used twice a day to prevent infections.  Discussed signs and symptoms that would warrant ED evaluation.    Risks and benefits discussed. Patient understands and agrees with the plan.     PATIENT INSTRUCTIONS    If tests have been performed at [facility], our office will contact you with results if changes need to be made to the care plan discussed with you at the visit.  You can review your full results on [facility] MyChart.    Follow up with PCP.     If any of the following occur, please report to your nearest ED for evaluation or call 911.   Difficultly breathing or shortness of breath  Chest pain  Acutely worsening symptoms.         Chief Complaint     Chief Complaint   Patient presents with    Hand Burn     Patient c/o burn on right hand from hot glue gun.         History of Present Illness       Patient is a 49-year-old female who presents with a burn to the dorsum aspect of her right hand and slightly on her left fifth digit when using a hot glue gun.  Incident occurred today.  There is an open wound on her right hand.  It is very tender.  Patient ran cool water over the area immediately.  Patient has full range of motion however it is painful in her right hand when she makes a strong fist.        Review of Systems   Review of Systems   Skin:  Positive for wound.         Current Medications       Current Outpatient Medications:      albuterol (2.5 mg/3 mL) 0.083 % nebulizer solution, Take 3 mL (2.5 mg total) by nebulization every 6 (six) hours as needed for wheezing or shortness of breath, Disp: 150 mL, Rfl: 0    albuterol (PROVENTIL HFA,VENTOLIN HFA) 90 mcg/act inhaler, INHALE 2 PUFFS BY MOUTH EVERY 6 HOURS AS NEEDED FOR WHEEZING, Disp: 18 g, Rfl: 5    bacitracin topical ointment 500 units/g topical ointment, Apply 1 large application topically 2 (two) times a day, Disp: 14 g, Rfl: 0    conjugated estrogens (Premarin) 0.45 mg tablet, TAKE 1 TABLET IN MORNING FOR 21 DAYS THEN DO NOT TAKE FOR 7 DAYS, Disp: 21 tablet, Rfl: 5    cyclobenzaprine (FLEXERIL) 10 mg tablet, Take 1 tablet (10 mg total) by mouth daily as needed for muscle spasms, Disp: 30 tablet, Rfl: 0    fluticasone (FLONASE) 50 mcg/act nasal spray, SPRAY 2 SPRAYS INTO EACH NOSTRIL EVERY DAY (Patient taking differently: 2 sprays into each nostril if needed for allergies), Disp: 16 mL, Rfl: 3    fluticasone-salmeterol (Wixela Inhub) 500-50 mcg/dose inhaler, Inhale 1 puff daily Rinse mouth after use. (Patient taking differently: Inhale 1 puff if needed (Wheezing) Rinse mouth after use.), Disp: 60 blister, Rfl: 0    ibuprofen (MOTRIN) 600 mg tablet, TAKE 1 TABLET (600 MG TOTAL) BY MOUTH EVERY 6 (SIX) HOURS AS NEEDED FOR MILD PAIN, Disp: 100 tablet, Rfl: 1    ipratropium (Atrovent HFA) 17 mcg/act inhaler, Inhale 2 puffs every 6 (six) hours, Disp: 38.7 g, Rfl: 3    montelukast (SINGULAIR) 10 mg tablet, TAKE 1 TABLET BY MOUTH EVERY DAY IN THE EVENING, Disp: 100 tablet, Rfl: 1    ondansetron (ZOFRAN) 4 mg tablet, TAKE 1 TABLET BY MOUTH EVERY 8 HOURS AS NEEDED FOR NAUSEA AND VOMITING (Patient taking differently: Take 4 mg by mouth every 8 (eight) hours as needed for nausea), Disp: 20 tablet, Rfl: 0    SUMAtriptan (IMITREX) 50 mg tablet, TAKE 2 TABLETS (100 MG TOTAL) BY MOUTH ONCE AS NEEDED FOR MIGRAINE, Disp: 9 tablet, Rfl: 5    zolpidem (AMBIEN) 10 mg tablet, TAKE 1 TABLET BY MOUTH DAILY AT  BEDTIME AS NEEDED FOR SLEEP, Disp: 30 tablet, Rfl: 0    meloxicam (MOBIC) 15 mg tablet, Take 1 tablet (15 mg total) by mouth daily (Patient not taking: Reported on 9/10/2024), Disp: 30 tablet, Rfl: 0    pantoprazole (PROTONIX) 40 mg tablet, TAKE 1 TABLET BY MOUTH EVERY DAY BEFORE BREAKFAST (Patient not taking: Reported on 9/10/2024), Disp: 30 tablet, Rfl: 5    promethazine-codeine (PHENERGAN WITH CODEINE) 6.25-10 mg/5 mL syrup, Take 5 mL by mouth every 4 (four) hours as needed for cough (Patient not taking: Reported on 9/10/2024), Disp: 120 mL, Rfl: 0    Current Allergies     Allergies as of 09/10/2024 - Reviewed 09/10/2024   Allergen Reaction Noted    Morphine Other (See Comments) 11/03/2017    Morphine and codeine Other (See Comments) 11/11/2017    Penicillins Itching and Rash 11/03/2017    Quazepam Itching 09/12/2013            The following portions of the patient's history were reviewed and updated as appropriate: allergies, current medications, past family history, past medical history, past social history, past surgical history and problem list.     Past Medical History:   Diagnosis Date    Anxiety     h/o 2 panic attacks     Arthritis     l hip congenital dyspasia    Asthma     good control    Bleb     R eye    Chronic kidney disease     one kidney left side    Chronic pain     back- DJD in 2 upper 2 lower, buldging disc c-7    Chronic pain disorder     lower back and hips    Depression     Endometriosis     GERD (gastroesophageal reflux disease)     Glaucoma     b/l . Bleb in R eye    Glaucoma     H/O carpal tunnel syndrome     L    Headache     migraines    History of cigarette smoking     History of transfusion     Hypercholesterolemia 1/17/2013    Insomnia     Migraines     Osteoarthritis     cervical spondylarthritis    Polypoid cystitis     POLYP CYSTS ON BUTTOCK AREA    Renal disorder     Reports single kidney    Right knee injury     SCRAPED GROWTH PLATE RIGHT KNEE    Seasonal allergies        Past  Surgical History:   Procedure Laterality Date    BLEPHAROPLASTY Right     BLEB RIGHT EYE    BUNIONECTOMY Bilateral     2 on R, 3 on left    CARPAL TUNNEL RELEASE Left     CT NEEDLE BX ASPIRATION INJECTION LOCALIZATION  3/8/2019    EYE SURGERY Right     bleb implant    EYE SURGERY Bilateral     lazer- glaucoma    FOOT HARDWARE REMOVAL Left 7/2/2018    Procedure: CALCANEAL REMOVAL OF HARDWARE;  Surgeon: Trell Betancourt DPM;  Location:  MAIN OR;  Service: Podiatry    FOOT HARDWARE REMOVAL Left 7/5/2018    Procedure: LEFT CALCANEAL REMOVAL OF HARDWARE;  Surgeon: Trell Betancourt DPM;  Location:  MAIN OR;  Service: Podiatry    FOOT SURGERY Right     BONE REMOVED BOTTOM OF RIGHT FOOT    HAND SURGERY Right     ring finger tendon severed    HARDWARE REMOVAL Left 7/5/2018    Procedure: LEFT BIG TOE REMOVAL OF HARDWARE;  Surgeon: Trell Betancourt DPM;  Location:  MAIN OR;  Service: Podiatry    HIP SURGERY Left     reconstruction as child    HYSTERECTOMY      total abdominal    HYSTERECTOMY      TOTAL    JOINT REPLACEMENT      LTHR and reconstruction    KNEE ARTHROSCOPY      R knee    LEG SURGERY      LISFRANC ARTHRODESIS Left 11/3/2017    Procedure: FUSION FIRST MTPJ: BROSTRUM; LATERAL ANKLE STABILIZATION;  Surgeon: Trell Betancourt DPM;  Location: [facility];  Service: Podiatry    OOPHORECTOMY      age 39    PILONIDAL CYST EXCISION      midline    AL DCMPRN FASCT LEG ANT&/LAT&PST W/DBRDMT MUS Left 7/5/2018    Procedure: LEFT PERONEAL TENDON DEBRIDMENT ;  Surgeon: Trell Betancourt DPM;  Location:  MAIN OR;  Service: Podiatry    AL OSTEOTOMY CALCANEUS W/WO INTERNAL FIXATION Left 11/3/2017    Procedure: OSTEOTOMY CALCANEUS;  Surgeon: Trell Betancourt DPM;  Location: [facility];  Service: Podiatry    AL RPR TENDON FLXR FOOT SEC W/FREE GRAFT EA TENDON Left 7/2/2018    Procedure: PERONEAL TENDON EXPLORATION;  Surgeon: Trell Betancourt DPM;  Location:  MAIN OR;  Service: Podiatry    AL SURGICAL ARTHROSCOPY SHOULDER W/ROTATOR CUFF RPR Left  11/6/2023    Procedure: Left - ARTHROSCOPY SHOULDER Synovectomy Debridement Arthroscopic rotator cuff repair Acromioplasty Post arthroscopic injections of intra-articular joint space and peripheral portals;  Surgeon: Sudeep Watson DO;  Location: [facility];  Service: Orthopedics    REFRACTIVE SURGERY Bilateral     LASER SURGERY BOTH EYES    TOTAL HIP ARTHROPLASTY  2005    WRIST SURGERY Left        Family History   Problem Relation Age of Onset    Cancer Mother     Hypertension Mother     Bone cancer Mother 44        COD    Breast cancer Mother 44    Hypertension Father     Heart disease Father     Dementia Father     Lung cancer Sister 44    Brain cancer Sister 49    No Known Problems Sister     No Known Problems Maternal Grandmother     No Known Problems Maternal Grandfather     Liver cancer Paternal Grandmother 73    Hypertension Paternal Grandfather     Stroke Paternal Grandfather     Breast cancer Maternal Aunt     Breast cancer Maternal Aunt 53    No Known Problems Paternal Aunt          Medications have been verified.        Objective   /68   Pulse 71   Temp 97.9 °F (36.6 °C) (Temporal)   Resp 18   Ht 5' (1.524 m)   Wt 76.2 kg (168 lb)   SpO2 99%   BMI 32.81 kg/m²   No LMP recorded. Patient has had a hysterectomy.       Physical Exam     Physical Exam  Cardiovascular:      Pulses: Normal pulses.   Pulmonary:      Effort: Pulmonary effort is normal. No respiratory distress.   Musculoskeletal:        Hands:       Comments: 1 cm superficial open wound with surrounding erythema that extends into the ring finger and dorsum of the hand.  Very tender to palpation.   Neurological:      General: No focal deficit present.      Mental Status: She is alert and oriented to person, place, and time.   Psychiatric:         Mood and Affect: Mood normal.         Behavior: Behavior normal.               Sari Santana DO

## 2024-09-11 ENCOUNTER — VBI (OUTPATIENT)
Dept: ADMINISTRATIVE | Facility: OTHER | Age: 49
End: 2024-09-11

## 2024-09-11 NOTE — TELEPHONE ENCOUNTER
09/11/24 9:23 AM     Chart reviewed for Pap Smear (HPV) aka Cervical Cancer Screening was/were not submitted to the patient's insurance.     Talya Martin MA   [facility]

## 2024-09-12 ENCOUNTER — TELEPHONE (OUTPATIENT)
Dept: FAMILY MEDICINE CLINIC | Facility: CLINIC | Age: 49
End: 2024-09-12

## 2024-09-12 NOTE — TELEPHONE ENCOUNTER
Patient is asking about the written prescriptions she requested on 9/6/24.  I did remind her she is due for a 6 month check but needed the scripts before that

## 2024-09-19 NOTE — TELEPHONE ENCOUNTER
Patient asking for her scripts to be printed due to switching pharmacies. Warm transfer to office

## 2024-09-23 ENCOUNTER — OFFICE VISIT (OUTPATIENT)
Dept: INTERNAL MEDICINE CLINIC | Facility: OTHER | Age: 49
End: 2024-09-23
Payer: COMMERCIAL

## 2024-09-23 ENCOUNTER — APPOINTMENT (OUTPATIENT)
Dept: LAB | Facility: IMAGING CENTER | Age: 49
End: 2024-09-23
Payer: COMMERCIAL

## 2024-09-23 VITALS
HEART RATE: 67 BPM | DIASTOLIC BLOOD PRESSURE: 70 MMHG | WEIGHT: 175.6 LBS | OXYGEN SATURATION: 97 % | BODY MASS INDEX: 34.47 KG/M2 | HEIGHT: 60 IN | TEMPERATURE: 98 F | SYSTOLIC BLOOD PRESSURE: 122 MMHG

## 2024-09-23 DIAGNOSIS — Z76.89 ENCOUNTER TO ESTABLISH CARE: Primary | ICD-10-CM

## 2024-09-23 DIAGNOSIS — G43.919 INTRACTABLE MIGRAINE WITHOUT STATUS MIGRAINOSUS, UNSPECIFIED MIGRAINE TYPE: ICD-10-CM

## 2024-09-23 DIAGNOSIS — Q60.0 UNILATERAL CONGENITAL ABSENCE OF KIDNEY: ICD-10-CM

## 2024-09-23 DIAGNOSIS — D50.8 OTHER IRON DEFICIENCY ANEMIA: ICD-10-CM

## 2024-09-23 DIAGNOSIS — Z78.0 MENOPAUSE: ICD-10-CM

## 2024-09-23 DIAGNOSIS — Z12.11 ENCOUNTER FOR COLORECTAL CANCER SCREENING: ICD-10-CM

## 2024-09-23 DIAGNOSIS — F51.01 PRIMARY INSOMNIA: ICD-10-CM

## 2024-09-23 DIAGNOSIS — Z12.31 SCREENING MAMMOGRAM FOR BREAST CANCER: ICD-10-CM

## 2024-09-23 DIAGNOSIS — E78.5 HYPERLIPIDEMIA, UNSPECIFIED HYPERLIPIDEMIA TYPE: ICD-10-CM

## 2024-09-23 DIAGNOSIS — Z12.12 ENCOUNTER FOR COLORECTAL CANCER SCREENING: ICD-10-CM

## 2024-09-23 DIAGNOSIS — Z12.2 SCREENING FOR LUNG CANCER: ICD-10-CM

## 2024-09-23 DIAGNOSIS — R73.9 HYPERGLYCEMIA: ICD-10-CM

## 2024-09-23 DIAGNOSIS — J44.1 CHRONIC OBSTRUCTIVE PULMONARY DISEASE WITH ACUTE EXACERBATION (HCC): ICD-10-CM

## 2024-09-23 DIAGNOSIS — Z00.6 ENCOUNTER FOR EXAMINATION FOR NORMAL COMPARISON OR CONTROL IN CLINICAL RESEARCH PROGRAM: ICD-10-CM

## 2024-09-23 DIAGNOSIS — Z72.0 TOBACCO USE: ICD-10-CM

## 2024-09-23 PROBLEM — E66.09 CLASS 1 OBESITY DUE TO EXCESS CALORIES WITH SERIOUS COMORBIDITY AND BODY MASS INDEX (BMI) OF 30.0 TO 30.9 IN ADULT: Status: ACTIVE | Noted: 2019-11-21

## 2024-09-23 PROBLEM — E66.811 CLASS 1 OBESITY DUE TO EXCESS CALORIES WITH SERIOUS COMORBIDITY AND BODY MASS INDEX (BMI) OF 33.0 TO 33.9 IN ADULT: Status: ACTIVE | Noted: 2019-11-21

## 2024-09-23 PROBLEM — R21 RASH: Status: RESOLVED | Noted: 2018-08-27 | Resolved: 2024-09-23

## 2024-09-23 PROBLEM — R14.0 BLOATING: Status: RESOLVED | Noted: 2021-11-15 | Resolved: 2024-09-23

## 2024-09-23 PROBLEM — E66.811 CLASS 1 OBESITY DUE TO EXCESS CALORIES WITH SERIOUS COMORBIDITY AND BODY MASS INDEX (BMI) OF 30.0 TO 30.9 IN ADULT: Status: ACTIVE | Noted: 2019-11-21

## 2024-09-23 PROBLEM — E66.09 CLASS 1 OBESITY DUE TO EXCESS CALORIES WITH SERIOUS COMORBIDITY AND BODY MASS INDEX (BMI) OF 33.0 TO 33.9 IN ADULT: Status: ACTIVE | Noted: 2019-11-21

## 2024-09-23 PROBLEM — S39.012A LUMBAR STRAIN, INITIAL ENCOUNTER: Status: RESOLVED | Noted: 2020-09-14 | Resolved: 2024-09-23

## 2024-09-23 PROBLEM — Z00.01 ABNORMAL WELLNESS EXAM: Status: RESOLVED | Noted: 2023-08-09 | Resolved: 2024-09-23

## 2024-09-23 PROBLEM — R52 PAIN: Status: RESOLVED | Noted: 2019-12-13 | Resolved: 2024-09-23

## 2024-09-23 PROBLEM — L84 CORN OF FOOT: Status: RESOLVED | Noted: 2023-04-04 | Resolved: 2024-09-23

## 2024-09-23 PROBLEM — S46.012A TRAUMATIC TEAR OF LEFT ROTATOR CUFF: Status: ACTIVE | Noted: 2019-11-21

## 2024-09-23 PROBLEM — K59.03 DRUG-INDUCED CONSTIPATION: Status: RESOLVED | Noted: 2023-06-05 | Resolved: 2024-09-23

## 2024-09-23 PROBLEM — R10.84 GENERALIZED ABDOMINAL PAIN: Status: RESOLVED | Noted: 2021-11-15 | Resolved: 2024-09-23

## 2024-09-23 PROBLEM — R19.7 DIARRHEA: Status: RESOLVED | Noted: 2020-03-26 | Resolved: 2024-09-23

## 2024-09-23 PROBLEM — H92.01 RIGHT EAR PAIN: Status: RESOLVED | Noted: 2021-01-07 | Resolved: 2024-09-23

## 2024-09-23 PROBLEM — M25.512 ACUTE PAIN OF LEFT SHOULDER: Status: RESOLVED | Noted: 2022-08-15 | Resolved: 2024-09-23

## 2024-09-23 LAB
ALBUMIN SERPL BCG-MCNC: 4.5 G/DL (ref 3.5–5)
ALP SERPL-CCNC: 82 U/L (ref 34–104)
ALT SERPL W P-5'-P-CCNC: 12 U/L (ref 7–52)
ANION GAP SERPL CALCULATED.3IONS-SCNC: 9 MMOL/L (ref 4–13)
AST SERPL W P-5'-P-CCNC: 15 U/L (ref 13–39)
BASOPHILS # BLD AUTO: 0.03 THOUSANDS/ΜL (ref 0–0.1)
BASOPHILS NFR BLD AUTO: 0 % (ref 0–1)
BILIRUB SERPL-MCNC: 0.37 MG/DL (ref 0.2–1)
BUN SERPL-MCNC: 19 MG/DL (ref 5–25)
CALCIUM SERPL-MCNC: 9.8 MG/DL (ref 8.4–10.2)
CHLORIDE SERPL-SCNC: 103 MMOL/L (ref 96–108)
CHOLEST SERPL-MCNC: 239 MG/DL
CO2 SERPL-SCNC: 28 MMOL/L (ref 21–32)
CREAT SERPL-MCNC: 0.78 MG/DL (ref 0.6–1.3)
EOSINOPHIL # BLD AUTO: 0.22 THOUSAND/ΜL (ref 0–0.61)
EOSINOPHIL NFR BLD AUTO: 3 % (ref 0–6)
ERYTHROCYTE [DISTWIDTH] IN BLOOD BY AUTOMATED COUNT: 13.1 % (ref 11.6–15.1)
EST. AVERAGE GLUCOSE BLD GHB EST-MCNC: 120 MG/DL
GFR SERPL CREATININE-BSD FRML MDRD: 88 ML/MIN/1.73SQ M
GLUCOSE P FAST SERPL-MCNC: 90 MG/DL (ref 65–99)
HBA1C MFR BLD: 5.8 %
HCT VFR BLD AUTO: 40.7 % (ref 34.8–46.1)
HDLC SERPL-MCNC: 51 MG/DL
HGB BLD-MCNC: 13.3 G/DL (ref 11.5–15.4)
IMM GRANULOCYTES # BLD AUTO: 0.01 THOUSAND/UL (ref 0–0.2)
IMM GRANULOCYTES NFR BLD AUTO: 0 % (ref 0–2)
LDLC SERPL CALC-MCNC: 158 MG/DL (ref 0–100)
LYMPHOCYTES # BLD AUTO: 2.45 THOUSANDS/ΜL (ref 0.6–4.47)
LYMPHOCYTES NFR BLD AUTO: 35 % (ref 14–44)
MCH RBC QN AUTO: 30.8 PG (ref 26.8–34.3)
MCHC RBC AUTO-ENTMCNC: 32.7 G/DL (ref 31.4–37.4)
MCV RBC AUTO: 94 FL (ref 82–98)
MONOCYTES # BLD AUTO: 0.52 THOUSAND/ΜL (ref 0.17–1.22)
MONOCYTES NFR BLD AUTO: 7 % (ref 4–12)
NEUTROPHILS # BLD AUTO: 3.87 THOUSANDS/ΜL (ref 1.85–7.62)
NEUTS SEG NFR BLD AUTO: 55 % (ref 43–75)
NRBC BLD AUTO-RTO: 0 /100 WBCS
PLATELET # BLD AUTO: 267 THOUSANDS/UL (ref 149–390)
PMV BLD AUTO: 11.6 FL (ref 8.9–12.7)
POTASSIUM SERPL-SCNC: 4.8 MMOL/L (ref 3.5–5.3)
PROT SERPL-MCNC: 7.1 G/DL (ref 6.4–8.4)
RBC # BLD AUTO: 4.32 MILLION/UL (ref 3.81–5.12)
SODIUM SERPL-SCNC: 140 MMOL/L (ref 135–147)
TRIGL SERPL-MCNC: 148 MG/DL
TSH SERPL DL<=0.05 MIU/L-ACNC: 1.74 UIU/ML (ref 0.45–4.5)
WBC # BLD AUTO: 7.1 THOUSAND/UL (ref 4.31–10.16)

## 2024-09-23 PROCEDURE — 36415 COLL VENOUS BLD VENIPUNCTURE: CPT

## 2024-09-23 PROCEDURE — 80053 COMPREHEN METABOLIC PANEL: CPT

## 2024-09-23 PROCEDURE — 80061 LIPID PANEL: CPT

## 2024-09-23 PROCEDURE — 84443 ASSAY THYROID STIM HORMONE: CPT

## 2024-09-23 PROCEDURE — 85025 COMPLETE CBC W/AUTO DIFF WBC: CPT

## 2024-09-23 PROCEDURE — 83036 HEMOGLOBIN GLYCOSYLATED A1C: CPT

## 2024-09-23 PROCEDURE — 99214 OFFICE O/P EST MOD 30 MIN: CPT

## 2024-09-23 RX ORDER — CYCLOBENZAPRINE HCL 10 MG
10 TABLET ORAL DAILY PRN
Qty: 30 TABLET | Refills: 0 | Status: CANCELLED | OUTPATIENT
Start: 2024-09-23

## 2024-09-23 RX ORDER — ZOLPIDEM TARTRATE 10 MG/1
10 TABLET ORAL
Qty: 30 TABLET | Refills: 0 | Status: SHIPPED | OUTPATIENT
Start: 2024-09-23

## 2024-09-23 RX ORDER — IPRATROPIUM BROMIDE 17 UG/1
2 AEROSOL, METERED RESPIRATORY (INHALATION) EVERY 6 HOURS
Qty: 12.9 G | Refills: 2 | Status: SHIPPED | OUTPATIENT
Start: 2024-09-23

## 2024-09-23 RX ORDER — ALBUTEROL SULFATE 0.83 MG/ML
2.5 SOLUTION RESPIRATORY (INHALATION) EVERY 6 HOURS PRN
Qty: 15 ML | Refills: 1 | Status: SHIPPED | OUTPATIENT
Start: 2024-09-23 | End: 2024-09-25 | Stop reason: SDUPTHER

## 2024-09-23 RX ORDER — SUMATRIPTAN 50 MG/1
100 TABLET, FILM COATED ORAL ONCE AS NEEDED
Qty: 9 TABLET | Refills: 1 | Status: SHIPPED | OUTPATIENT
Start: 2024-09-23

## 2024-09-23 RX ORDER — ALBUTEROL SULFATE 90 UG/1
2 INHALANT RESPIRATORY (INHALATION) EVERY 6 HOURS PRN
Qty: 18 G | Refills: 1 | Status: SHIPPED | OUTPATIENT
Start: 2024-09-23

## 2024-09-23 NOTE — ASSESSMENT & PLAN NOTE
Currently taking supplement 10 mg daily as needed for insomnia  She endorses that she does not take this part of her friend called.  Patient advised that she is not unable to drive or operate heavy machinery after taking this medication  Will start magnesium for migraine prevention as well as insomnia  Zolpidem refilled.  PDMP reviewed  Controlled substance form filled out today

## 2024-09-23 NOTE — PROGRESS NOTES
Assessment/Plan:    1. Encounter to establish care  Comments:  Past medical history, meds, allergies, surgical history, family history reviewed with patient  2. Encounter for colorectal cancer screening  -     [facility]  3. Chronic obstructive pulmonary disease with acute exacerbation (HCC)  Assessment & Plan:  Currently using albuterol or ipratropium as needed.  Denies shortness of breath or HAYNES  Previously on Wixela for daily inhaler therapy, however feels as though she has not been needing  Continue to monitor on close advised if symptoms worsen  Orders:  -     albuterol (PROVENTIL HFA,VENTOLIN HFA) 90 mcg/act inhaler; Inhale 2 puffs every 6 (six) hours as needed for wheezing  -     albuterol (2.5 mg/3 mL) 0.083 % nebulizer solution; Take 3 mL (2.5 mg total) by nebulization every 6 (six) hours as needed for wheezing or shortness of breath  -     ipratropium (Atrovent HFA) 17 mcg/act inhaler; Inhale 2 puffs every 6 (six) hours  4. Hyperglycemia  Assessment & Plan:  History of hyperglycemia.  Will get A1c and updated fasting glucose   Orders:  -     TSH, 3rd generation with Free T4 reflex; Future  -     Hemoglobin A1C; Future  5. Hyperlipidemia, unspecified hyperlipidemia type  Assessment & Plan:  Will update lipid panel  Encouraged healthy diet and exercise  Orders:  -     Lipid Panel with Direct LDL reflex; Future  6. Primary insomnia  Assessment & Plan:  Currently taking supplement 10 mg daily as needed for insomnia  She endorses that she does not take this part of her friend called.  Patient advised that she is not unable to drive or operate heavy machinery after taking this medication  Will start magnesium for migraine prevention as well as insomnia  Zolpidem refilled.  PDMP reviewed  Controlled substance form filled out today  Orders:  -     zolpidem (AMBIEN) 10 mg tablet; Take 1 tablet (10 mg total) by mouth daily at bedtime as needed for sleep  7. Other iron deficiency anemia  Assessment & Plan:  History  of iron deficiency anemia, most recent CBC WNL.  Will update CBC  Orders:  -     CBC and differential; Future  8. Tobacco use  Assessment & Plan:  Advised cessation  CT lung cancer screening ordered  9. Unilateral congenital absence of kidney  Assessment & Plan:  Update CMP  Orders:  -     Comprehensive metabolic panel; Future  10. Intractable migraine without status migrainosus, unspecified migraine type  Assessment & Plan:  Migraine frequency approximately 3-4 times a week  Advised to start magnesium and multivitamin with B12 for preventative  Continue Imitrex as needed for abortive  Call office if symptoms worsen or migration, increased in frequency or severity  Orders:  -     SUMAtriptan (IMITREX) 50 mg tablet; Take 2 tablets (100 mg total) by mouth once as needed for migraine  11. Screening for lung cancer  -     CT lung screening program; Future; Expected date: 09/23/2024  12. Menopause  -     Ambulatory Referral to Obstetrics / Gynecology; Future  13. Screening mammogram for breast cancer  -     Mammo screening bilateral w 3d and cad; Future         Depression Screening and Follow-up Plan: Patient was screened for depression during today's encounter. They screened negative with a PHQ-9 score of 2.    Tobacco Cessation Counseling: Tobacco cessation counseling was provided. The patient is sincerely urged to quit consumption of tobacco. She is not ready to quit tobacco.     Lung Cancer Screening Shared Decision Making: I discussed with her that she is a candidate for lung cancer CT screening.     The following Shared Decision-Making points were covered:  Benefits of screening were discussed, including the rates of reduction in death from lung cancer and other causes.  Harms of screening were reviewed, including false positive tests, radiation exposure levels, risks of invasive procedures, risks of complications of screening, and risk of overdiagnosis.  I counseled on the importance of adherence to annual lung  cancer LDCT screening, impact of co-morbidities, and ability or willingness to undergo diagnosis and treatment.  I counseled on the importance of maintaining abstinence as a former smoker or was counseled on the importance of smoking cessation if a current smoker    Review of Eligibility Criteria: She meets all of the criteria for Lung Cancer Screening.   - She is 49 y.o.   - She has 20 pack year tobacco history and is a current smoker or has quit within the past 15 years  - She presents no signs or symptoms of lung cancer    After discussion, the patient decided to elect lung cancer screening.        M*[employer] software was used to dictate this note.  It may contain errors with dictating incorrect words or incorrect spelling. Please contact the provider directly with any questions.    Subjective:      Patient ID: Geraldine Garner is a 49 y.o. female.    Patient is a 49-year-old female presenting to the office to establish care    COPD: Well-controlled on current inhalers, not on controller regimen, on wixella in past   Uses ipratropium or albuterol as needed    Hyperlipidemia: Reviewed last lipid panel.  Not currently on lipid-lowering medication    Obesity: Previously on Wegovy, tolerated well.  Interested in restarting    Migraines: Controlled with use of Imitrex as needed if she takes it at onset   Frequency: 3-4 per month  Characteristics: Visual Aura , photophobia, phonophobia, notes headache is mostly frontal, behind the eyes  Preventative: None  Abortive: Imitrex  Triggers: stress     Rotator cuff tear: S/p repair 11/2023, follows with orthopedics, has been improving strength    Insomnia: on Ambien 10mg daily, last filled 8/8  Has been on for 2-3 years  Has difficulty with comfort at night due to chronic neck and back pain  Some difficulty with waking up and falling back to sleep  Has been using about 4 nights per week, 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    Review of Systems   Constitutional:  Negative for chills, fatigue and fever.   HENT:  Negative for congestion, ear pain, postnasal drip, rhinorrhea, sinus pressure, sore throat and trouble swallowing.    Eyes:  Negative for pain and visual disturbance.   Respiratory:  Negative for cough, shortness of breath and wheezing.    Cardiovascular:  Negative for chest pain, palpitations and leg swelling.   Gastrointestinal:  Negative for abdominal pain, nausea and vomiting.   Genitourinary:  Negative for difficulty urinating, dysuria and hematuria.   Musculoskeletal:  Positive for back pain and neck pain.   Neurological:  Negative for dizziness, weakness, light-headedness, numbness and headaches.   Psychiatric/Behavioral:  Positive for sleep disturbance. Negative for dysphoric mood. The patient is not nervous/anxious.    All other systems reviewed and are negative.        Past Medical History:   Diagnosis Date    Anxiety     h/o 2 panic attacks     Arthritis     l hip congenital dyspasia    Asthma     good control    Bleb     R eye    Chronic kidney disease     one kidney left side    Chronic pain     back- DJD in 2 upper 2 lower, buldging disc c-7    Chronic pain disorder     lower back and hips    Depression     Endometriosis     GERD (gastroesophageal reflux disease)     Glaucoma     b/l . Bleb in R eye    Glaucoma     H/O carpal tunnel syndrome     L    Headache     migraines    History of cigarette smoking     History of total hip replacement 09/11/2012    Formatting of this note might be different from the original.  initial congenital left hip dysplasia  initial surgeries 1974 & 1984 Juli & then 2005 (total hip- Lui)      History of transfusion     Hypercholesterolemia 01/17/2013    Insomnia     Migraines     Osteoarthritis     cervical spondylarthritis    Polypoid cystitis     POLYP CYSTS ON BUTTOCK AREA    Renal disorder     Reports single kidney    Right knee injury     SCRAPED GROWTH PLATE RIGHT KNEE    Seasonal allergies          Current Outpatient Medications:     albuterol (2.5 mg/3 mL) 0.083 % nebulizer solution, Take 3 mL (2.5 mg total) by nebulization every 6 (six) hours as needed for wheezing or shortness of breath, Disp: 15 mL, Rfl: 1    albuterol (PROVENTIL HFA,VENTOLIN HFA) 90 mcg/act inhaler, Inhale 2 puffs every 6 (six) hours as needed for wheezing, Disp: 18 g, Rfl: 1    conjugated estrogens (Premarin) 0.45 mg tablet, TAKE 1 TABLET IN MORNING FOR 21 DAYS THEN DO NOT TAKE FOR 7 DAYS, Disp: 21 tablet, Rfl: 5    cyclobenzaprine (FLEXERIL) 10 mg tablet, Take 1 tablet (10 mg total) by mouth daily as needed for muscle spasms, Disp: 30 tablet, Rfl: 0    fluticasone (FLONASE) 50 mcg/act nasal spray, SPRAY 2 SPRAYS INTO EACH NOSTRIL EVERY DAY (Patient taking differently: 2 sprays into each nostril if needed for allergies), Disp: 16 mL, Rfl: 3    ibuprofen (MOTRIN) 600 mg tablet, TAKE 1 TABLET (600 MG TOTAL) BY MOUTH EVERY 6 (SIX) HOURS AS NEEDED FOR MILD PAIN, Disp: 100 tablet, Rfl: 1    ipratropium (Atrovent HFA) 17 mcg/act inhaler, Inhale 2 puffs every 6 (six) hours, Disp: 12.9 g, Rfl: 2    montelukast (SINGULAIR) 10 mg tablet, TAKE 1 TABLET BY MOUTH EVERY DAY IN THE EVENING, Disp: 100 tablet, Rfl: 1    SUMAtriptan (IMITREX) 50 mg tablet, Take 2 tablets (100 mg total) by mouth once as needed for migraine, Disp: 9 tablet, Rfl: 1    zolpidem (AMBIEN) 10 mg tablet, Take 1 tablet (10 mg total) by mouth daily at bedtime as needed for sleep, Disp: 30 tablet, Rfl: 0    Allergies   Allergen Reactions    Morphine Other (See Comments)     Blood pressure drops     Morphine And Codeine Other (See Comments)     Blood pressure drops    Penicillins Itching and Rash    Quazepam Itching       Social History     Objective:  /70 (BP Location: Left arm, Patient Position: Sitting, Cuff Size: Standard)   Pulse 67   Temp 98 °F (36.7 °C) (Temporal)   Ht 5' (1.524 m)   Wt 79.7 kg (175 lb 9.6 oz)   SpO2 97%   BMI 34.29 kg/m²      Physical Exam  Vitals and nursing note reviewed.   Constitutional:       General: She is not in acute distress.     Appearance: Normal appearance. She is not ill-appearing.   HENT:      Head: Normocephalic and atraumatic.      Right Ear: External ear normal.      Left Ear: External ear normal.      Nose: Nose normal.      Mouth/Throat:      Mouth: Mucous membranes are moist.      Pharynx: Oropharynx is clear.   Eyes:      General: No scleral icterus.     Conjunctiva/sclera: Conjunctivae normal.   Cardiovascular:      Rate and Rhythm: Normal rate and regular rhythm.      Heart sounds: Normal heart sounds. No murmur heard.     No friction rub. No gallop.   Pulmonary:      Effort: Pulmonary effort is normal. No respiratory distress.      Breath sounds: Normal breath sounds. No wheezing, rhonchi or rales.   Chest:      Chest wall: No tenderness.   Musculoskeletal:      Right lower leg: No edema.      Left lower leg: No edema.   Skin:     General: Skin is warm and dry.   Neurological:      General: No focal deficit present.      Mental Status: She is alert and oriented to person, place, and time. Mental status is at baseline.   Psychiatric:         Mood and Affect: Mood normal.         Behavior: Behavior normal.           Disclaimer: This note was generated with voice recognition software.  Phonetic, grammatical, and spelling errors may be present as a result.  Please contact provider with any concerns or questions

## 2024-09-23 NOTE — ASSESSMENT & PLAN NOTE
Migraine frequency approximately 3-4 times a week  Advised to start magnesium and multivitamin with B12 for preventative  Continue Imitrex as needed for abortive  Call office if symptoms worsen or migration, increased in frequency or severity

## 2024-09-23 NOTE — ASSESSMENT & PLAN NOTE
Currently using albuterol or ipratropium as needed.  Denies shortness of breath or HAYNES  Previously on Wixela for daily inhaler therapy, however feels as though she has not been needing  Continue to monitor on close advised if symptoms worsen

## 2024-09-24 ENCOUNTER — TELEPHONE (OUTPATIENT)
Age: 49
End: 2024-09-24

## 2024-09-24 DIAGNOSIS — J44.1 CHRONIC OBSTRUCTIVE PULMONARY DISEASE WITH ACUTE EXACERBATION (HCC): ICD-10-CM

## 2024-09-24 NOTE — TELEPHONE ENCOUNTER
Betzy from [facility] had called in for the medicine     albuterol (2.5 mg/3 mL) 0.083 % nebulizer solution    She had stated that they dont have the package of 15 mL and was wondering if its okay to dispense a 75 mL for the patient.     Please advise out to betzy at [phone] to give confirmation if this is okay to do.

## 2024-09-25 RX ORDER — ALBUTEROL SULFATE 0.83 MG/ML
2.5 SOLUTION RESPIRATORY (INHALATION) EVERY 6 HOURS PRN
Qty: 75 ML | Refills: 1 | Status: SHIPPED | OUTPATIENT
Start: 2024-09-25

## 2024-10-04 LAB
APOB+LDLR+PCSK9 GENE MUT ANL BLD/T: NOT DETECTED
BRCA1+BRCA2 DEL+DUP + FULL MUT ANL BLD/T: NOT DETECTED
MLH1+MSH2+MSH6+PMS2 GN DEL+DUP+FUL M: NOT DETECTED

## 2024-10-21 DIAGNOSIS — F51.01 PRIMARY INSOMNIA: ICD-10-CM

## 2024-10-22 RX ORDER — ZOLPIDEM TARTRATE 10 MG/1
10 TABLET ORAL
Qty: 30 TABLET | Refills: 0 | Status: SHIPPED | OUTPATIENT
Start: 2024-10-22

## 2024-10-25 ENCOUNTER — TELEPHONE (OUTPATIENT)
Dept: INTERNAL MEDICINE CLINIC | Facility: OTHER | Age: 49
End: 2024-10-25

## 2024-10-28 ENCOUNTER — TELEPHONE (OUTPATIENT)
Dept: INTERNAL MEDICINE CLINIC | Facility: OTHER | Age: 49
End: 2024-10-28

## 2024-10-28 NOTE — TELEPHONE ENCOUNTER
Called to reschedule patients physical that was canceled and she stated that she will call back when she is ready to schedule, but she has a family emergency going on right now.               ----- Message -----  From: Izable Bey PA-C  Sent: 10/21/2024   2:19 PM EDT  To: [facility]    Please reschedule patient for her missed appointment.  Recommend annual physical and follow-up

## 2024-11-14 DIAGNOSIS — F51.01 PRIMARY INSOMNIA: ICD-10-CM

## 2024-11-14 DIAGNOSIS — J44.1 CHRONIC OBSTRUCTIVE PULMONARY DISEASE WITH ACUTE EXACERBATION (HCC): ICD-10-CM

## 2024-11-14 RX ORDER — ALBUTEROL SULFATE 90 UG/1
2 INHALANT RESPIRATORY (INHALATION) EVERY 6 HOURS PRN
Qty: 54 G | Refills: 1 | Status: SHIPPED | OUTPATIENT
Start: 2024-11-14

## 2024-11-19 ENCOUNTER — OFFICE VISIT (OUTPATIENT)
Dept: INTERNAL MEDICINE CLINIC | Facility: OTHER | Age: 49
End: 2024-11-19
Payer: COMMERCIAL

## 2024-11-19 VITALS
BODY MASS INDEX: 33.96 KG/M2 | HEIGHT: 60 IN | HEART RATE: 76 BPM | DIASTOLIC BLOOD PRESSURE: 76 MMHG | SYSTOLIC BLOOD PRESSURE: 140 MMHG | WEIGHT: 173 LBS | OXYGEN SATURATION: 97 % | TEMPERATURE: 98 F

## 2024-11-19 DIAGNOSIS — Z00.00 ANNUAL PHYSICAL EXAM: Primary | ICD-10-CM

## 2024-11-19 DIAGNOSIS — E78.5 HYPERLIPIDEMIA, UNSPECIFIED HYPERLIPIDEMIA TYPE: ICD-10-CM

## 2024-11-19 DIAGNOSIS — E66.811 CLASS 1 OBESITY DUE TO EXCESS CALORIES WITH SERIOUS COMORBIDITY AND BODY MASS INDEX (BMI) OF 33.0 TO 33.9 IN ADULT: ICD-10-CM

## 2024-11-19 DIAGNOSIS — F51.01 PRIMARY INSOMNIA: ICD-10-CM

## 2024-11-19 DIAGNOSIS — I10 HYPERTENSION, UNSPECIFIED TYPE: ICD-10-CM

## 2024-11-19 DIAGNOSIS — J44.1 CHRONIC OBSTRUCTIVE PULMONARY DISEASE WITH ACUTE EXACERBATION (HCC): ICD-10-CM

## 2024-11-19 DIAGNOSIS — E66.09 CLASS 1 OBESITY DUE TO EXCESS CALORIES WITH SERIOUS COMORBIDITY AND BODY MASS INDEX (BMI) OF 33.0 TO 33.9 IN ADULT: ICD-10-CM

## 2024-11-19 PROCEDURE — 99396 PREV VISIT EST AGE 40-64: CPT | Performed by: INTERNAL MEDICINE

## 2024-11-19 RX ORDER — ZOLPIDEM TARTRATE 10 MG/1
10 TABLET ORAL
Qty: 30 TABLET | Refills: 0 | Status: SHIPPED | OUTPATIENT
Start: 2024-11-19

## 2024-11-19 RX ORDER — MONTELUKAST SODIUM 10 MG/1
10 TABLET ORAL EVERY EVENING
Qty: 100 TABLET | Refills: 1 | Status: SHIPPED | OUTPATIENT
Start: 2024-11-19 | End: 2024-11-19

## 2024-11-19 RX ORDER — MONTELUKAST SODIUM 10 MG/1
10 TABLET ORAL EVERY EVENING
Qty: 100 TABLET | Refills: 1 | Status: SHIPPED | OUTPATIENT
Start: 2024-11-19

## 2024-11-19 RX ORDER — ZOLPIDEM TARTRATE 10 MG/1
10 TABLET ORAL
Qty: 30 TABLET | OUTPATIENT
Start: 2024-11-19

## 2024-11-19 NOTE — ASSESSMENT & PLAN NOTE
Discussed weight loss treatment options with the patient.  It was recommended that she implement diet and exercise modifications prior to starting any medications and she was agreeable to this.  Plan to follow-up with her weight loss in 3 months.  She has seen good results with Wegovy in the past, but there were issues with coverage after she changed her insurance carrier.  If she would still like to pursue weight loss medications at her next visit, plan to price check Wegovy and other GLP-1 receptor agonists.

## 2024-11-19 NOTE — ASSESSMENT & PLAN NOTE
Patient meets criteria for lung cancer screening.  Low-dose CT ordered during her last visit.  Patient strongly advised to schedule this.    Orders:    montelukast (SINGULAIR) 10 mg tablet; Take 1 tablet (10 mg total) by mouth every evening

## 2024-11-19 NOTE — ASSESSMENT & PLAN NOTE
Most recent  with an ASCVD of 4.5%.  No history of CAD and currently prediabetic with A1c of 5.8%, so she does not qualify for statin therapy at this time.

## 2024-11-19 NOTE — PROGRESS NOTES
Adult Annual Physical  Name: Geraldine Garner      : 1975      MRN: [id number]  Encounter Provider: Alessia Coreas DO  Encounter Date: 2024   Encounter department: [facility] [place]    Assessment & Plan  Hypertension, unspecified type    Blood pressure 140/76 in the office today and 158/88 on recheck.  Patient checks her blood pressure 1-2 times per week and consistently gets readings 120s-130s/70s at home.  She attributes the elevated readings to life stressors, as her sister was just recently placed on hospice.  Patient advised to keep a blood pressure log over the next few months and to bring this to her next appointment in 3 months.  No medications given during this visit.         Class 1 obesity due to excess calories with serious comorbidity and body mass index (BMI) of 33.0 to 33.9 in adult    Discussed weight loss treatment options with the patient.  It was recommended that she implement diet and exercise modifications prior to starting any medications and she was agreeable to this.  Plan to follow-up with her weight loss in 3 months.  She has seen good results with Wegovy in the past, but there were issues with coverage after she changed her insurance carrier.  If she would still like to pursue weight loss medications at her next visit, plan to price check Wegovy and other GLP-1 receptor agonists.         Chronic obstructive pulmonary disease with acute exacerbation (HCC)    Patient meets criteria for lung cancer screening.  Low-dose CT ordered during her last visit.  Patient strongly advised to schedule this.    Orders:    montelukast (SINGULAIR) 10 mg tablet; Take 1 tablet (10 mg total) by mouth every evening    Primary insomnia    Orders:    zolpidem (AMBIEN) 10 mg tablet; Take 1 tablet (10 mg total) by mouth daily at bedtime as needed for sleep    Annual physical exam    Orders:    Hepatitis C antibody; Future    HIV 1/2 AG/AB w Reflex [facility] for 2 yr old  and above; Future    Hyperlipidemia, unspecified hyperlipidemia type    Most recent  with an ASCVD of 4.5%.  No history of CAD and currently prediabetic with A1c of 5.8%, so she does not qualify for statin therapy at this time.         Immunizations and preventive care screenings were discussed with patient today. Appropriate education was printed on patient's after visit summary.    Counseling:  Exercise: the importance of regular exercise/physical activity was discussed. Recommend exercise 3-5 times per week for at least 30 minutes.       Tobacco Cessation Counseling: The patient is sincerely urged to quit consumption of tobacco. She is ready to quit tobacco.         History of Present Illness     Adult Annual Physical:  Patient presents for annual physical. Patient is a 49-year-old female with a past medical history of COPD, migraines, anxiety/depression, insomnia, glaucoma s/p drainage implant, and hysterectomy in 2014 presenting for annual physical.  She would like to discuss weight loss today.  Patient reports using Wegovy for weight loss over 1 year ago.  She had used it for 2 months with good results, but it was stopped prior to a procedure on her foot.  She wanted to restart Wegovy after the procedure but she switched insurances and it was no longer covered, so it was discontinued.  She reports well-balanced diet with infrequent junk food consumption.  She has no formal exercise routine but has a labor-intensive job as an EMT.  Smokes 1/2 pack/day.  No alcohol or drug use..     Diet and Physical Activity:  - Diet/Nutrition: well balanced diet and limited junk food.  - Exercise: no formal exercise. Labor intensive job    General Health:  - Sleep: 7-8 hours of sleep on average.  - Hearing: normal hearing bilateral ears.  - Vision: wears glasses, wears contacts and most recent eye exam < 1 year ago.  - Dental: regular dental visits.    /GYN Health:  - Follows with GYN: no.   - Contraception:.  Hysterectomy 7 years ago.  Encounter to establish care with gynecology in December 2024      Advanced Care Planning:  - Has an advanced directive?: yes      Review of Systems   Constitutional:  Negative for chills and fever.   HENT:  Negative for ear pain and sore throat.    Eyes:  Negative for pain and visual disturbance.   Respiratory:  Negative for cough and shortness of breath.    Cardiovascular:  Negative for chest pain and palpitations.   Gastrointestinal:  Negative for abdominal pain and vomiting.   Genitourinary:  Negative for dysuria and hematuria.   Musculoskeletal:  Negative for arthralgias and back pain.   Skin:  Negative for color change and rash.   Neurological:  Positive for headaches. Negative for seizures and syncope.   All other systems reviewed and are negative.    Medical History Reviewed by provider this encounter:  Tobacco  Allergies  Meds  Problems  Med Hx  Surg Hx  Fam Hx     .  Current Outpatient Medications on File Prior to Visit   Medication Sig Dispense Refill    albuterol (2.5 mg/3 mL) 0.083 % nebulizer solution Take 3 mL (2.5 mg total) by nebulization every 6 (six) hours as needed for wheezing or shortness of breath 75 mL 1    albuterol (PROVENTIL HFA,VENTOLIN HFA) 90 mcg/act inhaler Inhale 2 puffs by mouth every 6 hours as needed for wheezing. 54 g 1    conjugated estrogens (Premarin) 0.45 mg tablet TAKE 1 TABLET IN MORNING FOR 21 DAYS THEN DO NOT TAKE FOR 7 DAYS 21 tablet 5    cyclobenzaprine (FLEXERIL) 10 mg tablet Take 1 tablet (10 mg total) by mouth daily as needed for muscle spasms 30 tablet 0    fluticasone (FLONASE) 50 mcg/act nasal spray SPRAY 2 SPRAYS INTO EACH NOSTRIL EVERY DAY 16 mL 3    ibuprofen (MOTRIN) 600 mg tablet TAKE 1 TABLET (600 MG TOTAL) BY MOUTH EVERY 6 (SIX) HOURS AS NEEDED FOR MILD PAIN 100 tablet 1    ipratropium (Atrovent HFA) 17 mcg/act inhaler Inhale 2 puffs every 6 (six) hours 12.9 g 2    SUMAtriptan (IMITREX) 50 mg tablet Take 2 tablets (100 mg  total) by mouth once as needed for migraine 9 tablet 1    [DISCONTINUED] montelukast (SINGULAIR) 10 mg tablet TAKE 1 TABLET BY MOUTH EVERY DAY IN THE EVENING 100 tablet 1    [DISCONTINUED] zolpidem (AMBIEN) 10 mg tablet Take 1 tablet by mouth daily at bedtime as needed for sleep. 30 tablet 0     No current facility-administered medications on file prior to visit.        Objective   /76 (BP Location: Left arm, Patient Position: Sitting, Cuff Size: Standard)   Pulse 76   Temp 98 °F (36.7 °C) (Temporal)   Ht 5' (1.524 m)   Wt 78.5 kg (173 lb)   SpO2 97%   BMI 33.79 kg/m²     Physical Exam  Constitutional:       General: She is not in acute distress.     Appearance: Normal appearance.   HENT:      Head: Normocephalic and atraumatic.      Right Ear: Tympanic membrane, ear canal and external ear normal.      Left Ear: Tympanic membrane, ear canal and external ear normal.      Mouth/Throat:      Mouth: Mucous membranes are moist.      Pharynx: Oropharynx is clear.   Eyes:      General:         Right eye: No discharge.         Left eye: No discharge.      Extraocular Movements: Extraocular movements intact.      Conjunctiva/sclera: Conjunctivae normal.      Pupils: Pupils are equal, round, and reactive to light.      Comments: S/p glaucoma drainage implant on the right.   Cardiovascular:      Rate and Rhythm: Normal rate and regular rhythm.      Pulses: Normal pulses.      Heart sounds: Normal heart sounds. No murmur heard.  Pulmonary:      Effort: Pulmonary effort is normal. No respiratory distress.      Breath sounds: Normal breath sounds. No wheezing, rhonchi or rales.   Abdominal:      General: Bowel sounds are normal. There is no distension.      Tenderness: There is no abdominal tenderness. There is no guarding.   Musculoskeletal:         General: No swelling.   Skin:     General: Skin is warm.      Capillary Refill: Capillary refill takes less than 2 seconds.      Findings: No rash.   Neurological:       General: No focal deficit present.      Mental Status: She is alert and oriented to person, place, and time.      Motor: No weakness.

## 2024-11-19 NOTE — ASSESSMENT & PLAN NOTE
Orders:    zolpidem (AMBIEN) 10 mg tablet; Take 1 tablet (10 mg total) by mouth daily at bedtime as needed for sleep

## 2024-11-19 NOTE — PATIENT INSTRUCTIONS
Please schedule your mammogram and lung CT.   Please complete your Cologuard screening.  Please bring in your blood pressure log to your next appointment.

## 2024-12-01 LAB — COLOGUARD RESULT REPORTABLE: NEGATIVE

## 2024-12-02 ENCOUNTER — RESULTS FOLLOW-UP (OUTPATIENT)
Dept: INTERNAL MEDICINE CLINIC | Facility: OTHER | Age: 49
End: 2024-12-02

## 2024-12-12 DIAGNOSIS — G43.919 INTRACTABLE MIGRAINE WITHOUT STATUS MIGRAINOSUS, UNSPECIFIED MIGRAINE TYPE: ICD-10-CM

## 2024-12-12 DIAGNOSIS — F51.01 PRIMARY INSOMNIA: ICD-10-CM

## 2024-12-13 RX ORDER — SUMATRIPTAN 50 MG/1
TABLET, FILM COATED ORAL
Qty: 9 TABLET | Refills: 1 | Status: SHIPPED | OUTPATIENT
Start: 2024-12-13

## 2024-12-18 RX ORDER — ZOLPIDEM TARTRATE 10 MG/1
10 TABLET ORAL
Qty: 30 TABLET | Refills: 0 | Status: SHIPPED | OUTPATIENT
Start: 2024-12-18

## 2025-01-20 DIAGNOSIS — J44.1 CHRONIC OBSTRUCTIVE PULMONARY DISEASE WITH ACUTE EXACERBATION (HCC): ICD-10-CM

## 2025-01-20 DIAGNOSIS — F51.01 PRIMARY INSOMNIA: ICD-10-CM

## 2025-01-21 RX ORDER — ZOLPIDEM TARTRATE 10 MG/1
10 TABLET ORAL
Qty: 30 TABLET | Refills: 0 | Status: SHIPPED | OUTPATIENT
Start: 2025-01-21

## 2025-01-21 RX ORDER — ALBUTEROL SULFATE 90 UG/1
2 INHALANT RESPIRATORY (INHALATION) EVERY 6 HOURS PRN
Qty: 54 G | Refills: 1 | Status: SHIPPED | OUTPATIENT
Start: 2025-01-21

## 2025-03-03 DIAGNOSIS — F51.01 PRIMARY INSOMNIA: ICD-10-CM

## 2025-03-04 RX ORDER — ZOLPIDEM TARTRATE 10 MG/1
10 TABLET ORAL
Qty: 30 TABLET | OUTPATIENT
Start: 2025-03-04

## 2025-03-05 ENCOUNTER — OFFICE VISIT (OUTPATIENT)
Dept: INTERNAL MEDICINE CLINIC | Facility: OTHER | Age: 50
End: 2025-03-05
Payer: COMMERCIAL

## 2025-03-05 VITALS
SYSTOLIC BLOOD PRESSURE: 122 MMHG | OXYGEN SATURATION: 97 % | HEIGHT: 60 IN | TEMPERATURE: 98.1 F | HEART RATE: 72 BPM | WEIGHT: 175.2 LBS | DIASTOLIC BLOOD PRESSURE: 80 MMHG | BODY MASS INDEX: 34.4 KG/M2

## 2025-03-05 DIAGNOSIS — F41.8 DEPRESSION WITH ANXIETY: ICD-10-CM

## 2025-03-05 DIAGNOSIS — Z11.59 NEED FOR HEPATITIS C SCREENING TEST: ICD-10-CM

## 2025-03-05 DIAGNOSIS — Q60.0 UNILATERAL CONGENITAL ABSENCE OF KIDNEY: ICD-10-CM

## 2025-03-05 DIAGNOSIS — J45.20 MILD INTERMITTENT ASTHMA WITHOUT COMPLICATION: ICD-10-CM

## 2025-03-05 DIAGNOSIS — G43.919 INTRACTABLE MIGRAINE WITHOUT STATUS MIGRAINOSUS, UNSPECIFIED MIGRAINE TYPE: ICD-10-CM

## 2025-03-05 DIAGNOSIS — F51.01 PRIMARY INSOMNIA: Primary | ICD-10-CM

## 2025-03-05 DIAGNOSIS — E66.09 CLASS 1 OBESITY DUE TO EXCESS CALORIES WITH SERIOUS COMORBIDITY AND BODY MASS INDEX (BMI) OF 33.0 TO 33.9 IN ADULT: ICD-10-CM

## 2025-03-05 DIAGNOSIS — Z12.31 ENCOUNTER FOR SCREENING MAMMOGRAM FOR BREAST CANCER: ICD-10-CM

## 2025-03-05 DIAGNOSIS — Z72.0 TOBACCO USE: ICD-10-CM

## 2025-03-05 DIAGNOSIS — E78.2 MIXED HYPERLIPIDEMIA: ICD-10-CM

## 2025-03-05 DIAGNOSIS — R73.01 IFG (IMPAIRED FASTING GLUCOSE): ICD-10-CM

## 2025-03-05 DIAGNOSIS — E66.811 CLASS 1 OBESITY DUE TO EXCESS CALORIES WITH SERIOUS COMORBIDITY AND BODY MASS INDEX (BMI) OF 33.0 TO 33.9 IN ADULT: ICD-10-CM

## 2025-03-05 DIAGNOSIS — M54.12 CERVICAL RADICULOPATHY: ICD-10-CM

## 2025-03-05 DIAGNOSIS — M54.16 LUMBAR RADICULOPATHY: ICD-10-CM

## 2025-03-05 PROBLEM — D64.9 ABSOLUTE ANEMIA: Status: RESOLVED | Noted: 2019-11-21 | Resolved: 2025-03-05

## 2025-03-05 PROBLEM — R73.9 HYPERGLYCEMIA: Status: RESOLVED | Noted: 2023-04-04 | Resolved: 2025-03-05

## 2025-03-05 PROCEDURE — 99214 OFFICE O/P EST MOD 30 MIN: CPT | Performed by: INTERNAL MEDICINE

## 2025-03-05 RX ORDER — ATORVASTATIN CALCIUM 10 MG/1
10 TABLET, FILM COATED ORAL DAILY
Qty: 100 TABLET | Refills: 1 | Status: SHIPPED | OUTPATIENT
Start: 2025-03-05

## 2025-03-05 RX ORDER — ZOLPIDEM TARTRATE 10 MG/1
10 TABLET ORAL
Qty: 30 TABLET | Refills: 0 | Status: SHIPPED | OUTPATIENT
Start: 2025-03-05

## 2025-03-05 RX ORDER — SEMAGLUTIDE 0.25 MG/.5ML
INJECTION, SOLUTION SUBCUTANEOUS
Qty: 2 ML | Refills: 0 | Status: SHIPPED | OUTPATIENT
Start: 2025-03-05

## 2025-03-05 NOTE — ASSESSMENT & PLAN NOTE
Discussed smoking cessation. Currently smoking 1/2 ppd.   Orders:  •  CT lung screening program; Future

## 2025-03-05 NOTE — PROGRESS NOTES
Name: Geraldine Garner      : 1975      MRN: [id number]  Encounter Provider: Juan M Serrato MD  Encounter Date: 3/5/2025   Encounter department: [facility] [place]  :  Assessment & Plan  Primary insomnia  Continue zolpidem 10 mg HS PRN.   Orders:  •  zolpidem (AMBIEN) 10 mg tablet; Take 1 tablet (10 mg total) by mouth daily at bedtime as needed for sleep    Intractable migraine without status migrainosus, unspecified migraine type  Stable, controlled. Continue PRN Imitrex.        Mild intermittent asthma without complication  Stable without recent exacerbation. Continue albuterol inhaler.        Cervical radiculopathy  Discussed ROM and stretching exercises.        Lumbar radiculopathy  Continue ROM and stretching exercises.        Unilateral congenital absence of kidney  Continue to trend kidney function.        Tobacco use  Discussed smoking cessation. Currently smoking 1/2 ppd.   Orders:  •  CT lung screening program; Future    Depression with anxiety  Stable. Monitor clinically.        Encounter for screening mammogram for breast cancer    Orders:  •  Mammo screening bilateral w 3d and cad; Future    Mixed hyperlipidemia  Will start Lipitor 10 mg daily. Check CMP and CK in 3 months.   Orders:  •  atorvastatin (LIPITOR) 10 mg tablet; Take 1 tablet (10 mg total) by mouth daily  •  CK; Future  •  Comprehensive metabolic panel; Future    Class 1 obesity due to excess calories with serious comorbidity and body mass index (BMI) of 33.0 to 33.9 in adult  Prior Authorization Clinical Questions for Weight Management Pharmacotherapy    1. Does the patient have a contrainidcation to medication prescribed for weight management?: No  2. Does the patient have a diagnosis of obesity, confirmed by a BMI greater than or equal to 30 kg/m^2?: Yes  3. Does the patient have a BMI of greater than or equal to 27 kg/m^2 with at least one weight-related comorbidity/risk factor/complication (e.g.  diabetes, dyslipidemia, coronary artery disease)?: Yes  4. Weight-related co-morbidities/risk factors: prediabetes  5. WEGOVY CVA Indication: Does patient have established documented cardiovascular disease (history of a prior heart attack (myocardial infarction), stroke, or symptomatic peripheral arterial disease (PAD)?: No  6. ZEPBOUND CYN Indication: Does patient have documented CYN diagnosed via sleep study (insurance will require copy of sleep study results for approval)?: No  7. Has the patient been on a weight loss regimen of low-calorie diet, increased physical activity, and lifestyle modifications for a minimum of 6 months?: Yes  8. Has the patient completed a comprehensive weight loss program (ie, Weight Watchers, Noom, Bariatrics, other tiago on phone)? If so, what?: No  9. Does the patient have a history of type 2 diabetes?: No  10. Has the member tried and failed other weight loss medication within the past 12 months?: No  11. Will the member use requested medication in combination with another GLP agonist or weight loss drug?: No  12. Is the medication a controlled substance?: No  Additional comments: Previously on Wegovy which she was on for 4 months and lost 40 pounds.  She stopped taking Wegovy while recovering from rotator cuff tear     Baseline weight (in pounds): 175 lbs  Current weight (in pounds): 175 lbs  Weight loss percentage: 0%       Will restart wegovy. Discussed diet and exercise.   Orders:  •  Semaglutide-Weight Management (Wegovy) 0.25 MG/0.5ML; Inject 0.25 mg under the skin weekly    IFG (impaired fasting glucose)  Continue to trend A1c.   Orders:  •  Hemoglobin A1C; Future    Need for hepatitis C screening test    Orders:  •  Hepatitis C Antibody; Future           History of Present Illness   Geraldine Garner is seen today for follow up of chronic conditions.   Recent laboratory studies reviewed today with the patient.   she has been compliant with her medication regimen.   She has been  experiencing sinus congestion that occurs when she's lying down for sleep. She has been compliant with singular and Flonase.   Asthma: stable.   Insomnia: controlled with zolpidem. Neck discomfort contributes to insomnia.   she has no complaints or concerns at this time.       Neck Pain   This is a chronic problem. The current episode started more than 1 year ago. The problem occurs daily. The problem has been unchanged. Pertinent negatives include no chest pain, fever, headaches, numbness or weakness.   Asthma  There is no cough, shortness of breath or wheezing. This is a chronic problem. The current episode started more than 1 year ago. The problem occurs rarely. Pertinent negatives include no appetite change, chest pain, fever, headaches, postnasal drip, rhinorrhea, sneezing or sore throat. Her symptoms are alleviated by beta-agonist. She reports moderate improvement on treatment. Her past medical history is significant for asthma.   Hyperlipidemia  This is a chronic problem. The current episode started more than 1 year ago. The problem is uncontrolled. Recent lipid tests were reviewed and are high. Pertinent negatives include no chest pain or shortness of breath. She is currently on no antihyperlipidemic treatment.     Review of Systems   Constitutional:  Negative for activity change, appetite change, chills, diaphoresis, fatigue and fever.   HENT:  Negative for congestion, postnasal drip, rhinorrhea, sinus pressure, sinus pain, sneezing and sore throat.    Eyes:  Negative for visual disturbance.   Respiratory:  Negative for apnea, cough, choking, chest tightness, shortness of breath and wheezing.    Cardiovascular:  Negative for chest pain, palpitations and leg swelling.   Gastrointestinal:  Negative for abdominal distention, abdominal pain, anal bleeding, blood in stool, constipation, diarrhea, nausea and vomiting.   Endocrine: Negative for cold intolerance and heat intolerance.   Genitourinary:  Negative for  difficulty urinating, dysuria and hematuria.   Musculoskeletal:  Positive for neck pain.   Skin: Negative.    Neurological:  Negative for dizziness, weakness, light-headedness, numbness and headaches.   Hematological:  Negative for adenopathy.   Psychiatric/Behavioral:  Negative for agitation, sleep disturbance and suicidal ideas.    All other systems reviewed and are negative.      Objective   /80 (BP Location: Left arm, Patient Position: Sitting, Cuff Size: Standard)   Pulse 72   Temp 98.1 °F (36.7 °C) (Temporal)   Ht 5' (1.524 m)   Wt 79.5 kg (175 lb 3.2 oz)   SpO2 97%   BMI 34.22 kg/m²      Physical Exam  Vitals and nursing note reviewed.   Constitutional:       General: She is not in acute distress.     Appearance: She is well-developed. She is not diaphoretic.   HENT:      Head: Normocephalic and atraumatic.   Eyes:      General:         Right eye: No discharge.         Left eye: No discharge.      Conjunctiva/sclera: Conjunctivae normal.      Pupils: Pupils are equal, round, and reactive to light.   Neck:      Thyroid: No thyromegaly.      Vascular: No JVD.   Cardiovascular:      Rate and Rhythm: Normal rate and regular rhythm.      Heart sounds: Normal heart sounds. No murmur heard.     No friction rub. No gallop.   Pulmonary:      Effort: Pulmonary effort is normal. No respiratory distress.      Breath sounds: Normal breath sounds. No wheezing or rales.   Chest:      Chest wall: No tenderness.   Abdominal:      General: There is no distension.      Palpations: Abdomen is soft.      Tenderness: There is no abdominal tenderness.   Musculoskeletal:         General: No tenderness or deformity. Normal range of motion.      Cervical back: Normal range of motion and neck supple.   Lymphadenopathy:      Cervical: No cervical adenopathy.   Skin:     General: Skin is warm and dry.      Coloration: Skin is not pale.      Findings: No erythema or rash.   Neurological:      Mental Status: She is alert and  oriented to person, place, and time.      Cranial Nerves: No cranial nerve deficit.      Coordination: Coordination normal.   Psychiatric:         Behavior: Behavior normal.         Thought Content: Thought content normal.         Judgment: Judgment normal.

## 2025-03-05 NOTE — ASSESSMENT & PLAN NOTE
Continue zolpidem 10 mg HS PRN.   Orders:  •  zolpidem (AMBIEN) 10 mg tablet; Take 1 tablet (10 mg total) by mouth daily at bedtime as needed for sleep

## 2025-03-05 NOTE — ASSESSMENT & PLAN NOTE
Prior Authorization Clinical Questions for Weight Management Pharmacotherapy    1. Does the patient have a contrainidcation to medication prescribed for weight management?: No  2. Does the patient have a diagnosis of obesity, confirmed by a BMI greater than or equal to 30 kg/m^2?: Yes  3. Does the patient have a BMI of greater than or equal to 27 kg/m^2 with at least one weight-related comorbidity/risk factor/complication (e.g. diabetes, dyslipidemia, coronary artery disease)?: Yes  4. Weight-related co-morbidities/risk factors: prediabetes  5. WEGOVY CVA Indication: Does patient have established documented cardiovascular disease (history of a prior heart attack (myocardial infarction), stroke, or symptomatic peripheral arterial disease (PAD)?: No  6. ZEPBOUND CYN Indication: Does patient have documented CYN diagnosed via sleep study (insurance will require copy of sleep study results for approval)?: No  7. Has the patient been on a weight loss regimen of low-calorie diet, increased physical activity, and lifestyle modifications for a minimum of 6 months?: Yes  8. Has the patient completed a comprehensive weight loss program (ie, Weight Watchers, Noom, Bariatrics, other tiago on phone)? If so, what?: No  9. Does the patient have a history of type 2 diabetes?: No  10. Has the member tried and failed other weight loss medication within the past 12 months?: No  11. Will the member use requested medication in combination with another GLP agonist or weight loss drug?: No  12. Is the medication a controlled substance?: No  Additional comments: Previously on Wegovy which she was on for 4 months and lost 40 pounds.  She stopped taking Wegovy while recovering from rotator cuff tear     Baseline weight (in pounds): 175 lbs  Current weight (in pounds): 175 lbs  Weight loss percentage: 0%       Will restart wegovy. Discussed diet and exercise.   Orders:  •  Semaglutide-Weight Management (Wegovy) 0.25 MG/0.5ML; Inject 0.25 mg  under the skin weekly

## 2025-03-05 NOTE — ASSESSMENT & PLAN NOTE
Will start Lipitor 10 mg daily. Check CMP and CK in 3 months.   Orders:  •  atorvastatin (LIPITOR) 10 mg tablet; Take 1 tablet (10 mg total) by mouth daily  •  CK; Future  •  Comprehensive metabolic panel; Future

## 2025-04-09 DIAGNOSIS — F51.01 PRIMARY INSOMNIA: ICD-10-CM

## 2025-04-10 RX ORDER — ZOLPIDEM TARTRATE 10 MG/1
10 TABLET ORAL
Qty: 30 TABLET | OUTPATIENT
Start: 2025-04-10

## 2025-04-11 DIAGNOSIS — F51.01 PRIMARY INSOMNIA: ICD-10-CM

## 2025-04-11 DIAGNOSIS — G43.919 INTRACTABLE MIGRAINE WITHOUT STATUS MIGRAINOSUS, UNSPECIFIED MIGRAINE TYPE: ICD-10-CM

## 2025-04-11 DIAGNOSIS — R52 PAIN AGGRAVATED BY WALKING: ICD-10-CM

## 2025-04-11 DIAGNOSIS — J01.00 ACUTE NON-RECURRENT MAXILLARY SINUSITIS: ICD-10-CM

## 2025-04-14 RX ORDER — ZOLPIDEM TARTRATE 10 MG/1
10 TABLET ORAL
Qty: 30 TABLET | OUTPATIENT
Start: 2025-04-14

## 2025-04-14 RX ORDER — SUMATRIPTAN 50 MG/1
TABLET, FILM COATED ORAL
Qty: 9 TABLET | Refills: 2 | Status: SHIPPED | OUTPATIENT
Start: 2025-04-14

## 2025-04-14 RX ORDER — CYCLOBENZAPRINE HCL 10 MG
10 TABLET ORAL DAILY PRN
Qty: 30 TABLET | Refills: 0 | OUTPATIENT
Start: 2025-04-14

## 2025-04-14 RX ORDER — FLUTICASONE PROPIONATE 50 MCG
SPRAY, SUSPENSION (ML) NASAL
Qty: 16 ML | Refills: 0 | OUTPATIENT
Start: 2025-04-14

## 2025-04-14 RX ORDER — ZOLPIDEM TARTRATE 10 MG/1
10 TABLET ORAL
Qty: 30 TABLET | Refills: 0 | Status: SHIPPED | OUTPATIENT
Start: 2025-04-14

## 2025-04-14 NOTE — TELEPHONE ENCOUNTER
Next Office Visit 6/4/25      Has been over 1 year since flexeril was prescribed and 2 yrs for the flonase. Appointment recommended for medications refills

## 2025-04-25 ENCOUNTER — VBI (OUTPATIENT)
Dept: ADMINISTRATIVE | Facility: OTHER | Age: 50
End: 2025-04-25

## 2025-04-25 NOTE — TELEPHONE ENCOUNTER
04/25/25 8:37 AM     Chart reviewed for Pap Smear (HPV) aka Cervical Cancer Screening was/were not submitted to the patient's insurance.     Talya Martin MA   [facility]

## 2025-05-12 DIAGNOSIS — F51.01 PRIMARY INSOMNIA: ICD-10-CM

## 2025-05-13 RX ORDER — ZOLPIDEM TARTRATE 10 MG/1
10 TABLET ORAL
Qty: 30 TABLET | OUTPATIENT
Start: 2025-05-13

## 2025-05-18 DIAGNOSIS — F51.01 PRIMARY INSOMNIA: ICD-10-CM

## 2025-05-18 DIAGNOSIS — S46.012D TRAUMATIC COMPLETE TEAR OF LEFT ROTATOR CUFF, SUBSEQUENT ENCOUNTER: ICD-10-CM

## 2025-05-18 DIAGNOSIS — G43.919 INTRACTABLE MIGRAINE WITHOUT STATUS MIGRAINOSUS, UNSPECIFIED MIGRAINE TYPE: ICD-10-CM

## 2025-05-18 RX ORDER — SUMATRIPTAN 50 MG/1
TABLET, FILM COATED ORAL
Qty: 9 TABLET | Refills: 0 | Status: SHIPPED | OUTPATIENT
Start: 2025-05-18

## 2025-05-19 RX ORDER — ZOLPIDEM TARTRATE 10 MG/1
10 TABLET ORAL
Qty: 30 TABLET | Refills: 0 | Status: SHIPPED | OUTPATIENT
Start: 2025-05-19

## 2025-05-19 RX ORDER — ZOLPIDEM TARTRATE 10 MG/1
10 TABLET ORAL
Qty: 30 TABLET | OUTPATIENT
Start: 2025-05-19

## 2025-05-19 RX ORDER — IBUPROFEN 600 MG/1
600 TABLET, FILM COATED ORAL EVERY 6 HOURS PRN
Qty: 100 TABLET | Refills: 0 | OUTPATIENT
Start: 2025-05-19

## 2025-05-19 NOTE — TELEPHONE ENCOUNTER
Prescribed by previous pcp in Aug 2024    Next Office Visit 06/04/2025    Please refill if appropriate

## 2025-06-04 ENCOUNTER — TELEPHONE (OUTPATIENT)
Dept: INTERNAL MEDICINE CLINIC | Facility: OTHER | Age: 50
End: 2025-06-04

## 2025-06-09 DIAGNOSIS — G43.919 INTRACTABLE MIGRAINE WITHOUT STATUS MIGRAINOSUS, UNSPECIFIED MIGRAINE TYPE: ICD-10-CM

## 2025-06-09 RX ORDER — SUMATRIPTAN 50 MG/1
TABLET, FILM COATED ORAL
Qty: 9 TABLET | Refills: 0 | OUTPATIENT
Start: 2025-06-09

## 2025-06-21 DIAGNOSIS — J44.1 CHRONIC OBSTRUCTIVE PULMONARY DISEASE WITH ACUTE EXACERBATION (HCC): ICD-10-CM

## 2025-06-21 DIAGNOSIS — F51.01 PRIMARY INSOMNIA: ICD-10-CM

## 2025-06-21 RX ORDER — ALBUTEROL SULFATE 90 UG/1
2 INHALANT RESPIRATORY (INHALATION) EVERY 6 HOURS PRN
Qty: 54 G | Refills: 0 | Status: SHIPPED | OUTPATIENT
Start: 2025-06-21

## 2025-06-23 RX ORDER — ZOLPIDEM TARTRATE 10 MG/1
10 TABLET ORAL
Qty: 30 TABLET | OUTPATIENT
Start: 2025-06-23

## 2025-06-24 DIAGNOSIS — F51.01 PRIMARY INSOMNIA: ICD-10-CM

## 2025-06-25 DIAGNOSIS — G43.919 INTRACTABLE MIGRAINE WITHOUT STATUS MIGRAINOSUS, UNSPECIFIED MIGRAINE TYPE: ICD-10-CM

## 2025-06-25 DIAGNOSIS — F51.01 PRIMARY INSOMNIA: ICD-10-CM

## 2025-06-25 DIAGNOSIS — E28.39 ESTROGEN DEFICIENCY: ICD-10-CM

## 2025-06-25 RX ORDER — ZOLPIDEM TARTRATE 10 MG/1
10 TABLET ORAL
Qty: 30 TABLET | OUTPATIENT
Start: 2025-06-25

## 2025-06-25 RX ORDER — SUMATRIPTAN 50 MG/1
TABLET, FILM COATED ORAL
Qty: 9 TABLET | Refills: 0 | Status: SHIPPED | OUTPATIENT
Start: 2025-06-25

## 2025-06-26 RX ORDER — ZOLPIDEM TARTRATE 10 MG/1
10 TABLET ORAL
Qty: 30 TABLET | Refills: 0 | Status: SHIPPED | OUTPATIENT
Start: 2025-06-26

## 2025-06-27 NOTE — TELEPHONE ENCOUNTER
Pt is no longer our pt. Confirmed with pt she does not need this script forwarded to current PCP. Please delete request.

## 2025-07-29 DIAGNOSIS — G43.919 INTRACTABLE MIGRAINE WITHOUT STATUS MIGRAINOSUS, UNSPECIFIED MIGRAINE TYPE: ICD-10-CM

## 2025-07-29 DIAGNOSIS — R52 PAIN AGGRAVATED BY WALKING: ICD-10-CM

## 2025-07-29 DIAGNOSIS — F51.01 PRIMARY INSOMNIA: ICD-10-CM

## 2025-07-30 RX ORDER — SUMATRIPTAN 50 MG/1
TABLET, FILM COATED ORAL
Qty: 9 TABLET | Refills: 0 | Status: SHIPPED | OUTPATIENT
Start: 2025-07-30

## 2025-07-31 RX ORDER — CYCLOBENZAPRINE HCL 10 MG
10 TABLET ORAL DAILY PRN
Qty: 30 TABLET | Refills: 0 | OUTPATIENT
Start: 2025-07-31

## 2025-07-31 RX ORDER — ZOLPIDEM TARTRATE 10 MG/1
10 TABLET ORAL
Qty: 30 TABLET | Refills: 0 | Status: SHIPPED | OUTPATIENT
Start: 2025-07-31

## 2025-07-31 RX ORDER — ZOLPIDEM TARTRATE 10 MG/1
10 TABLET ORAL
Qty: 30 TABLET | OUTPATIENT
Start: 2025-07-31

## (undated) DEVICE — STERILE POLYISOPRENE POWDER-FREE SURGICAL GLOVES: Brand: PROTEXIS

## (undated) DEVICE — DRAPE C-ARM X-RAY

## (undated) DEVICE — 10FR FRAZIER SUCTION HANDLE: Brand: CARDINAL HEALTH

## (undated) DEVICE — SYRINGE 10ML LL

## (undated) DEVICE — SUT VICRYL 4-0 PS-2 18 IN J496G

## (undated) DEVICE — OCCLUSIVE GAUZE STRIP,3% BISMUTH TRIBROMOPHENATE IN PETROLATUM BLEND: Brand: XEROFORM

## (undated) DEVICE — Device

## (undated) DEVICE — GAUZE SPONGES,USP TYPE VII GAUZE, 12 PLY: Brand: CURITY

## (undated) DEVICE — MAT FLOOR STEP DRI 24 X 36 IN N-STRL

## (undated) DEVICE — STERILE POLYISOPRENE POWDER-FREE SURGICAL GLOVES WITH EMOLLIENT COATING: Brand: PROTEXIS

## (undated) DEVICE — BLADE SHAVER EXCALIBUR 4MM 13CM COOLCUT

## (undated) DEVICE — DRILL BIT 2MM CANN

## (undated) DEVICE — SUT MONOCRYL 4-0 PS-2 27 IN Y426H

## (undated) DEVICE — PAD CAST 4 IN COTTON NON STERILE

## (undated) DEVICE — STRETCH BANDAGE ROLL: Brand: DERMACEA

## (undated) DEVICE — GLOVE INDICATOR PI UNDERGLOVE SZ 8 BLUE

## (undated) DEVICE — SUT ETHILON 3-0 INFS-1 30 IN 669H

## (undated) DEVICE — GLOVE SRG BIOGEL 8

## (undated) DEVICE — READY WET SKIN SCRUB TRAY-LF: Brand: MEDLINE INDUSTRIES, INC.

## (undated) DEVICE — STOCKINETTE,IMPERVIOUS,12X48,STERILE: Brand: MEDLINE

## (undated) DEVICE — GLOVE INDICATOR PI UNDERGLOVE SZ 7.5 BLUE

## (undated) DEVICE — KERLIX BANDAGE ROLL: Brand: KERLIX

## (undated) DEVICE — 4-PORT MANIFOLD: Brand: NEPTUNE 2

## (undated) DEVICE — PAD GROUNDING ADULT

## (undated) DEVICE — CAST PLASTER 4 IN ROLL FAST SET

## (undated) DEVICE — UNDERGLOVE PROTEXIS  BLUE SZ 7

## (undated) DEVICE — SUT VICRYL 2-0 J459H

## (undated) DEVICE — REAMER PHALANGEAL 20MM

## (undated) DEVICE — BURR  OVAL 4MM 13CM 8 FLUTE COOLCUT

## (undated) DEVICE — SUT VICRYL 4-0 PS-2 27 IN J426H

## (undated) DEVICE — BLADE SAGITTAL 25.6 X 9.5MM

## (undated) DEVICE — CAST PADDING 4 IN UNSTERILE

## (undated) DEVICE — SCD SEQUENTIAL COMPRESSION COMFORT SLEEVE MEDIUM KNEE LENGTH: Brand: KENDALL SCD

## (undated) DEVICE — REAMER METATARSAL 20MM

## (undated) DEVICE — LIGHT GLOVE GREEN

## (undated) DEVICE — CANNULA DISP 8.25 X 70MM W/SEAL SIDE PORT THRD

## (undated) DEVICE — DRILL BIT 3 X 120MM

## (undated) DEVICE — TUBING SUCTION 5MM X 12 FT

## (undated) DEVICE — INTENDED FOR TISSUE SEPARATION, AND OTHER PROCEDURES THAT REQUIRE A SHARP SURGICAL BLADE TO PUNCTURE OR CUT.: Brand: BARD-PARKER SAFETY BLADES SIZE 15, STERILE

## (undated) DEVICE — 3M™ STERI-DRAPE™ U-DRAPE 1015: Brand: STERI-DRAPE™

## (undated) DEVICE — ANCHOR SUT TAK-BB THRD

## (undated) DEVICE — UNIVERSAL  MINOR EXTREMITY PK: Brand: CARDINAL HEALTH

## (undated) DEVICE — GLOVE SRG BIOGEL 7.5

## (undated) DEVICE — CHLORAPREP HI-LITE 26ML ORANGE

## (undated) DEVICE — SUT PROLENE 4-0 PS-2 18 IN 8682H

## (undated) DEVICE — CURITY STRETCH BANDAGE: Brand: CURITY

## (undated) DEVICE — NEEDLE 18 G X 1 1/2 SAFETY

## (undated) DEVICE — NEEDLE 25G X 1 1/2

## (undated) DEVICE — DRAPE C-ARMOUR

## (undated) DEVICE — SUT ETHILON 4-0 PS-2 18 IN 1667H

## (undated) DEVICE — GAUZE SPONGES,16 PLY: Brand: CURITY

## (undated) DEVICE — ACE WRAP 4 IN UNSTERILE

## (undated) DEVICE — INTENDED FOR TISSUE SEPARATION, AND OTHER PROCEDURES THAT REQUIRE A SHARP SURGICAL BLADE TO PUNCTURE OR CUT.: Brand: BARD-PARKER ® CARBON RIB-BACK BLADES

## (undated) DEVICE — INTENDED FOR TISSUE SEPARATION, AND OTHER PROCEDURES THAT REQUIRE A SHARP SURGICAL BLADE TO PUNCTURE OR CUT.: Brand: BARD-PARKER ® SAFETYLOCK CARBON RIB-BACK BLADES

## (undated) DEVICE — CAST PADDING 4 IN SYNTHETIC NON-STRL

## (undated) DEVICE — DISPOSABLE CANNULA WITH OBTURATOR, SMOOTH, 6.0 MM X 75 MM: Brand: CONMED

## (undated) DEVICE — NEEDLE SUT SCORPION MULTIFIRE

## (undated) DEVICE — PREP SURGICAL PURPREP 26ML

## (undated) DEVICE — PENCIL ELECTROSURG E-Z CLEAN -0035H

## (undated) DEVICE — NEEDLE BLUNT 18 G X 1 1/2IN

## (undated) DEVICE — ASTOUND FABRIC REINFORCED SURGICAL GOWN: Brand: CONVERTORS

## (undated) DEVICE — GUIDEWIRE 1.1MM .045 IN TROCAR TIP LASER LINE

## (undated) DEVICE — SYRINGE 50ML LL

## (undated) DEVICE — STRETCH BANDAGE: Brand: CURITY

## (undated) DEVICE — FIBERTAPE 2MM X 7IN AR-7237-7

## (undated) DEVICE — SUT VICRYL 3-0 PS-2 27 IN J427H

## (undated) DEVICE — PREP PAD BNS: Brand: CONVERTORS

## (undated) DEVICE — AMBIENT MEGAVAC 90: Brand: COBLATION

## (undated) DEVICE — SYRINGE 10ML LL CONTROL TOP

## (undated) DEVICE — AMBIENT COVAC 50 IFS: Brand: COBLATION

## (undated) DEVICE — SUT VICRYL 2-0 SH 27 IN UNDYED J417H

## (undated) DEVICE — COBAN 4 IN STERILE

## (undated) DEVICE — CURITY NON-ADHERENT STRIPS: Brand: CURITY

## (undated) DEVICE — STANDARD SURGICAL GOWN, L: Brand: CONVERTORS

## (undated) DEVICE — NEEDLE 18 G X 1 1/2

## (undated) DEVICE — GUIDEWIRE 2.4MM

## (undated) DEVICE — SPECIMEN TISSUE TRAP 12MM RIGID

## (undated) DEVICE — ARTHREX WIRE 0.062 IN ORTHO TROC TIP

## (undated) DEVICE — PACK PBDS SHOULDER ARTHROSCOPY RF

## (undated) DEVICE — ACE WRAP 4 IN STERILE

## (undated) DEVICE — REAMER METATARSAL 22MM

## (undated) DEVICE — 2000CC GUARDIAN II: Brand: GUARDIAN

## (undated) DEVICE — BIT DRILL 2MM

## (undated) DEVICE — SPLINT ORTHO-GLASS 4IN X 15FT

## (undated) DEVICE — NEEDLE 21 G X 1 1/2 SAFETY

## (undated) DEVICE — 3M™ STERI-STRIP™ REINFORCED ADHESIVE SKIN CLOSURES, R1547, 1/2 IN X 4 IN (12 MM X 100 MM), 6 STRIPS/ENVELOPE: Brand: 3M™ STERI-STRIP™

## (undated) DEVICE — CUFF TOURNIQUET DISP SZ30

## (undated) DEVICE — PADDING CAST 4 IN  COTTON STRL

## (undated) DEVICE — ABDOMINAL PAD: Brand: DERMACEA

## (undated) DEVICE — STOCKINETTE REGULAR

## (undated) DEVICE — NEEDLE SPINAL 18G X 3IN DISP

## (undated) DEVICE — SUT VICRYL 3-0 SH 27 IN J416H

## (undated) DEVICE — PUDDLE VAC

## (undated) DEVICE — ACE WRAP 6 IN UNSTERILE

## (undated) DEVICE — TUBING ARTHROSCOPIC WAVE  MAIN PUMP

## (undated) DEVICE — T-MAX DISPOSABLE FACE MASK 8 PER BOX

## (undated) DEVICE — 3M™ DURAPORE™ SURGICAL TAPE 1538-3, 3 INCH X 10 YARD (7,5CM X 9,1M), 4 ROLLS/BOX: Brand: 3M™ DURAPORE™

## (undated) DEVICE — REM POLYHESIVE ADULT PATIENT RETURN ELECTRODE: Brand: VALLEYLAB

## (undated) DEVICE — GAUZE ROLL,3 PLY: Brand: DERMACEA

## (undated) DEVICE — KIT MINI SUTURETAK DISP

## (undated) DEVICE — CUFF TOURNIQUET DISP SZ34

## (undated) DEVICE — WEBRIL 6 IN UNSTERILE